# Patient Record
Sex: FEMALE | Race: WHITE | Employment: FULL TIME | URBAN - METROPOLITAN AREA
[De-identification: names, ages, dates, MRNs, and addresses within clinical notes are randomized per-mention and may not be internally consistent; named-entity substitution may affect disease eponyms.]

---

## 2021-02-02 ENCOUNTER — APPOINTMENT (OUTPATIENT)
Dept: GENERAL RADIOLOGY | Age: 36
End: 2021-02-02
Attending: EMERGENCY MEDICINE
Payer: COMMERCIAL

## 2021-02-02 ENCOUNTER — HOSPITAL ENCOUNTER (EMERGENCY)
Age: 36
Discharge: HOME | End: 2021-02-03
Attending: EMERGENCY MEDICINE
Payer: COMMERCIAL

## 2021-02-02 DIAGNOSIS — R07.81 PLEURITIC CHEST PAIN: Primary | ICD-10-CM

## 2021-02-02 DIAGNOSIS — K52.9 GASTROENTERITIS, ACUTE: ICD-10-CM

## 2021-02-02 DIAGNOSIS — Z20.822 SUSPECTED COVID-19 VIRUS INFECTION: ICD-10-CM

## 2021-02-02 LAB
ALBUMIN SERPL-MCNC: 3.3 G/DL (ref 3.5–5)
ALBUMIN/GLOB SERPL: 0.7 {RATIO} (ref 1.2–3.5)
ALP SERPL-CCNC: 105 U/L (ref 50–136)
ALT SERPL-CCNC: 27 U/L (ref 12–65)
ANION GAP SERPL CALC-SCNC: 7 MMOL/L (ref 7–16)
AST SERPL-CCNC: 28 U/L (ref 15–37)
BASOPHILS # BLD: 0 K/UL (ref 0–0.2)
BASOPHILS NFR BLD: 0 % (ref 0–2)
BILIRUB SERPL-MCNC: 0.4 MG/DL (ref 0.2–1.1)
BUN SERPL-MCNC: 13 MG/DL (ref 6–23)
CALCIUM SERPL-MCNC: 9.1 MG/DL (ref 8.3–10.4)
CHLORIDE SERPL-SCNC: 101 MMOL/L (ref 98–107)
CO2 SERPL-SCNC: 28 MMOL/L (ref 21–32)
CREAT SERPL-MCNC: 0.62 MG/DL (ref 0.6–1)
DIFFERENTIAL METHOD BLD: ABNORMAL
EOSINOPHIL # BLD: 0 K/UL (ref 0–0.8)
EOSINOPHIL NFR BLD: 0 % (ref 0.5–7.8)
ERYTHROCYTE [DISTWIDTH] IN BLOOD BY AUTOMATED COUNT: 13.7 % (ref 11.9–14.6)
GLOBULIN SER CALC-MCNC: 4.6 G/DL (ref 2.3–3.5)
GLUCOSE SERPL-MCNC: 187 MG/DL (ref 65–100)
HCG UR QL: NEGATIVE
HCT VFR BLD AUTO: 43.8 % (ref 35.8–46.3)
HGB BLD-MCNC: 14.3 G/DL (ref 11.7–15.4)
IMM GRANULOCYTES # BLD AUTO: 0 K/UL (ref 0–0.5)
IMM GRANULOCYTES NFR BLD AUTO: 0 % (ref 0–5)
LIPASE SERPL-CCNC: 56 U/L (ref 73–393)
LYMPHOCYTES # BLD: 1.2 K/UL (ref 0.5–4.6)
LYMPHOCYTES NFR BLD: 16 % (ref 13–44)
MCH RBC QN AUTO: 27.8 PG (ref 26.1–32.9)
MCHC RBC AUTO-ENTMCNC: 32.6 G/DL (ref 31.4–35)
MCV RBC AUTO: 85.2 FL (ref 79.6–97.8)
MONOCYTES # BLD: 0.7 K/UL (ref 0.1–1.3)
MONOCYTES NFR BLD: 9 % (ref 4–12)
NEUTS SEG # BLD: 5.4 K/UL (ref 1.7–8.2)
NEUTS SEG NFR BLD: 74 % (ref 43–78)
NRBC # BLD: 0 K/UL (ref 0–0.2)
PLATELET # BLD AUTO: 190 K/UL (ref 150–450)
PMV BLD AUTO: 9.3 FL (ref 9.4–12.3)
POTASSIUM SERPL-SCNC: 3.9 MMOL/L (ref 3.5–5.1)
PROT SERPL-MCNC: 7.9 G/DL (ref 6.3–8.2)
RBC # BLD AUTO: 5.14 M/UL (ref 4.05–5.2)
SODIUM SERPL-SCNC: 136 MMOL/L (ref 136–145)
WBC # BLD AUTO: 7.3 K/UL (ref 4.3–11.1)

## 2021-02-02 PROCEDURE — 71046 X-RAY EXAM CHEST 2 VIEWS: CPT

## 2021-02-02 PROCEDURE — 85025 COMPLETE CBC W/AUTO DIFF WBC: CPT

## 2021-02-02 PROCEDURE — 83690 ASSAY OF LIPASE: CPT

## 2021-02-02 PROCEDURE — 74011000250 HC RX REV CODE- 250: Performed by: EMERGENCY MEDICINE

## 2021-02-02 PROCEDURE — 96375 TX/PRO/DX INJ NEW DRUG ADDON: CPT

## 2021-02-02 PROCEDURE — U0002 COVID-19 LAB TEST NON-CDC: HCPCS

## 2021-02-02 PROCEDURE — 80053 COMPREHEN METABOLIC PANEL: CPT

## 2021-02-02 PROCEDURE — 81025 URINE PREGNANCY TEST: CPT

## 2021-02-02 PROCEDURE — U0003 INFECTIOUS AGENT DETECTION BY NUCLEIC ACID (DNA OR RNA); SEVERE ACUTE RESPIRATORY SYNDROME CORONAVIRUS 2 (SARS-COV-2) (CORONAVIRUS DISEASE [COVID-19]), AMPLIFIED PROBE TECHNIQUE, MAKING USE OF HIGH THROUGHPUT TECHNOLOGIES AS DESCRIBED BY CMS-2020-01-R: HCPCS

## 2021-02-02 PROCEDURE — 96374 THER/PROPH/DIAG INJ IV PUSH: CPT

## 2021-02-02 PROCEDURE — 81003 URINALYSIS AUTO W/O SCOPE: CPT

## 2021-02-02 PROCEDURE — 94640 AIRWAY INHALATION TREATMENT: CPT

## 2021-02-02 PROCEDURE — 74011250636 HC RX REV CODE- 250/636: Performed by: EMERGENCY MEDICINE

## 2021-02-02 PROCEDURE — 99283 EMERGENCY DEPT VISIT LOW MDM: CPT

## 2021-02-02 RX ORDER — METHYLPREDNISOLONE 4 MG/1
TABLET ORAL
Qty: 1 DOSE PACK | Refills: 0 | Status: SHIPPED | OUTPATIENT
Start: 2021-02-02 | End: 2021-02-28

## 2021-02-02 RX ORDER — ONDANSETRON 2 MG/ML
4 INJECTION INTRAMUSCULAR; INTRAVENOUS
Status: COMPLETED | OUTPATIENT
Start: 2021-02-02 | End: 2021-02-02

## 2021-02-02 RX ORDER — GLYBURIDE 5 MG/1
10 TABLET ORAL 2 TIMES DAILY WITH MEALS
COMMUNITY

## 2021-02-02 RX ORDER — HYOSCYAMINE SULFATE 0.12 MG/1
0.25 TABLET SUBLINGUAL
Qty: 20 TAB | Refills: 0 | Status: SHIPPED | OUTPATIENT
Start: 2021-02-02 | End: 2021-02-28

## 2021-02-02 RX ORDER — LISINOPRIL AND HYDROCHLOROTHIAZIDE 12.5; 2 MG/1; MG/1
1 TABLET ORAL DAILY
COMMUNITY

## 2021-02-02 RX ORDER — BUSPIRONE HYDROCHLORIDE 10 MG/1
10 TABLET ORAL 3 TIMES DAILY
COMMUNITY

## 2021-02-02 RX ORDER — ALBUTEROL SULFATE 90 UG/1
2 AEROSOL, METERED RESPIRATORY (INHALATION)
Qty: 1 INHALER | Refills: 0 | Status: SHIPPED | OUTPATIENT
Start: 2021-02-02

## 2021-02-02 RX ORDER — IPRATROPIUM BROMIDE AND ALBUTEROL SULFATE 2.5; .5 MG/3ML; MG/3ML
3 SOLUTION RESPIRATORY (INHALATION)
Status: COMPLETED | OUTPATIENT
Start: 2021-02-02 | End: 2021-02-02

## 2021-02-02 RX ORDER — ESCITALOPRAM OXALATE 10 MG/1
10 TABLET ORAL DAILY
COMMUNITY

## 2021-02-02 RX ORDER — NAPROXEN 500 MG/1
500 TABLET ORAL 2 TIMES DAILY WITH MEALS
Qty: 20 TAB | Refills: 0 | Status: SHIPPED | OUTPATIENT
Start: 2021-02-02 | End: 2021-02-28

## 2021-02-02 RX ORDER — ONDANSETRON 4 MG/1
4 TABLET, FILM COATED ORAL
Qty: 15 TAB | Refills: 0 | Status: SHIPPED | OUTPATIENT
Start: 2021-02-02 | End: 2021-02-28

## 2021-02-02 RX ADMIN — IPRATROPIUM BROMIDE AND ALBUTEROL SULFATE 3 ML: .5; 3 SOLUTION RESPIRATORY (INHALATION) at 23:58

## 2021-02-02 RX ADMIN — METHYLPREDNISOLONE SODIUM SUCCINATE 125 MG: 125 INJECTION, POWDER, FOR SOLUTION INTRAMUSCULAR; INTRAVENOUS at 23:52

## 2021-02-02 RX ADMIN — ONDANSETRON 4 MG: 2 INJECTION INTRAMUSCULAR; INTRAVENOUS at 22:23

## 2021-02-02 RX ADMIN — SODIUM CHLORIDE 1000 ML: 900 INJECTION, SOLUTION INTRAVENOUS at 22:19

## 2021-02-02 NOTE — Clinical Note
[address] [address] EMERGENCY DEPT 
[id number] [facility] 
[place] [facility] [place] [id number] [phone] Work/School Note Date: 2/2/2021 To Whom It May concern: 
 
Jillian Bolanos was evaulated by the following provider(s): 
Attending Provider: Diandra Rojas MD.   COVID19 virus is suspected. Per the CDC guidelines we recommend home isolation until the following conditions are all met: 1. At least 10 days have passed since symptoms first appeared and 2. At least 24 hours have passed since last fever without the use of fever-reducing medications and 
3. Symptoms (e.g., cough, shortness of breath) have improved Sincerely, Liz Palm MD

## 2021-02-03 VITALS
RESPIRATION RATE: 18 BRPM | HEART RATE: 111 BPM | SYSTOLIC BLOOD PRESSURE: 118 MMHG | HEIGHT: 68 IN | DIASTOLIC BLOOD PRESSURE: 81 MMHG | WEIGHT: 220 LBS | TEMPERATURE: 99.5 F | OXYGEN SATURATION: 100 % | BODY MASS INDEX: 33.34 KG/M2

## 2021-02-03 LAB — SARS COV-2, XPGCVT: POSITIVE

## 2021-02-03 NOTE — ED NOTES
I have reviewed discharge instructions with the patient. The patient verbalized understanding. Patient left ED via Discharge Method: ambulatory to Home f family, self). Opportunity for questions and clarification provided. Patient given 5 scripts. To continue your aftercare when you leave the hospital, you may receive an automated call from our care team to check in on how you are doing. This is a free service and part of our promise to provide the best care and service to meet your aftercare needs.  If you have questions, or wish to unsubscribe from this service please call [phone]. Thank you for Choosing our [facility] Emergency Department.

## 2021-02-03 NOTE — ED PROVIDER NOTES
The history is provided by the patient. Cough  This is a recurrent problem. The current episode started more than 2 days ago. The problem occurs constantly. The problem has been gradually worsening. The cough is non-productive. Patient reports a subjective fever - was not measured. Associated symptoms include headaches, shortness of breath, nausea and vomiting. Pertinent negatives include no chest pain, no chills, no ear pain, no rhinorrhea and no myalgias. She has tried antibiotics for the symptoms. The treatment provided no relief. Her past medical history is significant for asthma. Diarrhea   Associated symptoms include diarrhea, nausea, vomiting and headaches. Pertinent negatives include no fever, no constipation, no dysuria, no arthralgias, no myalgias and no chest pain. Vomiting   Associated symptoms include diarrhea, headaches, cough and headaches. Pertinent negatives include no chills, no fever, no abdominal pain, no arthralgias and no myalgias. Past Medical History:   Diagnosis Date    Asthma     Diabetes mellitus ([facility] [address]     Hypertension        History reviewed. No pertinent surgical history. No family history on file.     Social History     Socioeconomic History    Marital status: SINGLE     Spouse name: Not on file    Number of children: Not on file    Years of education: Not on file    Highest education level: Not on file   Occupational History    Not on file   Social Needs    Financial resource strain: Not on file    Food insecurity     Worry: Not on file     Inability: Not on file    Transportation needs     Medical: Not on file     Non-medical: Not on file   Tobacco Use    Smoking status: Not on file   Substance and Sexual Activity    Alcohol use: Not on file    Drug use: Not on file    Sexual activity: Not on file   Lifestyle    Physical activity     Days per week: Not on file     Minutes per session: Not on file    Stress: Not on file   Relationships    Social connections     Talks on phone: Not on file     Gets together: Not on file     Attends Anabaptism service: Not on file     Active member of club or organization: Not on file     Attends meetings of clubs or organizations: Not on file     Relationship status: Not on file    Intimate partner violence     Fear of current or ex partner: Not on file     Emotionally abused: Not on file     Physically abused: Not on file     Forced sexual activity: Not on file   Other Topics Concern    Not on file   Social History Narrative    Not on file         ALLERGIES: Amoxicillin, Gabapentin, and Penicillins    Review of Systems   Constitutional: Negative for chills and fever. HENT: Negative for congestion, ear pain and rhinorrhea. Eyes: Negative for photophobia and discharge. Respiratory: Positive for cough and shortness of breath. Cardiovascular: Negative for chest pain and palpitations. Gastrointestinal: Positive for diarrhea, nausea and vomiting. Negative for abdominal pain and constipation. Endocrine: Negative for cold intolerance and heat intolerance. Genitourinary: Negative for dysuria and flank pain. Musculoskeletal: Negative for arthralgias, myalgias and neck pain. Skin: Negative for rash and wound. Allergic/Immunologic: Negative for environmental allergies and food allergies. Neurological: Positive for headaches. Negative for syncope. Hematological: Negative for adenopathy. Does not bruise/bleed easily. Psychiatric/Behavioral: Negative for dysphoric mood. The patient is not nervous/anxious. All other systems reviewed and are negative. Vitals:    02/02/21 1944   BP: 118/81   Pulse: (!) 111   Resp: 18   Temp: 99.5 °F (37.5 °C)   SpO2: 93%   Weight: 99.8 kg (220 lb)   Height: 5' 8\" (1.727 m)            Physical Exam  Vitals signs and nursing note reviewed. Constitutional:       General: She is in acute distress. Appearance: Normal appearance. She is well-developed. She is obese. HENT:      Head: Normocephalic and atraumatic. Right Ear: External ear normal.      Left Ear: External ear normal.      Mouth/Throat:      Mouth: Mucous membranes are moist.      Pharynx: Oropharynx is clear. No oropharyngeal exudate or posterior oropharyngeal erythema. Eyes:      Extraocular Movements: Extraocular movements intact. Conjunctiva/sclera: Conjunctivae normal.      Pupils: Pupils are equal, round, and reactive to light. Neck:      Musculoskeletal: Normal range of motion and neck supple. Vascular: No JVD. Cardiovascular:      Rate and Rhythm: Normal rate and regular rhythm. Pulses: Normal pulses. Heart sounds: Normal heart sounds. No murmur. No friction rub. No gallop. Pulmonary:      Effort: Pulmonary effort is normal.      Breath sounds: Normal breath sounds. Abdominal:      General: Bowel sounds are normal. There is no distension. Palpations: Abdomen is soft. There is no mass. Tenderness: There is no abdominal tenderness. Musculoskeletal: Normal range of motion. General: No deformity. Skin:     General: Skin is warm and dry. Capillary Refill: Capillary refill takes less than 2 seconds. Findings: No rash. Neurological:      General: No focal deficit present. Mental Status: She is alert and oriented to person, place, and time. Cranial Nerves: No cranial nerve deficit. Sensory: No sensory deficit. Gait: Gait normal.   Psychiatric:         Mood and Affect: Mood normal.         Speech: Speech normal.         Behavior: Behavior normal.         Thought Content:  Thought content normal.         Judgment: Judgment normal.          MDM  Number of Diagnoses or Management Options  Gastroenteritis, acute: new and requires workup  Pleuritic chest pain: new and requires workup  Suspected COVID-19 virus infection: new and requires workup  Diagnosis management comments: Urine dip negative  Urine hCG negative    Chest x-ray is essentially negative    Lab work unremarkable    Patient symptoms are consistent with a viral illness. We will start her on some steroids and an inhaler. Levsin Zofran for her GI issues    We will Covid test prior to discharge  Isolation protocol discussed  Work note provided       Amount and/or Complexity of Data Reviewed  Clinical lab tests: ordered and reviewed  Tests in the radiology section of CPT®: ordered and reviewed  Tests in the medicine section of CPT®: reviewed  Decide to obtain previous medical records or to obtain history from someone other than the patient: yes  Review and summarize past medical records: yes  Independent visualization of images, tracings, or specimens: yes    Risk of Complications, Morbidity, and/or Mortality  Presenting problems: moderate  Diagnostic procedures: moderate  Management options: moderate  General comments: Elements of this note have been dictated via voice recognition software. Text and phrases may be limited by the accuracy of the software. The chart has been reviewed, but errors may still be present.       Patient Progress  Patient progress: improved         Procedures

## 2021-02-03 NOTE — ED TRIAGE NOTES
Pt to ED from [facility] for n/v/d and cough. The n/v/d began on Saturday and pt states poor oral intake. Pt states cough with mucus production that began approx ten days ago. Pt was prescribed a z-marta last week and completed the course. Pt states she has a pressure on her chest and tightness and difficulty breathing. Pt states taking OTC dayquil but has not been able to \"keep it down\" Pt denies fevers at home or bodyaches. Pt masked PTA.

## 2021-02-03 NOTE — DISCHARGE INSTRUCTIONS
Take medications as directed  Increase your fluid intake  Canvas diet  Recheck with your doctor or the follow-up doctor  If you develop fever or any progressive shortness of breath,  Return to ER for any worsening symptoms or new problems which may arise

## 2021-02-03 NOTE — ED NOTES
Pt presents to the ED for c/o cough, diarrhea and vomiting. States that she was seen and treated in her PMD's office and given antibiotics and a cough meds.   Has had nausea and vomiting prior to getting her meds

## 2021-02-04 NOTE — PROGRESS NOTES
02/04/21 1st attempt to notify patient of positive Covid 19 result; mailbox full; unable to leave message

## 2021-02-05 NOTE — PROGRESS NOTES
02/05/21 Patient notified of Positive Covid 19 result; home care instructions discussed; questions answered

## 2021-02-28 ENCOUNTER — HOSPITAL ENCOUNTER (EMERGENCY)
Age: 36
Discharge: HOME | End: 2021-02-28
Attending: EMERGENCY MEDICINE
Payer: COMMERCIAL

## 2021-02-28 VITALS
SYSTOLIC BLOOD PRESSURE: 158 MMHG | OXYGEN SATURATION: 99 % | TEMPERATURE: 98.9 F | HEART RATE: 90 BPM | DIASTOLIC BLOOD PRESSURE: 84 MMHG | RESPIRATION RATE: 16 BRPM

## 2021-02-28 DIAGNOSIS — K04.7 DENTAL INFECTION: Primary | ICD-10-CM

## 2021-02-28 DIAGNOSIS — K08.409 HISTORY OF TOOTH EXTRACTION, UNSPECIFIED EDENTULISM CLASS: ICD-10-CM

## 2021-02-28 PROCEDURE — 99283 EMERGENCY DEPT VISIT LOW MDM: CPT

## 2021-02-28 RX ORDER — CLINDAMYCIN HYDROCHLORIDE 300 MG/1
300 CAPSULE ORAL 3 TIMES DAILY
Qty: 30 CAP | Refills: 0 | Status: SHIPPED | OUTPATIENT
Start: 2021-02-28 | End: 2021-03-10

## 2021-02-28 RX ORDER — HYDROCODONE BITARTRATE AND ACETAMINOPHEN 5; 325 MG/1; MG/1
1 TABLET ORAL
Qty: 10 TAB | Refills: 0 | Status: SHIPPED | OUTPATIENT
Start: 2021-02-28 | End: 2021-03-03

## 2021-02-28 NOTE — ED PROVIDER NOTES
Emergency department with a dental pain after a tooth extraction couple days ago. Now she is complaining of additional pain she states they did not put her on antibiotics. She has pain mostly located over her left upper jaw. The history is provided by the patient. Dental Pain   This is a new problem. The problem occurs constantly. The problem has not changed since onset. The pain is located in the generalized mouth. The pain is at a severity of 8/10. The pain is moderate. There was no vomiting, no nausea, no fever, no swelling, no chest pain, no shortness of breath, no headaches, no gum redness and no drainage. She has tried nothing for the symptoms. The treatment provided no relief. Past Medical History:   Diagnosis Date    Asthma     Diabetes mellitus ([facility] [address]     Hypertension        No past surgical history on file. No family history on file.     Social History     Socioeconomic History    Marital status: SINGLE     Spouse name: Not on file    Number of children: Not on file    Years of education: Not on file    Highest education level: Not on file   Occupational History    Not on file   Social Needs    Financial resource strain: Not on file    Food insecurity     Worry: Not on file     Inability: Not on file    Transportation needs     Medical: Not on file     Non-medical: Not on file   Tobacco Use    Smoking status: Not on file   Substance and Sexual Activity    Alcohol use: Not on file    Drug use: Not on file    Sexual activity: Not on file   Lifestyle    Physical activity     Days per week: Not on file     Minutes per session: Not on file    Stress: Not on file   Relationships    Social connections     Talks on phone: Not on file     Gets together: Not on file     Attends Buddhist service: Not on file     Active member of club or organization: Not on file     Attends meetings of clubs or organizations: Not on file     Relationship status: Not on file    Intimate partner violence     Fear of current or ex partner: Not on file     Emotionally abused: Not on file     Physically abused: Not on file     Forced sexual activity: Not on file   Other Topics Concern   • Not on file   Social History Narrative   • Not on file         ALLERGIES: Amoxicillin, Gabapentin, and Penicillins    Review of Systems   Constitutional: Negative.  Negative for activity change, appetite change, chills and fever.   HENT: Positive for dental problem.    Respiratory: Negative.  Negative for cough and shortness of breath.    Cardiovascular: Negative.    Gastrointestinal: Negative.    Genitourinary: Negative.    Musculoskeletal: Negative.  Negative for gait problem.   Neurological: Negative.    All other systems reviewed and are negative.      Vitals:    02/28/21 1418   BP: (!) 160/92   Pulse: 98   Resp: 18   Temp: 98.9 °F (37.2 °C)   SpO2: 98%            Physical Exam  Vitals signs and nursing note reviewed.   Constitutional:       General: She is not in acute distress.     Appearance: Normal appearance. She is not ill-appearing, toxic-appearing or diaphoretic.   HENT:      Head: Normocephalic and atraumatic.      Mouth/Throat:      Dentition: Abnormal dentition. Gingival swelling present. No dental tenderness, dental caries or dental abscesses.      Tongue: No lesions. Tongue does not deviate from midline.        Comments: No infection noted all upper teeth were extracted.  No signs of abscess.  There is some gingival swelling.  Eyes:      Conjunctiva/sclera: Conjunctivae normal.   Cardiovascular:      Rate and Rhythm: Normal rate and regular rhythm.      Pulses: Normal pulses.      Heart sounds: Normal heart sounds.   Pulmonary:      Effort: Pulmonary effort is normal. No respiratory distress.      Breath sounds: Normal breath sounds and air entry. No stridor, decreased air movement or transmitted upper airway sounds. No decreased breath sounds, wheezing, rhonchi or rales.   Chest:      Chest wall: No  tenderness. Skin:     General: Skin is warm and dry. Neurological:      General: No focal deficit present. Mental Status: She is alert and oriented to person, place, and time. Mental status is at baseline. MDM  Number of Diagnoses or Management Options  Dental infection: new and requires workup  History of tooth extraction, unspecified edentulism class: new and requires workup  Diagnosis management comments: We will treat with antibiotics and discharge her home. She is stable for discharge home.        Amount and/or Complexity of Data Reviewed  Discuss the patient with other providers: yes    Risk of Complications, Morbidity, and/or Mortality  Presenting problems: moderate  Diagnostic procedures: low  Management options: low    Patient Progress  Patient progress: stable         Procedures

## 2021-02-28 NOTE — ED TRIAGE NOTES
Pt states she had 8 teeth pulled 5 days ago. Has run out of her [place] tabs and the dentist has been closed all weekend. C/o continued L upper dental pain and states it feels swollen and possibly infected.

## 2021-02-28 NOTE — LETTER
Yulia Montgomery was seen and treated in our emergency department on 2/28/2021. She may return to work on 3/4/2021  
if you have any questions or concerns, please don't hesitate to call.

## 2021-02-28 NOTE — ED NOTES
I have reviewed discharge instructions with the patient. The patient verbalized understanding. Patient left ED via Discharge Method: ambulatory to Home with self. Opportunity for questions and clarification provided. Patient given 0 scripts. 2 E-scripts sent to pharmacy        To continue your aftercare when you leave the hospital, you may receive an automated call from our care team to check in on how you are doing. This is a free service and part of our promise to provide the best care and service to meet your aftercare needs.  If you have questions, or wish to unsubscribe from this service please call [phone]. Thank you for Choosing our [facility] Emergency Department.

## 2021-06-04 ENCOUNTER — HOSPITAL ENCOUNTER (EMERGENCY)
Age: 36
Discharge: HOME | End: 2021-06-04
Attending: EMERGENCY MEDICINE

## 2021-06-04 VITALS
BODY MASS INDEX: 33.35 KG/M2 | WEIGHT: 220.02 LBS | DIASTOLIC BLOOD PRESSURE: 85 MMHG | OXYGEN SATURATION: 98 % | RESPIRATION RATE: 16 BRPM | HEART RATE: 95 BPM | SYSTOLIC BLOOD PRESSURE: 133 MMHG | TEMPERATURE: 98.7 F | HEIGHT: 68 IN

## 2021-06-04 DIAGNOSIS — S39.012A BACK STRAIN, INITIAL ENCOUNTER: Primary | ICD-10-CM

## 2021-06-04 DIAGNOSIS — M62.838 MUSCLE SPASM: ICD-10-CM

## 2021-06-04 PROCEDURE — 99282 EMERGENCY DEPT VISIT SF MDM: CPT

## 2021-06-04 RX ORDER — METHOCARBAMOL 750 MG/1
1500 TABLET, FILM COATED ORAL 4 TIMES DAILY
Qty: 40 TABLET | Refills: 0 | Status: SHIPPED | OUTPATIENT
Start: 2021-06-04

## 2021-06-04 RX ORDER — DIFLUNISAL 500 MG/1
500 TABLET, FILM COATED ORAL 2 TIMES DAILY
Qty: 20 TABLET | Refills: 0 | Status: SHIPPED | OUTPATIENT
Start: 2021-06-04 | End: 2021-06-14

## 2021-06-05 NOTE — ED TRIAGE NOTES
Pt states that she is having neck and shoulder pain that radiates down into her back.   Masked on arrival

## 2021-06-05 NOTE — ED NOTES
I have reviewed discharge instructions with the patient. The patient verbalized understanding. Patient left ED via Discharge Method: ambulatory to Home with family). Opportunity for questions and clarification provided. Patient given 2 scripts. To continue your aftercare when you leave the hospital, you may receive an automated call from our care team to check in on how you are doing. This is a free service and part of our promise to provide the best care and service to meet your aftercare needs.  If you have questions, or wish to unsubscribe from this service please call [phone]. Thank you for Choosing our [facility] Emergency Department.

## 2021-06-05 NOTE — ED PROVIDER NOTES
The history is provided by the patient. Neck Pain   This is a new problem. The current episode started more than 2 days ago. The problem occurs constantly. The problem has been gradually worsening. The pain is associated with lifting a heavy object and twisting. There has been no fever. The pain is present in the generalized neck. The quality of the pain is described as aching and cramping. The pain is moderate. The symptoms are aggravated by bending and twisting. Pertinent negatives include no photophobia, no chest pain, no headaches, no bowel incontinence and no bladder incontinence. She has tried NSAIDs for the symptoms. The treatment provided mild relief. Back Pain   This is a new problem. The current episode started more than 2 days ago. The problem has been gradually worsening. The problem occurs constantly. The pain is associated with lifting and twisting. The pain is present in the lower back. The quality of the pain is described as aching and cramping. The pain does not radiate. The pain is moderate. The symptoms are aggravated by bending, twisting and certain positions. Pertinent negatives include no chest pain, no fever, no headaches, no abdominal pain, no bowel incontinence, no bladder incontinence and no dysuria. She has tried NSAIDs for the symptoms. The treatment provided mild relief. Risk factors include obesity. The patient's surgical history non-contributory        Past Medical History:   Diagnosis Date    Asthma     Diabetes mellitus ([facility] [address]     Hypertension        History reviewed. No pertinent surgical history. History reviewed. No pertinent family history.     Social History     Socioeconomic History    Marital status: SINGLE     Spouse name: Not on file    Number of children: Not on file    Years of education: Not on file    Highest education level: Not on file   Occupational History    Not on file   Tobacco Use    Smoking status: Never Smoker    Smokeless tobacco: Never Used Substance and Sexual Activity    Alcohol use: Not Currently    Drug use: Not Currently    Sexual activity: Yes     Partners: Male   Other Topics Concern    Not on file   Social History Narrative    Not on file     Social Determinants of Health     Financial Resource Strain:     Difficulty of Paying Living Expenses:    Food Insecurity:     Worried About Running Out of Food in the Last Year:     [address] in the Last Year:    Transportation Needs:     Lack of Transportation (Medical):  Lack of Transportation (Non-Medical):    Physical Activity:     Days of Exercise per Week:     Minutes of Exercise per Session:    Stress:     Feeling of Stress :    Social Connections:     Frequency of Communication with Friends and Family:     Frequency of Social Gatherings with Friends and Family:     Attends Worship Services:     Active Member of Clubs or Organizations:     Attends Club or Organization Meetings:     Marital Status:    Intimate Partner Violence:     Fear of Current or Ex-Partner:     Emotionally Abused:     Physically Abused:     Sexually Abused: ALLERGIES: Amoxicillin, Gabapentin, and Penicillins    Review of Systems   Constitutional: Negative for chills and fever. HENT: Negative for congestion, ear pain and rhinorrhea. Eyes: Negative for photophobia and discharge. Respiratory: Negative for cough and shortness of breath. Cardiovascular: Negative for chest pain and palpitations. Gastrointestinal: Negative for abdominal pain, bowel incontinence, constipation, diarrhea and vomiting. Endocrine: Negative for cold intolerance and heat intolerance. Genitourinary: Negative for bladder incontinence, dysuria and flank pain. Musculoskeletal: Positive for back pain and neck pain. Negative for arthralgias and myalgias. Skin: Negative for rash and wound. Allergic/Immunologic: Negative for environmental allergies and food allergies.    Neurological: Negative for syncope and headaches. Hematological: Negative for adenopathy. Does not bruise/bleed easily. Psychiatric/Behavioral: Negative for dysphoric mood. The patient is not nervous/anxious. All other systems reviewed and are negative. Vitals:    06/04/21 2051   BP: 133/85   Pulse: 95   Resp: 16   Temp: 98.7 °F (37.1 °C)   SpO2: 98%   Weight: 99.8 kg (220 lb 0.3 oz)   Height: 5' 8\" (1.727 m)            Physical Exam  Vitals and nursing note reviewed. Constitutional:       General: She is in acute distress. Appearance: Normal appearance. She is well-developed. She is obese. HENT:      Head: Normocephalic and atraumatic. Right Ear: External ear normal.      Left Ear: External ear normal.      Mouth/Throat:      Pharynx: No oropharyngeal exudate. Eyes:      Conjunctiva/sclera: Conjunctivae normal.      Pupils: Pupils are equal, round, and reactive to light. Neck:      Vascular: No JVD. Cardiovascular:      Rate and Rhythm: Normal rate and regular rhythm. Pulses: Normal pulses. Heart sounds: Normal heart sounds. No murmur heard. No friction rub. No gallop. Pulmonary:      Effort: Pulmonary effort is normal.      Breath sounds: Normal breath sounds. Abdominal:      General: Bowel sounds are normal. There is no distension. Palpations: Abdomen is soft. There is no mass. Tenderness: There is no abdominal tenderness. Musculoskeletal:         General: No deformity. Normal range of motion. Cervical back: Normal range of motion and neck supple. Tenderness present. No rigidity, torticollis or bony tenderness. Pain with movement present. Thoracic back: Normal.      Lumbar back: Spasms and tenderness present. No edema or bony tenderness. Back:    Skin:     General: Skin is warm and dry. Capillary Refill: Capillary refill takes less than 2 seconds. Findings: No rash. Neurological:      General: No focal deficit present.       Mental Status: She is alert and oriented to person, place, and time. Mental status is at baseline. Cranial Nerves: No cranial nerve deficit. Sensory: No sensory deficit. Gait: Gait normal.   Psychiatric:         Mood and Affect: Mood normal.         Speech: Speech normal.         Behavior: Behavior normal.         Thought Content: Thought content normal.         Judgment: Judgment normal.          MDM  Number of Diagnoses or Management Options  Back strain, initial encounter: new and does not require workup  Muscle spasm: new and does not require workup  Diagnosis management comments: Patient here with 4 to 5 days of paraspinal cervical and lumbar discomfort. 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 She has had no acute trauma such as a fall motor vehicle accident or direct blows    Patient will be treated symptomatically with Dolobid and Robaxin  Given the lack of a traumatic mechanism there is no indication for imaging       Amount and/or Complexity of Data Reviewed  Tests in the medicine section of CPT®: reviewed    Risk of Complications, Morbidity, and/or Mortality  Presenting problems: moderate  Diagnostic procedures: minimal  Management options: low  General comments: Elements of this note have been dictated via voice recognition software. Text and phrases may be limited by the accuracy of the software. The chart has been reviewed, but errors may still be present.       Patient Progress  Patient progress: stable         Procedures

## 2021-06-05 NOTE — DISCHARGE INSTRUCTIONS
Take medications as prescribed  No lifting, bending or other strenous activities  Call your doctor/follow up doctor to set up appointment for recheck visit    Do not drink alcohol or drive while taking the prescription pain medications    Return to ER for any worsening symptoms or new problems which may arise

## 2021-06-14 ENCOUNTER — APPOINTMENT (OUTPATIENT)
Dept: GENERAL RADIOLOGY | Age: 36
End: 2021-06-14
Attending: EMERGENCY MEDICINE
Payer: COMMERCIAL

## 2021-06-14 ENCOUNTER — HOSPITAL ENCOUNTER (EMERGENCY)
Age: 36
Discharge: HOME | End: 2021-06-14
Attending: EMERGENCY MEDICINE
Payer: COMMERCIAL

## 2021-06-14 VITALS
HEIGHT: 68 IN | SYSTOLIC BLOOD PRESSURE: 133 MMHG | DIASTOLIC BLOOD PRESSURE: 84 MMHG | RESPIRATION RATE: 18 BRPM | HEART RATE: 96 BPM | OXYGEN SATURATION: 97 % | BODY MASS INDEX: 33.34 KG/M2 | TEMPERATURE: 99.1 F | WEIGHT: 220 LBS

## 2021-06-14 DIAGNOSIS — M25.551 RIGHT HIP PAIN: Primary | ICD-10-CM

## 2021-06-14 PROCEDURE — 99283 EMERGENCY DEPT VISIT LOW MDM: CPT

## 2021-06-14 PROCEDURE — 74011250636 HC RX REV CODE- 250/636: Performed by: PHYSICIAN ASSISTANT

## 2021-06-14 PROCEDURE — 73502 X-RAY EXAM HIP UNI 2-3 VIEWS: CPT

## 2021-06-14 PROCEDURE — 96372 THER/PROPH/DIAG INJ SC/IM: CPT

## 2021-06-14 RX ORDER — METHYL SALICYLATE/MENTH/CAMPH 15%-5%-3%
CREAM (GRAM) TOPICAL
Qty: 60 G | Refills: 0 | Status: SHIPPED | OUTPATIENT
Start: 2021-06-14

## 2021-06-14 RX ORDER — CYCLOBENZAPRINE HCL 10 MG
10 TABLET ORAL
Qty: 21 TABLET | Refills: 0 | Status: SHIPPED | OUTPATIENT
Start: 2021-06-14 | End: 2021-06-21

## 2021-06-14 RX ORDER — NAPROXEN SODIUM 550 MG/1
550 TABLET ORAL 2 TIMES DAILY WITH MEALS
Qty: 20 TABLET | Refills: 0 | Status: SHIPPED | OUTPATIENT
Start: 2021-06-14 | End: 2021-06-24

## 2021-06-14 RX ORDER — KETOROLAC TROMETHAMINE 30 MG/ML
30 INJECTION, SOLUTION INTRAMUSCULAR; INTRAVENOUS
Status: COMPLETED | OUTPATIENT
Start: 2021-06-14 | End: 2021-06-14

## 2021-06-14 RX ADMIN — KETOROLAC TROMETHAMINE 30 MG: 30 INJECTION, SOLUTION INTRAMUSCULAR at 16:01

## 2021-06-14 NOTE — ED NOTES
I have reviewed discharge instructions with the patient. The patient verbalized understanding. Patient left ED via Discharge Method: ambulatory to Home with self. Opportunity for questions and clarification provided. Patient given 3 scripts. To continue your aftercare when you leave the hospital, you may receive an automated call from our care team to check in on how you are doing. This is a free service and part of our promise to provide the best care and service to meet your aftercare needs.  If you have questions, or wish to unsubscribe from this service please call [phone]. Thank you for Choosing our [facility] Emergency Department.

## 2021-06-14 NOTE — ED TRIAGE NOTES
Pt states she fell at work about one week ago and was having pain in right knee. States she is now having pain in right thigh and hip since yesterday with no new injury. Pt has been ambulatory. Masked.

## 2021-06-14 NOTE — Clinical Note
[address] [address] EMERGENCY DEPT 
[address] 
[facility] [address] [phone] Work/School Note Date: 6/14/2021 To Whom It May concern: 
 
Keren De Oliveira was seen and treated today in the emergency room by the following provider(s): 
Attending Provider: Maria Victoria Yousif DO Physician Assistant: Mainor Blake, [address]. Keren De Oliveira is excused from work/school on 06/14/21 and 06/15/21. She is medically clear to return to work/school on 6/16/2021. Sincerely, Annabella Joshi, [address]

## 2021-06-14 NOTE — ED PROVIDER NOTES
Patient is a 69-year-old female coming in for evaluation of right hip pain. She reports she had a fall about a week ago and was having lower leg pain and right medial thigh pain at that time. She was prescribed a muscle relaxant and oxycodone and her symptoms are improving. She ran out of her muscle relaxant yesterday and she says that she started having pain in the right hip yesterday           Past Medical History:   Diagnosis Date    Asthma     Diabetes mellitus ([address]     Hypertension        History reviewed. No pertinent surgical history. History reviewed. No pertinent family history. Social History     Socioeconomic History    Marital status: SINGLE     Spouse name: Not on file    Number of children: Not on file    Years of education: Not on file    Highest education level: Not on file   Occupational History    Not on file   Tobacco Use    Smoking status: Never Smoker    Smokeless tobacco: Never Used   Substance and Sexual Activity    Alcohol use: Not Currently    Drug use: Not Currently    Sexual activity: Yes     Partners: Male   Other Topics Concern    Not on file   Social History Narrative    Not on file     Social Determinants of Health     Financial Resource Strain:     Difficulty of Paying Living Expenses:    Food Insecurity:     Worried About Running Out of Food in the Last Year:     [address] in the Last Year:    Transportation Needs:     Lack of Transportation (Medical):      Lack of Transportation (Non-Medical):    Physical Activity:     Days of Exercise per Week:     Minutes of Exercise per Session:    Stress:     Feeling of Stress :    Social Connections:     Frequency of Communication with Friends and Family:     Frequency of Social Gatherings with Friends and Family:     Attends [employer] Services:     Active Member of Clubs or Organizations:     Attends Club or Organization Meetings:     Marital Status:    Intimate Partner Violence:     Fear of Current or Ex-Partner:     Emotionally Abused:     Physically Abused:     Sexually Abused: ALLERGIES: Amoxicillin, Gabapentin, and Penicillins    Review of Systems   Constitutional: Negative for chills and fever. Respiratory: Negative for cough and shortness of breath. Cardiovascular: Negative for chest pain. Musculoskeletal: Positive for arthralgias. Negative for back pain and neck pain. Skin: Negative for color change. All other systems reviewed and are negative. Vitals:    06/14/21 1425   BP: 130/86   Pulse: 100   Resp: 16   Temp: 99.1 °F (37.3 °C)   SpO2: 97%   Weight: 99.8 kg (220 lb)   Height: 5' 8\" (1.727 m)            Physical Exam  Vitals and nursing note reviewed. Constitutional:       General: She is not in acute distress. Appearance: Normal appearance. She is not ill-appearing, toxic-appearing or diaphoretic. HENT:      Head: Normocephalic and atraumatic. Eyes:      Conjunctiva/sclera: Conjunctivae normal.   Cardiovascular:      Rate and Rhythm: Normal rate and regular rhythm. Pulses: Normal pulses. Pulmonary:      Effort: Pulmonary effort is normal. No respiratory distress. Breath sounds: Normal air entry. No stridor, decreased air movement or transmitted upper airway sounds. No decreased breath sounds. Abdominal:      General: Abdomen is flat. Palpations: Abdomen is soft. Musculoskeletal:      Cervical back: Normal.      Thoracic back: Normal.      Lumbar back: Normal.      Right hip: Tenderness present. No deformity, lacerations, bony tenderness or crepitus. Decreased range of motion. Normal strength. Left hip: Normal.      Right upper leg: Tenderness present. No swelling, edema, deformity, lacerations or bony tenderness. Left upper leg: Normal.        Legs:       Comments: There is tenderness to palpation of the right lateral hip and the medial aspect of the right thigh.   There is minimally diminished range of motion of the right hip, no leg length discrepancy or external rotation noted. Patient is ambulating without a limp. She is neurovascularly intact. Skin:     General: Skin is warm and dry. Neurological:      General: No focal deficit present. Mental Status: She is alert and oriented to person, place, and time. Mental status is at baseline. MDM  Number of Diagnoses or Management Options  Right hip pain: new and requires workup  Diagnosis management comments: Patient comes in with concern for right hip pain. She injured her lower leg and thigh about a week ago and now is complaining of right hip pain to the lateral aspect. We will get an x-ray and treat with a Toradol injection. We will also refill her prescription for muscle relaxants as this has been helping her pain. Hip x-ray negative. Will treat conservatively and have patient follow-up with PCP.        Amount and/or Complexity of Data Reviewed  Tests in the radiology section of CPT®: reviewed and ordered  Tests in the medicine section of CPT®: reviewed and ordered    Risk of Complications, Morbidity, and/or Mortality  Presenting problems: moderate  Diagnostic procedures: low  Management options: low    Patient Progress  Patient progress: stable         Procedures

## 2021-06-14 NOTE — DISCHARGE INSTRUCTIONS
Take naproxen and Flexeril for pain. Use Tiger balm as needed. Rest and apply ice to the area and return for emergent symptoms. Please follow-up with the primary doctor as scheduled on Thursday.

## 2022-03-18 PROBLEM — M25.551 RIGHT HIP PAIN: Status: ACTIVE | Noted: 2021-06-14

## 2022-09-26 PROCEDURE — 96365 THER/PROPH/DIAG IV INF INIT: CPT

## 2022-09-26 PROCEDURE — 99284 EMERGENCY DEPT VISIT MOD MDM: CPT

## 2022-09-26 PROCEDURE — 96361 HYDRATE IV INFUSION ADD-ON: CPT

## 2022-09-26 RX ORDER — SODIUM CHLORIDE, SODIUM LACTATE, POTASSIUM CHLORIDE, AND CALCIUM CHLORIDE .6; .31; .03; .02 G/100ML; G/100ML; G/100ML; G/100ML
30 INJECTION, SOLUTION INTRAVENOUS ONCE
Status: COMPLETED | OUTPATIENT
Start: 2022-09-27 | End: 2022-09-27

## 2022-09-26 ASSESSMENT — PAIN - FUNCTIONAL ASSESSMENT: PAIN_FUNCTIONAL_ASSESSMENT: 0-10

## 2022-09-26 ASSESSMENT — PAIN SCALES - GENERAL: PAINLEVEL_OUTOF10: 3

## 2022-09-27 ENCOUNTER — HOSPITAL ENCOUNTER (EMERGENCY)
Dept: GENERAL RADIOLOGY | Age: 37
Discharge: HOME | End: 2022-09-30

## 2022-09-27 ENCOUNTER — HOSPITAL ENCOUNTER (EMERGENCY)
Age: 37
Discharge: HOME | End: 2022-09-27
Attending: GENERAL PRACTICE

## 2022-09-27 VITALS
BODY MASS INDEX: 33.34 KG/M2 | WEIGHT: 220 LBS | OXYGEN SATURATION: 98 % | HEIGHT: 68 IN | SYSTOLIC BLOOD PRESSURE: 144 MMHG | TEMPERATURE: 98 F | DIASTOLIC BLOOD PRESSURE: 88 MMHG | RESPIRATION RATE: 20 BRPM | HEART RATE: 99 BPM

## 2022-09-27 DIAGNOSIS — L08.9 DIABETIC FOOT INFECTION (HCC): ICD-10-CM

## 2022-09-27 DIAGNOSIS — E11.628 DIABETIC FOOT INFECTION (HCC): ICD-10-CM

## 2022-09-27 DIAGNOSIS — L03.115 CELLULITIS OF RIGHT LOWER EXTREMITY: Primary | ICD-10-CM

## 2022-09-27 LAB
ALBUMIN SERPL-MCNC: 3.6 G/DL (ref 3.5–5)
ALBUMIN/GLOB SERPL: 0.9 {RATIO} (ref 1.2–3.5)
ALP SERPL-CCNC: 115 U/L (ref 50–136)
ALT SERPL-CCNC: 21 U/L (ref 12–65)
ANION GAP SERPL CALC-SCNC: 6 MMOL/L (ref 4–13)
AST SERPL-CCNC: 10 U/L (ref 15–37)
BASOPHILS # BLD: 0.1 K/UL (ref 0–0.2)
BASOPHILS NFR BLD: 1 % (ref 0–2)
BILIRUB SERPL-MCNC: 0.2 MG/DL (ref 0.2–1.1)
BUN SERPL-MCNC: 18 MG/DL (ref 6–23)
CALCIUM SERPL-MCNC: 9.5 MG/DL (ref 8.3–10.4)
CHLORIDE SERPL-SCNC: 100 MMOL/L (ref 101–110)
CO2 SERPL-SCNC: 27 MMOL/L (ref 21–32)
CREAT SERPL-MCNC: 0.66 MG/DL (ref 0.6–1)
DIFFERENTIAL METHOD BLD: ABNORMAL
EOSINOPHIL # BLD: 0.2 K/UL (ref 0–0.8)
EOSINOPHIL NFR BLD: 2 % (ref 0.5–7.8)
ERYTHROCYTE [DISTWIDTH] IN BLOOD BY AUTOMATED COUNT: 12.9 % (ref 11.9–14.6)
GLOBULIN SER CALC-MCNC: 4.1 G/DL (ref 2.3–3.5)
GLUCOSE SERPL-MCNC: 166 MG/DL (ref 65–100)
HCT VFR BLD AUTO: 45 % (ref 35.8–46.3)
HGB BLD-MCNC: 14.9 G/DL (ref 11.7–15.4)
IMM GRANULOCYTES # BLD AUTO: 0 K/UL (ref 0–0.5)
IMM GRANULOCYTES NFR BLD AUTO: 0 % (ref 0–5)
LACTATE SERPL-SCNC: 1.4 MMOL/L (ref 0.4–2)
LYMPHOCYTES # BLD: 3.4 K/UL (ref 0.5–4.6)
LYMPHOCYTES NFR BLD: 31 % (ref 13–44)
MCH RBC QN AUTO: 26.7 PG (ref 26.1–32.9)
MCHC RBC AUTO-ENTMCNC: 33.1 G/DL (ref 31.4–35)
MCV RBC AUTO: 80.5 FL (ref 79.6–97.8)
MONOCYTES # BLD: 0.8 K/UL (ref 0.1–1.3)
MONOCYTES NFR BLD: 8 % (ref 4–12)
NEUTS SEG # BLD: 6.7 K/UL (ref 1.7–8.2)
NEUTS SEG NFR BLD: 60 % (ref 43–78)
NRBC # BLD: 0 K/UL (ref 0–0.2)
PLATELET # BLD AUTO: 336 K/UL (ref 150–450)
PMV BLD AUTO: 8.9 FL (ref 9.4–12.3)
POTASSIUM SERPL-SCNC: 3.7 MMOL/L (ref 3.5–5.1)
PROCALCITONIN SERPL-MCNC: 0.05 NG/ML (ref 0–0.49)
PROT SERPL-MCNC: 7.7 G/DL (ref 6.3–8.2)
RBC # BLD AUTO: 5.59 M/UL (ref 4.05–5.2)
SODIUM SERPL-SCNC: 133 MMOL/L (ref 136–145)
WBC # BLD AUTO: 11.2 K/UL (ref 4.3–11.1)

## 2022-09-27 PROCEDURE — 84145 PROCALCITONIN (PCT): CPT

## 2022-09-27 PROCEDURE — 83605 ASSAY OF LACTIC ACID: CPT

## 2022-09-27 PROCEDURE — 96361 HYDRATE IV INFUSION ADD-ON: CPT

## 2022-09-27 PROCEDURE — 87040 BLOOD CULTURE FOR BACTERIA: CPT

## 2022-09-27 PROCEDURE — 80053 COMPREHEN METABOLIC PANEL: CPT

## 2022-09-27 PROCEDURE — 2580000003 HC RX 258: Performed by: GENERAL PRACTICE

## 2022-09-27 PROCEDURE — 6360000002 HC RX W HCPCS: Performed by: GENERAL PRACTICE

## 2022-09-27 PROCEDURE — 96365 THER/PROPH/DIAG IV INF INIT: CPT

## 2022-09-27 PROCEDURE — 85025 COMPLETE CBC W/AUTO DIFF WBC: CPT

## 2022-09-27 PROCEDURE — 71045 X-RAY EXAM CHEST 1 VIEW: CPT

## 2022-09-27 RX ADMIN — CEFTRIAXONE 1000 MG: 1 INJECTION, POWDER, FOR SOLUTION INTRAMUSCULAR; INTRAVENOUS at 02:13

## 2022-09-27 RX ADMIN — SODIUM CHLORIDE, SODIUM LACTATE, POTASSIUM CHLORIDE, AND CALCIUM CHLORIDE 1917 ML: 600; 310; 30; 20 INJECTION, SOLUTION INTRAVENOUS at 01:00

## 2022-09-27 NOTE — ED PROVIDER NOTES
Emergency Department Provider Note                   PCP:                None Provider               Age: 43 y.o. Sex: female       ICD-10-CM    1. Cellulitis of right lower extremity  L03.115       2. Diabetic foot infection ([facility]  E11.628     L08.9           DISPOSITION [place] 09/27/2022 02:03:21 AM        MDM  Number of Diagnoses or Management Options  Cellulitis of right lower extremity: new, needed workup  Diabetic foot infection [facility]): new, needed workup  Diagnosis management comments: Patient presented with diabetic foot infection that is worsening despite outpatient treatment. Infection is also severe enough that I told the patient she needed to be admitted to the hospital.  Patient does not appear to be septic as her vitals are mostly normal.  However, with this type of infection and with her failing outpatient treatment I strongly recommended the patient be admitted to the hospital for IV antibiotics. Patient did not want to stay. She did not want the inconvenience of staying in the hospital.  I reiterated that she is already failing outpatient treatment and with her diabetes that this could be foot and/or toe threatening or even could turn into sepsis. Patient still refused to be admitted. She was of sound mind and able to make her own decisions and vitals were normal at this time. I did at least convince her to receive 1 dose of IV antibiotics. Patient has allergies to penicillins so I could not give Zosyn. Vancomycin would not be beneficial with 1 IV dose. Therefore I gave a dose of Rocephin. Patient is already taking clindamycin.   Patient will be signing out [address]       Amount and/or Complexity of Data Reviewed  Clinical lab tests: ordered and reviewed  Tests in the radiology section of CPT®: ordered and reviewed  Tests in the medicine section of CPT®: ordered and reviewed  Discussion of test results with the performing providers: no  Decide to obtain previous medical records or to obtain history from someone other than the patient: no  Obtain history from someone other than the patient: no  Review and summarize past medical records: no  Discuss the patient with other providers: no  Independent visualization of images, tracings, or specimens: no    Risk of Complications, Morbidity, and/or Mortality  Presenting problems: moderate  Diagnostic procedures: low  Management options: moderate    Patient Progress  Patient progress: stable             Orders Placed This Encounter   Procedures    Culture, Blood 1    Culture, Blood 1    XR CHEST PORTABLE    Lactic Acid    Lactic Acid    CBC with Auto Differential    CMP    Procalcitonin    Cardiac Monitor - ED Only    Straight Cath (Select if patient is unable to provide a sample)    EKG 12 Lead    Saline lock IV        Medications   lactated ringers bolus (has no administration in time range)   cefTRIAXone (ROCEPHIN) 1,000 mg in sodium chloride 0.9 % 50 mL IVPB mini-bag (has no administration in time range)       New Prescriptions    No medications on file        Hermelinda Rdz is a 43 y.o. female who presents to the Emergency Department with chief complaint of    Chief Complaint   Patient presents with    Wound Infection     Patient presents complaining of diabetic ulcer to right foot. Reports recent surgery for osteomyelitis. States wound was healing appropriately until this week. PCP placed her on clindamycin. Presents to ED for increased drainage and increased BGL. Patient presents with diabetic foot infection. Patient has noted swelling to her right foot since Thursday. It has progressed over the weekend to increasing redness and swelling over the majority of her toes and plantar and dorsal aspect of her right foot. Patient had a recent osteomyelitis infection a few months ago and Priscila. Patient states she called her primary who called in antibiotics for her and is arranging for wound clinic with Janki.   Patient started clindamycin on Saturday. She has taken it every day since and still has worsening. Review of Systems   All other systems reviewed and are negative. Past Medical History:   Diagnosis Date    Asthma     Diabetes mellitus ([address]     Hypertension         No past surgical history on file. No family history on file. Social History     Socioeconomic History    Marital status: Single   Tobacco Use    Smoking status: Never    Smokeless tobacco: Never   Substance and Sexual Activity    Alcohol use: Not Currently    Drug use: Not Currently         Gabapentin, Metformin and related, and Penicillins     Previous Medications    ALBUTEROL SULFATE  (90 BASE) MCG/ACT INHALER    Inhale 2 puffs into the lungs every 4 hours as needed    BUSPIRONE (BUSPAR) 10 MG TABLET    Take 10 mg by mouth 3 times daily    CAMPHOR-MENTHOL-METHYL SAL 3-5-15 % CREA    Apply to the affected area every 6-8 hours as needed for pain. ESCITALOPRAM (LEXAPRO) 10 MG TABLET    Take 10 mg by mouth daily    GLYBURIDE (DIABETA) 5 MG TABLET    Take 10 mg by mouth 2 times daily (with meals)    LISINOPRIL-HYDROCHLOROTHIAZIDE (PRINZIDE;ZESTORETIC) 20-12.5 MG PER TABLET    Take 1 tablet by mouth daily    METHOCARBAMOL (ROBAXIN) 750 MG TABLET    Take 1,500 mg by mouth 4 times daily        Vitals signs and nursing note reviewed. Patient Vitals for the past 4 hrs:   Temp Pulse Resp BP SpO2   09/26/22 2345 98 °F (36.7 °C) (!) 102 20 (!) 148/92 99 %          Physical Exam  Constitutional:       General: She is not in acute distress. HENT:      Head: Normocephalic and atraumatic. Nose: Nose normal.      Mouth/Throat:      Mouth: Mucous membranes are moist.   Eyes:      Extraocular Movements: Extraocular movements intact. Pupils: Pupils are equal, round, and reactive to light. Cardiovascular:      Rate and Rhythm: Normal rate and regular rhythm.    Pulmonary:      Effort: Pulmonary effort is normal.   Abdominal:      General: Abdomen is flat. Palpations: Abdomen is soft. Tenderness: There is no abdominal tenderness. Musculoskeletal:         General: No swelling or tenderness. Cervical back: Normal range of motion. Comments: Patient has significant swelling surrounding a plantar ulcer that is located at the plantar aspect of the third MTP. She has diffuse swelling on the plantar aspect throughout the whole foot and then radiating into the dorsal aspect and some swelling going up to the ankle. Erythema is mostly around the plantar and dorsal aspects at the MTPs. Patient does have swelling of the second and third toe diffusely with erythema. No palpable fluid collection   Skin:     Findings: No erythema or rash. Neurological:      General: No focal deficit present. Mental Status: She is oriented to person, place, and time. Psychiatric:         Mood and Affect: Mood normal.         Behavior: Behavior normal.        Procedures    Results for orders placed or performed during the hospital encounter of 09/27/22   XR CHEST PORTABLE    Narrative    Portable chest xray      INDICATION: Sepsis    FINDINGS:     There is no focal pulmonary consolidation, pleural effusion or pneumothorax. No  pulmonary edema. Cardiac mediastinal silhouette is within normal limits. Surrounding bones are intact. Impression    1. Negative chest x-ray.    Lactic Acid   Result Value Ref Range    Lactic Acid, Plasma 1.4 0.4 - 2.0 MMOL/L   CBC with Auto Differential   Result Value Ref Range    WBC 11.2 (H) 4.3 - 11.1 K/uL    RBC 5.59 (H) 4.05 - 5.2 M/uL    Hemoglobin 14.9 11.7 - 15.4 g/dL    Hematocrit 45.0 35.8 - 46.3 %    MCV 80.5 79.6 - 97.8 FL    MCH 26.7 26.1 - 32.9 PG    MCHC 33.1 31.4 - 35.0 g/dL    RDW 12.9 11.9 - 14.6 %    Platelets 860 831 - 239 K/uL    MPV 8.9 (L) 9.4 - 12.3 FL    nRBC 0.00 0.0 - 0.2 K/uL    Differential Type AUTOMATED      Seg Neutrophils 60 43 - 78 %    Lymphocytes 31 13 - 44 %    Monocytes 8 4.0 - 12.0 % Eosinophils % 2 0.5 - 7.8 %    Basophils 1 0.0 - 2.0 %    Immature Granulocytes 0 0.0 - 5.0 %    Segs Absolute 6.7 1.7 - 8.2 K/UL    Absolute Lymph # 3.4 0.5 - 4.6 K/UL    Absolute Mono # 0.8 0.1 - 1.3 K/UL    Absolute Eos # 0.2 0.0 - 0.8 K/UL    Basophils Absolute 0.1 0.0 - 0.2 K/UL    Absolute Immature Granulocyte 0.0 0.0 - 0.5 K/UL   CMP   Result Value Ref Range    Sodium 133 (L) 136 - 145 mmol/L    Potassium 3.7 3.5 - 5.1 mmol/L    Chloride 100 (L) 101 - 110 mmol/L    CO2 27 21 - 32 mmol/L    Anion Gap 6 4 - 13 mmol/L    Glucose 166 (H) 65 - 100 mg/dL    BUN 18 6 - 23 MG/DL    Creatinine 0.66 0.6 - 1.0 MG/DL    GFR African American >60 >60 ml/min/1.73m2    GFR Non- >60 >60 ml/min/1.73m2    Calcium 9.5 8.3 - 10.4 MG/DL    Total Bilirubin 0.2 0.2 - 1.1 MG/DL    ALT 21 12 - 65 U/L    AST 10 (L) 15 - 37 U/L    Alk Phosphatase 115 50 - 136 U/L    Total Protein 7.7 6.3 - 8.2 g/dL    Albumin 3.6 3.5 - 5.0 g/dL    Globulin 4.1 (H) 2.3 - 3.5 g/dL    Albumin/Globulin Ratio 0.9 (L) 1.2 - 3.5     Procalcitonin   Result Value Ref Range    Procalcitonin 0.05 0.00 - 0.49 ng/mL        XR CHEST PORTABLE   Final Result      1. Negative chest x-ray. Voice dictation software was used during the making of this note. This software is not perfect and grammatical and other typographical errors may be present. This note has not been completely proofread for errors.      Kailyn Villa DO  09/27/22 [id number]

## 2022-09-27 NOTE — ED TRIAGE NOTES
Patient presents complaining of diabetic ulcer to right foot. Reports recent surgery for osteomyelitis. States wound was healing appropriately until this week. PCP placed her on clindamycin. Presents to ED for increased drainage and increased BGL.

## 2022-09-27 NOTE — ED NOTES
I have reviewed discharge instructions with the patient. The patient verbalized understanding. Patient left ED via Discharge Method: ambulatory to Home with family. Opportunity for questions and clarification provided. Patient given 0 scripts. To continue your aftercare when you leave the hospital, you may receive an automated call from our care team to check in on how you are doing. This is a free service and part of our promise to provide the best care and service to meet your aftercare needs.  If you have questions, or wish to unsubscribe from this service please call [phone]. Thank you for Choosing our [facility] Emergency Department.         Shirley Muller RN  09/27/22 2128

## 2022-10-02 LAB
BACTERIA SPEC CULT: NORMAL
BACTERIA SPEC CULT: NORMAL
SERVICE CMNT-IMP: NORMAL
SERVICE CMNT-IMP: NORMAL

## 2023-01-05 ENCOUNTER — HOSPITAL ENCOUNTER (EMERGENCY)
Dept: GENERAL RADIOLOGY | Age: 38
End: 2023-01-05

## 2023-01-05 ENCOUNTER — HOSPITAL ENCOUNTER (EMERGENCY)
Age: 38
Discharge: HOME | End: 2023-01-05
Attending: EMERGENCY MEDICINE

## 2023-01-05 VITALS
TEMPERATURE: 98.2 F | BODY MASS INDEX: 32.58 KG/M2 | OXYGEN SATURATION: 100 % | HEIGHT: 68 IN | HEART RATE: 98 BPM | SYSTOLIC BLOOD PRESSURE: 112 MMHG | DIASTOLIC BLOOD PRESSURE: 85 MMHG | RESPIRATION RATE: 12 BRPM | WEIGHT: 215 LBS

## 2023-01-05 DIAGNOSIS — U07.1 COVID-19: Primary | ICD-10-CM

## 2023-01-05 LAB
FLUAV AG NPH QL IA: NEGATIVE
FLUBV AG NPH QL IA: NEGATIVE
SARS-COV-2 RDRP RESP QL NAA+PROBE: DETECTED
SOURCE: ABNORMAL
SPECIMEN SOURCE: NORMAL

## 2023-01-05 PROCEDURE — 87635 SARS-COV-2 COVID-19 AMP PRB: CPT

## 2023-01-05 PROCEDURE — 87804 INFLUENZA ASSAY W/OPTIC: CPT

## 2023-01-05 PROCEDURE — 6370000000 HC RX 637 (ALT 250 FOR IP): Performed by: PHYSICIAN ASSISTANT

## 2023-01-05 PROCEDURE — 71046 X-RAY EXAM CHEST 2 VIEWS: CPT

## 2023-01-05 PROCEDURE — 99284 EMERGENCY DEPT VISIT MOD MDM: CPT

## 2023-01-05 RX ORDER — ONDANSETRON 4 MG/1
4 TABLET, ORALLY DISINTEGRATING ORAL
Status: COMPLETED | OUTPATIENT
Start: 2023-01-05 | End: 2023-01-05

## 2023-01-05 RX ORDER — ONDANSETRON 4 MG/1
4 TABLET, FILM COATED ORAL 3 TIMES DAILY PRN
Qty: 15 TABLET | Refills: 2 | Status: SHIPPED | OUTPATIENT
Start: 2023-01-05

## 2023-01-05 RX ADMIN — ONDANSETRON 4 MG: 4 TABLET, ORALLY DISINTEGRATING ORAL at 15:12

## 2023-01-05 ASSESSMENT — ENCOUNTER SYMPTOMS
COUGH: 1
SHORTNESS OF BREATH: 1
ABDOMINAL PAIN: 0
SORE THROAT: 1

## 2023-01-05 ASSESSMENT — PAIN SCALES - GENERAL: PAINLEVEL_OUTOF10: 9

## 2023-01-05 ASSESSMENT — PAIN DESCRIPTION - LOCATION: LOCATION: EAR;THROAT

## 2023-01-05 ASSESSMENT — PAIN - FUNCTIONAL ASSESSMENT: PAIN_FUNCTIONAL_ASSESSMENT: 0-10

## 2023-01-05 NOTE — ED TRIAGE NOTES
68-year-old female presenting to the emergency department chief complaint of 3 days history of sore throat, cough, and congestion. She denies associated fevers and chills. Physical examination reveals chest clear to auscultation bilaterally. Oxygen 100% on room air. Blood pressure 108/62. Patient evaluated initially in triage. Rapid Medical Evaluation was conducted and necessary orders have been placed. I have performed a medical screening exam.  Care will now be transferred to the provider in the back of the emergency department.   TAMARA Crane 11:28 AM

## 2023-01-05 NOTE — ED NOTES
I have reviewed discharge instructions with the patient. The patient verbalized understanding. Patient left ED via Discharge Method: ambulatory to Home with self. Opportunity for questions and clarification provided. Patient given 1 scripts. To continue your aftercare when you leave the hospital, you may receive an automated call from our care team to check in on how you are doing. This is a free service and part of our promise to provide the best care and service to meet your aftercare needs.  If you have questions, or wish to unsubscribe from this service please call [phone]. Thank you for Choosing our [facility] Emergency Department.       Nicholas Mix RN  01/05/23 6962

## 2023-01-05 NOTE — Clinical Note
Emmanuelle Hernández was seen and treated in our emergency department on 1/5/2023. She may return to work on 01/07/2023. If you have any questions or concerns, please don't hesitate to call.       TAMARA Samuels

## 2023-01-05 NOTE — DISCHARGE INSTRUCTIONS
Continue your regularly scheduled medications. Return here with worsening worrisome symptoms, otherwise follow-up with your primary care physician.

## 2023-01-05 NOTE — Clinical Note
Suly Pendleton was seen and treated in our emergency department on 1/5/2023. She may return to work on . If you have any questions or concerns, please don't hesitate to call.       TAMARA Medina

## 2023-01-05 NOTE — Clinical Note
Odessa Bunn was seen and treated in our emergency department on 1/5/2023. She may return to work on . If you have any questions or concerns, please don't hesitate to call.       TAMARA Casillas

## 2023-01-05 NOTE — ED PROVIDER NOTES
Emergency Department Provider Note                   PCP:                On File Not (Inactive)               Age: 42 y.o. Sex: female       ICD-10-CM    1. COVID-19  U5.3           DISPOSITION Decision To Discharge 01/05/2023 01:11:55 PM        Odessa Bunn is a 42 y.o. female who presents to the Emergency Department with chief complaint of    Chief Complaint   Patient presents with    Cough    Pharyngitis      Presents with 1 week history of COVID-like illness. Ports getting COVID shot and boosters. Starts this she had headache, progressed to coughing, generalized body aches and unwell feeling. Works in healthcare. Reports history of hypertension and diabetes for which she is on medications for both. The history is provided by the patient. No  was used. Review of Systems   Constitutional:  Positive for chills and fever. HENT:  Positive for congestion and sore throat. Respiratory:  Positive for cough and shortness of breath. Gastrointestinal:  Negative for abdominal pain. Genitourinary:  Negative for dysuria. Musculoskeletal:  Positive for myalgias. Neurological:  Positive for headaches. Psychiatric/Behavioral:  The patient is not nervous/anxious. Past Medical History:   Diagnosis Date    Asthma     Diabetes mellitus ([facility] [address]     Hypertension         No past surgical history on file. No family history on file.      Social History     Socioeconomic History    Marital status: Single   Tobacco Use    Smoking status: Never    Smokeless tobacco: Never   Substance and Sexual Activity    Alcohol use: Not Currently    Drug use: Not Currently         Gabapentin, Metformin and related, and Penicillins     Previous Medications    ALBUTEROL SULFATE  (90 BASE) MCG/ACT INHALER    Inhale 2 puffs into the lungs every 4 hours as needed    BUSPIRONE (BUSPAR) 10 MG TABLET    Take 10 mg by mouth 3 times daily    CAMPHOR-MENTHOL-METHYL SAL 3-5-15 % CREA    Apply to the affected area every 6-8 hours as needed for pain. ESCITALOPRAM (LEXAPRO) 10 MG TABLET    Take 10 mg by mouth daily    GLYBURIDE (DIABETA) 5 MG TABLET    Take 10 mg by mouth 2 times daily (with meals)    LISINOPRIL-HYDROCHLOROTHIAZIDE (PRINZIDE;ZESTORETIC) 20-12.5 MG PER TABLET    Take 1 tablet by mouth daily    METHOCARBAMOL (ROBAXIN) 750 MG TABLET    Take 1,500 mg by mouth 4 times daily        Vitals signs and nursing note reviewed. Patient Vitals for the past 4 hrs:   Temp Pulse Resp BP SpO2   01/05/23 1123 98.2 °F (36.8 °C) (!) 111 18 108/62 95 %          Physical Exam  Vitals and nursing note reviewed. Constitutional:       General: She is not in acute distress. Appearance: Normal appearance. She is not ill-appearing, toxic-appearing or diaphoretic. HENT:      Head: Normocephalic and atraumatic. Nose: Nose normal.      Mouth/Throat:      Mouth: Mucous membranes are moist.   Eyes:      Pupils: Pupils are equal, round, and reactive to light. Cardiovascular:      Rate and Rhythm: Normal rate. Pulmonary:      Effort: Pulmonary effort is normal. No respiratory distress. Abdominal:      General: Abdomen is flat. Palpations: Abdomen is soft. Tenderness: There is no abdominal tenderness. Musculoskeletal:         General: Normal range of motion. Cervical back: Normal range of motion. No rigidity. Skin:     General: Skin is warm. Neurological:      General: No focal deficit present. Mental Status: She is alert. Psychiatric:         Mood and Affect: Mood normal.        Procedures    Medical Decision Making  Presents with COVID-like illness. COVID test was ordered here in the department and resulted positive. Discussed treatment with patient, unfortunate she is on diabetic meds as well as hypertensive medications that would interact with the Paxlovid. We will elected not to prescribe Paxlovid. This was discussed with patient.   Given that she has had COVID shots and booster she should do just fine. She will follow-up with primary care and return here with worsening or worrisome symptoms. Amount and/or Complexity of Data Reviewed  External Data Reviewed: labs. Labs: ordered. Decision-making details documented in ED Course. Radiology:      Details: Considered but did not order       Complexity of Problem: 1 acute, uncomplicated illness or injury. (3)    I have conducted an independent ordering and review of Labs. I have reviewed records from an external source: ED records from outside this hospital.  I have reviewed records from an external source: provider visit notes from PCP. Considerations: Shared decision making was utilized in the care of this patient. Considerations: The following labs and/or imaging studies were considered but not ordered: X-ray of chest, CT of chest, CBC and chemistry  Considerations: The following treatments were considered but not given:   Paxlovid  We discussed care recommended by provider that patient declined (tests, disposition, etc). Patient was discharged risks and benefits of hospitalization were discussed. Orders Placed This Encounter   Procedures    COVID-19, Rapid    Rapid influenza A/B antigens    XR CHEST (2 VW)        Medications - No data to display    New Prescriptions    No medications on file        Results for orders placed or performed during the hospital encounter of 01/05/23   COVID-19, Rapid    Specimen: Nasopharyngeal   Result Value Ref Range    Source Nasopharyngeal      SARS-CoV-2, Rapid Detected (AA) NOTD     Rapid influenza A/B antigens    Specimen: Nasal Washing   Result Value Ref Range    Influenza A Ag Negative NEG      Influenza B Ag Negative NEG      Source Nasopharyngeal     XR CHEST (2 VW)    Narrative    Exam: XR CHEST (2 VW) on 1/5/2023 11:58 AM    Clinical History: The Female patient is 42years old  presenting for prod cough.     Comparison:  Chest x-ray 9/27/2022    Findings:  Frontal and lateral views of the chest were obtained. Lungs are clear without focal infiltrate or consolidation. No pleural effusions  are seen. The cardiomediastinal silhouette is within normal limits. There are  no acute osseous abnormalities. A right brachial PICC line has been placed with  tip at the right atrial/vena cava junction. Impression    1. No acute cardiopulmonary process. CPT code(s) 85646                XR CHEST (2 VW)   Final Result      1. No acute cardiopulmonary process. CPT code(s) 08857                                      Voice dictation software was used during the making of this note. This software is not perfect and grammatical and other typographical errors may be present. This note has not been completely proofread for errors.      Desirae Cuevasma  01/05/23 1312

## 2023-01-05 NOTE — ED TRIAGE NOTES
Patient complaining of headache started one week ago, started with cough, congestion and sore throat 2 days ago. Patient with PICC line to right upper arm after dealing with recent osteomyelitis to right foot.

## 2023-02-24 ENCOUNTER — HOSPITAL ENCOUNTER (OUTPATIENT)
Dept: WOUND CARE | Age: 38
Discharge: HOME | End: 2023-02-24
Payer: COMMERCIAL

## 2023-02-24 VITALS
HEIGHT: 68 IN | TEMPERATURE: 98.5 F | RESPIRATION RATE: 18 BRPM | BODY MASS INDEX: 33.04 KG/M2 | WEIGHT: 218 LBS | HEART RATE: 83 BPM | DIASTOLIC BLOOD PRESSURE: 68 MMHG | SYSTOLIC BLOOD PRESSURE: 112 MMHG

## 2023-02-24 DIAGNOSIS — E11.621 DIABETIC ULCER OF RIGHT MIDFOOT ASSOCIATED WITH TYPE 2 DIABETES MELLITUS, WITH NECROSIS OF MUSCLE (HCC): ICD-10-CM

## 2023-02-24 DIAGNOSIS — M86.671 CHRONIC REFRACTORY OSTEOMYELITIS OF FOOT, RIGHT (HCC): ICD-10-CM

## 2023-02-24 DIAGNOSIS — L97.413 DIABETIC ULCER OF RIGHT MIDFOOT ASSOCIATED WITH TYPE 2 DIABETES MELLITUS, WITH NECROSIS OF MUSCLE (HCC): ICD-10-CM

## 2023-02-24 PROCEDURE — 11042 DBRDMT SUBQ TIS 1ST 20SQCM/<: CPT

## 2023-02-24 RX ORDER — DOXYCYCLINE HYCLATE 100 MG/1
100 CAPSULE ORAL DAILY
COMMUNITY
Start: 2023-02-19

## 2023-02-24 RX ORDER — NALOXONE HYDROCHLORIDE 4 MG/.1ML
4 SPRAY NASAL
COMMUNITY
Start: 2022-03-16

## 2023-02-24 RX ORDER — FLUOXETINE HYDROCHLORIDE 40 MG/1
40 CAPSULE ORAL DAILY
COMMUNITY
Start: 2023-02-01

## 2023-02-24 RX ORDER — INSULIN DEGLUDEC INJECTION 100 U/ML
50 INJECTION, SOLUTION SUBCUTANEOUS NIGHTLY
COMMUNITY
Start: 2023-02-18

## 2023-02-24 RX ORDER — HYDROXYZINE HYDROCHLORIDE 25 MG/1
75 TABLET, FILM COATED ORAL NIGHTLY
COMMUNITY
Start: 2023-02-02

## 2023-02-24 RX ORDER — CETIRIZINE HYDROCHLORIDE 10 MG/1
10 TABLET ORAL DAILY
COMMUNITY
Start: 2023-02-18

## 2023-02-24 RX ORDER — INSULIN LISPRO 200 [IU]/ML
10 INJECTION, SOLUTION SUBCUTANEOUS
COMMUNITY
Start: 2023-02-01

## 2023-02-24 RX ORDER — IBUPROFEN 800 MG/1
TABLET ORAL
COMMUNITY
Start: 2023-02-18

## 2023-02-24 RX ORDER — BLOOD-GLUCOSE TRANSMITTER
EACH MISCELLANEOUS
COMMUNITY
Start: 2022-12-30

## 2023-02-24 RX ORDER — TRAZODONE HYDROCHLORIDE 100 MG/1
100 TABLET ORAL NIGHTLY
COMMUNITY
Start: 2023-02-01

## 2023-02-24 RX ORDER — OXYCODONE AND ACETAMINOPHEN 10; 325 MG/1; MG/1
1 TABLET ORAL EVERY 8 HOURS PRN
COMMUNITY
Start: 2023-02-01

## 2023-02-24 ASSESSMENT — PAIN DESCRIPTION - ORIENTATION: ORIENTATION: RIGHT

## 2023-02-24 ASSESSMENT — PAIN SCALES - GENERAL: PAINLEVEL_OUTOF10: 5

## 2023-02-24 ASSESSMENT — PAIN DESCRIPTION - LOCATION: LOCATION: FOOT

## 2023-02-24 NOTE — FLOWSHEET NOTE
02/24/23 1300   Wound 02/24/23 Right;Plantar #1   Date First Assessed/Time First Assessed: 02/24/23 1314   Present on Hospital Admission: Yes  Primary Wound Type: Diabetic Ulcer  Wound Location Orientation: Right;Plantar  Wound Description (Comments): #1   Wound Image    Wound Etiology Diabetic Elizabeth 3   Wound Cleansed Cleansed with saline   Dressing/Treatment Hydrofera blue   Offloading for Diabetic Foot Ulcers Orthowedge/inserts   Wound Length (cm) 2.4 cm   Wound Width (cm) 3 cm   Wound Depth (cm) 0.2 cm   Wound Surface Area (cm^2) 7.2 cm^2   Wound Volume (cm^3) 1.44 cm^3   Post-Procedure Length (cm) 2.4 cm   Post-Procedure Width (cm) 3 cm   Post-Procedure Depth (cm) 0.2 cm   Post-Procedure Surface Area (cm^2) 7.2 cm^2   Post-Procedure Volume (cm^3) 1.44 cm^3   Wound Assessment Granulation tissue   Drainage Amount Moderate   Drainage Description Serosanguinous   Odor None   Emily-wound Assessment Edematous   Wound Thickness Description not for Pressure Injury Full thickness   Wound 02/24/23 Foot Right;Medial #2   Date First Assessed/Time First Assessed: 02/24/23 1314   Present on Hospital Admission: Yes  Primary Wound Type: Diabetic Ulcer  Location: Foot  Wound Location Orientation: Right;Medial  Wound Description (Comments): #2   Wound Image    Wound Etiology Diabetic Elizabeth 2   Wound Cleansed Cleansed with saline   Dressing/Treatment Hydrofera blue   Offloading for Diabetic Foot Ulcers Orthowedge/inserts   Wound Length (cm) 0.5 cm   Wound Width (cm) 1.1 cm   Wound Depth (cm) 0.1 cm   Wound Surface Area (cm^2) 0.55 cm^2   Wound Volume (cm^3) 0.055 cm^3   Post-Procedure Length (cm) 0.5 cm   Post-Procedure Width (cm) 1.1 cm   Post-Procedure Depth (cm) 0.1 cm   Post-Procedure Surface Area (cm^2) 0.55 cm^2   Post-Procedure Volume (cm^3) 0.055 cm^3   Wound Assessment Pink/red   Drainage Amount Moderate   Drainage Description Serosanguinous   Odor None   Emily-wound Assessment Edematous   Wound Thickness Description not for Pressure Injury Full thickness   Pain Assessment   Pain Assessment 0-10   Pain Level 5   Pain Location Foot   Pain Orientation Right

## 2023-02-24 NOTE — WOUND CARE
Discharge Instructions for  Asad Pillai  [address] [place] [address]  [address], [address]  Phone [phone]   Fax [phone]      NAME:  Pilar Robles OF BIRTH:  1985  MEDICAL RECORD NUMBER:  [id number]  DATE:  2/24/2023    Return Appointment:   1 week with Loida Kelsey DO      Instructions: Right foot wounds:  Clean wounds with Vashe. Hydrofera Blue: Cut to wound size, moisten with saline, and apply to wound bed. Cover with ABD pad and wrap with gauze. Change 3 times weekly. Patient to offload wound with DARCO SHOE WITH PEG ASSIST INSERT while standing or weight bearing. Do not get wound wet. May purchase cast cover at local pharmacy to keep dry in shower. Wound healing is greatly slowed when blood glucose levels are greater than 200. Monitor glucose levels daily to ensure tight glucose control. Follow up with PCP if your glucose levels are frequently greater than 200. Recommend weight loss to promote overall health and wound healing. Increase protein intake to promote wound healing. Protein supplements such as Austin and Ensure are great options. MRI ordered today. Expect a call to schedule test. If you do not receive a call in 2 business days, please call [phone] to schedule. Continue antibiotics as directed by Infectious Disease. Should you experience increased redness, swelling, pain, foul odor, size of wound(s), or have a temperature over 101 degrees please contact the [address] [place] [address] Road at [phone] or if after hours contact your primary care physician or go to the hospital emergency department. PLEASE NOTE: IF YOU ARE UNABLE TO OBTAIN WOUND SUPPLIES, CONTINUE TO USE THE SUPPLIES YOU HAVE AVAILABLE UNTIL YOU ARE ABLE TO REACH US. IT IS MOST IMPORTANT TO KEEP THE WOUND COVERED AT ALL TIMES.     Electronically signed Andrea Frazier RN on 2/24/2023 at 2:08 PM

## 2023-03-01 PROBLEM — M86.671 CHRONIC REFRACTORY OSTEOMYELITIS OF FOOT, RIGHT (HCC): Chronic | Status: ACTIVE | Noted: 2023-02-24

## 2023-03-01 PROBLEM — E11.621 DIABETIC ULCER OF RIGHT MIDFOOT ASSOCIATED WITH TYPE 2 DIABETES MELLITUS, WITH NECROSIS OF MUSCLE (HCC): Chronic | Status: ACTIVE | Noted: 2023-02-24

## 2023-03-01 PROBLEM — L97.413 DIABETIC ULCER OF RIGHT MIDFOOT ASSOCIATED WITH TYPE 2 DIABETES MELLITUS, WITH NECROSIS OF MUSCLE (HCC): Chronic | Status: ACTIVE | Noted: 2023-02-24

## 2023-03-03 ENCOUNTER — HOSPITAL ENCOUNTER (OUTPATIENT)
Dept: WOUND CARE | Age: 38
Discharge: HOME | End: 2023-03-03
Payer: COMMERCIAL

## 2023-03-03 VITALS
RESPIRATION RATE: 18 BRPM | DIASTOLIC BLOOD PRESSURE: 51 MMHG | HEIGHT: 68 IN | HEART RATE: 89 BPM | WEIGHT: 218 LBS | BODY MASS INDEX: 33.04 KG/M2 | SYSTOLIC BLOOD PRESSURE: 91 MMHG | TEMPERATURE: 97.9 F

## 2023-03-03 DIAGNOSIS — M86.671 CHRONIC REFRACTORY OSTEOMYELITIS OF FOOT, RIGHT (HCC): ICD-10-CM

## 2023-03-03 DIAGNOSIS — E11.621 DIABETIC ULCER OF RIGHT MIDFOOT ASSOCIATED WITH TYPE 2 DIABETES MELLITUS, WITH NECROSIS OF MUSCLE (HCC): Primary | ICD-10-CM

## 2023-03-03 DIAGNOSIS — L97.413 DIABETIC ULCER OF RIGHT MIDFOOT ASSOCIATED WITH TYPE 2 DIABETES MELLITUS, WITH NECROSIS OF MUSCLE (HCC): Primary | ICD-10-CM

## 2023-03-03 PROCEDURE — 99213 OFFICE O/P EST LOW 20 MIN: CPT

## 2023-03-03 RX ORDER — LIDOCAINE 40 MG/G
CREAM TOPICAL ONCE
OUTPATIENT
Start: 2023-03-03 | End: 2023-03-03

## 2023-03-03 RX ORDER — GINSENG 100 MG
CAPSULE ORAL ONCE
OUTPATIENT
Start: 2023-03-03 | End: 2023-03-03

## 2023-03-03 RX ORDER — BACITRACIN, NEOMYCIN, POLYMYXIN B 400; 3.5; 5 [USP'U]/G; MG/G; [USP'U]/G
OINTMENT TOPICAL ONCE
OUTPATIENT
Start: 2023-03-03 | End: 2023-03-03

## 2023-03-03 RX ORDER — LIDOCAINE HYDROCHLORIDE 20 MG/ML
JELLY TOPICAL ONCE
OUTPATIENT
Start: 2023-03-03 | End: 2023-03-03

## 2023-03-03 RX ORDER — BETAMETHASONE DIPROPIONATE 0.05 %
OINTMENT (GRAM) TOPICAL ONCE
OUTPATIENT
Start: 2023-03-03 | End: 2023-03-03

## 2023-03-03 RX ORDER — LIDOCAINE 50 MG/G
OINTMENT TOPICAL ONCE
OUTPATIENT
Start: 2023-03-03 | End: 2023-03-03

## 2023-03-03 RX ORDER — GENTAMICIN SULFATE 1 MG/G
OINTMENT TOPICAL ONCE
OUTPATIENT
Start: 2023-03-03 | End: 2023-03-03

## 2023-03-03 RX ORDER — BACITRACIN ZINC AND POLYMYXIN B SULFATE 500; 1000 [USP'U]/G; [USP'U]/G
OINTMENT TOPICAL ONCE
OUTPATIENT
Start: 2023-03-03 | End: 2023-03-03

## 2023-03-03 RX ORDER — LIDOCAINE HYDROCHLORIDE 40 MG/ML
SOLUTION TOPICAL ONCE
OUTPATIENT
Start: 2023-03-03 | End: 2023-03-03

## 2023-03-03 RX ORDER — CLOBETASOL PROPIONATE 0.5 MG/G
OINTMENT TOPICAL ONCE
OUTPATIENT
Start: 2023-03-03 | End: 2023-03-03

## 2023-03-03 NOTE — FLOWSHEET NOTE
03/03/23 0848   Wound 03/03/23 Toe (Comment  which one) Right;Medial #3 Right 4th Medial Toe   Date First Assessed/Time First Assessed: 03/03/23 0850   Present on Hospital Admission: Yes  Wound Approximate Age at First Assessment (Weeks): 2 weeks  Primary Wound Type: Diabetic Ulcer  Location: (c) Toe (Comment  which one)  Wound Location Orienta. .. Wound Image    Wound Etiology Diabetic Elizabeth 2   Dressing Status Old drainage noted   Wound Cleansed Vashe;Cleansed with saline   Dressing/Treatment Betadine swabs/povidone iodine   Offloading for Diabetic Foot Ulcers Post op shoe  (Peg Assist ordered)   Wound Length (cm) 1.4 cm   Wound Width (cm) 0.6 cm   Wound Depth (cm) 0.1 cm   Wound Surface Area (cm^2) 0.84 cm^2   Wound Volume (cm^3) 0.084 cm^3   Wound Assessment Slough;Pink/red   Drainage Amount Small   Drainage Description Serosanguinous   Odor None   Emily-wound Assessment Maceration   Margins Attached edges   Wound Thickness Description not for Pressure Injury Full thickness   Wound 02/24/23 Right;Plantar #1   Date First Assessed/Time First Assessed: 02/24/23 1314   Present on Hospital Admission: Yes  Primary Wound Type: Diabetic Ulcer  Wound Location Orientation: Right;Plantar  Wound Description (Comments): #1   Wound Image    Wound Etiology Diabetic Elizabeth 3   Dressing Status Old drainage noted   Wound Cleansed Cleansed with saline; Vashe   Dressing/Treatment Hydrofera blue   Offloading for Diabetic Foot Ulcers Post op shoe  (Peg Assist ordered)   Wound Length (cm) 2.7 cm   Wound Width (cm) 2.7 cm   Wound Depth (cm) 0.2 cm   Wound Surface Area (cm^2) 7.29 cm^2   Change in Wound Size % (l*w) -1.25   Wound Volume (cm^3) 1. 458 cm^3   Wound Healing % -1   Wound Assessment Granulation tissue; Fluid filled blister   Drainage Amount Moderate   Drainage Description Serosanguinous   Odor None   Emily-wound Assessment Hyperkeratosis (callous); Edematous; Blisters   Margins Attached edges   Wound Thickness Description not for Pressure Injury Full thickness   Wound 02/24/23 Foot Right;Medial #2   Date First Assessed/Time First Assessed: 02/24/23 1314   Present on Hospital Admission: Yes  Primary Wound Type: Diabetic Ulcer  Location: Foot  Wound Location Orientation: Right;Medial  Wound Description (Comments): #2   Wound Image    Wound Etiology Diabetic Elizabeth 2   Dressing Status Old drainage noted   Wound Cleansed Cleansed with saline; Vashe   Dressing/Treatment Hydrofera blue   Offloading for Diabetic Foot Ulcers Post op shoe  (Peg Assist ordered)   Wound Length (cm) 0.5 cm   Wound Width (cm) 1.1 cm   Wound Depth (cm) 0.1 cm   Wound Surface Area (cm^2) 0.55 cm^2   Change in Wound Size % (l*w) 0   Wound Volume (cm^3) 0.055 cm^3   Wound Healing % 0   Wound Assessment Granulation tissue   Drainage Amount Moderate   Drainage Description Serosanguinous   Odor None   Emily-wound Assessment Edematous   Margins Attached edges   Wound Thickness Description not for Pressure Injury Full thickness   Pain Assessment   Pain Assessment 0-10   Pain Level 5   Patient's Stated Pain Goal 0 - No pain   Pain Location Foot   Pain Orientation Right;Other (Comment)  (plantar)   Wounds mechanically debrided with gauze and normal saline. Clinician visit only.

## 2023-03-06 ENCOUNTER — HOSPITAL ENCOUNTER (OUTPATIENT)
Dept: WOUND CARE | Age: 38
Discharge: HOME | End: 2023-03-06
Payer: COMMERCIAL

## 2023-03-06 VITALS
HEART RATE: 85 BPM | RESPIRATION RATE: 18 BRPM | HEIGHT: 68 IN | WEIGHT: 222 LBS | DIASTOLIC BLOOD PRESSURE: 72 MMHG | BODY MASS INDEX: 33.65 KG/M2 | SYSTOLIC BLOOD PRESSURE: 109 MMHG | TEMPERATURE: 97.8 F

## 2023-03-06 DIAGNOSIS — E11.621 DIABETIC ULCER OF RIGHT MIDFOOT ASSOCIATED WITH TYPE 2 DIABETES MELLITUS, WITH NECROSIS OF MUSCLE (HCC): Primary | ICD-10-CM

## 2023-03-06 DIAGNOSIS — M86.671 CHRONIC REFRACTORY OSTEOMYELITIS OF FOOT, RIGHT (HCC): ICD-10-CM

## 2023-03-06 DIAGNOSIS — L97.413 DIABETIC ULCER OF RIGHT MIDFOOT ASSOCIATED WITH TYPE 2 DIABETES MELLITUS, WITH NECROSIS OF MUSCLE (HCC): Primary | ICD-10-CM

## 2023-03-06 PROBLEM — M25.551 RIGHT HIP PAIN: Chronic | Status: ACTIVE | Noted: 2021-06-14

## 2023-03-06 PROBLEM — M25.551 RIGHT HIP PAIN: Status: ACTIVE | Noted: 2021-06-14

## 2023-03-06 PROCEDURE — 11042 DBRDMT SUBQ TIS 1ST 20SQCM/<: CPT

## 2023-03-06 PROCEDURE — 11042 DBRDMT SUBQ TIS 1ST 20SQCM/<: CPT | Performed by: FAMILY MEDICINE

## 2023-03-06 RX ORDER — LIDOCAINE HYDROCHLORIDE 20 MG/ML
JELLY TOPICAL ONCE
Status: COMPLETED | OUTPATIENT
Start: 2023-03-06 | End: 2023-03-06

## 2023-03-06 RX ORDER — BETAMETHASONE DIPROPIONATE 0.05 %
OINTMENT (GRAM) TOPICAL ONCE
OUTPATIENT
Start: 2023-03-06 | End: 2023-03-06

## 2023-03-06 RX ORDER — LIDOCAINE 40 MG/G
CREAM TOPICAL ONCE
OUTPATIENT
Start: 2023-03-06 | End: 2023-03-06

## 2023-03-06 RX ORDER — LIDOCAINE HYDROCHLORIDE 20 MG/ML
JELLY TOPICAL ONCE
OUTPATIENT
Start: 2023-03-06 | End: 2023-03-06

## 2023-03-06 RX ORDER — BACITRACIN ZINC AND POLYMYXIN B SULFATE 500; 1000 [USP'U]/G; [USP'U]/G
OINTMENT TOPICAL ONCE
OUTPATIENT
Start: 2023-03-06 | End: 2023-03-06

## 2023-03-06 RX ORDER — BACITRACIN, NEOMYCIN, POLYMYXIN B 400; 3.5; 5 [USP'U]/G; MG/G; [USP'U]/G
OINTMENT TOPICAL ONCE
OUTPATIENT
Start: 2023-03-06 | End: 2023-03-06

## 2023-03-06 RX ORDER — LIDOCAINE HYDROCHLORIDE 40 MG/ML
SOLUTION TOPICAL ONCE
OUTPATIENT
Start: 2023-03-06 | End: 2023-03-06

## 2023-03-06 RX ORDER — GENTAMICIN SULFATE 1 MG/G
OINTMENT TOPICAL ONCE
OUTPATIENT
Start: 2023-03-06 | End: 2023-03-06

## 2023-03-06 RX ORDER — LIDOCAINE 50 MG/G
OINTMENT TOPICAL ONCE
OUTPATIENT
Start: 2023-03-06 | End: 2023-03-06

## 2023-03-06 RX ORDER — GINSENG 100 MG
CAPSULE ORAL ONCE
OUTPATIENT
Start: 2023-03-06 | End: 2023-03-06

## 2023-03-06 RX ORDER — CLOBETASOL PROPIONATE 0.5 MG/G
OINTMENT TOPICAL ONCE
OUTPATIENT
Start: 2023-03-06 | End: 2023-03-06

## 2023-03-06 RX ADMIN — LIDOCAINE HYDROCHLORIDE: 20 JELLY TOPICAL at 08:43

## 2023-03-06 ASSESSMENT — PAIN SCALES - GENERAL: PAINLEVEL_OUTOF10: 6

## 2023-03-06 ASSESSMENT — PAIN DESCRIPTION - LOCATION: LOCATION: FOOT

## 2023-03-06 ASSESSMENT — PAIN DESCRIPTION - DESCRIPTORS: DESCRIPTORS: ACHING

## 2023-03-06 ASSESSMENT — PAIN DESCRIPTION - ORIENTATION: ORIENTATION: RIGHT

## 2023-03-06 NOTE — WOUND CARE
Discharge Instructions for  Asad Pillai  [address] [facility] [address]  [address], [address]  Phone [phone]   Fax [phone]      NAME:  Sudhakar Paulino OF BIRTH:  1985  MEDICAL RECORD NUMBER:  [id number]  DATE:  3/6/2023    Return Appointment:   1 week with Maria Munson DO      Instructions: Right foot wounds:  Clean wounds with Vashe. Hydrofera Blue: Cut to wound size, moisten with saline, and apply to wound bed. Cover with ABD pad and wrap with gauze. Change 3 times weekly. Patient to offload wound with DARCO SHOE WITH PEG ASSIST INSERT while standing or weight bearing. Do not get wound wet. May purchase cast cover at local pharmacy to keep dry in shower. Wound healing is greatly slowed when blood glucose levels are greater than 200. Monitor glucose levels daily to ensure tight glucose control. Follow up with PCP if your glucose levels are frequently greater than 200. Recommend weight loss to promote overall health and wound healing. Increase protein intake to promote wound healing. Protein supplements such as Austin and Ensure are great options. MRI ordered today. Expect a call to schedule test. If you do not receive a call in 2 business days, please call [phone] to schedule. Continue antibiotics as directed by Infectious Disease. Should you experience increased redness, swelling, pain, foul odor, size of wound(s), or have a temperature over 101 degrees please contact the [address] [place] [address] Road at [phone] or if after hours contact your primary care physician or go to the hospital emergency department. PLEASE NOTE: IF YOU ARE UNABLE TO OBTAIN WOUND SUPPLIES, CONTINUE TO USE THE SUPPLIES YOU HAVE AVAILABLE UNTIL YOU ARE ABLE TO REACH US. IT IS MOST IMPORTANT TO KEEP THE WOUND COVERED AT ALL TIMES.     Electronically signed Natalie Andrew RN on 3/6/2023 at 8:44 AM

## 2023-03-06 NOTE — FLOWSHEET NOTE
03/06/23 0820   Wound 03/03/23 Toe (Comment  which one) Right;Medial #3 Right 4th Medial Toe   Date First Assessed/Time First Assessed: 03/03/23 0850   Present on Hospital Admission: Yes  Wound Approximate Age at First Assessment (Weeks): 2 weeks  Primary Wound Type: Diabetic Ulcer  Location: (c) Toe (Comment  which one)  Wound Location Orienta. ..    Wound Image    Wound Etiology Diabetic Elizabeth 2   Dressing Status Old drainage noted   Wound Cleansed Vashe   Dressing/Treatment Betadine swabs/povidone iodine   Offloading for Diabetic Foot Ulcers Post op shoe   Wound Length (cm) 0.5 cm   Wound Width (cm) 0.5 cm   Wound Depth (cm) 0.1 cm   Wound Surface Area (cm^2) 0.25 cm^2   Change in Wound Size % (l*w) 70.24   Wound Volume (cm^3) 0.025 cm^3   Wound Healing % 70   Wound Assessment Pink/red   Drainage Amount Moderate   Drainage Description Serous   Odor Mild   Emily-wound Assessment Maceration   Wound Thickness Description not for Pressure Injury Full thickness   Wound 02/24/23 Right;Plantar #1   Date First Assessed/Time First Assessed: 02/24/23 1314   Present on Hospital Admission: Yes  Primary Wound Type: Diabetic Ulcer  Wound Location Orientation: Right;Plantar  Wound Description (Comments): #1   Wound Image    Wound Etiology Diabetic Elizabeth 3   Dressing Status Old drainage noted   Wound Cleansed Cleansed with saline   Dressing/Treatment Hydrofera blue   Offloading for Diabetic Foot Ulcers Post op shoe   Wound Length (cm) 2.5 cm   Wound Width (cm) 2.7 cm   Wound Depth (cm) 0.2 cm   Wound Surface Area (cm^2) 6.75 cm^2   Change in Wound Size % (l*w) 6.25   Wound Volume (cm^3) 1.35 cm^3   Wound Healing % 6   Wound Assessment Granulation tissue   Drainage Amount Large   Drainage Description Serosanguinous   Odor Mild   Emily-wound Assessment Intact   Wound Thickness Description not for Pressure Injury Full thickness   Wound 02/24/23 Foot Right;Medial #2   Date First Assessed/Time First Assessed: 02/24/23 1314   Present on Hospital Admission: Yes  Primary Wound Type: Diabetic Ulcer  Location: Foot  Wound Location Orientation: Right;Medial  Wound Description (Comments): #2   Wound Image    Wound Etiology Diabetic Elizabeth 2   Dressing Status Old drainage noted   Wound Cleansed Cleansed with saline   Dressing/Treatment Hydrofera blue   Offloading for Diabetic Foot Ulcers Post op shoe   Wound Length (cm) 0.3 cm   Wound Width (cm) 0.4 cm   Wound Depth (cm) 0.1 cm   Wound Surface Area (cm^2) 0.12 cm^2   Change in Wound Size % (l*w) 78.18   Wound Volume (cm^3) 0.012 cm^3   Wound Healing % 78   Wound Assessment Pink/red   Drainage Amount Small   Drainage Description Serosanguinous   Odor None   Emily-wound Assessment Intact   Wound Thickness Description not for Pressure Injury Full thickness   Pain Assessment   Pain Assessment 0-10   Pain Level 6   Pain Location Foot   Pain Orientation Right   Pain Descriptors Aching

## 2023-03-06 NOTE — PROGRESS NOTES
Procedure Note  Indications: Based on my examination of this patient's wound(s)/ulcer(s) today, debridement is required to promote healing and evaluate the wound base. Return Appointment:   1 week with Jensen Holman DO        Instructions: Right foot wounds:  Clean wounds with Vashe. Hydrofera Blue: Cut to wound size, moisten with saline, and apply to wound bed. Cover with ABD pad and wrap with gauze. Change 3 times weekly. Patient to offload wound with DARCO SHOE WITH PEG ASSIST INSERT while standing or weight bearing. Do not get wound wet. May purchase cast cover at local pharmacy to keep dry in shower. Wound healing is greatly slowed when blood glucose levels are greater than 200. Monitor glucose levels daily to ensure tight glucose control. Follow up with PCP if your glucose levels are frequently greater than 200. Recommend weight loss to promote overall health and wound healing. Increase protein intake to promote wound healing. Protein supplements such as Austin and Ensure are great options. MRI ordered today. Expect a call to schedule test. If you do not receive a call in 2 business days, please call [phone] to schedule. Continue antibiotics as directed by Infectious Disease. Debridement: Excisional Debridement    Using: curette the wound(s)/ulcer(s) was/were debrided down through and including the removal of epidermis, dermis, and subcutaneous tissue. Performed by:  Lia Alonso DO  Consent obtained: Yes  Time out taken: Yes  Pain Control:         Devitalized Tissue Debrided: fibrin, biofilm, slough, and callus    Pre Debridement Measurements:  Are located in the [place]  Documentation Flow Sheet    Diabetic/Pressure/Non Pressure Ulcers only:  Ulcer: Diabetic ulcer, fat layer exposed     Wound/Ulcer #: 1 and 2  Post Debridement Measurements:  Wound/Ulcer Descriptions are Pre Debridement except measurements:  Wound 02/24/23 Right;Plantar #1 (Active)   Wound Image 03/06/23 0820   Wound Etiology Diabetic Elizabeth 3 03/06/23 0820   Dressing Status Old drainage noted 03/06/23 0820   Wound Cleansed Cleansed with saline 03/06/23 0820   Dressing/Treatment Hydrofera blue 03/06/23 0820   Offloading for Diabetic Foot Ulcers Post op shoe 03/06/23 0820   Wound Length (cm) 2.5 cm 03/06/23 0820   Wound Width (cm) 2.7 cm 03/06/23 0820   Wound Depth (cm) 0.2 cm 03/06/23 0820   Wound Surface Area (cm^2) 6.75 cm^2 03/06/23 0820   Change in Wound Size % (l*w) 6.25 03/06/23 0820   Wound Volume (cm^3) 1.35 cm^3 03/06/23 0820   Wound Healing % 6 03/06/23 0820   Post-Procedure Length (cm) 2.4 cm 02/24/23 1300   Post-Procedure Width (cm) 3 cm 02/24/23 1300   Post-Procedure Depth (cm) 0.2 cm 02/24/23 1300   Post-Procedure Surface Area (cm^2) 7.2 cm^2 02/24/23 1300   Post-Procedure Volume (cm^3) 1.44 cm^3 02/24/23 1300   Wound Assessment Granulation tissue 03/06/23 0820   Drainage Amount Large 03/06/23 0820   Drainage Description Serosanguinous 03/06/23 0820   Odor Mild 03/06/23 0820   Emily-wound Assessment Intact 03/06/23 0820   Margins Attached edges 03/03/23 0848   Wound Thickness Description not for Pressure Injury Full thickness 03/06/23 0820   Number of days: 9       Wound 02/24/23 Foot Right;Medial #2 (Active)   Wound Image   03/06/23 0820   Wound Etiology Diabetic Elizabeth 2 03/06/23 0820   Dressing Status Old drainage noted 03/06/23 0820   Wound Cleansed Cleansed with saline 03/06/23 0820   Dressing/Treatment Hydrofera blue 03/06/23 0820   Offloading for Diabetic Foot Ulcers Post op shoe 03/06/23 0820   Wound Length (cm) 0.3 cm 03/06/23 0820   Wound Width (cm) 0.4 cm 03/06/23 0820   Wound Depth (cm) 0.1 cm 03/06/23 0820   Wound Surface Area (cm^2) 0.12 cm^2 03/06/23 0820   Change in Wound Size % (l*w) 78.18 03/06/23 0820   Wound Volume (cm^3) 0.012 cm^3 03/06/23 0820   Wound Healing % 78 03/06/23 0820   Post-Procedure Length (cm) 0.5 cm 02/24/23 1300   Post-Procedure Width (cm) 1.1 cm 02/24/23 1300   Post-Procedure Depth (cm) 0.1 cm 02/24/23 1300   Post-Procedure Surface Area (cm^2) 0.55 cm^2 02/24/23 1300   Post-Procedure Volume (cm^3) 0.055 cm^3 02/24/23 1300   Wound Assessment Pink/red 03/06/23 0820   Drainage Amount Small 03/06/23 0820   Drainage Description Serosanguinous 03/06/23 0820   Odor None 03/06/23 0820   Emily-wound Assessment Intact 03/06/23 0820   Margins Attached edges 03/03/23 0848   Wound Thickness Description not for Pressure Injury Full thickness 03/06/23 0820   Number of days: 9       Wound 03/03/23 Toe (Comment  which one) Right;Medial #3 Right 4th Medial Toe (Active)   Wound Image   03/06/23 0820   Wound Etiology Diabetic Elizabeth 2 03/06/23 0820   Dressing Status Old drainage noted 03/06/23 0820   Wound Cleansed Vashe 03/06/23 0820   Dressing/Treatment Betadine swabs/povidone iodine 03/06/23 0820   Offloading for Diabetic Foot Ulcers Post op shoe 03/06/23 0820   Wound Length (cm) 0.5 cm 03/06/23 0820   Wound Width (cm) 0.5 cm 03/06/23 0820   Wound Depth (cm) 0.1 cm 03/06/23 0820   Wound Surface Area (cm^2) 0.25 cm^2 03/06/23 0820   Change in Wound Size % (l*w) 70.24 03/06/23 0820   Wound Volume (cm^3) 0.025 cm^3 03/06/23 0820   Wound Healing % 70 03/06/23 0820   Wound Assessment Pink/red 03/06/23 0820   Drainage Amount Moderate 03/06/23 0820   Drainage Description Serous 03/06/23 0820   Odor Mild 03/06/23 0820   Emily-wound Assessment Maceration 03/06/23 0820   Margins Attached edges 03/03/23 0848   Wound Thickness Description not for Pressure Injury Full thickness 03/06/23 0820   Number of days: 3        Total Surface Area Debrided:  6.87 sq cm   Estimated Blood Loss: Minimal amount blood loss . Hemostasis Achieved:  by pressure and by silver nitrate stick  Procedural Pain: 0  / 10   Post Procedural Pain: 0 / 10   Response to treatment: Patient tolerated procedure well with no complaints of pain.

## 2023-03-15 ENCOUNTER — HOSPITAL ENCOUNTER (OUTPATIENT)
Dept: WOUND CARE | Age: 38
Discharge: HOME | End: 2023-03-15
Payer: COMMERCIAL

## 2023-03-15 VITALS
TEMPERATURE: 98.5 F | HEIGHT: 68 IN | WEIGHT: 220 LBS | SYSTOLIC BLOOD PRESSURE: 137 MMHG | BODY MASS INDEX: 33.34 KG/M2 | HEART RATE: 82 BPM | DIASTOLIC BLOOD PRESSURE: 77 MMHG

## 2023-03-15 PROCEDURE — 11042 DBRDMT SUBQ TIS 1ST 20SQCM/<: CPT

## 2023-03-15 NOTE — FLOWSHEET NOTE
03/15/23 1328   Wound 02/24/23 Right;Plantar #1   Date First Assessed/Time First Assessed: 02/24/23 1314   Present on Hospital Admission: Yes  Primary Wound Type: Diabetic Ulcer  Wound Location Orientation: Right;Plantar  Wound Description (Comments): #1   Wound Image    Wound Etiology Diabetic Elizabeth 3   Dressing Status Intact   Wound Cleansed Cleansed with saline   Dressing/Treatment Hydrofera blue   Offloading for Diabetic Foot Ulcers Post op shoe   Wound Length (cm) 2.2 cm   Wound Width (cm) 2.7 cm   Wound Depth (cm) 0.1 cm   Wound Surface Area (cm^2) 5.94 cm^2   Change in Wound Size % (l*w) 17.5   Wound Volume (cm^3) 0.594 cm^3   Wound Healing % 59   Wound Assessment Granulation tissue   Drainage Amount Moderate   Drainage Description Serosanguinous   Odor None   Emily-wound Assessment Intact   Wound Thickness Description not for Pressure Injury Full thickness   Wound 03/03/23 Toe (Comment  which one) Right;Medial #3 Right 4th Medial Toe   Date First Assessed/Time First Assessed: 03/03/23 0850   Present on Hospital Admission: Yes  Wound Approximate Age at First Assessment (Weeks): 2 weeks  Primary Wound Type: Diabetic Ulcer  Location: (c) Toe (Comment  which one)  Wound Location Orienta. ..    Wound Image    Wound Etiology Diabetic Elizabeth 2   Dressing Status Intact   Wound Cleansed Vashe   Dressing/Treatment Hydrofera blue   Offloading for Diabetic Foot Ulcers Post op shoe   Wound Length (cm) 0.1 cm   Wound Width (cm) 0.1 cm   Wound Depth (cm) 0.1 cm   Wound Surface Area (cm^2) 0.01 cm^2   Change in Wound Size % (l*w) 98.81   Wound Volume (cm^3) 0.001 cm^3   Wound Healing % 99   Wound Assessment Pink/red   Drainage Amount Small   Drainage Description Serous   Odor None   Emily-wound Assessment Intact   Wound Thickness Description not for Pressure Injury Full thickness   Wound 02/24/23 Foot Right;Medial #2   Date First Assessed/Time First Assessed: 02/24/23 1314   Present on Hospital Admission: Yes  Primary Wound Type: Diabetic Ulcer  Location: Foot  Wound Location Orientation: Right;Medial  Wound Description (Comments): #2   Wound Image    Wound Etiology Diabetic Elizabeth 2   Dressing Status Intact   Wound Cleansed Cleansed with saline   Dressing/Treatment Hydrofera blue   Offloading for Diabetic Foot Ulcers Post op shoe   Wound Length (cm) 0 cm   Wound Width (cm) 0 cm   Wound Depth (cm) 0 cm   Wound Surface Area (cm^2) 0 cm^2   Change in Wound Size % (l*w) 100   Wound Volume (cm^3) 0 cm^3   Wound Healing % 100   Wound Assessment Epithelialization   Drainage Amount None   Odor None   Emily-wound Assessment Intact   Wound Thickness Description not for Pressure Injury Full thickness

## 2023-03-15 NOTE — WOUND CARE
Discharge Instructions for  Asad Pillai  [address] [place] [address]  [address], [address] [facility]  Phone [phone]   Fax [phone]      NAME:  Emily Hitchcock OF BIRTH:  1985  MEDICAL RECORD NUMBER:  [id number]  DATE:  3/15/2023    Return Appointment:   3 weeks with Isauro Estrada, DO      Instructions: Right foot wounds:  Clean wounds with Vashe. Apply alginate with silver. Cut product to wound size and apply to wound bed. Cover with ABD pad and wrap with gauze. Change 3 times weekly. Patient to offload wound with DARCO SHOE WITH PEG ASSIST INSERT while standing or weight bearing. Do not get wound wet. May purchase cast cover at local pharmacy to keep dry in shower. Wound healing is greatly slowed when blood glucose levels are greater than 200. Monitor glucose levels daily to ensure tight glucose control. Follow up with PCP if your glucose levels are frequently greater than 200. Recommend weight loss to promote overall health and wound healing. Increase protein intake to promote wound healing. Protein supplements such as Austin and Ensure are great options. MRI ordered today. Expect a call to schedule test. If you do not receive a call in 2 business days, please call [phone] to schedule. Continue antibiotics as directed by Infectious Disease. Should you experience increased redness, swelling, pain, foul odor, size of wound(s), or have a temperature over 101 degrees please contact the [address] [place] [address] Road at [phone] or if after hours contact your primary care physician or go to the hospital emergency department. PLEASE NOTE: IF YOU ARE UNABLE TO OBTAIN WOUND SUPPLIES, CONTINUE TO USE THE SUPPLIES YOU HAVE AVAILABLE UNTIL YOU ARE ABLE TO REACH US. IT IS MOST IMPORTANT TO KEEP THE WOUND COVERED AT ALL TIMES.     Electronically signed Wendi Parr RN on 3/15/2023 at 1:48 PM

## 2023-03-29 ENCOUNTER — HOSPITAL ENCOUNTER (OUTPATIENT)
Dept: MRI IMAGING | Age: 38
Discharge: HOME | End: 2023-04-01
Payer: COMMERCIAL

## 2023-03-29 DIAGNOSIS — L97.413 DIABETIC ULCER OF RIGHT MIDFOOT ASSOCIATED WITH TYPE 2 DIABETES MELLITUS, WITH NECROSIS OF MUSCLE (HCC): ICD-10-CM

## 2023-03-29 DIAGNOSIS — M86.671 CHRONIC REFRACTORY OSTEOMYELITIS OF FOOT, RIGHT (HCC): ICD-10-CM

## 2023-03-29 DIAGNOSIS — E11.621 DIABETIC ULCER OF RIGHT MIDFOOT ASSOCIATED WITH TYPE 2 DIABETES MELLITUS, WITH NECROSIS OF MUSCLE (HCC): ICD-10-CM

## 2023-03-29 PROCEDURE — 6360000004 HC RX CONTRAST MEDICATION: Performed by: FAMILY MEDICINE

## 2023-03-29 PROCEDURE — 2580000003 HC RX 258: Performed by: FAMILY MEDICINE

## 2023-03-29 PROCEDURE — 73720 MRI LWR EXTREMITY W/O&W/DYE: CPT

## 2023-03-29 PROCEDURE — A9579 GAD-BASE MR CONTRAST NOS,1ML: HCPCS | Performed by: FAMILY MEDICINE

## 2023-03-29 RX ORDER — SODIUM CHLORIDE 0.9 % (FLUSH) 0.9 %
10 SYRINGE (ML) INJECTION AS NEEDED
Status: DISCONTINUED | OUTPATIENT
Start: 2023-03-29 | End: 2023-04-02 | Stop reason: HOSPADM

## 2023-03-29 RX ADMIN — GADOTERIDOL 20 ML: 279.3 INJECTION, SOLUTION INTRAVENOUS at 14:54

## 2023-03-29 RX ADMIN — SODIUM CHLORIDE, PRESERVATIVE FREE 10 ML: 5 INJECTION INTRAVENOUS at 14:54

## 2023-03-31 ENCOUNTER — TELEPHONE (OUTPATIENT)
Dept: WOUND CARE | Age: 38
End: 2023-03-31

## 2023-03-31 NOTE — TELEPHONE ENCOUNTER
Estevan Sadler, representative with [facility] outpatient pre-authorization, called to notify that the patient's insurance is requesting a [id number] review for order for MRI of right foot. Wondering why x-ray was not ordered first.  Estevan Sadler stated that they went ahead and completed the test based on severity of her foot but insurance has made this request. Phone number to call is [phone] and reference case # [id number].

## 2023-04-07 ENCOUNTER — HOSPITAL ENCOUNTER (OUTPATIENT)
Dept: WOUND CARE | Age: 38
Discharge: HOME | End: 2023-04-07

## 2023-04-07 VITALS
HEIGHT: 68 IN | HEART RATE: 97 BPM | WEIGHT: 219 LBS | BODY MASS INDEX: 33.19 KG/M2 | SYSTOLIC BLOOD PRESSURE: 118 MMHG | DIASTOLIC BLOOD PRESSURE: 63 MMHG

## 2023-04-07 DIAGNOSIS — E11.621 DIABETIC ULCER OF RIGHT MIDFOOT ASSOCIATED WITH TYPE 2 DIABETES MELLITUS, WITH NECROSIS OF MUSCLE (HCC): Primary | ICD-10-CM

## 2023-04-07 DIAGNOSIS — L97.413 DIABETIC ULCER OF RIGHT MIDFOOT ASSOCIATED WITH TYPE 2 DIABETES MELLITUS, WITH NECROSIS OF MUSCLE (HCC): Primary | ICD-10-CM

## 2023-04-07 DIAGNOSIS — M86.671 CHRONIC REFRACTORY OSTEOMYELITIS OF FOOT, RIGHT (HCC): ICD-10-CM

## 2023-04-07 RX ORDER — BACITRACIN, NEOMYCIN, POLYMYXIN B 400; 3.5; 5 [USP'U]/G; MG/G; [USP'U]/G
OINTMENT TOPICAL ONCE
OUTPATIENT
Start: 2023-04-07 | End: 2023-04-07

## 2023-04-07 RX ORDER — LIDOCAINE HYDROCHLORIDE 20 MG/ML
JELLY TOPICAL ONCE
OUTPATIENT
Start: 2023-04-07 | End: 2023-04-07

## 2023-04-07 RX ORDER — LIDOCAINE 40 MG/G
CREAM TOPICAL ONCE
OUTPATIENT
Start: 2023-04-07 | End: 2023-04-07

## 2023-04-07 RX ORDER — GINSENG 100 MG
CAPSULE ORAL ONCE
OUTPATIENT
Start: 2023-04-07 | End: 2023-04-07

## 2023-04-07 RX ORDER — LIDOCAINE 50 MG/G
OINTMENT TOPICAL ONCE
OUTPATIENT
Start: 2023-04-07 | End: 2023-04-07

## 2023-04-07 RX ORDER — GENTAMICIN SULFATE 1 MG/G
OINTMENT TOPICAL ONCE
OUTPATIENT
Start: 2023-04-07 | End: 2023-04-07

## 2023-04-07 RX ORDER — LIDOCAINE HYDROCHLORIDE 40 MG/ML
SOLUTION TOPICAL ONCE
OUTPATIENT
Start: 2023-04-07 | End: 2023-04-07

## 2023-04-07 RX ORDER — BETAMETHASONE DIPROPIONATE 0.05 %
OINTMENT (GRAM) TOPICAL ONCE
OUTPATIENT
Start: 2023-04-07 | End: 2023-04-07

## 2023-04-07 RX ORDER — BACITRACIN ZINC AND POLYMYXIN B SULFATE 500; 1000 [USP'U]/G; [USP'U]/G
OINTMENT TOPICAL ONCE
OUTPATIENT
Start: 2023-04-07 | End: 2023-04-07

## 2023-04-07 RX ORDER — LIDOCAINE HYDROCHLORIDE 20 MG/ML
JELLY TOPICAL ONCE
Status: COMPLETED | OUTPATIENT
Start: 2023-04-07 | End: 2023-04-07

## 2023-04-07 RX ORDER — CLOBETASOL PROPIONATE 0.5 MG/G
OINTMENT TOPICAL ONCE
OUTPATIENT
Start: 2023-04-07 | End: 2023-04-07

## 2023-04-07 RX ADMIN — LIDOCAINE HYDROCHLORIDE: 20 JELLY TOPICAL at 13:29

## 2023-04-07 ASSESSMENT — PAIN SCALES - GENERAL: PAINLEVEL_OUTOF10: 4

## 2023-04-07 ASSESSMENT — PAIN DESCRIPTION - LOCATION: LOCATION: FOOT

## 2023-04-07 ASSESSMENT — PAIN DESCRIPTION - ORIENTATION: ORIENTATION: RIGHT

## 2023-04-07 ASSESSMENT — PAIN DESCRIPTION - DESCRIPTORS: DESCRIPTORS: BURNING

## 2023-04-07 NOTE — DISCHARGE INSTRUCTIONS
treatment discussed. Expect a call from Ashley Chu, [facility]. Continue antibiotics as directed by Infectious Disease. Should you experience increased redness, swelling, pain, foul odor, size of wound(s), or have a temperature over 101 degrees please contact the [address] [place] [address] Road at [phone] or if after hours contact your primary care physician or go to the hospital emergency department. PLEASE NOTE: IF YOU ARE UNABLE TO OBTAIN WOUND SUPPLIES, CONTINUE TO USE THE SUPPLIES YOU HAVE AVAILABLE UNTIL YOU ARE ABLE TO REACH US. IT IS MOST IMPORTANT TO KEEP THE WOUND COVERED AT ALL TIMES.      Electronically signed Alva Bo RN on 4/7/2023 at 1:58 PM

## 2023-04-07 NOTE — FLOWSHEET NOTE
(cm) 2.5 cm   Wound Depth (cm) 0.1 cm   Wound Surface Area (cm^2) 5 cm^2   Change in Wound Size % (l*w) 30.56   Wound Volume (cm^3) 0.5 cm^3   Wound Healing % 65   Post-Procedure Length (cm) 2 cm   Post-Procedure Width (cm) 2.5 cm   Post-Procedure Depth (cm) 0.1 cm   Post-Procedure Surface Area (cm^2) 5 cm^2   Post-Procedure Volume (cm^3) 0.5 cm^3   Wound Assessment Granulation tissue   Drainage Amount Moderate   Drainage Description Serosanguinous   Odor None   Emily-wound Assessment Edematous; Intact; Hyperkeratosis (callous)   Margins Unattached edges   Wound Thickness Description not for Pressure Injury Full thickness   Wound 02/24/23 Foot Right;Medial #2   Date First Assessed/Time First Assessed: 02/24/23 1314   Present on Hospital Admission: Yes  Primary Wound Type: Diabetic Ulcer  Location: Foot  Wound Location Orientation: Right;Medial  Wound Description (Comments): #2   Wound Image    Wound Etiology Diabetic Elizabeth 2   Dressing Status Intact   Wound Cleansed Cleansed with saline   Dressing/Treatment Alginate with Ag   Offloading for Diabetic Foot Ulcers Offloading ordered   Wound Length (cm) 0 cm   Wound Width (cm) 0 cm   Wound Depth (cm) 0 cm   Wound Surface Area (cm^2) 0 cm^2   Change in Wound Size % (l*w) 100   Wound Volume (cm^3) 0 cm^3   Wound Healing % 100   Wound Assessment Epithelialization   Drainage Amount None   Odor None   Emily-wound Assessment Intact   Pain Assessment   Pain Assessment 0-10   Pain Level 4   Pain Location Foot   Pain Orientation Right   Pain Descriptors Burning

## 2023-04-07 NOTE — WOUND CARE
the [facility] at [phone] or if after hours contact your primary care physician or go to the hospital emergency department. PLEASE NOTE: IF YOU ARE UNABLE TO OBTAIN WOUND SUPPLIES, CONTINUE TO USE THE SUPPLIES YOU HAVE AVAILABLE UNTIL YOU ARE ABLE TO REACH US. IT IS MOST IMPORTANT TO KEEP THE WOUND COVERED AT ALL TIMES.     Electronically signed Tyshawn Hager RN on 4/7/2023 at 1:58 PM

## 2023-05-01 ENCOUNTER — HOSPITAL ENCOUNTER (OUTPATIENT)
Dept: WOUND CARE | Age: 38
Discharge: HOME | End: 2023-05-01
Payer: COMMERCIAL

## 2023-05-01 VITALS
TEMPERATURE: 98.5 F | BODY MASS INDEX: 35.77 KG/M2 | WEIGHT: 236 LBS | SYSTOLIC BLOOD PRESSURE: 137 MMHG | HEART RATE: 94 BPM | RESPIRATION RATE: 18 BRPM | DIASTOLIC BLOOD PRESSURE: 85 MMHG | HEIGHT: 68 IN

## 2023-05-01 DIAGNOSIS — E11.621 DIABETIC ULCER OF RIGHT MIDFOOT ASSOCIATED WITH TYPE 2 DIABETES MELLITUS, WITH NECROSIS OF MUSCLE (HCC): Primary | ICD-10-CM

## 2023-05-01 DIAGNOSIS — L97.413 DIABETIC ULCER OF RIGHT MIDFOOT ASSOCIATED WITH TYPE 2 DIABETES MELLITUS, WITH NECROSIS OF MUSCLE (HCC): Primary | ICD-10-CM

## 2023-05-01 DIAGNOSIS — M86.671 CHRONIC REFRACTORY OSTEOMYELITIS OF FOOT, RIGHT (HCC): ICD-10-CM

## 2023-05-01 PROCEDURE — 11042 DBRDMT SUBQ TIS 1ST 20SQCM/<: CPT

## 2023-05-01 PROCEDURE — 11042 DBRDMT SUBQ TIS 1ST 20SQCM/<: CPT | Performed by: FAMILY MEDICINE

## 2023-05-01 RX ORDER — LIDOCAINE HYDROCHLORIDE 20 MG/ML
JELLY TOPICAL ONCE
OUTPATIENT
Start: 2023-05-01 | End: 2023-05-01

## 2023-05-01 RX ORDER — LIDOCAINE 50 MG/G
OINTMENT TOPICAL ONCE
OUTPATIENT
Start: 2023-05-01 | End: 2023-05-01

## 2023-05-01 RX ORDER — BETAMETHASONE DIPROPIONATE 0.05 %
OINTMENT (GRAM) TOPICAL ONCE
OUTPATIENT
Start: 2023-05-01 | End: 2023-05-01

## 2023-05-01 RX ORDER — LIDOCAINE 40 MG/G
CREAM TOPICAL ONCE
OUTPATIENT
Start: 2023-05-01 | End: 2023-05-01

## 2023-05-01 RX ORDER — CLOBETASOL PROPIONATE 0.5 MG/G
OINTMENT TOPICAL ONCE
OUTPATIENT
Start: 2023-05-01 | End: 2023-05-01

## 2023-05-01 RX ORDER — LIDOCAINE HYDROCHLORIDE 40 MG/ML
SOLUTION TOPICAL ONCE
OUTPATIENT
Start: 2023-05-01 | End: 2023-05-01

## 2023-05-01 RX ORDER — BACITRACIN, NEOMYCIN, POLYMYXIN B 400; 3.5; 5 [USP'U]/G; MG/G; [USP'U]/G
OINTMENT TOPICAL ONCE
OUTPATIENT
Start: 2023-05-01 | End: 2023-05-01

## 2023-05-01 RX ORDER — BACITRACIN ZINC AND POLYMYXIN B SULFATE 500; 1000 [USP'U]/G; [USP'U]/G
OINTMENT TOPICAL ONCE
OUTPATIENT
Start: 2023-05-01 | End: 2023-05-01

## 2023-05-01 RX ORDER — LIDOCAINE HYDROCHLORIDE 20 MG/ML
JELLY TOPICAL ONCE
Status: COMPLETED | OUTPATIENT
Start: 2023-05-01 | End: 2023-05-01

## 2023-05-01 RX ORDER — GENTAMICIN SULFATE 1 MG/G
OINTMENT TOPICAL ONCE
OUTPATIENT
Start: 2023-05-01 | End: 2023-05-01

## 2023-05-01 RX ORDER — GINSENG 100 MG
CAPSULE ORAL ONCE
OUTPATIENT
Start: 2023-05-01 | End: 2023-05-01

## 2023-05-01 RX ADMIN — LIDOCAINE HYDROCHLORIDE: 20 JELLY TOPICAL at 09:25

## 2023-05-01 NOTE — WOUND CARE
Discharge Instructions for  Asad Pillai  [facility]  [address], [address]  Phone [phone]   Fax [phone]      NAME:  Merline Abraham OF BIRTH:  1985  MEDICAL RECORD NUMBER:  [id number]  DATE:  5/1/2023    Return Appointment:   1 week with Sal Simms DO    Instructions: Right foot wounds:  Cleanse intact skin with soap & water, wound with normal saline or wound cleanser. Vashe Wound Solution (hypochlorous acid) soak placed on wound bed for a minimum of 1 minute prior to dressing application. This solution removes pathogens, bioburden, and biofilms from wounds, but is not cytotoxic. Apply alginate with silver. Cut product to wound size and apply to wound bed. Cover with ABD pad and wrap with gauze. Change 3 times weekly. Patient to offload wound with DARCO SHOE WITH PEG ASSIST INSERT while standing or weight bearing. Place rolled foam alongside foot to keep depression on PEG insert aligned with wound bed. Diabetic shoes with inserts the long-range plan after wound is healed. Potential Total Contact Cast application if offloading PEG with Darco not effective. Do not get wound wet. May purchase cast cover at local pharmacy to keep dry in shower. Wound healing is greatly slowed when blood glucose levels are greater than 200. Monitor glucose levels daily to ensure tight glucose control. Follow up with PCP if your glucose levels are frequently greater than 200. Recommend weight loss to promote overall health and wound healing. Increase protein intake to promote wound healing. Protein supplements such as Austin and Ensure are great options. MRI results reviewed. HBO evaluation & treatment discussed. Expect a call from Ant Ta, [facility]. Continue antibiotics as directed by Infectious Disease.      Should you experience increased redness, swelling, pain, foul odor, size of wound(s), or have a temperature over 101 degrees please contact

## 2023-05-01 NOTE — FLOWSHEET NOTE
05/01/23 0903   Wound 03/03/23 Toe (Comment  which one) Right;Medial #3 Right 4th Medial Toe   Date First Assessed/Time First Assessed: 03/03/23 0850   Present on Hospital Admission: Yes  Wound Approximate Age at First Assessment (Weeks): 2 weeks  Primary Wound Type: Diabetic Ulcer  Location: (c) Toe (Comment  which one)  Wound Location [place]. ..    Wound Image    Wound Etiology Diabetic Elizabeth 2   Dressing Status Intact   Wound Cleansed Cleansed with saline   Dressing/Treatment Alginate with Ag   Offloading for Diabetic Foot Ulcers Offloading ordered   Wound Length (cm) 0 cm   Wound Width (cm) 0 cm   Wound Depth (cm) 0 cm   Wound Surface Area (cm^2) 0 cm^2   Change in Wound Size % (l*w) 100   Wound Volume (cm^3) 0 cm^3   Wound Healing % 100   Wound Assessment Epithelialization   Drainage Amount None   Odor None   Emily-wound Assessment Intact   Wound Thickness Description not for Pressure Injury Full thickness   Wound 02/24/23 Right;Plantar #1   Date First Assessed/Time First Assessed: 02/24/23 1314   Present on Hospital Admission: Yes  Primary Wound Type: Diabetic Ulcer  Wound Location Orientation: Right;Plantar  Wound Description (Comments): #1   Wound Image    Wound Etiology Diabetic Elizabeth 3   Dressing Status Intact   Wound Cleansed Cleansed with saline   Dressing/Treatment Alginate with Ag   Offloading for Diabetic Foot Ulcers Offloading ordered   Wound Length (cm) 1.8 cm   Wound Width (cm) 2.5 cm   Wound Depth (cm) 0.1 cm   Wound Surface Area (cm^2) 4.5 cm^2   Change in Wound Size % (l*w) 37.5   Wound Volume (cm^3) 0.45 cm^3   Wound Healing % 69   Wound Assessment Granulation tissue   Drainage Amount Moderate   Drainage Description Serosanguinous   Odor None   Emily-wound Assessment Intact   Wound Thickness Description not for Pressure Injury Full thickness   Pain Assessment   Pain Assessment None - Denies Pain

## 2023-05-01 NOTE — PROGRESS NOTES
Procedure Note  Indications: Based on my examination of this patient's wound(s)/ulcer(s) today, debridement is required to promote healing and evaluate the wound base. Return Appointment:   1 week with Juan David Smith DO     Instructions: Right foot wounds:  Cleanse intact skin with soap & water, wound with normal saline or wound cleanser. Vashe Wound Solution (hypochlorous acid) soak placed on wound bed for a minimum of 1 minute prior to dressing application. This solution removes pathogens, bioburden, and biofilms from wounds, but is not cytotoxic. Apply alginate with silver. Cut product to wound size and apply to wound bed. Cover with ABD pad and wrap with gauze. Change 3 times weekly. Patient to offload wound with DARCO SHOE WITH PEG ASSIST INSERT while standing or weight bearing. Place rolled foam alongside foot to keep depression on PEG insert aligned with wound bed. Diabetic shoes with inserts the long-range plan after wound is healed. Potential Total Contact Cast application if offloading PEG with Darco not effective. Do not get wound wet. May purchase cast cover at local pharmacy to keep dry in shower. Wound healing is greatly slowed when blood glucose levels are greater than 200. Monitor glucose levels daily to ensure tight glucose control. Follow up with PCP if your glucose levels are frequently greater than 200. Recommend weight loss to promote overall health and wound healing. Increase protein intake to promote wound healing. Protein supplements such as Austin and Ensure are great options. MRI results reviewed. HBO evaluation & treatment discussed. Expect a call from Diego Santiago, [facility]. Continue antibiotics as directed by Infectious Disease. Debridement: Excisional Debridement    Using: curette the wound(s)/ulcer(s) was/were debrided down through and including the removal of epidermis, dermis, and subcutaneous tissue. Performed by:  Lisette Doan, DO  Consent

## 2023-05-10 ENCOUNTER — HOSPITAL ENCOUNTER (OUTPATIENT)
Dept: WOUND CARE | Age: 38
Discharge: HOME | End: 2023-05-10
Payer: COMMERCIAL

## 2023-05-10 VITALS
SYSTOLIC BLOOD PRESSURE: 121 MMHG | BODY MASS INDEX: 36.07 KG/M2 | OXYGEN SATURATION: 98 % | DIASTOLIC BLOOD PRESSURE: 70 MMHG | RESPIRATION RATE: 18 BRPM | TEMPERATURE: 98.6 F | HEART RATE: 104 BPM | HEIGHT: 68 IN | WEIGHT: 238 LBS

## 2023-05-10 DIAGNOSIS — L97.413 DIABETIC ULCER OF RIGHT MIDFOOT ASSOCIATED WITH TYPE 2 DIABETES MELLITUS, WITH NECROSIS OF MUSCLE (HCC): Primary | ICD-10-CM

## 2023-05-10 DIAGNOSIS — E11.621 DIABETIC ULCER OF RIGHT MIDFOOT ASSOCIATED WITH TYPE 2 DIABETES MELLITUS, WITH NECROSIS OF MUSCLE (HCC): Primary | ICD-10-CM

## 2023-05-10 DIAGNOSIS — M86.671 CHRONIC REFRACTORY OSTEOMYELITIS OF FOOT, RIGHT (HCC): ICD-10-CM

## 2023-05-10 PROCEDURE — 11042 DBRDMT SUBQ TIS 1ST 20SQCM/<: CPT

## 2023-05-10 RX ORDER — GINSENG 100 MG
CAPSULE ORAL ONCE
OUTPATIENT
Start: 2023-05-10 | End: 2023-05-10

## 2023-05-10 RX ORDER — LIDOCAINE 40 MG/G
CREAM TOPICAL ONCE
OUTPATIENT
Start: 2023-05-10 | End: 2023-05-10

## 2023-05-10 RX ORDER — GENTAMICIN SULFATE 1 MG/G
OINTMENT TOPICAL ONCE
OUTPATIENT
Start: 2023-05-10 | End: 2023-05-10

## 2023-05-10 RX ORDER — LIDOCAINE HYDROCHLORIDE 40 MG/ML
SOLUTION TOPICAL ONCE
OUTPATIENT
Start: 2023-05-10 | End: 2023-05-10

## 2023-05-10 RX ORDER — LIDOCAINE 50 MG/G
OINTMENT TOPICAL ONCE
OUTPATIENT
Start: 2023-05-10 | End: 2023-05-10

## 2023-05-10 RX ORDER — CLOBETASOL PROPIONATE 0.5 MG/G
OINTMENT TOPICAL ONCE
OUTPATIENT
Start: 2023-05-10 | End: 2023-05-10

## 2023-05-10 RX ORDER — BACITRACIN ZINC AND POLYMYXIN B SULFATE 500; 1000 [USP'U]/G; [USP'U]/G
OINTMENT TOPICAL ONCE
OUTPATIENT
Start: 2023-05-10 | End: 2023-05-10

## 2023-05-10 RX ORDER — LIDOCAINE HYDROCHLORIDE 20 MG/ML
JELLY TOPICAL ONCE
OUTPATIENT
Start: 2023-05-10 | End: 2023-05-10

## 2023-05-10 RX ORDER — BACITRACIN, NEOMYCIN, POLYMYXIN B 400; 3.5; 5 [USP'U]/G; MG/G; [USP'U]/G
OINTMENT TOPICAL ONCE
OUTPATIENT
Start: 2023-05-10 | End: 2023-05-10

## 2023-05-10 RX ORDER — BETAMETHASONE DIPROPIONATE 0.05 %
OINTMENT (GRAM) TOPICAL ONCE
OUTPATIENT
Start: 2023-05-10 | End: 2023-05-10

## 2023-05-10 ASSESSMENT — PAIN DESCRIPTION - DESCRIPTORS: DESCRIPTORS: THROBBING

## 2023-05-10 ASSESSMENT — PAIN DESCRIPTION - LOCATION: LOCATION: FOOT

## 2023-05-10 ASSESSMENT — PAIN SCALES - GENERAL: PAINLEVEL_OUTOF10: 6

## 2023-05-10 ASSESSMENT — PAIN DESCRIPTION - ORIENTATION: ORIENTATION: RIGHT

## 2023-05-10 NOTE — WOUND CARE
Discharge Instructions for  Asad Pillai  [address] [place] [address]  [address], [address] [address] [facility]  Phone [phone]   Fax [phone]      NAME:  Juanito Farnsworth OF BIRTH:  1985  MEDICAL RECORD NUMBER:  [id number]  DATE:  5/10/2023    Return Appointment:   1 week with Edna Fenton DO      Instructions:    Right foot wounds:  Cleanse intact skin with soap & water, wound with normal saline or wound cleanser. Do not get wound wet. May purchase cast cover at local pharmacy to keep dry in shower. Vashe Wound Solution (hypochlorous acid) soak placed on wound bed for a minimum of 1 minute prior to dressing application. This solution removes pathogens, bioburden, and biofilms from wounds, but is not cytotoxic. Apply alginate with silver. Cut product to wound size and apply to wound bed. Cover with ABD pad and wrap with gauze. Change 3 times weekly. Patient to offload wound with DARCO SHOE WITH PEG ASSIST INSERT while standing or weight bearing. Place rolled foam alongside foot to keep depression on PEG insert aligned with wound bed. Diabetic shoes with inserts the long-range plan after wound is healed. Potential Total Contact Cast application if offloading PEG with Darco not effective. Wound healing is greatly slowed when blood glucose levels are greater than 200. Monitor glucose levels daily to ensure tight glucose control. Follow up with PCP if your glucose levels are frequently greater than 200. Recommend weight loss to promote overall health and wound healing. Increase protein intake to promote wound healing. Protein supplements such as Austin and Ensure are great options. -HBO treatment discussed to start on 5/15/23.   -Continue antibiotics as directed by Infectious Disease.       Should you experience increased redness, swelling, pain, foul odor, size of wound(s), or have a temperature over 101 degrees please contact the [address] at
RN HYPERBARIC OXYGEN THERAPY RISK ASSESSMENT TOOL   [facility]     Brenda Montesinos  MEDICAL RECORD NUMBER:  [id number]  AGE: 42 y.o. GENDER: female  : 1985  EPISODE DATE:  5/10/2023       PAST MEDICAL HISTORY      Diagnosis Date    Asthma     Diabetes mellitus ([address]     Hypertension        PAST SURGICAL HISTORY  History reviewed. No pertinent surgical history. FAMILY HISTORY  History reviewed. No pertinent family history. SOCIAL HISTORY  Social History     Tobacco Use    Smoking status: Never    Smokeless tobacco: Never   Substance Use Topics    Alcohol use: Not Currently    Drug use: Not Currently       ALLERGIES  Allergies   Allergen Reactions    Morphine Anaphylaxis    Metformin Nausea And Vomiting and Nausea Only    Gabapentin Diarrhea    Mirtazapine Other (See Comments) and Seizure     seizure  seizure  seizure  Other reaction(s): Other (See Comments)  seizure  seizure  seizure  seizure  seizure      Sulfamethoxazole-Trimethoprim Diarrhea and Other (See Comments)    Insulin Glargine-Lixisenatide Nausea And Vomiting    Metformin And Related Rash    Penicillins Rash, Hives and Other (See Comments)     Other reaction(s): Rash-Allergy         MEDICATIONS  Current Outpatient Medications on File Prior to Encounter   Medication Sig Dispense Refill    traZODone (DESYREL) 100 MG tablet Take 100 mg by mouth at bedtime      oxyCODONE-acetaminophen (PERCOCET)  MG per tablet Take 1 tablet by mouth every 8 hours as needed.       naloxone 4 MG/0.1ML LIQD nasal spray 4 mg once as needed      insulin lispro (HUMALOG KWIKPEN) 200 UNIT/ML SOPN pen Inject 10 Units into the skin 3 times daily (with meals)      TRESIBA FLEXTOUCH 100 UNIT/ML SOPN Inject 50 Units into the skin at bedtime      ibuprofen (ADVIL;MOTRIN) 800 MG tablet TAKE 1 TABLET BY MOUTH EVERY 8 HOURS AS NEEDED FOR MODERATE PAIN      hydrOXYzine HCl (ATARAX) 25 MG tablet Take 75 mg by mouth at bedtime      FLUoxetine
Smoking has a negative effect on treatment and may diminish the benefits you may receive. Smoking within 2 hours prior to your treatment poses additional risk and should be avoided completely. Drinking alcohol or using illicit drugs may also have a negative effect on your treatment and should be avoided. Drinking carbonated beverages such as soda pop within 1 hour of your treatment could cause pains in the stomach during the treatment. Caffeinated beverages or foods containing caffeine should be avoided before your treatment. Please let us know if we can assist you with seeking help to stop smoking, drinking or using recreational drugs. PROHIBITED ITEMS INFORMATION REVIEWED: Yes   No wigs, hair spray, hair oils, hair ornaments, creams, lotions, make-up, after shave, perfumes, colognes, chap stick, lip balms, mustache waxes, petroleum products (e.g. Vaseline), baby oil, deodorant or ointments  No nail polish    No synthetic clothing  No nylon hose, underwear, panty hose or bra  No food, gum or candy  No jewelry  No smoking materials such as lighter, cigarettes or matches  No reading material  No hearing aids, non-fixed dentures  No Hard (non-gas permeable) contact lenses  Most dressings are approved to wear into the hyperbaric chamber. Please advise the hyperbaric staff of any new dressings applied to your wound to ensure the new dressings are compatible with hyperbaric oxygen treatments. No alcohol preps  No heat packs  No street clothes or shoes  No cell phones or other electronics  If it was not a part of you when you were born into this world, if it was not provided by the chamber , then it cannot go into the chamber with you.         Electronically signed by Brigida Yates RN on 5/10/2023 at 3:42 PM

## 2023-05-10 NOTE — DISCHARGE INSTRUCTIONS
Instructions:    Right foot wounds:  Cleanse intact skin with soap & water, wound with normal saline or wound cleanser. Do not get wound wet. May purchase cast cover at local pharmacy to keep dry in shower. Vashe Wound Solution (hypochlorous acid) soak placed on wound bed for a minimum of 1 minute prior to dressing application. This solution removes pathogens, bioburden, and biofilms from wounds, but is not cytotoxic. Apply alginate with silver. Cut product to wound size and apply to wound bed. Cover with ABD pad and wrap with gauze. Change 3 times weekly. Patient to offload wound with DARCO SHOE WITH PEG ASSIST INSERT while standing or weight bearing. Place rolled foam alongside foot to keep depression on PEG insert aligned with wound bed. Diabetic shoes with inserts the long-range plan after wound is healed. Potential Total Contact Cast application if offloading PEG with Darco not effective. Wound healing is greatly slowed when blood glucose levels are greater than 200. Monitor glucose levels daily to ensure tight glucose control. Follow up with PCP if your glucose levels are frequently greater than 200. Recommend weight loss to promote overall health and wound healing. Increase protein intake to promote wound healing. Protein supplements such as Austin and Ensure are great options. -HBO treatment discussed to start on 5/15/23.   -Continue antibiotics as directed by Infectious Disease.

## 2023-05-10 NOTE — FLOWSHEET NOTE
05/10/23 1535   Wound 02/24/23 Right;Plantar #1   Date First Assessed/Time First Assessed: 02/24/23 1314   Present on Hospital Admission: Yes  Primary Wound Type: Diabetic Ulcer  Wound Location Orientation: Right;Plantar  Wound Description (Comments): #1   Wound Image    Wound Etiology Diabetic Elizabeth 3   Dressing Status Intact   Wound Cleansed Cleansed with saline   Dressing/Treatment Alginate with Ag   Offloading for Diabetic Foot Ulcers Offloading ordered   Wound Length (cm) 1.7 cm   Wound Width (cm) 2.5 cm   Wound Depth (cm) 0.2 cm   Wound Surface Area (cm^2) 4.25 cm^2   Change in Wound Size % (l*w) 40.97   Wound Volume (cm^3) 0.85 cm^3   Wound Healing % 41   Wound Assessment Granulation tissue   Drainage Amount Moderate   Drainage Description Serosanguinous   Odor None   Emily-wound Assessment Maceration   Wound Thickness Description not for Pressure Injury Full thickness

## 2023-05-15 ENCOUNTER — HOSPITAL ENCOUNTER (OUTPATIENT)
Dept: WOUND CARE | Age: 38
Discharge: HOME | End: 2023-05-15
Payer: COMMERCIAL

## 2023-05-15 VITALS
HEART RATE: 88 BPM | TEMPERATURE: 97.8 F | SYSTOLIC BLOOD PRESSURE: 129 MMHG | DIASTOLIC BLOOD PRESSURE: 87 MMHG | OXYGEN SATURATION: 97 % | RESPIRATION RATE: 18 BRPM

## 2023-05-15 DIAGNOSIS — E11.621 DIABETIC ULCER OF RIGHT MIDFOOT ASSOCIATED WITH TYPE 2 DIABETES MELLITUS, WITH NECROSIS OF MUSCLE (HCC): Primary | ICD-10-CM

## 2023-05-15 DIAGNOSIS — M86.671 CHRONIC REFRACTORY OSTEOMYELITIS OF FOOT, RIGHT (HCC): ICD-10-CM

## 2023-05-15 DIAGNOSIS — L97.413 DIABETIC ULCER OF RIGHT MIDFOOT ASSOCIATED WITH TYPE 2 DIABETES MELLITUS, WITH NECROSIS OF MUSCLE (HCC): Primary | ICD-10-CM

## 2023-05-15 LAB
GLUCOSE BLD STRIP.AUTO-MCNC: 125 MG/DL (ref 65–100)
GLUCOSE BLD STRIP.AUTO-MCNC: 175 MG/DL (ref 65–100)
SERVICE CMNT-IMP: ABNORMAL
SERVICE CMNT-IMP: ABNORMAL

## 2023-05-15 PROCEDURE — G0277 HBOT, FULL BODY CHAMBER, 30M: HCPCS

## 2023-05-15 PROCEDURE — 82962 GLUCOSE BLOOD TEST: CPT

## 2023-05-16 ENCOUNTER — HOSPITAL ENCOUNTER (OUTPATIENT)
Dept: WOUND CARE | Age: 38
Discharge: HOME | End: 2023-05-16
Payer: COMMERCIAL

## 2023-05-16 VITALS
SYSTOLIC BLOOD PRESSURE: 110 MMHG | TEMPERATURE: 97.6 F | DIASTOLIC BLOOD PRESSURE: 78 MMHG | OXYGEN SATURATION: 100 % | HEART RATE: 82 BPM | RESPIRATION RATE: 18 BRPM

## 2023-05-16 DIAGNOSIS — E11.621 DIABETIC ULCER OF RIGHT MIDFOOT ASSOCIATED WITH TYPE 2 DIABETES MELLITUS, WITH NECROSIS OF MUSCLE (HCC): Primary | ICD-10-CM

## 2023-05-16 DIAGNOSIS — L97.413 DIABETIC ULCER OF RIGHT MIDFOOT ASSOCIATED WITH TYPE 2 DIABETES MELLITUS, WITH NECROSIS OF MUSCLE (HCC): Primary | ICD-10-CM

## 2023-05-16 DIAGNOSIS — M86.671 CHRONIC REFRACTORY OSTEOMYELITIS OF FOOT, RIGHT (HCC): ICD-10-CM

## 2023-05-16 LAB
GLUCOSE BLD STRIP.AUTO-MCNC: 196 MG/DL (ref 65–100)
GLUCOSE BLD STRIP.AUTO-MCNC: 244 MG/DL (ref 65–100)
SERVICE CMNT-IMP: ABNORMAL
SERVICE CMNT-IMP: ABNORMAL

## 2023-05-16 PROCEDURE — 99183 HYPERBARIC OXYGEN THERAPY: CPT | Performed by: FAMILY MEDICINE

## 2023-05-16 PROCEDURE — 82962 GLUCOSE BLOOD TEST: CPT

## 2023-05-16 PROCEDURE — G0277 HBOT, FULL BODY CHAMBER, 30M: HCPCS

## 2023-05-16 NOTE — PROGRESS NOTES
HBO PROGRESS NOTE      NAME: [address] RECORD NUMBER:  [id number]  AGE: 42 y.o. GENDER: female  : 1985  EPISODE DATE:  2023     Subjective     HBO Treatment Number: 2 out of Total Treatments: 40    HBO Diagnosis:             Indications: Chronic Refractory Osteomyelitis to ___ (site) (right foot)    Safety checks performed prior to treatment. See doc flowsheets for documentation.     Objective        Recent Labs     23  0702 23  0858   POCGLU 244* 196*     Pre treatment Vital Signs       Temp: 97.6 °F (36.4 °C)     Pulse: 92     Respirations: 18     BP: 106/76       Post treatment Vital Signs  Temp: 97.6 °F (36.4 °C)  Pulse: 82  Respirations: 18  BP: 110/78    Assessment        HBO Diagnosis:   Problem List Items Addressed This Visit          Endocrine    Diabetic ulcer of right midfoot associated with type 2 diabetes mellitus, with necrosis of muscle (HCC) - Primary (Chronic)    Relevant Orders    Hypoglycemial protocol    POCT glucose    Care order/instruction    Care order/instruction    Care order/instruction    Care order/instruction    Care order/instruction    Care order/instruction    Care order/instruction    Care order/instruction       Other    Chronic refractory osteomyelitis of foot, right (HCC) (Chronic)    Relevant Orders    Hypoglycemial protocol    POCT glucose    Care order/instruction    Care order/instruction    Care order/instruction    Care order/instruction    Care order/instruction    Care order/instruction    Care order/instruction    Care order/instruction    Notify physician (specify)    Hyperbaric Oxygen Therapy       Physical Exam        General Appearance:  alert and oriented to person, place and time, well-developed and well-nourished, in no acute distress    Pre Tympanic Membrane Assessment:  Right: Normal  Left: Normal    Post Tympanic Membrane Assessment:  Left: Normal  Right: Normal    Pulmonary/Chest:  clear to auscultation bilaterally- no

## 2023-05-17 ENCOUNTER — HOSPITAL ENCOUNTER (OUTPATIENT)
Dept: WOUND CARE | Age: 38
Discharge: HOME | End: 2023-05-17
Payer: COMMERCIAL

## 2023-05-17 VITALS
RESPIRATION RATE: 18 BRPM | TEMPERATURE: 97.5 F | DIASTOLIC BLOOD PRESSURE: 81 MMHG | SYSTOLIC BLOOD PRESSURE: 116 MMHG | HEART RATE: 98 BPM | OXYGEN SATURATION: 98 %

## 2023-05-17 DIAGNOSIS — E11.621 DIABETIC ULCER OF RIGHT MIDFOOT ASSOCIATED WITH TYPE 2 DIABETES MELLITUS, WITH NECROSIS OF MUSCLE (HCC): Primary | ICD-10-CM

## 2023-05-17 DIAGNOSIS — L97.413 DIABETIC ULCER OF RIGHT MIDFOOT ASSOCIATED WITH TYPE 2 DIABETES MELLITUS, WITH NECROSIS OF MUSCLE (HCC): Primary | ICD-10-CM

## 2023-05-17 DIAGNOSIS — M86.671 CHRONIC REFRACTORY OSTEOMYELITIS OF FOOT, RIGHT (HCC): ICD-10-CM

## 2023-05-17 LAB
GLUCOSE BLD STRIP.AUTO-MCNC: 153 MG/DL (ref 65–100)
GLUCOSE BLD STRIP.AUTO-MCNC: 158 MG/DL (ref 65–100)
SERVICE CMNT-IMP: ABNORMAL
SERVICE CMNT-IMP: ABNORMAL

## 2023-05-17 PROCEDURE — G0277 HBOT, FULL BODY CHAMBER, 30M: HCPCS

## 2023-05-17 PROCEDURE — 82962 GLUCOSE BLOOD TEST: CPT

## 2023-05-17 PROCEDURE — 11042 DBRDMT SUBQ TIS 1ST 20SQCM/<: CPT

## 2023-05-17 RX ORDER — LIDOCAINE HYDROCHLORIDE 40 MG/ML
SOLUTION TOPICAL ONCE
OUTPATIENT
Start: 2023-05-17 | End: 2023-05-17

## 2023-05-17 RX ORDER — BACITRACIN ZINC AND POLYMYXIN B SULFATE 500; 1000 [USP'U]/G; [USP'U]/G
OINTMENT TOPICAL ONCE
OUTPATIENT
Start: 2023-05-17 | End: 2023-05-17

## 2023-05-17 RX ORDER — BETAMETHASONE DIPROPIONATE 0.05 %
OINTMENT (GRAM) TOPICAL ONCE
OUTPATIENT
Start: 2023-05-17 | End: 2023-05-17

## 2023-05-17 RX ORDER — LIDOCAINE HYDROCHLORIDE 20 MG/ML
JELLY TOPICAL ONCE
OUTPATIENT
Start: 2023-05-17 | End: 2023-05-17

## 2023-05-17 RX ORDER — LIDOCAINE 40 MG/G
CREAM TOPICAL ONCE
OUTPATIENT
Start: 2023-05-17 | End: 2023-05-17

## 2023-05-17 RX ORDER — CLOBETASOL PROPIONATE 0.5 MG/G
OINTMENT TOPICAL ONCE
OUTPATIENT
Start: 2023-05-17 | End: 2023-05-17

## 2023-05-17 RX ORDER — LIDOCAINE HYDROCHLORIDE 20 MG/ML
JELLY TOPICAL ONCE
Status: COMPLETED | OUTPATIENT
Start: 2023-05-17 | End: 2023-05-17

## 2023-05-17 RX ORDER — BACITRACIN, NEOMYCIN, POLYMYXIN B 400; 3.5; 5 [USP'U]/G; MG/G; [USP'U]/G
OINTMENT TOPICAL ONCE
OUTPATIENT
Start: 2023-05-17 | End: 2023-05-17

## 2023-05-17 RX ORDER — GINSENG 100 MG
CAPSULE ORAL ONCE
OUTPATIENT
Start: 2023-05-17 | End: 2023-05-17

## 2023-05-17 RX ORDER — GENTAMICIN SULFATE 1 MG/G
OINTMENT TOPICAL ONCE
OUTPATIENT
Start: 2023-05-17 | End: 2023-05-17

## 2023-05-17 RX ORDER — LIDOCAINE 50 MG/G
OINTMENT TOPICAL ONCE
OUTPATIENT
Start: 2023-05-17 | End: 2023-05-17

## 2023-05-17 RX ADMIN — LIDOCAINE HYDROCHLORIDE: 20 JELLY TOPICAL at 09:50

## 2023-05-17 NOTE — DISCHARGE INSTRUCTIONS
Discharge Instructions for  Asad Pillai  [address] [place] [address] [address], [address]  Phone [phone]   Fax [phone]      NAME:  Debbie Haynes OF BIRTH:  1985  MEDICAL RECORD NUMBER:  [id number]  DATE:  @ED@    Return Appointment:   1 week with Wendy Arciniega DO      Instructions: Right foot wounds:  Cleanse intact skin with soap & water, wound with normal saline or wound cleanser. Do not get wound wet. May purchase cast cover at local pharmacy to keep dry in shower. Vashe Wound Solution (hypochlorous acid) soak placed on wound bed for a minimum of 1 minute prior to dressing application. This solution removes pathogens, bioburden, and biofilms from wounds, but is not cytotoxic. Apply alginate with silver. Cut product to wound size and apply to wound bed. Cover with ABD pad and wrap with gauze. Change 3 times weekly. Plan for TCC next week        Wound healing is greatly slowed when blood glucose levels are greater than 200. Monitor glucose levels daily to ensure tight glucose control. Follow up with PCP if your glucose levels are frequently greater than 200. Recommend weight loss to promote overall health and wound healing. Increase protein intake to promote wound healing. Protein supplements such as Austin and Ensure are great options. -HBO treatment discussed to start on 5/15/23.   -Continue antibiotics as directed by Infectious Disease. Should you experience increased redness, swelling, pain, foul odor, size of wound(s), or have a temperature over 101 degrees please contact the [address] at [phone] or if after hours contact your primary care physician or go to the hospital emergency department. PLEASE NOTE: IF YOU ARE UNABLE TO OBTAIN WOUND SUPPLIES, CONTINUE TO USE THE SUPPLIES YOU HAVE AVAILABLE UNTIL YOU ARE ABLE TO REACH US. IT IS MOST IMPORTANT TO KEEP THE WOUND COVERED AT ALL TIMES.     Electronically

## 2023-05-17 NOTE — WOUND CARE
Discharge Instructions for  Asad Pillai  [address] [place] [address] [address], [address] [facility] [address]  Phone [phone]   Fax [phone]      NAME:  Dmitriy Troncoso OF BIRTH:  1985  MEDICAL RECORD NUMBER:  [id number]  DATE:  5/17/2023    Return Appointment:   1 week with Alden Will DO      Instructions:    Right foot wounds:  Cleanse intact skin with soap & water, wound with normal saline or wound cleanser. Do not get wound wet. May purchase cast cover at local pharmacy to keep dry in shower. Vashe Wound Solution (hypochlorous acid) soak placed on wound bed for a minimum of 1 minute prior to dressing application. This solution removes pathogens, bioburden, and biofilms from wounds, but is not cytotoxic. Apply alginate with silver. Cut product to wound size and apply to wound bed. Cover with ABD pad and wrap with gauze. Change 3 times weekly. Plan for TCC next week       Wound healing is greatly slowed when blood glucose levels are greater than 200. Monitor glucose levels daily to ensure tight glucose control. Follow up with PCP if your glucose levels are frequently greater than 200. Recommend weight loss to promote overall health and wound healing. Increase protein intake to promote wound healing. Protein supplements such as Austni and Ensure are great options. -HBO treatment discussed to start on 5/15/23.   -Continue antibiotics as directed by Infectious Disease. Should you experience increased redness, swelling, pain, foul odor, size of wound(s), or have a temperature over 101 degrees please contact the [address] [place] [address] Road at [phone] or if after hours contact your primary care physician or go to the hospital emergency department. PLEASE NOTE: IF YOU ARE UNABLE TO OBTAIN WOUND SUPPLIES, CONTINUE TO USE THE SUPPLIES YOU HAVE AVAILABLE UNTIL YOU ARE ABLE TO REACH US. IT IS MOST IMPORTANT TO KEEP THE WOUND COVERED AT ALL TIMES.            Should you

## 2023-05-17 NOTE — FLOWSHEET NOTE
05/17/23 0925   Wound 02/24/23 Right;Plantar #1   Date First Assessed/Time First Assessed: 02/24/23 1314   Present on Hospital Admission: Yes  Primary Wound Type: Diabetic Ulcer  Wound Location Orientation: Right;Plantar  Wound Description (Comments): #1   Wound Image    Wound Etiology Diabetic Elizabeth 3   Dressing Status Intact   Wound Cleansed Cleansed with saline   Dressing/Treatment Alginate with Ag   Offloading for Diabetic Foot Ulcers Offloading ordered   Wound Length (cm) 1.8 cm   Wound Width (cm) 2.9 cm   Wound Depth (cm) 0.1 cm   Wound Surface Area (cm^2) 5.22 cm^2   Change in Wound Size % (l*w) 27.5   Wound Volume (cm^3) 0.522 cm^3   Wound Healing % 64   Wound Assessment Granulation tissue   Drainage Amount Moderate   Drainage Description Serosanguinous   Odor None   Emily-wound Assessment Edematous   Wound Thickness Description not for Pressure Injury Full thickness

## 2023-05-17 NOTE — FLOWSHEET NOTE
05/17/23 0925   Wound 02/24/23 Right;Plantar #1   Date First Assessed/Time First Assessed: 02/24/23 1314   Present on Hospital Admission: Yes  Primary Wound Type: Diabetic Ulcer  Wound Location Orientation: Right;Plantar  Wound Description (Comments): #1   Wound Image     Wound Etiology Diabetic Elizabeth 3   Dressing Status Intact   Wound Cleansed Cleansed with saline   Dressing/Treatment Alginate with Ag   Offloading for Diabetic Foot Ulcers Offloading ordered   Wound Length (cm) 1.8 cm   Wound Width (cm) 2.9 cm   Wound Depth (cm) 0.1 cm   Wound Surface Area (cm^2) 5.22 cm^2   Change in Wound Size % (l*w) 27.5   Wound Volume (cm^3) 0.522 cm^3   Wound Healing % 64   Post-Procedure Length (cm) 1.8 cm   Post-Procedure Width (cm) 2.9 cm   Post-Procedure Depth (cm) 0.1 cm   Post-Procedure Surface Area (cm^2) 5.22 cm^2   Post-Procedure Volume (cm^3) 0.522 cm^3   Wound Assessment Granulation tissue   Drainage Amount Moderate   Drainage Description Serosanguinous   Odor None   Emily-wound Assessment Edematous   Wound Thickness Description not for Pressure Injury Full thickness     Post debridement

## 2023-05-18 ENCOUNTER — HOSPITAL ENCOUNTER (OUTPATIENT)
Dept: WOUND CARE | Age: 38
Discharge: HOME | End: 2023-05-18
Payer: COMMERCIAL

## 2023-05-18 VITALS
SYSTOLIC BLOOD PRESSURE: 116 MMHG | DIASTOLIC BLOOD PRESSURE: 83 MMHG | RESPIRATION RATE: 18 BRPM | HEART RATE: 82 BPM | TEMPERATURE: 97.5 F | OXYGEN SATURATION: 99 %

## 2023-05-18 DIAGNOSIS — L97.413 DIABETIC ULCER OF RIGHT MIDFOOT ASSOCIATED WITH TYPE 2 DIABETES MELLITUS, WITH NECROSIS OF MUSCLE (HCC): Primary | ICD-10-CM

## 2023-05-18 DIAGNOSIS — M86.671 CHRONIC REFRACTORY OSTEOMYELITIS OF FOOT, RIGHT (HCC): ICD-10-CM

## 2023-05-18 DIAGNOSIS — E11.621 DIABETIC ULCER OF RIGHT MIDFOOT ASSOCIATED WITH TYPE 2 DIABETES MELLITUS, WITH NECROSIS OF MUSCLE (HCC): Primary | ICD-10-CM

## 2023-05-18 LAB
GLUCOSE BLD STRIP.AUTO-MCNC: 115 MG/DL (ref 65–100)
GLUCOSE BLD STRIP.AUTO-MCNC: 140 MG/DL (ref 65–100)
SERVICE CMNT-IMP: ABNORMAL
SERVICE CMNT-IMP: ABNORMAL

## 2023-05-18 PROCEDURE — 82962 GLUCOSE BLOOD TEST: CPT

## 2023-05-18 PROCEDURE — G0277 HBOT, FULL BODY CHAMBER, 30M: HCPCS

## 2023-05-18 PROCEDURE — 99183 HYPERBARIC OXYGEN THERAPY: CPT | Performed by: SURGERY

## 2023-05-18 NOTE — PROGRESS NOTES
HBO PROGRESS NOTE  NAME: Ming Gutierrez RECORD NUMBER:  [id number]  AGE: 42 y.o. GENDER: female  : 1985  EPISODE DATE:  2023     Subjective   HBO Treatment Number: 4 out of Total Treatments: 40    HBO Diagnosis:           Indications: Chronic Refractory Osteomyelitis to ___ (site) (Right Foot)    Safety checks performed prior to treatment. See doc flowsheets for documentation.       Objective      No results found for: [address] [facility]     23  0703 23  0859   POCGLU 140* 115*     Pre treatment Vital Signs       Temp: 97.5 °F (36.4 °C)     Pulse: 97     Respirations: 18     BP: 108/78       Post treatment Vital Signs  Temp: 97.5 °F (36.4 °C)  Pulse: 82  Respirations: 18  BP: 116/83    Assessment      HBO Diagnosis:   Problem List Items Addressed This Visit          Endocrine    Diabetic ulcer of right midfoot associated with type 2 diabetes mellitus, with necrosis of muscle (HCC) - Primary (Chronic)    Relevant Orders    Hypoglycemial protocol    POCT glucose    Care order/instruction    Care order/instruction    Care order/instruction    Care order/instruction    Care order/instruction    Care order/instruction    Care order/instruction    Care order/instruction       Other    Chronic refractory osteomyelitis of foot, right (HCC) (Chronic)    Relevant Orders    Hypoglycemial protocol    POCT glucose    Care order/instruction    Care order/instruction    Care order/instruction    Care order/instruction    Care order/instruction    Care order/instruction    Care order/instruction    Care order/instruction    Notify physician (specify)    Hyperbaric Oxygen Therapy         Physical Exam:  General Appearance: alert, interacfive    Pre Tympanic Membrane Assessment:  Right: Normal  Left: Normal    Post Tympanic Membrane Assessment:  Left: Normal  Right: Normal    Pulmonary/Chest: No audible wheezing      Patient place in a fully body Monoplace serial number         Treatment Start

## 2023-05-22 ENCOUNTER — HOSPITAL ENCOUNTER (OUTPATIENT)
Dept: WOUND CARE | Age: 38
Discharge: HOME | End: 2023-05-22
Payer: COMMERCIAL

## 2023-05-22 VITALS
OXYGEN SATURATION: 99 % | HEART RATE: 82 BPM | DIASTOLIC BLOOD PRESSURE: 90 MMHG | SYSTOLIC BLOOD PRESSURE: 134 MMHG | TEMPERATURE: 97.4 F | RESPIRATION RATE: 18 BRPM

## 2023-05-22 DIAGNOSIS — M86.671 CHRONIC REFRACTORY OSTEOMYELITIS OF FOOT, RIGHT (HCC): ICD-10-CM

## 2023-05-22 DIAGNOSIS — E11.621 DIABETIC ULCER OF RIGHT MIDFOOT ASSOCIATED WITH TYPE 2 DIABETES MELLITUS, WITH NECROSIS OF MUSCLE (HCC): Primary | ICD-10-CM

## 2023-05-22 DIAGNOSIS — L97.413 DIABETIC ULCER OF RIGHT MIDFOOT ASSOCIATED WITH TYPE 2 DIABETES MELLITUS, WITH NECROSIS OF MUSCLE (HCC): Primary | ICD-10-CM

## 2023-05-22 LAB
GLUCOSE BLD STRIP.AUTO-MCNC: 100 MG/DL (ref 65–100)
GLUCOSE BLD STRIP.AUTO-MCNC: 123 MG/DL (ref 65–100)
GLUCOSE BLD STRIP.AUTO-MCNC: 124 MG/DL (ref 65–100)
SERVICE CMNT-IMP: ABNORMAL
SERVICE CMNT-IMP: ABNORMAL
SERVICE CMNT-IMP: NORMAL

## 2023-05-22 PROCEDURE — 82962 GLUCOSE BLOOD TEST: CPT

## 2023-05-22 PROCEDURE — 99183 HYPERBARIC OXYGEN THERAPY: CPT | Performed by: SURGERY

## 2023-05-22 PROCEDURE — G0277 HBOT, FULL BODY CHAMBER, 30M: HCPCS

## 2023-05-22 NOTE — PROGRESS NOTES
HBO PROGRESS NOTE  NAME: Ming Gutierrez RECORD NUMBER:  [id number]  AGE: 42 y.o. GENDER: female  : 1985  EPISODE DATE:  2023     Subjective   HBO Treatment Number: 5 out of Total Treatments: 40    HBO Diagnosis:           Indications: Chronic Refractory Osteomyelitis to ___ (site) (Right Foot)    Safety checks performed prior to treatment. See doc flowsheets for documentation.       Objective      No results found for: [address] [facility]     23  0708 23  0911   POCGLU 123* 100     Pre treatment Vital Signs       Temp: 97.4 °F (36.3 °C)     Pulse: 91     Respirations: 18     BP: 125/86       Post treatment Vital Signs  Temp: 97.4 °F (36.3 °C)  Pulse: 82  Respirations: 18  BP: (!) 134/90    Assessment      HBO Diagnosis:   Problem List Items Addressed This Visit          Endocrine    Diabetic ulcer of right midfoot associated with type 2 diabetes mellitus, with necrosis of muscle (HCC) - Primary (Chronic)    Relevant Orders    Hypoglycemial protocol    POCT glucose    Care order/instruction    Care order/instruction    Care order/instruction    Care order/instruction    Care order/instruction    Care order/instruction    Care order/instruction    Care order/instruction       Other    Chronic refractory osteomyelitis of foot, right (HCC) (Chronic)    Relevant Orders    Hypoglycemial protocol    POCT glucose    Care order/instruction    Care order/instruction    Care order/instruction    Care order/instruction    Care order/instruction    Care order/instruction    Care order/instruction    Care order/instruction    Notify physician (specify)    Hyperbaric Oxygen Therapy         Physical Exam:  General Appearance: alert, interacfive    Pre Tympanic Membrane Assessment:  Right: Normal  Left: Normal    Post Tympanic Membrane Assessment:  Left: Normal  Right: Normal    Pulmonary/Chest: No audible wheezing      Patient place in a fully body Monoplace serial number         Treatment Start

## 2023-05-23 ENCOUNTER — HOSPITAL ENCOUNTER (OUTPATIENT)
Dept: WOUND CARE | Age: 38
Discharge: HOME | End: 2023-05-23
Payer: COMMERCIAL

## 2023-05-23 VITALS
TEMPERATURE: 97.5 F | SYSTOLIC BLOOD PRESSURE: 147 MMHG | RESPIRATION RATE: 18 BRPM | OXYGEN SATURATION: 99 % | HEART RATE: 90 BPM | DIASTOLIC BLOOD PRESSURE: 88 MMHG

## 2023-05-23 DIAGNOSIS — E11.621 DIABETIC ULCER OF RIGHT MIDFOOT ASSOCIATED WITH TYPE 2 DIABETES MELLITUS, WITH NECROSIS OF MUSCLE (HCC): Primary | ICD-10-CM

## 2023-05-23 DIAGNOSIS — M86.671 CHRONIC REFRACTORY OSTEOMYELITIS OF FOOT, RIGHT (HCC): ICD-10-CM

## 2023-05-23 DIAGNOSIS — L97.413 DIABETIC ULCER OF RIGHT MIDFOOT ASSOCIATED WITH TYPE 2 DIABETES MELLITUS, WITH NECROSIS OF MUSCLE (HCC): Primary | ICD-10-CM

## 2023-05-23 LAB
GLUCOSE BLD STRIP.AUTO-MCNC: 138 MG/DL (ref 65–100)
GLUCOSE BLD STRIP.AUTO-MCNC: 224 MG/DL (ref 65–100)
SERVICE CMNT-IMP: ABNORMAL
SERVICE CMNT-IMP: ABNORMAL

## 2023-05-23 PROCEDURE — 82962 GLUCOSE BLOOD TEST: CPT

## 2023-05-23 PROCEDURE — G0277 HBOT, FULL BODY CHAMBER, 30M: HCPCS

## 2023-05-24 ENCOUNTER — HOSPITAL ENCOUNTER (OUTPATIENT)
Dept: WOUND CARE | Age: 38
Discharge: HOME | End: 2023-05-24
Payer: COMMERCIAL

## 2023-05-24 VITALS
RESPIRATION RATE: 18 BRPM | DIASTOLIC BLOOD PRESSURE: 87 MMHG | OXYGEN SATURATION: 100 % | HEART RATE: 88 BPM | SYSTOLIC BLOOD PRESSURE: 130 MMHG | TEMPERATURE: 97.4 F

## 2023-05-24 VITALS
SYSTOLIC BLOOD PRESSURE: 130 MMHG | DIASTOLIC BLOOD PRESSURE: 87 MMHG | TEMPERATURE: 97.4 F | HEART RATE: 88 BPM | RESPIRATION RATE: 18 BRPM | OXYGEN SATURATION: 100 %

## 2023-05-24 DIAGNOSIS — M86.671 CHRONIC REFRACTORY OSTEOMYELITIS OF FOOT, RIGHT (HCC): ICD-10-CM

## 2023-05-24 DIAGNOSIS — E11.621 DIABETIC ULCER OF RIGHT MIDFOOT ASSOCIATED WITH TYPE 2 DIABETES MELLITUS, WITH NECROSIS OF MUSCLE (HCC): Primary | ICD-10-CM

## 2023-05-24 DIAGNOSIS — L97.413 DIABETIC ULCER OF RIGHT MIDFOOT ASSOCIATED WITH TYPE 2 DIABETES MELLITUS, WITH NECROSIS OF MUSCLE (HCC): Primary | ICD-10-CM

## 2023-05-24 LAB
GLUCOSE BLD STRIP.AUTO-MCNC: 129 MG/DL (ref 65–100)
GLUCOSE BLD STRIP.AUTO-MCNC: 141 MG/DL (ref 65–100)
GLUCOSE BLD STRIP.AUTO-MCNC: 149 MG/DL (ref 65–100)
SERVICE CMNT-IMP: ABNORMAL

## 2023-05-24 PROCEDURE — G0277 HBOT, FULL BODY CHAMBER, 30M: HCPCS

## 2023-05-24 PROCEDURE — 82962 GLUCOSE BLOOD TEST: CPT

## 2023-05-24 PROCEDURE — 11042 DBRDMT SUBQ TIS 1ST 20SQCM/<: CPT

## 2023-05-24 PROCEDURE — 99183 HYPERBARIC OXYGEN THERAPY: CPT | Performed by: FAMILY MEDICINE

## 2023-05-24 RX ORDER — BETAMETHASONE DIPROPIONATE 0.05 %
OINTMENT (GRAM) TOPICAL ONCE
Status: CANCELLED | OUTPATIENT
Start: 2023-05-24 | End: 2023-05-24

## 2023-05-24 RX ORDER — LIDOCAINE HYDROCHLORIDE 20 MG/ML
JELLY TOPICAL ONCE
Status: CANCELLED | OUTPATIENT
Start: 2023-05-24 | End: 2023-05-24

## 2023-05-24 RX ORDER — CLOBETASOL PROPIONATE 0.5 MG/G
OINTMENT TOPICAL ONCE
Status: CANCELLED | OUTPATIENT
Start: 2023-05-24 | End: 2023-05-24

## 2023-05-24 RX ORDER — BACITRACIN ZINC AND POLYMYXIN B SULFATE 500; 1000 [USP'U]/G; [USP'U]/G
OINTMENT TOPICAL ONCE
Status: CANCELLED | OUTPATIENT
Start: 2023-05-24 | End: 2023-05-24

## 2023-05-24 RX ORDER — BACITRACIN, NEOMYCIN, POLYMYXIN B 400; 3.5; 5 [USP'U]/G; MG/G; [USP'U]/G
OINTMENT TOPICAL ONCE
Status: CANCELLED | OUTPATIENT
Start: 2023-05-24 | End: 2023-05-24

## 2023-05-24 RX ORDER — LIDOCAINE HYDROCHLORIDE 20 MG/ML
JELLY TOPICAL ONCE
Status: COMPLETED | OUTPATIENT
Start: 2023-05-24 | End: 2023-05-24

## 2023-05-24 RX ORDER — GENTAMICIN SULFATE 1 MG/G
OINTMENT TOPICAL ONCE
Status: CANCELLED | OUTPATIENT
Start: 2023-05-24 | End: 2023-05-24

## 2023-05-24 RX ORDER — GINSENG 100 MG
CAPSULE ORAL ONCE
Status: CANCELLED | OUTPATIENT
Start: 2023-05-24 | End: 2023-05-24

## 2023-05-24 RX ORDER — LIDOCAINE 50 MG/G
OINTMENT TOPICAL ONCE
Status: CANCELLED | OUTPATIENT
Start: 2023-05-24 | End: 2023-05-24

## 2023-05-24 RX ORDER — LIDOCAINE 40 MG/G
CREAM TOPICAL ONCE
Status: CANCELLED | OUTPATIENT
Start: 2023-05-24 | End: 2023-05-24

## 2023-05-24 RX ORDER — LIDOCAINE HYDROCHLORIDE 40 MG/ML
SOLUTION TOPICAL ONCE
Status: CANCELLED | OUTPATIENT
Start: 2023-05-24 | End: 2023-05-24

## 2023-05-24 RX ADMIN — LIDOCAINE HYDROCHLORIDE: 20 JELLY TOPICAL at 10:50

## 2023-05-24 NOTE — FLOWSHEET NOTE
05/24/23 0934   Wound 02/24/23 Right;Plantar #1   Date First Assessed/Time First Assessed: 02/24/23 1314   Present on Hospital Admission: Yes  Primary Wound Type: Diabetic Ulcer  Wound Location Orientation: Right;Plantar  Wound Description (Comments): #1   Wound Image     Wound Etiology Diabetic Elizabeth 3   Dressing Status Intact   Wound Cleansed Vashe   Dressing/Treatment Alginate with Ag   Offloading for Diabetic Foot Ulcers Offloading ordered   Wound Length (cm) 3 cm   Wound Width (cm) 2.7 cm   Wound Depth (cm) 0.3 cm   Wound Surface Area (cm^2) 8.1 cm^2   Change in Wound Size % (l*w) -12.5   Wound Volume (cm^3) 2.43 cm^3   Wound Healing % -69   Post-Procedure Length (cm) 3 cm   Post-Procedure Width (cm) 3 cm   Post-Procedure Depth (cm) 0.2 cm   Post-Procedure Surface Area (cm^2) 9 cm^2   Post-Procedure Volume (cm^3) 1.8 cm^3   Wound Assessment Granulation tissue   Drainage Amount Moderate   Drainage Description Serosanguinous   Odor Mild   Emily-wound Assessment Edematous; Maceration; Warm   Wound Thickness Description not for Pressure Injury Full thickness

## 2023-05-24 NOTE — WOUND CARE
Discharge Instructions for  Asad Pillai  [address] [place] [address]  [address], [address]  Phone [phone]   Fax [phone]      NAME:  Tameka Gtz OF BIRTH:  1985  MEDICAL RECORD NUMBER:  [id number]  DATE:  5/24/2023    Return Appointment:   1 week with Nathan Adkins, DO  Return Appointment: Friday with  Dr. Yuliet De Souza        Instructions:   Right Plantar Foot:  Do not get wound wet in shower, pool or tub. May purchase cast cover at local pharmacy to keep dry in shower. Cleanse wound with normal saline. Apply Apply alginate with silver. Cut product to wound size and apply to wound bed. Cover with Optilock and rolled gauze. Change with cast change. Apply TCC EZ 3\" to right lower leg. Change once a week. *DO NOT DRIVE WITH CAST ON RIGHT FOOT Odilia Perez MD.      Should you experience increased redness, swelling, pain, foul odor, size of wound(s), or have a temperature over 101 degrees please contact the [address] [place] [address] Road at [phone] or if after hours contact your primary care physician or go to the hospital emergency department. PLEASE NOTE: IF YOU ARE UNABLE TO OBTAIN WOUND SUPPLIES, CONTINUE TO USE THE SUPPLIES YOU HAVE AVAILABLE UNTIL YOU ARE ABLE TO REACH US. IT IS MOST IMPORTANT TO KEEP THE WOUND COVERED AT ALL TIMES.     Electronically signed Jimbo Kaiser RN on 5/24/2023 at 10:29 AM

## 2023-05-24 NOTE — PROGRESS NOTES
HBO PROGRESS NOTE      NAME: [address] RECORD NUMBER:  [id number]  AGE: 42 y.o. GENDER: female  : 1985  EPISODE DATE:  2023     Subjective     HBO Treatment Number: 7 out of Total Treatments: 40    HBO Diagnosis:             Indications: Chronic Refractory Osteomyelitis to ___ (site) (Right Foot)    Safety checks performed prior to treatment. See doc flowsheets for documentation.     Objective        Recent Labs     23  0713 23  0901   POCGLU 141* 149*     Pre treatment Vital Signs       Temp: 97.4 °F (36.3 °C)     Pulse: 94     Respirations: 18     BP: (!) 136/93       Post treatment Vital Signs  Temp: 97.4 °F (36.3 °C)  Pulse: 88  Respirations: 18  BP: 130/87    Assessment        HBO Diagnosis:   Problem List Items Addressed This Visit          Endocrine    Diabetic ulcer of right midfoot associated with type 2 diabetes mellitus, with necrosis of muscle (HCC) - Primary (Chronic)    Relevant Orders    Hypoglycemial protocol    POCT glucose    Care order/instruction    Care order/instruction    Care order/instruction    Care order/instruction    Care order/instruction    Care order/instruction    Care order/instruction    Care order/instruction       Other    * (Principal) Chronic refractory osteomyelitis of foot, right (HCC) (Chronic)    Relevant Orders    Hypoglycemial protocol    POCT glucose    Care order/instruction    Care order/instruction    Care order/instruction    Care order/instruction    Care order/instruction    Care order/instruction    Care order/instruction    Care order/instruction    Notify physician (specify)    Hyperbaric Oxygen Therapy       Physical Exam        General Appearance:  alert and oriented to person, place and time, well-developed and well-nourished, in no acute distress    Pre Tympanic Membrane Assessment:  Right: Normal  Left: Normal    Post Tympanic Membrane Assessment:  Left: Normal  Right: Normal    Pulmonary/Chest:  clear to

## 2023-05-24 NOTE — WOUND CARE
Total Contact Cast    NAME:  Ramiro Cruz  YOB: 1985  MEDICAL RECORD NUMBER:  [id number]  DATE:  5/24/2023    Goal:  Patient will maintain integrity of cast, avoid mobility hazards, and report complications that may occur (foul odor, pain, numbness, cracked cast). Applied ordered dressing over wound(s)   Applied cast padding  Applied foam padding  Applied per Dr. Brittany Monet was applied in the [address] [place] [address] Road to right lower leg  Applied cast material fiberglass  Applied cast material plaster   Allow cast to dry   Instructed patient to report to health care provider, including wound care center, any back pain, hip pain, or leg pain, numbness of toes, or any odor coming from the cast.   Instructed patient not to stick any foreign objects down into cast.  Instructed patient to utilize assistive devices(crutches, cane or walker) as ordered. Instructed patient to continue offloading as directed.      Applied cast per  Guidelines    Electronically signed by Odette Reese RN on 5/24/2023 at 10:28 AM

## 2023-05-25 ENCOUNTER — HOSPITAL ENCOUNTER (OUTPATIENT)
Dept: WOUND CARE | Age: 38
Discharge: HOME | End: 2023-05-25
Payer: COMMERCIAL

## 2023-05-25 VITALS
DIASTOLIC BLOOD PRESSURE: 88 MMHG | TEMPERATURE: 97.5 F | RESPIRATION RATE: 18 BRPM | SYSTOLIC BLOOD PRESSURE: 130 MMHG | HEART RATE: 89 BPM | OXYGEN SATURATION: 99 %

## 2023-05-25 DIAGNOSIS — L97.413 DIABETIC ULCER OF RIGHT MIDFOOT ASSOCIATED WITH TYPE 2 DIABETES MELLITUS, WITH NECROSIS OF MUSCLE (HCC): Primary | ICD-10-CM

## 2023-05-25 DIAGNOSIS — M86.671 CHRONIC REFRACTORY OSTEOMYELITIS OF FOOT, RIGHT (HCC): ICD-10-CM

## 2023-05-25 DIAGNOSIS — E11.621 DIABETIC ULCER OF RIGHT MIDFOOT ASSOCIATED WITH TYPE 2 DIABETES MELLITUS, WITH NECROSIS OF MUSCLE (HCC): Primary | ICD-10-CM

## 2023-05-25 LAB
GLUCOSE BLD STRIP.AUTO-MCNC: 185 MG/DL (ref 65–100)
GLUCOSE BLD STRIP.AUTO-MCNC: 198 MG/DL (ref 65–100)
SERVICE CMNT-IMP: ABNORMAL
SERVICE CMNT-IMP: ABNORMAL

## 2023-05-25 PROCEDURE — 82962 GLUCOSE BLOOD TEST: CPT

## 2023-05-25 PROCEDURE — 99183 HYPERBARIC OXYGEN THERAPY: CPT | Performed by: SURGERY

## 2023-05-25 PROCEDURE — G0277 HBOT, FULL BODY CHAMBER, 30M: HCPCS

## 2023-05-25 NOTE — PROGRESS NOTES
HBO PROGRESS NOTE  NAME: Ming Gutierrez RECORD NUMBER:  [id number]  AGE: 42 y.o. GENDER: female  : 1985  EPISODE DATE:  2023     Subjective   HBO Treatment Number: 8 out of Total Treatments: 40    HBO Diagnosis:           Indications: Chronic Refractory Osteomyelitis to ___ (site) (Right Foot)    Safety checks performed prior to treatment. See doc flowsheets for documentation.       Objective      No results found for: [facility]     23  0901 23  0659   POCGLU 149* 198*     Pre treatment Vital Signs       Temp: 97.5 °F (36.4 °C)     Pulse: 99     Respirations: 18     BP: 113/77       Post treatment Vital Signs  Temp: 97.5 °F (36.4 °C)  Pulse: 89  Respirations: 18  BP: 130/88    Assessment      HBO Diagnosis:   Problem List Items Addressed This Visit          Endocrine    Diabetic ulcer of right midfoot associated with type 2 diabetes mellitus, with necrosis of muscle (HCC) - Primary (Chronic)    Relevant Orders    Hypoglycemial protocol    POCT glucose    Care order/instruction    Care order/instruction    Care order/instruction    Care order/instruction    Care order/instruction    Care order/instruction    Care order/instruction    Care order/instruction       Other    Chronic refractory osteomyelitis of foot, right (HCC) (Chronic)    Relevant Orders    Hypoglycemial protocol    POCT glucose    Care order/instruction    Care order/instruction    Care order/instruction    Care order/instruction    Care order/instruction    Care order/instruction    Care order/instruction    Care order/instruction    Notify physician (specify)    Hyperbaric Oxygen Therapy         Physical Exam:  General Appearance: alert, interacfive    Pre Tympanic Membrane Assessment:  Right: Normal  Left: Normal    Post Tympanic Membrane Assessment:  Left: Normal  Right: Normal    Pulmonary/Chest: No audible wheezing      Patient place in a fully body Monoplace serial number         Treatment Start

## 2023-05-26 ENCOUNTER — HOSPITAL ENCOUNTER (OUTPATIENT)
Dept: WOUND CARE | Age: 38
Discharge: HOME | End: 2023-05-26
Payer: COMMERCIAL

## 2023-05-26 VITALS
SYSTOLIC BLOOD PRESSURE: 104 MMHG | DIASTOLIC BLOOD PRESSURE: 71 MMHG | OXYGEN SATURATION: 99 % | TEMPERATURE: 97.5 F | HEART RATE: 97 BPM | RESPIRATION RATE: 18 BRPM

## 2023-05-26 DIAGNOSIS — L97.413 DIABETIC ULCER OF RIGHT MIDFOOT ASSOCIATED WITH TYPE 2 DIABETES MELLITUS, WITH NECROSIS OF MUSCLE (HCC): Primary | ICD-10-CM

## 2023-05-26 DIAGNOSIS — E11.621 DIABETIC ULCER OF RIGHT MIDFOOT ASSOCIATED WITH TYPE 2 DIABETES MELLITUS, WITH NECROSIS OF MUSCLE (HCC): Primary | ICD-10-CM

## 2023-05-26 DIAGNOSIS — M86.671 CHRONIC REFRACTORY OSTEOMYELITIS OF FOOT, RIGHT (HCC): ICD-10-CM

## 2023-05-26 LAB
GLUCOSE BLD STRIP.AUTO-MCNC: 149 MG/DL (ref 65–100)
GLUCOSE BLD STRIP.AUTO-MCNC: 181 MG/DL (ref 65–100)
SERVICE CMNT-IMP: ABNORMAL
SERVICE CMNT-IMP: ABNORMAL

## 2023-05-26 PROCEDURE — 29445 APPL RIGID TOT CNTC LEG CAST: CPT

## 2023-05-26 PROCEDURE — 82962 GLUCOSE BLOOD TEST: CPT

## 2023-05-26 PROCEDURE — G0277 HBOT, FULL BODY CHAMBER, 30M: HCPCS

## 2023-05-26 RX ORDER — LIDOCAINE 40 MG/G
CREAM TOPICAL ONCE
OUTPATIENT
Start: 2023-05-26 | End: 2023-05-26

## 2023-05-26 RX ORDER — LIDOCAINE 50 MG/G
OINTMENT TOPICAL ONCE
OUTPATIENT
Start: 2023-05-26 | End: 2023-05-26

## 2023-05-26 RX ORDER — BACITRACIN, NEOMYCIN, POLYMYXIN B 400; 3.5; 5 [USP'U]/G; MG/G; [USP'U]/G
OINTMENT TOPICAL ONCE
OUTPATIENT
Start: 2023-05-26 | End: 2023-05-26

## 2023-05-26 RX ORDER — BETAMETHASONE DIPROPIONATE 0.05 %
OINTMENT (GRAM) TOPICAL ONCE
OUTPATIENT
Start: 2023-05-26 | End: 2023-05-26

## 2023-05-26 RX ORDER — LIDOCAINE HYDROCHLORIDE 40 MG/ML
SOLUTION TOPICAL ONCE
OUTPATIENT
Start: 2023-05-26 | End: 2023-05-26

## 2023-05-26 RX ORDER — GENTAMICIN SULFATE 1 MG/G
OINTMENT TOPICAL ONCE
OUTPATIENT
Start: 2023-05-26 | End: 2023-05-26

## 2023-05-26 RX ORDER — BACITRACIN ZINC AND POLYMYXIN B SULFATE 500; 1000 [USP'U]/G; [USP'U]/G
OINTMENT TOPICAL ONCE
OUTPATIENT
Start: 2023-05-26 | End: 2023-05-26

## 2023-05-26 RX ORDER — LIDOCAINE HYDROCHLORIDE 20 MG/ML
JELLY TOPICAL ONCE
OUTPATIENT
Start: 2023-05-26 | End: 2023-05-26

## 2023-05-26 RX ORDER — CLOBETASOL PROPIONATE 0.5 MG/G
OINTMENT TOPICAL ONCE
OUTPATIENT
Start: 2023-05-26 | End: 2023-05-26

## 2023-05-26 RX ORDER — GINSENG 100 MG
CAPSULE ORAL ONCE
OUTPATIENT
Start: 2023-05-26 | End: 2023-05-26

## 2023-05-26 NOTE — FLOWSHEET NOTE
05/26/23 0928   Wound 02/24/23 Right;Plantar #1   Date First Assessed/Time First Assessed: 02/24/23 1314   Present on Hospital Admission: Yes  Primary Wound Type: Diabetic Ulcer  Wound Location Orientation: Right;Plantar  Wound Description (Comments): #1   Wound Image    Wound Etiology Diabetic Elizabeth 3   Dressing Status Old drainage noted   Wound Cleansed Vashe   Offloading for Diabetic Foot Ulcers Total contact cast   Wound Length (cm) 2 cm   Wound Width (cm) 2.5 cm   Wound Depth (cm) 0.1 cm   Wound Surface Area (cm^2) 5 cm^2   Change in Wound Size % (l*w) 30.56   Wound Volume (cm^3) 0.5 cm^3   Wound Healing % 65   Wound Assessment Pink/red   Drainage Amount Moderate   Drainage Description Serosanguinous   Odor Mild   Emily-wound Assessment Edematous   Margins Attached edges   Wound Thickness Description not for Pressure Injury Full thickness

## 2023-05-26 NOTE — WOUND CARE
Total Contact Cast    NAME:  Emily Waller  YOB: 1985  MEDICAL RECORD NUMBER:  [id number]  DATE:  5/26/2023    Goal:  Patient will maintain integrity of cast, avoid mobility hazards, and report complications that may occur (foul odor, pain, numbness, cracked cast). Removed old contact cast if indicated and wash extremity with soap and water. Applied ordered dressing. Applied foam padding  Applied cast padding included in the kit   Applied per nursing and Dewig Purchase, DO  Applied to: [] Left Lower Leg [x] Right Lower Leg  Allow cast to dry. Instructed patient to report to health care provider, including wound care center, any back pain, hip pain, or leg pain, numbness of toes, or any odor coming from the cast.   Instructed patient not to stick any foreign objects down into cast.   Instructed patient to utilize assistive devices(crutches, cane or walker) as ordered   Instructed patient to continue offloading as directed.      Applied cast per  Guidelines    Electronically signed by Taylor Parish RN on 5/26/2023 at 10:12 AM

## 2023-05-26 NOTE — PROGRESS NOTES
HBO PROGRESS NOTE      NAME: [address] RECORD NUMBER:  [id number]  AGE: 42 y.o. GENDER: female  : 1985  EPISODE DATE:  2023     Subjective     HBO Treatment Number: 6 out of Total Treatments: 40    HBO Diagnosis:             Indications: Chronic Refractory Osteomyelitis to ___ (site) (Right Foot)    Safety checks performed prior to treatment. See doc flowsheets for documentation.     Objective        Recent Labs     23  0659 23  0900   POCGLU 198* 185*     Pre treatment Vital Signs       Temp: 97.5 °F (36.4 °C)     Pulse: 94     Respirations: 18     BP: 132/85       Post treatment Vital Signs  Temp: 97.5 °F (36.4 °C)  Pulse: 90  Respirations: 18  BP: (!) 147/88    Assessment        HBO Diagnosis:   Problem List Items Addressed This Visit          Endocrine    Diabetic ulcer of right midfoot associated with type 2 diabetes mellitus, with necrosis of muscle (HCC) - Primary (Chronic)    Relevant Orders    POCT glucose       Other    * (Principal) Chronic refractory osteomyelitis of foot, right (HCC) (Chronic)    Relevant Orders    POCT glucose       Physical Exam        General Appearance:  alert and oriented to person, place and time, well-developed and well-nourished, in no acute distress    Pre Tympanic Membrane Assessment:  Right: Normal  Left: Normal    Post Tympanic Membrane Assessment:  Left: Normal  Right: Normal    Pulmonary/Chest:  clear to auscultation bilaterally- no wheezes, rales or rhonchi, normal air movement, no respiratory distress    Cardiovascular:  normal    Plan        Patient placed in a full body Monoplace Chamber #: [id number]  Treatment Start Time: 0703     Pressure Reached Time: 0712  QUIQUE : 2  Number of Air Breaks:        Decompression Time: 0843   Treatment End Time: 9584  Length of Treatment: 90 Minutes  Symptoms Noted During Treatment: None  Total Treatment Time (min): 108    Treatment Completion Status: Treatment completed without issue (D/C

## 2023-05-26 NOTE — WOUND CARE
Discharge Instructions for  Asad Pillai  [address] [facility] [address], [address]  Phone [phone]   Fax [phone]      NAME:  Lara Mejia OF BIRTH:  1985  MEDICAL RECORD NUMBER:  [id number]  DATE:  5/26/2023    Return Appointment:     Return Appointment:   Tuesday or Wednesday for cast change with Dr Douglas Bello        Instructions:   Right Plantar Foot:  Do not get wound wet in shower, pool or tub. May purchase cast cover at local pharmacy to keep dry in shower. Cleanse wound with normal saline. Apply Apply alginate with silver. Cut product to wound size and apply to wound bed. Cover with Optilock  Change with cast change. Apply TCC EZ 3\" to right lower leg. Change once a week. *DO NOT DRIVE WITH CAST ON RIGHT FOOT Avril Urban MD.      Should you experience increased redness, swelling, pain, foul odor, size of wound(s), or have a temperature over 101 degrees please contact the [address] [place] [address] Road at [phone] or if after hours contact your primary care physician or go to the hospital emergency department. PLEASE NOTE: IF YOU ARE UNABLE TO OBTAIN WOUND SUPPLIES, CONTINUE TO USE THE SUPPLIES YOU HAVE AVAILABLE UNTIL YOU ARE ABLE TO REACH US. IT IS MOST IMPORTANT TO KEEP THE WOUND COVERED AT ALL TIMES.     Electronically signed Abdias Moore RN on 5/26/2023 at 10:11 AM

## 2023-05-30 ENCOUNTER — HOSPITAL ENCOUNTER (OUTPATIENT)
Dept: WOUND CARE | Age: 38
Discharge: HOME | End: 2023-05-30
Payer: COMMERCIAL

## 2023-05-30 VITALS
SYSTOLIC BLOOD PRESSURE: 128 MMHG | HEART RATE: 95 BPM | TEMPERATURE: 97.4 F | OXYGEN SATURATION: 99 % | DIASTOLIC BLOOD PRESSURE: 89 MMHG | RESPIRATION RATE: 18 BRPM

## 2023-05-30 DIAGNOSIS — M86.671 CHRONIC REFRACTORY OSTEOMYELITIS OF FOOT, RIGHT (HCC): ICD-10-CM

## 2023-05-30 DIAGNOSIS — L97.413 DIABETIC ULCER OF RIGHT MIDFOOT ASSOCIATED WITH TYPE 2 DIABETES MELLITUS, WITH NECROSIS OF MUSCLE (HCC): Primary | ICD-10-CM

## 2023-05-30 DIAGNOSIS — E11.621 DIABETIC ULCER OF RIGHT MIDFOOT ASSOCIATED WITH TYPE 2 DIABETES MELLITUS, WITH NECROSIS OF MUSCLE (HCC): Primary | ICD-10-CM

## 2023-05-30 LAB
GLUCOSE BLD STRIP.AUTO-MCNC: 156 MG/DL (ref 65–100)
GLUCOSE BLD STRIP.AUTO-MCNC: 224 MG/DL (ref 65–100)
SERVICE CMNT-IMP: ABNORMAL
SERVICE CMNT-IMP: ABNORMAL

## 2023-05-30 PROCEDURE — 99183 HYPERBARIC OXYGEN THERAPY: CPT | Performed by: FAMILY MEDICINE

## 2023-05-30 PROCEDURE — 82962 GLUCOSE BLOOD TEST: CPT

## 2023-05-30 PROCEDURE — G0277 HBOT, FULL BODY CHAMBER, 30M: HCPCS

## 2023-05-30 NOTE — PROGRESS NOTES
HBO PROGRESS NOTE      NAME: [address] RECORD NUMBER:  [id number]  AGE: 42 y.o. GENDER: female  : 1985  EPISODE DATE:  2023     Subjective     HBO Treatment Number: 10 out of Total Treatments: 40    HBO Diagnosis:             Indications: Chronic Refractory Osteomyelitis to ___ (site) (Right Foot)    Safety checks performed prior to treatment. See doc flowsheets for documentation.     Objective        Recent Labs     23  0657 23  0902   POCGLU 156* 224*     Pre treatment Vital Signs       Temp: 97.4 °F (36.3 °C)     Pulse: (!) 109     Respirations: 18     BP: (!) 148/92       Post treatment Vital Signs  Temp: 97.4 °F (36.3 °C)  Pulse: 95  Respirations: 18  BP: 128/89    Assessment        HBO Diagnosis:   Problem List Items Addressed This Visit          Endocrine    Diabetic ulcer of right midfoot associated with type 2 diabetes mellitus, with necrosis of muscle (HCC) - Primary (Chronic)    Relevant Orders    Hypoglycemial protocol    POCT glucose    Care order/instruction    Care order/instruction    Care order/instruction    Care order/instruction    Care order/instruction    Care order/instruction    Care order/instruction    Care order/instruction       Other    * (Principal) Chronic refractory osteomyelitis of foot, right (HCC) (Chronic)    Relevant Orders    Hypoglycemial protocol    POCT glucose    Care order/instruction    Care order/instruction    Care order/instruction    Care order/instruction    Care order/instruction    Care order/instruction    Care order/instruction    Care order/instruction    Notify physician (specify)    Hyperbaric Oxygen Therapy       Physical Exam        General Appearance:  alert and oriented to person, place and time, well-developed and well-nourished, in no acute distress    Pre Tympanic Membrane Assessment:  Right: Normal  Left: Normal    Post Tympanic Membrane Assessment:  Left: Normal  Right: Normal    Pulmonary/Chest:  clear to

## 2023-05-31 ENCOUNTER — HOSPITAL ENCOUNTER (OUTPATIENT)
Dept: WOUND CARE | Age: 38
Discharge: HOME | End: 2023-05-31
Payer: COMMERCIAL

## 2023-05-31 VITALS
SYSTOLIC BLOOD PRESSURE: 133 MMHG | RESPIRATION RATE: 18 BRPM | DIASTOLIC BLOOD PRESSURE: 85 MMHG | HEART RATE: 100 BPM | TEMPERATURE: 98.2 F | OXYGEN SATURATION: 99 %

## 2023-05-31 DIAGNOSIS — L97.413 DIABETIC ULCER OF RIGHT MIDFOOT ASSOCIATED WITH TYPE 2 DIABETES MELLITUS, WITH NECROSIS OF MUSCLE (HCC): Primary | ICD-10-CM

## 2023-05-31 DIAGNOSIS — M86.671 CHRONIC REFRACTORY OSTEOMYELITIS OF FOOT, RIGHT (HCC): ICD-10-CM

## 2023-05-31 DIAGNOSIS — E11.621 DIABETIC ULCER OF RIGHT MIDFOOT ASSOCIATED WITH TYPE 2 DIABETES MELLITUS, WITH NECROSIS OF MUSCLE (HCC): Primary | ICD-10-CM

## 2023-05-31 LAB
GLUCOSE BLD STRIP.AUTO-MCNC: 110 MG/DL (ref 65–100)
GLUCOSE BLD STRIP.AUTO-MCNC: 153 MG/DL (ref 65–100)
GLUCOSE BLD STRIP.AUTO-MCNC: 156 MG/DL (ref 65–100)
SERVICE CMNT-IMP: ABNORMAL

## 2023-05-31 PROCEDURE — 11042 DBRDMT SUBQ TIS 1ST 20SQCM/<: CPT

## 2023-05-31 PROCEDURE — G0277 HBOT, FULL BODY CHAMBER, 30M: HCPCS

## 2023-05-31 PROCEDURE — 82962 GLUCOSE BLOOD TEST: CPT

## 2023-05-31 RX ORDER — BETAMETHASONE DIPROPIONATE 0.05 %
OINTMENT (GRAM) TOPICAL ONCE
OUTPATIENT
Start: 2023-05-31 | End: 2023-05-31

## 2023-05-31 RX ORDER — LIDOCAINE 50 MG/G
OINTMENT TOPICAL ONCE
OUTPATIENT
Start: 2023-05-31 | End: 2023-05-31

## 2023-05-31 RX ORDER — CLOBETASOL PROPIONATE 0.5 MG/G
OINTMENT TOPICAL ONCE
OUTPATIENT
Start: 2023-05-31 | End: 2023-05-31

## 2023-05-31 RX ORDER — BACITRACIN, NEOMYCIN, POLYMYXIN B 400; 3.5; 5 [USP'U]/G; MG/G; [USP'U]/G
OINTMENT TOPICAL ONCE
OUTPATIENT
Start: 2023-05-31 | End: 2023-05-31

## 2023-05-31 RX ORDER — LIDOCAINE HYDROCHLORIDE 20 MG/ML
JELLY TOPICAL ONCE
OUTPATIENT
Start: 2023-05-31 | End: 2023-05-31

## 2023-05-31 RX ORDER — GENTAMICIN SULFATE 1 MG/G
OINTMENT TOPICAL ONCE
OUTPATIENT
Start: 2023-05-31 | End: 2023-05-31

## 2023-05-31 RX ORDER — LIDOCAINE HYDROCHLORIDE 40 MG/ML
SOLUTION TOPICAL ONCE
OUTPATIENT
Start: 2023-05-31 | End: 2023-05-31

## 2023-05-31 RX ORDER — GINSENG 100 MG
CAPSULE ORAL ONCE
OUTPATIENT
Start: 2023-05-31 | End: 2023-05-31

## 2023-05-31 RX ORDER — LIDOCAINE 40 MG/G
CREAM TOPICAL ONCE
OUTPATIENT
Start: 2023-05-31 | End: 2023-05-31

## 2023-05-31 RX ORDER — BACITRACIN ZINC AND POLYMYXIN B SULFATE 500; 1000 [USP'U]/G; [USP'U]/G
OINTMENT TOPICAL ONCE
OUTPATIENT
Start: 2023-05-31 | End: 2023-05-31

## 2023-05-31 NOTE — FLOWSHEET NOTE
05/31/23 0934   Wound 02/24/23 Right;Plantar #1   Date First Assessed/Time First Assessed: 02/24/23 1314   Present on Hospital Admission: Yes  Primary Wound Type: Diabetic Ulcer  Wound Location Orientation: Right;Plantar  Wound Description (Comments): #1   Wound Image    Wound Etiology Diabetic Elizabeth 3   Dressing Status Old drainage noted   Wound Cleansed Cleansed with saline   Dressing/Treatment Alginate with Ag   Offloading for Diabetic Foot Ulcers Total contact cast   Wound Length (cm) 1.8 cm   Wound Width (cm) 2.5 cm   Wound Depth (cm) 0.1 cm   Wound Surface Area (cm^2) 4.5 cm^2   Change in Wound Size % (l*w) 37.5   Wound Volume (cm^3) 0.45 cm^3   Wound Healing % 69   Wound Assessment Granulation tissue   Drainage Amount Moderate   Drainage Description Serosanguinous   Odor Mild   Emily-wound Assessment Maceration   Wound Thickness Description not for Pressure Injury Full thickness

## 2023-05-31 NOTE — PROGRESS NOTES
HBO PROGRESS NOTE  NAME: Ming Gutierrez RECORD NUMBER:  [id number]  AGE: 42 y.o. GENDER: female  : 1985  EPISODE DATE:  2023     Subjective   HBO Treatment Number: 12 out of Total Treatments: 40    HBO Diagnosis:           Indications: Chronic Refractory Osteomyelitis to ___ (site) (Right Foot)    Safety checks performed prior to treatment. See doc flowsheets for documentation.       Objective      No results found for: [facility]     23  0703 23  0858   POCGLU 116* 186*     Pre treatment Vital Signs       Temp: 97.8 °F (36.6 °C)     Pulse: (!) 102     Respirations: 20     BP: (!) 135/90       Post treatment Vital Signs  Temp: 97.8 °F (36.6 °C)  Pulse: 94  Respirations: 18  BP: 101/78    Assessment      HBO Diagnosis:   Problem List Items Addressed This Visit          Endocrine    Diabetic ulcer of right midfoot associated with type 2 diabetes mellitus, with necrosis of muscle (HCC) - Primary (Chronic)    Relevant Orders    Hypoglycemial protocol    POCT glucose    Care order/instruction    Care order/instruction    Care order/instruction    Care order/instruction    Care order/instruction    Care order/instruction    Care order/instruction    Care order/instruction       Other    Chronic refractory osteomyelitis of foot, right (HCC) (Chronic)    Relevant Orders    Hypoglycemial protocol    POCT glucose    Care order/instruction    Care order/instruction    Care order/instruction    Care order/instruction    Care order/instruction    Care order/instruction    Care order/instruction    Care order/instruction    Notify physician (specify)    Hyperbaric Oxygen Therapy         Physical Exam:  General Appearance: alert, interacfive    Pre Tympanic Membrane Assessment:  Right: Normal  Left: Normal    Post Tympanic Membrane Assessment:  Left: Normal  Right: Normal    Pulmonary/Chest: No audible wheezing      Patient place in a fully body Monoplace serial number         Treatment

## 2023-05-31 NOTE — WOUND CARE
Total Contact Cast    NAME:  Charmayne Friendly  YOB: 1985  MEDICAL RECORD NUMBER:  [id number]  DATE:  5/31/2023    Goal:  Patient will maintain integrity of cast, avoid mobility hazards, and report complications that may occur (foul odor, pain, numbness, cracked cast). Removed old contact cast if indicated and wash extremity with soap and water. Applied ordered dressing. Applied foam padding  Applied cast padding included in the kit   Applied per nursing and Levell Evens, DO  Applied to: [] Left Lower Leg [x] Right Lower Leg  Allow cast to dry. Instructed patient to report to health care provider, including wound care center, any back pain, hip pain, or leg pain, numbness of toes, or any odor coming from the cast.   Instructed patient not to stick any foreign objects down into cast.   Instructed patient to utilize assistive devices(crutches, cane or walker) as ordered   Instructed patient to continue offloading as directed.      Applied cast per  Guidelines    Electronically signed by Sarai Salas RN on 5/31/2023 at 10:18 AM

## 2023-05-31 NOTE — WOUND CARE
Discharge Instructions for  Asad Pillai  [address] [place] [address], [address] [place] [address]  Phone [phone]   Fax [phone]      NAME:  Lizzeth Ramirez OF BIRTH:  1985  MEDICAL RECORD NUMBER:  [id number]  DATE:  5/31/2023    Return Appointment:   Tuesday or Wednesday for cast change with Dr Nazario Hazel    Instructions:   Right Plantar Foot:  Do not get wound wet in shower, pool or tub. May purchase cast cover at local pharmacy to keep dry in shower. Cleanse wound with normal saline. Apply Apply alginate with silver. Cut product to wound size and apply to wound bed. Cover with Optilock  Change with cast change. Apply TCC EZ 3\" to right lower leg. Change once a week. *DO NOT DRIVE WITH CAST ON RIGHT FOOT Ivana Marti MD.      Should you experience increased redness, swelling, pain, foul odor, size of wound(s), or have a temperature over 101 degrees please contact the [address] [place] [address] Road at [phone] or if after hours contact your primary care physician or go to the hospital emergency department. PLEASE NOTE: IF YOU ARE UNABLE TO OBTAIN WOUND SUPPLIES, CONTINUE TO USE THE SUPPLIES YOU HAVE AVAILABLE UNTIL YOU ARE ABLE TO REACH US. IT IS MOST IMPORTANT TO KEEP THE WOUND COVERED AT ALL TIMES.     Electronically signed Rickie Costa RN on 5/31/2023 at 9:53 AM

## 2023-06-01 ENCOUNTER — HOSPITAL ENCOUNTER (OUTPATIENT)
Dept: WOUND CARE | Age: 38
Discharge: HOME | End: 2023-06-01
Payer: COMMERCIAL

## 2023-06-01 VITALS
DIASTOLIC BLOOD PRESSURE: 78 MMHG | OXYGEN SATURATION: 99 % | SYSTOLIC BLOOD PRESSURE: 101 MMHG | TEMPERATURE: 97.8 F | RESPIRATION RATE: 18 BRPM | HEART RATE: 94 BPM

## 2023-06-01 DIAGNOSIS — M86.671 CHRONIC REFRACTORY OSTEOMYELITIS OF FOOT, RIGHT (HCC): ICD-10-CM

## 2023-06-01 DIAGNOSIS — L97.413 DIABETIC ULCER OF RIGHT MIDFOOT ASSOCIATED WITH TYPE 2 DIABETES MELLITUS, WITH NECROSIS OF MUSCLE (HCC): Primary | ICD-10-CM

## 2023-06-01 DIAGNOSIS — E11.621 DIABETIC ULCER OF RIGHT MIDFOOT ASSOCIATED WITH TYPE 2 DIABETES MELLITUS, WITH NECROSIS OF MUSCLE (HCC): Primary | ICD-10-CM

## 2023-06-01 LAB
GLUCOSE BLD STRIP.AUTO-MCNC: 111 MG/DL (ref 65–100)
GLUCOSE BLD STRIP.AUTO-MCNC: 116 MG/DL (ref 65–100)
GLUCOSE BLD STRIP.AUTO-MCNC: 186 MG/DL (ref 65–100)
SERVICE CMNT-IMP: ABNORMAL

## 2023-06-01 PROCEDURE — 82962 GLUCOSE BLOOD TEST: CPT

## 2023-06-01 PROCEDURE — G0277 HBOT, FULL BODY CHAMBER, 30M: HCPCS

## 2023-06-02 ENCOUNTER — HOSPITAL ENCOUNTER (OUTPATIENT)
Dept: WOUND CARE | Age: 38
Discharge: HOME | End: 2023-06-02
Payer: COMMERCIAL

## 2023-06-02 VITALS
DIASTOLIC BLOOD PRESSURE: 88 MMHG | SYSTOLIC BLOOD PRESSURE: 122 MMHG | TEMPERATURE: 97.6 F | RESPIRATION RATE: 18 BRPM | OXYGEN SATURATION: 100 % | HEART RATE: 92 BPM

## 2023-06-02 DIAGNOSIS — E11.621 DIABETIC ULCER OF RIGHT MIDFOOT ASSOCIATED WITH TYPE 2 DIABETES MELLITUS, WITH NECROSIS OF MUSCLE (HCC): Primary | ICD-10-CM

## 2023-06-02 DIAGNOSIS — L97.413 DIABETIC ULCER OF RIGHT MIDFOOT ASSOCIATED WITH TYPE 2 DIABETES MELLITUS, WITH NECROSIS OF MUSCLE (HCC): Primary | ICD-10-CM

## 2023-06-02 DIAGNOSIS — M86.671 CHRONIC REFRACTORY OSTEOMYELITIS OF FOOT, RIGHT (HCC): ICD-10-CM

## 2023-06-02 LAB
GLUCOSE BLD STRIP.AUTO-MCNC: 115 MG/DL (ref 65–100)
GLUCOSE BLD STRIP.AUTO-MCNC: 133 MG/DL (ref 65–100)
SERVICE CMNT-IMP: ABNORMAL
SERVICE CMNT-IMP: ABNORMAL

## 2023-06-02 PROCEDURE — G0277 HBOT, FULL BODY CHAMBER, 30M: HCPCS

## 2023-06-02 PROCEDURE — 82962 GLUCOSE BLOOD TEST: CPT

## 2023-06-05 ENCOUNTER — HOSPITAL ENCOUNTER (OUTPATIENT)
Dept: WOUND CARE | Age: 38
Discharge: HOME | End: 2023-06-05
Payer: COMMERCIAL

## 2023-06-05 VITALS
SYSTOLIC BLOOD PRESSURE: 115 MMHG | DIASTOLIC BLOOD PRESSURE: 81 MMHG | RESPIRATION RATE: 18 BRPM | OXYGEN SATURATION: 98 % | TEMPERATURE: 98.1 F | HEART RATE: 88 BPM

## 2023-06-05 DIAGNOSIS — E11.621 DIABETIC ULCER OF RIGHT MIDFOOT ASSOCIATED WITH TYPE 2 DIABETES MELLITUS, WITH NECROSIS OF MUSCLE (HCC): Primary | ICD-10-CM

## 2023-06-05 DIAGNOSIS — L97.413 DIABETIC ULCER OF RIGHT MIDFOOT ASSOCIATED WITH TYPE 2 DIABETES MELLITUS, WITH NECROSIS OF MUSCLE (HCC): Primary | ICD-10-CM

## 2023-06-05 DIAGNOSIS — M86.671 CHRONIC REFRACTORY OSTEOMYELITIS OF FOOT, RIGHT (HCC): ICD-10-CM

## 2023-06-05 LAB
GLUCOSE BLD STRIP.AUTO-MCNC: 154 MG/DL (ref 65–100)
GLUCOSE BLD STRIP.AUTO-MCNC: 163 MG/DL (ref 65–100)
SERVICE CMNT-IMP: ABNORMAL
SERVICE CMNT-IMP: ABNORMAL

## 2023-06-05 PROCEDURE — G0277 HBOT, FULL BODY CHAMBER, 30M: HCPCS

## 2023-06-05 PROCEDURE — 82962 GLUCOSE BLOOD TEST: CPT

## 2023-06-05 PROCEDURE — 99183 HYPERBARIC OXYGEN THERAPY: CPT | Performed by: SURGERY

## 2023-06-05 NOTE — PROGRESS NOTES
Time: 2402 Door Closed      Pressure Reached  QUIQUE : 2 QUIQUE  Number of Air Breaks: none        Decompression Time   Treatment End Time: 3326 Door Opened         Length of Treatment: 90 Minutes         Adverse Event:      I was present on these premises and immediately available to furnish assistance & direction throughout the HBO procedure today. MD spoke with patient regarding any problems or questions related to HBO therapy. Reviewed HBO treatment profile with HBO  and verified no adverse reactions noted. Plan      Patient placed in a full body Monoplace Chamber #: [id number]  Treatment Start Time: 9481     Pressure Reached Time: 0716  QUIQUE : 2  Number of Air Breaks:        Decompression Time: 9299   Treatment End Time: 2481  Length of Treatment: 90 Minutes     Total Treatment Time (min): 107  Treatment Completion Status: Treatment completed without issue (D/C instructions reviewed no questions)      I was present on these premises and immediately available to furnish assistance & direction throughout the HBO Treatment. Jake Smyth is a 42 y.o. female  successfully completed today's hyperbaric oxygen treatment at [facility] and HBO therapy. In my clinical judgement, ongoing HBO therapy is necessary at this time to assist with wound healing, preservation of limb, life, or function. Supervision and attendance of Hyperbaric Oxygen Therapy provided. Continue HBO treatment as outlined in the treatment plan. Hyperbaric Oxygen: Ms. Kusum Coppola tolerated: Treatment Number: 14 without issue. Discharge Instructions were explained and given to Ms.  Nancy   Electronically signed by Georgina Ta MD on 6/5/2023 at 11:39 AM

## 2023-06-06 ENCOUNTER — OFFICE VISIT (OUTPATIENT)
Dept: ENT CLINIC | Age: 38
End: 2023-06-06
Payer: COMMERCIAL

## 2023-06-06 ENCOUNTER — HOSPITAL ENCOUNTER (OUTPATIENT)
Dept: WOUND CARE | Age: 38
Discharge: HOME | End: 2023-06-06
Payer: COMMERCIAL

## 2023-06-06 VITALS
DIASTOLIC BLOOD PRESSURE: 60 MMHG | BODY MASS INDEX: 36.07 KG/M2 | WEIGHT: 238 LBS | HEIGHT: 68 IN | SYSTOLIC BLOOD PRESSURE: 108 MMHG

## 2023-06-06 VITALS
HEART RATE: 92 BPM | RESPIRATION RATE: 18 BRPM | TEMPERATURE: 97.4 F | DIASTOLIC BLOOD PRESSURE: 83 MMHG | OXYGEN SATURATION: 100 % | SYSTOLIC BLOOD PRESSURE: 118 MMHG

## 2023-06-06 DIAGNOSIS — E11.621 DIABETIC ULCER OF RIGHT MIDFOOT ASSOCIATED WITH TYPE 2 DIABETES MELLITUS, WITH NECROSIS OF MUSCLE (HCC): Primary | ICD-10-CM

## 2023-06-06 DIAGNOSIS — H92.03 OTALGIA, BILATERAL: ICD-10-CM

## 2023-06-06 DIAGNOSIS — L97.413 DIABETIC ULCER OF RIGHT MIDFOOT ASSOCIATED WITH TYPE 2 DIABETES MELLITUS, WITH NECROSIS OF MUSCLE (HCC): Primary | ICD-10-CM

## 2023-06-06 DIAGNOSIS — H65.113 ACUTE MUCOID OTITIS MEDIA OF BOTH EARS: ICD-10-CM

## 2023-06-06 DIAGNOSIS — H65.93 FLUID LEVEL BEHIND TYMPANIC MEMBRANE OF BOTH EARS: Primary | Chronic | ICD-10-CM

## 2023-06-06 DIAGNOSIS — M86.671 CHRONIC REFRACTORY OSTEOMYELITIS OF FOOT, RIGHT (HCC): ICD-10-CM

## 2023-06-06 LAB
GLUCOSE BLD STRIP.AUTO-MCNC: 167 MG/DL (ref 65–100)
SERVICE CMNT-IMP: ABNORMAL

## 2023-06-06 PROCEDURE — 99204 OFFICE O/P NEW MOD 45 MIN: CPT | Performed by: PHYSICIAN ASSISTANT

## 2023-06-06 PROCEDURE — 82962 GLUCOSE BLOOD TEST: CPT

## 2023-06-06 PROCEDURE — 99183 HYPERBARIC OXYGEN THERAPY: CPT | Performed by: FAMILY MEDICINE

## 2023-06-06 PROCEDURE — 29445 APPL RIGID TOT CNTC LEG CAST: CPT

## 2023-06-06 PROCEDURE — 99213 OFFICE O/P EST LOW 20 MIN: CPT

## 2023-06-06 PROCEDURE — 69433 CREATE EARDRUM OPENING: CPT | Performed by: PHYSICIAN ASSISTANT

## 2023-06-06 RX ORDER — LIDOCAINE HYDROCHLORIDE 20 MG/ML
JELLY TOPICAL ONCE
OUTPATIENT
Start: 2023-06-06 | End: 2023-06-06

## 2023-06-06 RX ORDER — GENTAMICIN SULFATE 1 MG/G
OINTMENT TOPICAL ONCE
OUTPATIENT
Start: 2023-06-06 | End: 2023-06-06

## 2023-06-06 RX ORDER — LIDOCAINE 40 MG/G
CREAM TOPICAL ONCE
OUTPATIENT
Start: 2023-06-06 | End: 2023-06-06

## 2023-06-06 RX ORDER — BACITRACIN ZINC AND POLYMYXIN B SULFATE 500; 1000 [USP'U]/G; [USP'U]/G
OINTMENT TOPICAL ONCE
OUTPATIENT
Start: 2023-06-06 | End: 2023-06-06

## 2023-06-06 RX ORDER — LIDOCAINE 50 MG/G
OINTMENT TOPICAL ONCE
OUTPATIENT
Start: 2023-06-06 | End: 2023-06-06

## 2023-06-06 RX ORDER — IBUPROFEN 200 MG
TABLET ORAL ONCE
OUTPATIENT
Start: 2023-06-06 | End: 2023-06-06

## 2023-06-06 RX ORDER — CLOBETASOL PROPIONATE 0.5 MG/G
OINTMENT TOPICAL ONCE
OUTPATIENT
Start: 2023-06-06 | End: 2023-06-06

## 2023-06-06 RX ORDER — BETAMETHASONE DIPROPIONATE 0.05 %
OINTMENT (GRAM) TOPICAL ONCE
OUTPATIENT
Start: 2023-06-06 | End: 2023-06-06

## 2023-06-06 RX ORDER — LIDOCAINE HYDROCHLORIDE 40 MG/ML
SOLUTION TOPICAL ONCE
OUTPATIENT
Start: 2023-06-06 | End: 2023-06-06

## 2023-06-06 RX ORDER — GINSENG 100 MG
CAPSULE ORAL ONCE
OUTPATIENT
Start: 2023-06-06 | End: 2023-06-06

## 2023-06-06 ASSESSMENT — ENCOUNTER SYMPTOMS
ABDOMINAL PAIN: 0
EYE DISCHARGE: 0
COUGH: 0

## 2023-06-06 ASSESSMENT — PAIN DESCRIPTION - LOCATION: LOCATION: EAR

## 2023-06-06 ASSESSMENT — PAIN SCALES - GENERAL: PAINLEVEL_OUTOF10: 7

## 2023-06-06 ASSESSMENT — PAIN DESCRIPTION - ORIENTATION: ORIENTATION: LEFT;RIGHT

## 2023-06-06 ASSESSMENT — PAIN DESCRIPTION - DESCRIPTORS: DESCRIPTORS: THROBBING;PRESSURE

## 2023-06-06 NOTE — FLOWSHEET NOTE
06/06/23 0828   Wound 02/24/23 Right;Plantar #1   Date First Assessed/Time First Assessed: 02/24/23 1314   Present on Hospital Admission: Yes  Primary Wound Type: Diabetic Ulcer  Wound Location Orientation: Right;Plantar  Wound Description (Comments): #1   Wound Etiology Diabetic Elizabeth 3   Wound Length (cm) 1.6 cm   Wound Width (cm) 2.4 cm   Wound Depth (cm) 0.1 cm   Wound Surface Area (cm^2) 3.84 cm^2   Change in Wound Size % (l*w) 46.67   Wound Volume (cm^3) 0.384 cm^3   Wound Healing % 73   Wound Assessment Epithelialization;Pink/red   Drainage Amount Moderate   Drainage Description Serosanguinous   Emily-wound Assessment Edematous   Wound Thickness Description not for Pressure Injury Full thickness

## 2023-06-06 NOTE — PROGRESS NOTES
Luis Medley is a 42 y.o. female presents today for ear tube placement. She is undergoing hyperbaric oxygen therapy for osteomyelitis of her right foot, as a complication of diabetic ulcer. Referred by the wound care center. Her ears have been hurting and each time she undergoes treatment she has had 3 treatments and each one gets worse. Hearing loss, pain, and pressure, without otorrhea. Chief Complaint   Patient presents with    New Patient    Ear Problem     Patient here for ear pressure. Patient Active Problem List   Diagnosis    Right hip pain    Diabetic ulcer of right midfoot associated with type 2 diabetes mellitus, with necrosis of muscle (HCC)    Chronic refractory osteomyelitis of foot, right (HCC)    Otalgia, bilateral        Reviewed and updated this visit by provider:  Tobacco  Allergies  Meds  Problems  Med Hx  Surg Hx  Fam Hx         Review of Systems   Constitutional:  Negative for fever. HENT:  Negative for ear discharge and ear pain. Eyes:  Negative for discharge. Respiratory:  Negative for cough. Cardiovascular:  Negative for chest pain. Gastrointestinal:  Negative for abdominal pain. Genitourinary:  Negative for difficulty urinating. Musculoskeletal:  Negative for neck pain. Skin:  Negative for rash. Allergic/Immunologic: Negative for environmental allergies. Neurological:  Negative for dizziness. Hematological:  Negative for adenopathy. Psychiatric/Behavioral:  Negative for agitation. /60 (Site: Left Upper Arm, Position: Sitting)   Ht 5' 8\" (1.727 m)   Wt 238 lb (108 kg)   BMI 36.19 kg/m²     Physical Exam:    General: Well developed, obese, in no acute distress  Communication: The patient communicates appropriately for their age.   Voice: Normal.  Head, Face, and Salivary Glands: No head or facial abnormalities present, No masses or lesions present, Overall appearance is normal, No abnormality of parotid or submandibular glands

## 2023-06-06 NOTE — DISCHARGE INSTRUCTIONS
Discharge Instructions for  Asad Pillai  [address] [place] [address] Laney Caba, [address] [facility] [address]  Phone [phone]   Fax [phone]      NAME:  Sahara Chávez OF BIRTH:  1985  MEDICAL RECORD NUMBER:  [id number]  DATE:  @ED@    Return Appointment:   1 week with Mirta Castro DO      Instructions: Right Plantar Foot:  Do not get wound wet in shower, pool or tub. May purchase cast cover at local pharmacy to keep dry in shower. Cleanse wound with normal saline. Apply collagen with silver. Cut product to approximate wound size and apply to wound bed. Apply Apply alginate with silver. Cut product to wound size and apply to wound bed. Cover with Optilock or abd  Change with cast change. Apply TCC EZ 3\" to right lower leg. Change once a week. *DO NOT DRIVE WITH CAST ON RIGHT FOOT Stacie Brown MD.           Should you experience increased redness, swelling, pain, foul odor, size of wound(s), or have a temperature over 101 degrees please contact the [address] [place] [address] Road at [phone] or if after hours contact your primary care physician or go to the hospital emergency department. PLEASE NOTE: IF YOU ARE UNABLE TO OBTAIN WOUND SUPPLIES, CONTINUE TO USE THE SUPPLIES YOU HAVE AVAILABLE UNTIL YOU ARE ABLE TO REACH US. IT IS MOST IMPORTANT TO KEEP THE WOUND COVERED AT ALL TIMES.     Electronically signed Reid Chaney RN on 6/6/2023 at 8:42 AM

## 2023-06-06 NOTE — WOUND CARE
Discharge Instructions for  Asad Pillai  [address] [place] [address] [facility], [address]  Phone [phone]   Fax [phone]      NAME:  Kylie Kent OF BIRTH:  1985  MEDICAL RECORD NUMBER:  [id number]  DATE:  6/6/2023    Return Appointment:   Tuesday or Wednesday for cast change with Dr Donta Mendez    Instructions:   Right Plantar Foot:  Do not get wound wet in shower, pool or tub. May purchase cast cover at local pharmacy to keep dry in shower. Cleanse wound with normal saline. Apply collagen with silver. Cut product to approximate wound size and apply to wound bed. Apply Apply alginate with silver. Cut product to wound size and apply to wound bed. Cover with Optilock or abd  Change with cast change. Apply TCC EZ 3\" to right lower leg. Change once a week. *DO NOT DRIVE WITH CAST ON RIGHT FOOT Daysi Corbett MD.      Should you experience increased redness, swelling, pain, foul odor, size of wound(s), or have a temperature over 101 degrees please contact the [address] [place] [address] Road at [phone] or if after hours contact your primary care physician or go to the hospital emergency department. PLEASE NOTE: IF YOU ARE UNABLE TO OBTAIN WOUND SUPPLIES, CONTINUE TO USE THE SUPPLIES YOU HAVE AVAILABLE UNTIL YOU ARE ABLE TO REACH US. IT IS MOST IMPORTANT TO KEEP THE WOUND COVERED AT ALL TIMES.     Electronically signed Kim Whitten RN on 6/6/2023 at 8:33 AM

## 2023-06-06 NOTE — WOUND CARE
Total Contact Cast    NAME:  Heaven Pimentel  YOB: 1985  MEDICAL RECORD NUMBER:  [id number]  DATE:  6/6/2023    Goal:  Patient will maintain integrity of cast, avoid mobility hazards, and report complications that may occur (foul odor, pain, numbness, cracked cast). Removed old contact cast if indicated and wash extremity with soap and water. Applied ordered dressing. Applied foam padding  Applied cast padding included in the kit   Applied per nursing and Janak Crane,   Applied to: [] Left Lower Leg [x] Right Lower Leg  Allow cast to dry. Instructed patient to report to health care provider, including wound care center, any back pain, hip pain, or leg pain, numbness of toes, or any odor coming from the cast.   Instructed patient not to stick any foreign objects down into cast.   Instructed patient to utilize assistive devices(crutches, cane or walker) as ordered   Instructed patient to continue offloading as directed.      Applied cast per  Guidelines    Electronically signed by Olivia Marshall RN on 6/6/2023 at 8:31 AM

## 2023-06-06 NOTE — PROGRESS NOTES
Assessment:  Left: Grade III  Right: Grade III    Pulmonary/Chest:  clear to auscultation bilaterally- no wheezes, rales or rhonchi, normal air movement, no respiratory distress    Cardiovascular:  normal    Plan        Patient placed in a full body Monoplace Chamber #: [id number]  Treatment Start Time: 0704        QUIQUE : 2  Number of Air Breaks:  Treatment Status: Other (Comment) (paused treatment for ear pain)     Decompression Time: 0711   Treatment End Time: 0715  Length of Treatment: 90 Minutes  Symptoms Noted During Treatment: None  Total Treatment Time (min): 11    Treatment Completion Status: Treatment aborted due to issue    I was present on these premises and immediately available to furnish assistance & direction throughout the HBO Treatment. Rolando Weaver is a 42 y.o. female  did not successfully complete today's hyperbaric oxygen treatment at [facility] and HBO therapy. Bilateral ear pain. Barotrauma to ears. Pt sent to ENT today for possible PE tubes    In my clinical judgement, ongoing HBO therapy is  necessary at this time to assist with wound healing, preservation of limb, life, or function. Supervision and attendance of Hyperbaric Oxygen Therapy provided. Continue HBO treatment as outlined in the treatment plan. Hyperbaric Oxygen: Ms. Rylie Amado tolerated: Treatment Number: 15 with  issue. Discharge Instructions were explained and given to Ms. Cruz     Electronically signed by Jacky Gonzalez DO on 6/6/2023 at 7:31 AM

## 2023-06-07 ENCOUNTER — HOSPITAL ENCOUNTER (OUTPATIENT)
Dept: WOUND CARE | Age: 38
Discharge: HOME | End: 2023-06-07
Payer: COMMERCIAL

## 2023-06-07 VITALS
TEMPERATURE: 97 F | HEART RATE: 88 BPM | OXYGEN SATURATION: 99 % | DIASTOLIC BLOOD PRESSURE: 71 MMHG | RESPIRATION RATE: 18 BRPM | SYSTOLIC BLOOD PRESSURE: 101 MMHG

## 2023-06-07 DIAGNOSIS — L97.413 DIABETIC ULCER OF RIGHT MIDFOOT ASSOCIATED WITH TYPE 2 DIABETES MELLITUS, WITH NECROSIS OF MUSCLE (HCC): Primary | ICD-10-CM

## 2023-06-07 DIAGNOSIS — H92.03 OTALGIA, BILATERAL: ICD-10-CM

## 2023-06-07 DIAGNOSIS — M86.671 CHRONIC REFRACTORY OSTEOMYELITIS OF FOOT, RIGHT (HCC): ICD-10-CM

## 2023-06-07 DIAGNOSIS — E11.621 DIABETIC ULCER OF RIGHT MIDFOOT ASSOCIATED WITH TYPE 2 DIABETES MELLITUS, WITH NECROSIS OF MUSCLE (HCC): Primary | ICD-10-CM

## 2023-06-07 LAB
GLUCOSE BLD STRIP.AUTO-MCNC: 139 MG/DL (ref 65–100)
GLUCOSE BLD STRIP.AUTO-MCNC: 159 MG/DL (ref 65–100)
SERVICE CMNT-IMP: ABNORMAL
SERVICE CMNT-IMP: ABNORMAL

## 2023-06-07 PROCEDURE — G0277 HBOT, FULL BODY CHAMBER, 30M: HCPCS

## 2023-06-07 PROCEDURE — 82962 GLUCOSE BLOOD TEST: CPT

## 2023-06-07 RX ORDER — CIPROFLOXACIN AND DEXAMETHASONE 3; 1 MG/ML; MG/ML
4 SUSPENSION/ DROPS AURICULAR (OTIC) 2 TIMES DAILY
Qty: 7.5 ML | Refills: 1 | Status: SHIPPED | OUTPATIENT
Start: 2023-06-07 | End: 2023-07-15

## 2023-06-08 ENCOUNTER — HOSPITAL ENCOUNTER (OUTPATIENT)
Dept: WOUND CARE | Age: 38
Discharge: HOME | End: 2023-06-08
Payer: COMMERCIAL

## 2023-06-08 VITALS
SYSTOLIC BLOOD PRESSURE: 118 MMHG | TEMPERATURE: 97 F | RESPIRATION RATE: 18 BRPM | OXYGEN SATURATION: 98 % | DIASTOLIC BLOOD PRESSURE: 83 MMHG | HEART RATE: 92 BPM

## 2023-06-08 DIAGNOSIS — L97.413 DIABETIC ULCER OF RIGHT MIDFOOT ASSOCIATED WITH TYPE 2 DIABETES MELLITUS, WITH NECROSIS OF MUSCLE (HCC): Primary | ICD-10-CM

## 2023-06-08 DIAGNOSIS — M86.671 CHRONIC REFRACTORY OSTEOMYELITIS OF FOOT, RIGHT (HCC): ICD-10-CM

## 2023-06-08 DIAGNOSIS — E11.621 DIABETIC ULCER OF RIGHT MIDFOOT ASSOCIATED WITH TYPE 2 DIABETES MELLITUS, WITH NECROSIS OF MUSCLE (HCC): Primary | ICD-10-CM

## 2023-06-08 DIAGNOSIS — H92.03 OTALGIA, BILATERAL: ICD-10-CM

## 2023-06-08 LAB
GLUCOSE BLD STRIP.AUTO-MCNC: 160 MG/DL (ref 65–100)
GLUCOSE BLD STRIP.AUTO-MCNC: 242 MG/DL (ref 65–100)
SERVICE CMNT-IMP: ABNORMAL
SERVICE CMNT-IMP: ABNORMAL

## 2023-06-08 PROCEDURE — 82962 GLUCOSE BLOOD TEST: CPT

## 2023-06-08 PROCEDURE — 99183 HYPERBARIC OXYGEN THERAPY: CPT | Performed by: FAMILY MEDICINE

## 2023-06-08 PROCEDURE — G0277 HBOT, FULL BODY CHAMBER, 30M: HCPCS

## 2023-06-08 NOTE — PROGRESS NOTES
HBO PROGRESS NOTE      NAME: [address] RECORD NUMBER:  [id number]  AGE: 42 y.o. GENDER: female  : 1985  EPISODE DATE:  2023     Subjective     HBO Treatment Number: 17 out of Total Treatments: 40    HBO Diagnosis:             Indications: Chronic Refractory Osteomyelitis to ___ (site) (Right Foot)    Safety checks performed prior to treatment. See doc flowsheets for documentation.     Objective        Recent Labs     23  0705 23  0903   POCGLU 160* 242*     Pre treatment Vital Signs       Temp: 97 °F (36.1 °C)     Pulse: 89     Respirations: 18     BP: 101/68       Post treatment Vital Signs  Temp: 97 °F (36.1 °C)  Pulse: 92  Respirations: 18  BP: 118/83    Assessment        HBO Diagnosis:   Problem List Items Addressed This Visit          Endocrine    Diabetic ulcer of right midfoot associated with type 2 diabetes mellitus, with necrosis of muscle (HCC) - Primary (Chronic)    Relevant Orders    Hypoglycemial protocol    POCT glucose    Care order/instruction    Care order/instruction    Care order/instruction    Care order/instruction    Care order/instruction    Care order/instruction    Care order/instruction    Care order/instruction       Other    * (Principal) Chronic refractory osteomyelitis of foot, right (HCC) (Chronic)    Relevant Orders    Hypoglycemial protocol    POCT glucose    Care order/instruction    Care order/instruction    Care order/instruction    Care order/instruction    Care order/instruction    Care order/instruction    Care order/instruction    Care order/instruction    Notify physician (specify)    Hyperbaric Oxygen Therapy    Otalgia, bilateral    Relevant Orders    Notify physician (specify)    Hyperbaric Oxygen Therapy       Physical Exam        General Appearance:  alert and oriented to person, place and time, well-developed and well-nourished, in no acute distress    Pre Tympanic Membrane Assessment:  Right: Normal  Left: Normal    Post Tympanic

## 2023-06-09 ENCOUNTER — HOSPITAL ENCOUNTER (OUTPATIENT)
Dept: WOUND CARE | Age: 38
Discharge: HOME | End: 2023-06-09
Payer: COMMERCIAL

## 2023-06-09 VITALS
HEART RATE: 80 BPM | RESPIRATION RATE: 18 BRPM | TEMPERATURE: 97.4 F | OXYGEN SATURATION: 99 % | SYSTOLIC BLOOD PRESSURE: 106 MMHG | DIASTOLIC BLOOD PRESSURE: 74 MMHG

## 2023-06-09 DIAGNOSIS — E11.621 DIABETIC ULCER OF RIGHT MIDFOOT ASSOCIATED WITH TYPE 2 DIABETES MELLITUS, WITH NECROSIS OF MUSCLE (HCC): Primary | ICD-10-CM

## 2023-06-09 DIAGNOSIS — M86.671 CHRONIC REFRACTORY OSTEOMYELITIS OF FOOT, RIGHT (HCC): ICD-10-CM

## 2023-06-09 DIAGNOSIS — H92.03 OTALGIA, BILATERAL: ICD-10-CM

## 2023-06-09 DIAGNOSIS — L97.413 DIABETIC ULCER OF RIGHT MIDFOOT ASSOCIATED WITH TYPE 2 DIABETES MELLITUS, WITH NECROSIS OF MUSCLE (HCC): Primary | ICD-10-CM

## 2023-06-09 LAB
GLUCOSE BLD STRIP.AUTO-MCNC: 206 MG/DL (ref 65–100)
GLUCOSE BLD STRIP.AUTO-MCNC: 281 MG/DL (ref 65–100)
SERVICE CMNT-IMP: ABNORMAL
SERVICE CMNT-IMP: ABNORMAL

## 2023-06-09 PROCEDURE — G0277 HBOT, FULL BODY CHAMBER, 30M: HCPCS

## 2023-06-09 PROCEDURE — 82962 GLUCOSE BLOOD TEST: CPT

## 2023-06-19 ENCOUNTER — HOSPITAL ENCOUNTER (OUTPATIENT)
Dept: WOUND CARE | Age: 38
Discharge: HOME | End: 2023-06-19
Payer: COMMERCIAL

## 2023-06-19 VITALS — HEIGHT: 68 IN | WEIGHT: 238 LBS | BODY MASS INDEX: 36.07 KG/M2

## 2023-06-19 VITALS
RESPIRATION RATE: 18 BRPM | DIASTOLIC BLOOD PRESSURE: 75 MMHG | TEMPERATURE: 98.3 F | SYSTOLIC BLOOD PRESSURE: 105 MMHG | HEART RATE: 92 BPM | OXYGEN SATURATION: 98 %

## 2023-06-19 DIAGNOSIS — H92.03 OTALGIA, BILATERAL: ICD-10-CM

## 2023-06-19 DIAGNOSIS — E11.621 DIABETIC ULCER OF RIGHT MIDFOOT ASSOCIATED WITH TYPE 2 DIABETES MELLITUS, WITH NECROSIS OF MUSCLE (HCC): Primary | ICD-10-CM

## 2023-06-19 DIAGNOSIS — M86.671 CHRONIC REFRACTORY OSTEOMYELITIS OF FOOT, RIGHT (HCC): ICD-10-CM

## 2023-06-19 DIAGNOSIS — L97.413 DIABETIC ULCER OF RIGHT MIDFOOT ASSOCIATED WITH TYPE 2 DIABETES MELLITUS, WITH NECROSIS OF MUSCLE (HCC): Primary | ICD-10-CM

## 2023-06-19 LAB
GLUCOSE BLD STRIP.AUTO-MCNC: 127 MG/DL (ref 65–100)
GLUCOSE BLD STRIP.AUTO-MCNC: 139 MG/DL (ref 65–100)
SERVICE CMNT-IMP: ABNORMAL
SERVICE CMNT-IMP: ABNORMAL

## 2023-06-19 PROCEDURE — G0277 HBOT, FULL BODY CHAMBER, 30M: HCPCS

## 2023-06-19 PROCEDURE — 29445 APPL RIGID TOT CNTC LEG CAST: CPT

## 2023-06-19 PROCEDURE — 82962 GLUCOSE BLOOD TEST: CPT

## 2023-06-19 PROCEDURE — 99183 HYPERBARIC OXYGEN THERAPY: CPT | Performed by: FAMILY MEDICINE

## 2023-06-19 RX ORDER — LIDOCAINE HYDROCHLORIDE 20 MG/ML
JELLY TOPICAL ONCE
OUTPATIENT
Start: 2023-06-19 | End: 2023-06-19

## 2023-06-19 RX ORDER — LIDOCAINE HYDROCHLORIDE 40 MG/ML
SOLUTION TOPICAL ONCE
OUTPATIENT
Start: 2023-06-19 | End: 2023-06-19

## 2023-06-19 RX ORDER — GENTAMICIN SULFATE 1 MG/G
OINTMENT TOPICAL ONCE
OUTPATIENT
Start: 2023-06-19 | End: 2023-06-19

## 2023-06-19 RX ORDER — LIDOCAINE 50 MG/G
OINTMENT TOPICAL ONCE
OUTPATIENT
Start: 2023-06-19 | End: 2023-06-19

## 2023-06-19 RX ORDER — GINSENG 100 MG
CAPSULE ORAL ONCE
OUTPATIENT
Start: 2023-06-19 | End: 2023-06-19

## 2023-06-19 RX ORDER — BACITRACIN ZINC AND POLYMYXIN B SULFATE 500; 1000 [USP'U]/G; [USP'U]/G
OINTMENT TOPICAL ONCE
OUTPATIENT
Start: 2023-06-19 | End: 2023-06-19

## 2023-06-19 RX ORDER — CLOBETASOL PROPIONATE 0.5 MG/G
OINTMENT TOPICAL ONCE
OUTPATIENT
Start: 2023-06-19 | End: 2023-06-19

## 2023-06-19 RX ORDER — IBUPROFEN 200 MG
TABLET ORAL ONCE
OUTPATIENT
Start: 2023-06-19 | End: 2023-06-19

## 2023-06-19 RX ORDER — BETAMETHASONE DIPROPIONATE 0.05 %
OINTMENT (GRAM) TOPICAL ONCE
OUTPATIENT
Start: 2023-06-19 | End: 2023-06-19

## 2023-06-19 RX ORDER — LIDOCAINE 40 MG/G
CREAM TOPICAL ONCE
OUTPATIENT
Start: 2023-06-19 | End: 2023-06-19

## 2023-06-19 NOTE — WOUND CARE
Total Contact Cast    NAME:  Hema Mosqueda  YOB: 1985  MEDICAL RECORD NUMBER:  [id number]  DATE:  6/19/2023    Goal:  Patient will maintain integrity of cast, avoid mobility hazards, and report complications that may occur (foul odor, pain, numbness, cracked cast). Removed old contact cast if indicated and wash extremity with soap and water. Applied ordered dressing. Applied foam padding  Applied cast padding included in the kit   Applied per nursing and Jensen Frazier,   Applied to: [] Left Lower Leg [x] Right Lower Leg  Allow cast to dry. Instructed patient to report to health care provider, including wound care center, any back pain, hip pain, or leg pain, numbness of toes, or any odor coming from the cast.   Instructed patient not to stick any foreign objects down into cast.   Instructed patient to utilize assistive devices(crutches, cane or walker) as ordered   Instructed patient to continue offloading as directed.      Applied cast per  Guidelines    Electronically signed by Nickie Cody RN on 6/19/2023 at 10:01 AM

## 2023-06-19 NOTE — FLOWSHEET NOTE
06/19/23 0959   Wound 02/24/23 Right;Plantar #1   Date First Assessed/Time First Assessed: 02/24/23 1314   Present on Hospital Admission: Yes  Primary Wound Type: Diabetic Ulcer  Wound Location Orientation: Right;Plantar  Wound Description (Comments): #1   Wound Image    Wound Etiology Diabetic Elizabeth 3   Dressing Status Old drainage noted   Wound Cleansed Vashe   Dressing/Treatment Collagen with Ag;Alginate with Ag   Offloading for Diabetic Foot Ulcers Total contact cast   Wound Length (cm) 1 cm   Wound Width (cm) 2 cm   Wound Depth (cm) 0.1 cm   Wound Surface Area (cm^2) 2 cm^2   Change in Wound Size % (l*w) 72.22   Wound Volume (cm^3) 0.2 cm^3   Wound Healing % 86   Wound Assessment Epithelialization; Hyper granulation tissue   Drainage Amount Moderate   Drainage Description Serosanguinous   Odor None   Emily-wound Assessment Intact   Margins Attached edges   Wound Thickness Description not for Pressure Injury Full thickness

## 2023-06-19 NOTE — WOUND CARE
Discharge Instructions for  Asad Pillai  [address] [place] [address]  [facility] [address], [address]  Phone [phone]   Fax [phone]      NAME:  Jaylene Trujillo OF BIRTH:  1985  MEDICAL RECORD NUMBER:  [id number]  DATE:  6/19/2023    Return Appointment:   1 week with Zulema Aguilera DO      Instructions:   Right Plantar Foot:  Cleanse wound with normal saline. Vashe  Apply collagen with silver. Cut product to approximate wound size and apply to wound bed. Apply alginate with silver. Cut product to wound size and apply to wound bed. Cover with Optilock or ABD  Change dressing with each Total Contact Cast application. Apply TCC EZ 3\" to right lower leg. [employer] after cast cures. Change once a week. *DO NOT DRIVE WITH CAST ON RIGHT FOOT Glo Melvin MD.    Provide TCC education and safety measures. Referral to [facility], Dr. Leni Ackerman with [facility]. Should you experience increased redness, swelling, pain, foul odor, size of wound(s), or have a temperature over 101 degrees please contact the [address] [place] [address] Road at [phone] or if after hours contact your primary care physician or go to the hospital emergency department. PLEASE NOTE: IF YOU ARE UNABLE TO OBTAIN WOUND SUPPLIES, CONTINUE TO USE THE SUPPLIES YOU HAVE AVAILABLE UNTIL YOU ARE ABLE TO REACH US. IT IS MOST IMPORTANT TO KEEP THE WOUND COVERED AT ALL TIMES.     Electronically signed Link MICK Camp on 6/19/2023 at 10:02 AM

## 2023-06-19 NOTE — PROGRESS NOTES
HBO PROGRESS NOTE      NAME: [address] RECORD NUMBER:  [id number]  AGE: 42 y.o. GENDER: female  : 1985  EPISODE DATE:  2023     Subjective     HBO Treatment Number: 23 out of Total Treatments: 40    HBO Diagnosis:             Indications: Chronic Refractory Osteomyelitis to ___ (site) (right foot)    Safety checks performed prior to treatment. See doc flowsheets for documentation.     Objective        Recent Labs     23  0706 23  0908   POCGLU 139* 127*     Pre treatment Vital Signs       Temp: 98.3 °F (36.8 °C)     Pulse: 95     Respirations: 18     BP: 91/60       Post treatment Vital Signs  Temp: 98.3 °F (36.8 °C)  Pulse: 92  Respirations: 18  BP: 105/75    Assessment        HBO Diagnosis:   Problem List Items Addressed This Visit          Endocrine    Diabetic ulcer of right midfoot associated with type 2 diabetes mellitus, with necrosis of muscle (HCC) - Primary (Chronic)    Relevant Orders    Hypoglycemial protocol    POCT glucose    Care order/instruction    Care order/instruction    Care order/instruction    Care order/instruction    Care order/instruction    Care order/instruction    Care order/instruction    Care order/instruction       Other    * (Principal) Chronic refractory osteomyelitis of foot, right (HCC) (Chronic)    Relevant Orders    Hypoglycemial protocol    POCT glucose    Care order/instruction    Care order/instruction    Care order/instruction    Care order/instruction    Care order/instruction    Care order/instruction    Care order/instruction    Care order/instruction    Notify physician (specify)    Hyperbaric Oxygen Therapy    Otalgia, bilateral    Relevant Orders    Notify physician (specify)    Hyperbaric Oxygen Therapy       Physical Exam        General Appearance:  alert and oriented to person, place and time, well-developed and well-nourished, in no acute distress    Pre Tympanic Membrane Assessment:  Right: Normal  Left: Normal    Post

## 2023-06-19 NOTE — DISCHARGE INSTRUCTIONS
Discharge Instructions for  Asad Pillai  [address] [place] [address] Laney Caba, [address]  Phone [phone]   Fax [phone]      NAME:  Serjio Mills OF BIRTH:  1985  MEDICAL RECORD NUMBER:  [id number]  DATE:  @ED@    Return Appointment:   1 week with Ming Borja,       Instructions: ***        Should you experience increased redness, swelling, pain, foul odor, size of wound(s), or have a temperature over 101 degrees please contact the [address] [place] [address] Road at [phone] or if after hours contact your primary care physician or go to the hospital emergency department. PLEASE NOTE: IF YOU ARE UNABLE TO OBTAIN WOUND SUPPLIES, CONTINUE TO USE THE SUPPLIES YOU HAVE AVAILABLE UNTIL YOU ARE ABLE TO REACH US. IT IS MOST IMPORTANT TO KEEP THE WOUND COVERED AT ALL TIMES.     Electronically signed Evette Spence RN on 6/19/2023 at 10:03 AM

## 2023-06-20 ENCOUNTER — HOSPITAL ENCOUNTER (OUTPATIENT)
Dept: WOUND CARE | Age: 38
Discharge: HOME | End: 2023-06-20
Payer: COMMERCIAL

## 2023-06-20 VITALS
TEMPERATURE: 97.8 F | OXYGEN SATURATION: 100 % | RESPIRATION RATE: 18 BRPM | DIASTOLIC BLOOD PRESSURE: 63 MMHG | SYSTOLIC BLOOD PRESSURE: 96 MMHG | HEART RATE: 96 BPM

## 2023-06-20 DIAGNOSIS — H92.03 OTALGIA, BILATERAL: ICD-10-CM

## 2023-06-20 DIAGNOSIS — E11.621 DIABETIC ULCER OF RIGHT MIDFOOT ASSOCIATED WITH TYPE 2 DIABETES MELLITUS, WITH NECROSIS OF MUSCLE (HCC): Primary | ICD-10-CM

## 2023-06-20 DIAGNOSIS — M86.671 CHRONIC REFRACTORY OSTEOMYELITIS OF FOOT, RIGHT (HCC): ICD-10-CM

## 2023-06-20 DIAGNOSIS — L97.413 DIABETIC ULCER OF RIGHT MIDFOOT ASSOCIATED WITH TYPE 2 DIABETES MELLITUS, WITH NECROSIS OF MUSCLE (HCC): Primary | ICD-10-CM

## 2023-06-20 LAB
GLUCOSE BLD STRIP.AUTO-MCNC: 147 MG/DL (ref 65–100)
GLUCOSE BLD STRIP.AUTO-MCNC: 222 MG/DL (ref 65–100)
SERVICE CMNT-IMP: ABNORMAL
SERVICE CMNT-IMP: ABNORMAL

## 2023-06-20 PROCEDURE — 82962 GLUCOSE BLOOD TEST: CPT

## 2023-06-20 PROCEDURE — G0277 HBOT, FULL BODY CHAMBER, 30M: HCPCS

## 2023-06-20 PROCEDURE — 99183 HYPERBARIC OXYGEN THERAPY: CPT | Performed by: FAMILY MEDICINE

## 2023-06-21 NOTE — PROGRESS NOTES
HBO PROGRESS NOTE      NAME: [address] RECORD NUMBER:  [id number]  AGE: 42 y.o. GENDER: female  : 1985  EPISODE DATE:  2023     Subjective     HBO Treatment Number: 24 out of Total Treatments: 40    HBO Diagnosis:             Indications: Chronic Refractory Osteomyelitis to ___ (site) (right foot)    Safety checks performed prior to treatment. See doc flowsheets for documentation.     Objective        Recent Labs     23  0659 23  0913   POCGLU 147* 222*     Pre treatment Vital Signs       Temp: 97.8 °F (36.6 °C)     Pulse: 98     Respirations: 18     BP: 112/78       Post treatment Vital Signs  Temp: 97.8 °F (36.6 °C)  Pulse: 96  Respirations: 18  BP: 96/63    Assessment        HBO Diagnosis:   Problem List Items Addressed This Visit          Endocrine    Diabetic ulcer of right midfoot associated with type 2 diabetes mellitus, with necrosis of muscle (HCC) - Primary (Chronic)    Relevant Orders    Hypoglycemial protocol    POCT glucose    Care order/instruction    Care order/instruction    Care order/instruction    Care order/instruction    Care order/instruction    Care order/instruction    Care order/instruction    Care order/instruction       Other    * (Principal) Chronic refractory osteomyelitis of foot, right (HCC) (Chronic)    Relevant Orders    Hypoglycemial protocol    POCT glucose    Care order/instruction    Care order/instruction    Care order/instruction    Care order/instruction    Care order/instruction    Care order/instruction    Care order/instruction    Care order/instruction    Notify physician (specify)    Hyperbaric Oxygen Therapy    Otalgia, bilateral    Relevant Orders    Notify physician (specify)    Hyperbaric Oxygen Therapy       Physical Exam        General Appearance:  alert and oriented to person, place and time, well-developed and well-nourished, in no acute distress    Pre Tympanic Membrane Assessment:  Right: Normal  Left: Normal    Post

## 2023-06-22 ENCOUNTER — HOSPITAL ENCOUNTER (OUTPATIENT)
Dept: WOUND CARE | Age: 38
Discharge: HOME | End: 2023-06-22
Payer: COMMERCIAL

## 2023-06-22 VITALS
OXYGEN SATURATION: 100 % | HEART RATE: 98 BPM | DIASTOLIC BLOOD PRESSURE: 83 MMHG | SYSTOLIC BLOOD PRESSURE: 118 MMHG | RESPIRATION RATE: 18 BRPM | TEMPERATURE: 97.6 F

## 2023-06-22 DIAGNOSIS — E11.621 DIABETIC ULCER OF RIGHT MIDFOOT ASSOCIATED WITH TYPE 2 DIABETES MELLITUS, WITH NECROSIS OF MUSCLE (HCC): Primary | ICD-10-CM

## 2023-06-22 DIAGNOSIS — H92.03 OTALGIA, BILATERAL: ICD-10-CM

## 2023-06-22 DIAGNOSIS — M86.671 CHRONIC REFRACTORY OSTEOMYELITIS OF FOOT, RIGHT (HCC): ICD-10-CM

## 2023-06-22 DIAGNOSIS — L97.413 DIABETIC ULCER OF RIGHT MIDFOOT ASSOCIATED WITH TYPE 2 DIABETES MELLITUS, WITH NECROSIS OF MUSCLE (HCC): Primary | ICD-10-CM

## 2023-06-22 LAB
GLUCOSE BLD STRIP.AUTO-MCNC: 186 MG/DL (ref 65–100)
GLUCOSE BLD STRIP.AUTO-MCNC: 202 MG/DL (ref 65–100)
SERVICE CMNT-IMP: ABNORMAL
SERVICE CMNT-IMP: ABNORMAL

## 2023-06-22 PROCEDURE — G0277 HBOT, FULL BODY CHAMBER, 30M: HCPCS

## 2023-06-22 PROCEDURE — 82962 GLUCOSE BLOOD TEST: CPT

## 2023-06-22 NOTE — PROGRESS NOTES
HBO PROGRESS NOTE  NAME: Ming Gutierrez RECORD NUMBER:  [id number]  AGE: 42 y.o. GENDER: female  : 1985  EPISODE DATE:  2023     Subjective   HBO Treatment Number: 25 out of Total Treatments: 40    HBO Diagnosis:           Indications: Chronic Refractory Osteomyelitis to ___ (site) (Right Foot)    Safety checks performed prior to treatment. See doc flowsheets for documentation.       Objective      No results found for: [facility]     23  0913 23  0657   POCGLU 222* 202*     Pre treatment Vital Signs       Temp: 97.6 °F (36.4 °C)     Pulse: 99     Respirations: 18     BP: 119/82       Post treatment Vital Signs  Temp: 97.6 °F (36.4 °C)  Pulse: 99  Respirations: 18  BP: 119/82    Assessment      HBO Diagnosis:   Problem List Items Addressed This Visit          Endocrine    Diabetic ulcer of right midfoot associated with type 2 diabetes mellitus, with necrosis of muscle (HCC) - Primary (Chronic)    Relevant Orders    Hypoglycemial protocol    POCT glucose    Care order/instruction    Care order/instruction    Care order/instruction    Care order/instruction    Care order/instruction    Care order/instruction    Care order/instruction    Care order/instruction       Other    Chronic refractory osteomyelitis of foot, right (HCC) (Chronic)    Relevant Orders    Hypoglycemial protocol    POCT glucose    Care order/instruction    Care order/instruction    Care order/instruction    Care order/instruction    Care order/instruction    Care order/instruction    Care order/instruction    Care order/instruction    Notify physician (specify)    Hyperbaric Oxygen Therapy    Otalgia, bilateral    Relevant Orders    Notify physician (specify)    Hyperbaric Oxygen Therapy         Physical Exam:  General Appearance: alert, interacfive    Pre Tympanic Membrane Assessment:  Right: Normal  Left: Normal    Post Tympanic Membrane Assessment:  Left: Normal  Right: Normal    Pulmonary/Chest: No

## 2023-06-23 ENCOUNTER — HOSPITAL ENCOUNTER (OUTPATIENT)
Dept: WOUND CARE | Age: 38
Discharge: HOME | End: 2023-06-23
Payer: COMMERCIAL

## 2023-06-23 VITALS
HEART RATE: 63 BPM | SYSTOLIC BLOOD PRESSURE: 111 MMHG | RESPIRATION RATE: 18 BRPM | DIASTOLIC BLOOD PRESSURE: 68 MMHG | TEMPERATURE: 98.1 F | OXYGEN SATURATION: 100 %

## 2023-06-23 DIAGNOSIS — H92.03 OTALGIA, BILATERAL: ICD-10-CM

## 2023-06-23 DIAGNOSIS — M86.671 CHRONIC REFRACTORY OSTEOMYELITIS OF FOOT, RIGHT (HCC): ICD-10-CM

## 2023-06-23 DIAGNOSIS — E11.621 DIABETIC ULCER OF RIGHT MIDFOOT ASSOCIATED WITH TYPE 2 DIABETES MELLITUS, WITH NECROSIS OF MUSCLE (HCC): Primary | ICD-10-CM

## 2023-06-23 DIAGNOSIS — L97.413 DIABETIC ULCER OF RIGHT MIDFOOT ASSOCIATED WITH TYPE 2 DIABETES MELLITUS, WITH NECROSIS OF MUSCLE (HCC): Primary | ICD-10-CM

## 2023-06-23 LAB
GLUCOSE BLD STRIP.AUTO-MCNC: 104 MG/DL (ref 65–100)
GLUCOSE BLD STRIP.AUTO-MCNC: 152 MG/DL (ref 65–100)
GLUCOSE BLD STRIP.AUTO-MCNC: 159 MG/DL (ref 65–100)
SERVICE CMNT-IMP: ABNORMAL

## 2023-06-23 PROCEDURE — 82962 GLUCOSE BLOOD TEST: CPT

## 2023-06-23 PROCEDURE — G0277 HBOT, FULL BODY CHAMBER, 30M: HCPCS

## 2023-06-24 ENCOUNTER — HOSPITAL ENCOUNTER (EMERGENCY)
Age: 38
Discharge: HOME | End: 2023-06-25
Attending: GENERAL PRACTICE
Payer: COMMERCIAL

## 2023-06-24 VITALS
BODY MASS INDEX: 36.07 KG/M2 | DIASTOLIC BLOOD PRESSURE: 107 MMHG | TEMPERATURE: 98.4 F | RESPIRATION RATE: 17 BRPM | HEIGHT: 68 IN | WEIGHT: 238 LBS | OXYGEN SATURATION: 98 % | SYSTOLIC BLOOD PRESSURE: 140 MMHG | HEART RATE: 100 BPM

## 2023-06-24 DIAGNOSIS — J06.9 ACUTE UPPER RESPIRATORY INFECTION: Primary | ICD-10-CM

## 2023-06-24 PROCEDURE — 99282 EMERGENCY DEPT VISIT SF MDM: CPT

## 2023-06-24 ASSESSMENT — PAIN SCALES - GENERAL: PAINLEVEL_OUTOF10: 5

## 2023-06-24 ASSESSMENT — PAIN DESCRIPTION - LOCATION: LOCATION: THROAT

## 2023-06-24 ASSESSMENT — LIFESTYLE VARIABLES
HOW MANY STANDARD DRINKS CONTAINING ALCOHOL DO YOU HAVE ON A TYPICAL DAY: PATIENT DOES NOT DRINK
HOW OFTEN DO YOU HAVE A DRINK CONTAINING ALCOHOL: NEVER

## 2023-06-26 ENCOUNTER — HOSPITAL ENCOUNTER (OUTPATIENT)
Dept: WOUND CARE | Age: 38
Discharge: HOME | End: 2023-06-26
Payer: COMMERCIAL

## 2023-06-26 VITALS — BODY MASS INDEX: 36.07 KG/M2 | HEIGHT: 68 IN | WEIGHT: 238 LBS

## 2023-06-26 VITALS
SYSTOLIC BLOOD PRESSURE: 100 MMHG | RESPIRATION RATE: 20 BRPM | DIASTOLIC BLOOD PRESSURE: 73 MMHG | TEMPERATURE: 97.3 F | HEART RATE: 93 BPM | OXYGEN SATURATION: 100 %

## 2023-06-26 DIAGNOSIS — E11.621 DIABETIC ULCER OF RIGHT MIDFOOT ASSOCIATED WITH TYPE 2 DIABETES MELLITUS, WITH NECROSIS OF MUSCLE (HCC): Primary | ICD-10-CM

## 2023-06-26 DIAGNOSIS — M86.671 CHRONIC REFRACTORY OSTEOMYELITIS OF FOOT, RIGHT (HCC): ICD-10-CM

## 2023-06-26 DIAGNOSIS — L97.413 DIABETIC ULCER OF RIGHT MIDFOOT ASSOCIATED WITH TYPE 2 DIABETES MELLITUS, WITH NECROSIS OF MUSCLE (HCC): Primary | ICD-10-CM

## 2023-06-26 DIAGNOSIS — H92.03 OTALGIA, BILATERAL: ICD-10-CM

## 2023-06-26 LAB
GLUCOSE BLD STRIP.AUTO-MCNC: 127 MG/DL (ref 65–100)
GLUCOSE BLD STRIP.AUTO-MCNC: 133 MG/DL (ref 65–100)
SERVICE CMNT-IMP: ABNORMAL
SERVICE CMNT-IMP: ABNORMAL

## 2023-06-26 PROCEDURE — 99213 OFFICE O/P EST LOW 20 MIN: CPT | Performed by: FAMILY MEDICINE

## 2023-06-26 PROCEDURE — 17250 CHEM CAUT OF GRANLTJ TISSUE: CPT

## 2023-06-26 PROCEDURE — 82962 GLUCOSE BLOOD TEST: CPT

## 2023-06-26 PROCEDURE — 99183 HYPERBARIC OXYGEN THERAPY: CPT | Performed by: FAMILY MEDICINE

## 2023-06-26 PROCEDURE — G0277 HBOT, FULL BODY CHAMBER, 30M: HCPCS

## 2023-06-26 RX ORDER — GINSENG 100 MG
CAPSULE ORAL ONCE
OUTPATIENT
Start: 2023-06-26 | End: 2023-06-26

## 2023-06-26 RX ORDER — IBUPROFEN 200 MG
TABLET ORAL ONCE
OUTPATIENT
Start: 2023-06-26 | End: 2023-06-26

## 2023-06-26 RX ORDER — LIDOCAINE HYDROCHLORIDE 20 MG/ML
JELLY TOPICAL ONCE
OUTPATIENT
Start: 2023-06-26 | End: 2023-06-26

## 2023-06-26 RX ORDER — BETAMETHASONE DIPROPIONATE 0.05 %
OINTMENT (GRAM) TOPICAL ONCE
OUTPATIENT
Start: 2023-06-26 | End: 2023-06-26

## 2023-06-26 RX ORDER — LIDOCAINE 50 MG/G
OINTMENT TOPICAL ONCE
OUTPATIENT
Start: 2023-06-26 | End: 2023-06-26

## 2023-06-26 RX ORDER — GENTAMICIN SULFATE 1 MG/G
OINTMENT TOPICAL ONCE
OUTPATIENT
Start: 2023-06-26 | End: 2023-06-26

## 2023-06-26 RX ORDER — BACITRACIN ZINC AND POLYMYXIN B SULFATE 500; 1000 [USP'U]/G; [USP'U]/G
OINTMENT TOPICAL ONCE
OUTPATIENT
Start: 2023-06-26 | End: 2023-06-26

## 2023-06-26 RX ORDER — CLOBETASOL PROPIONATE 0.5 MG/G
OINTMENT TOPICAL ONCE
OUTPATIENT
Start: 2023-06-26 | End: 2023-06-26

## 2023-06-26 RX ORDER — LIDOCAINE HYDROCHLORIDE 40 MG/ML
SOLUTION TOPICAL ONCE
OUTPATIENT
Start: 2023-06-26 | End: 2023-06-26

## 2023-06-26 RX ORDER — LIDOCAINE 40 MG/G
CREAM TOPICAL ONCE
OUTPATIENT
Start: 2023-06-26 | End: 2023-06-26

## 2023-07-07 ENCOUNTER — APPOINTMENT (OUTPATIENT)
Dept: WOUND CARE | Age: 38
End: 2023-07-07
Payer: COMMERCIAL

## 2023-07-18 ENCOUNTER — HOSPITAL ENCOUNTER (OUTPATIENT)
Dept: WOUND CARE | Age: 38
Discharge: HOME | End: 2023-07-18
Payer: COMMERCIAL

## 2023-07-18 VITALS
BODY MASS INDEX: 36.07 KG/M2 | SYSTOLIC BLOOD PRESSURE: 138 MMHG | HEART RATE: 90 BPM | TEMPERATURE: 97.1 F | DIASTOLIC BLOOD PRESSURE: 85 MMHG | HEIGHT: 68 IN | WEIGHT: 238 LBS

## 2023-07-18 DIAGNOSIS — L97.413 DIABETIC ULCER OF RIGHT MIDFOOT ASSOCIATED WITH TYPE 2 DIABETES MELLITUS, WITH NECROSIS OF MUSCLE (HCC): Primary | ICD-10-CM

## 2023-07-18 DIAGNOSIS — E11.621 DIABETIC ULCER OF RIGHT MIDFOOT ASSOCIATED WITH TYPE 2 DIABETES MELLITUS, WITH NECROSIS OF MUSCLE (HCC): Primary | ICD-10-CM

## 2023-07-18 DIAGNOSIS — M86.671 CHRONIC REFRACTORY OSTEOMYELITIS OF FOOT, RIGHT (HCC): ICD-10-CM

## 2023-07-18 PROCEDURE — 11042 DBRDMT SUBQ TIS 1ST 20SQCM/<: CPT

## 2023-07-18 RX ORDER — LIDOCAINE 40 MG/G
CREAM TOPICAL ONCE
OUTPATIENT
Start: 2023-07-18 | End: 2023-07-18

## 2023-07-18 RX ORDER — IBUPROFEN 200 MG
TABLET ORAL ONCE
OUTPATIENT
Start: 2023-07-18 | End: 2023-07-18

## 2023-07-18 RX ORDER — BETAMETHASONE DIPROPIONATE 0.05 %
OINTMENT (GRAM) TOPICAL ONCE
OUTPATIENT
Start: 2023-07-18 | End: 2023-07-18

## 2023-07-18 RX ORDER — LIDOCAINE HYDROCHLORIDE 40 MG/ML
SOLUTION TOPICAL ONCE
OUTPATIENT
Start: 2023-07-18 | End: 2023-07-18

## 2023-07-18 RX ORDER — CLOBETASOL PROPIONATE 0.5 MG/G
OINTMENT TOPICAL ONCE
OUTPATIENT
Start: 2023-07-18 | End: 2023-07-18

## 2023-07-18 RX ORDER — BACITRACIN ZINC AND POLYMYXIN B SULFATE 500; 1000 [USP'U]/G; [USP'U]/G
OINTMENT TOPICAL ONCE
OUTPATIENT
Start: 2023-07-18 | End: 2023-07-18

## 2023-07-18 RX ORDER — GENTAMICIN SULFATE 1 MG/G
OINTMENT TOPICAL ONCE
OUTPATIENT
Start: 2023-07-18 | End: 2023-07-18

## 2023-07-18 RX ORDER — LIDOCAINE HYDROCHLORIDE 20 MG/ML
JELLY TOPICAL ONCE
Status: DISCONTINUED | OUTPATIENT
Start: 2023-07-18 | End: 2023-07-19 | Stop reason: HOSPADM

## 2023-07-18 RX ORDER — LIDOCAINE 50 MG/G
OINTMENT TOPICAL ONCE
OUTPATIENT
Start: 2023-07-18 | End: 2023-07-18

## 2023-07-18 RX ORDER — GINSENG 100 MG
CAPSULE ORAL ONCE
OUTPATIENT
Start: 2023-07-18 | End: 2023-07-18

## 2023-07-18 RX ORDER — LIDOCAINE HYDROCHLORIDE 20 MG/ML
JELLY TOPICAL ONCE
OUTPATIENT
Start: 2023-07-18 | End: 2023-07-18

## 2023-07-18 ASSESSMENT — PAIN SCALES - GENERAL: PAINLEVEL_OUTOF10: 5

## 2023-07-18 NOTE — WOUND CARE
Discharge Instructions for  [address]  [facility]  [address], [address]  Phone [phone]   Fax [phone]      NAME:  Padmini Nguyen OF BIRTH:  1985  MEDICAL RECORD NUMBER:  [id number]  DATE:  7/18/2023    Return Appointment:   1 week  with Marie Day DO      Instructions:   Right Plantar Foot:  Cleanse wound with normal saline. Vashe  Apply collagen with silver. Cut product to approximate wound size and apply to wound bed. Apply alginate with silver. Cut product to wound size and apply to wound bed. Cover with Optilock or ABD    Do not get wound wet in shower, pool or tub. May purchase cast cover at local pharmacy to keep dry in shower. TCC applied today       Limit walking as much as possible. Should you experience increased redness, swelling, pain, foul odor, size of wound(s), or have a temperature over 101 degrees please contact the [address] [place] [address] Deny Yusuf at [phone] or if after hours contact your primary care physician or go to the hospital emergency department. PLEASE NOTE: IF YOU ARE UNABLE TO OBTAIN WOUND SUPPLIES, CONTINUE TO USE THE SUPPLIES YOU HAVE AVAILABLE UNTIL YOU ARE ABLE TO REACH US. IT IS MOST IMPORTANT TO KEEP THE WOUND COVERED AT ALL TIMES.     Electronically signed Magdi Baldwin RN on 7/18/2023 at 2:34 PM

## 2023-07-18 NOTE — WOUND CARE
Total Contact Cast    NAME:  Flaquita Calles  YOB: 1985  MEDICAL RECORD NUMBER:  [id number]  DATE:  7/18/2023    Goal:  Patient will maintain integrity of cast, avoid mobility hazards, and report complications that may occur (foul odor, pain, numbness, cracked cast). Removed old contact cast if indicated and wash extremity with soap and water  Applied ordered dressing over wound(s)   Applied cast padding  Applied foam padding  Applied per Dr. Mix  was applied in the [address] [place] [address] to left lower leg  Total Contact Cast was applied in the [address] [place] [address] to right lower leg  Applied cast material fiberglass  Applied cast material plaster   Allow cast to dry   Instructed patient to report to health care provider, including wound care center, any back pain, hip pain, or leg pain, numbness of toes, or any odor coming from the cast.   Instructed patient not to stick any foreign objects down into cast.  Instructed patient to utilize assistive devices(crutches, cane or walker) as ordered. Instructed patient to continue offloading as directed.      Applied cast per  Guidelines    Electronically signed by Lauryn Oneill RN on 7/18/2023 at 2:36 PM

## 2023-07-18 NOTE — DISCHARGE INSTRUCTIONS
Discharge Instructions for  Chaitanya Hernandez  [address], [address] [place] [address]  Phone [phone]   Fax [phone]      NAME:  Jourdan Little OF BIRTH:  1985  MEDICAL RECORD NUMBER:  [id number]  DATE:  @ED@    Return Appointment:   1 week with Bean , DO      Instructions: Right Plantar Foot:  Cleanse wound with normal saline. Vashe  Apply collagen with silver. Cut product to approximate wound size and apply to wound bed. Apply alginate with silver. Cut product to wound size and apply to wound bed. Cover with Optilock or ABD     Do not get wound wet in shower, pool or tub. May purchase cast cover at local pharmacy to keep dry in shower. TCC applied today         Limit walking as much as possible. Should you experience increased redness, swelling, pain, foul odor, size of wound(s), or have a temperature over 101 degrees please contact the [address] [place] [address] Deny Yusuf at [phone] or if after hours contact your primary care physician or go to the hospital emergency department. PLEASE NOTE: IF YOU ARE UNABLE TO OBTAIN WOUND SUPPLIES, CONTINUE TO USE THE SUPPLIES YOU HAVE AVAILABLE UNTIL YOU ARE ABLE TO REACH US. IT IS MOST IMPORTANT TO KEEP THE WOUND COVERED AT ALL TIMES.     Electronically signed Joselin Luis RN on 7/18/2023 at 2:34 PM

## 2023-07-18 NOTE — FLOWSHEET NOTE
07/18/23 1432   Wound 02/24/23 Right;Plantar #1   Date First Assessed/Time First Assessed: 02/24/23 1314   Present on Hospital Admission: Yes  Primary Wound Type: Diabetic Ulcer  Wound Location Orientation: Right;Plantar  Wound Description (Comments): #1   Wound Image     Wound Etiology Diabetic Elizabeth 3   Dressing Status Old drainage noted   Wound Cleansed Cleansed with saline   Dressing/Treatment Alginate with Ag   Offloading for Diabetic Foot Ulcers Offloading ordered   Wound Length (cm) 1 cm   Wound Width (cm) 2.1 cm   Wound Depth (cm) 0.1 cm   Wound Surface Area (cm^2) 2.1 cm^2   Change in Wound Size % (l*w) 70.83   Wound Volume (cm^3) 0.21 cm^3   Wound Healing % 85   Post-Procedure Length (cm) 1 cm   Post-Procedure Width (cm) 2.1 cm   Post-Procedure Depth (cm) 0.1 cm   Post-Procedure Surface Area (cm^2) 2.1 cm^2   Post-Procedure Volume (cm^3) 0.21 cm^3   Wound Assessment Pink/red   Drainage Amount Moderate   Drainage Description Serosanguinous   Odor None   Emily-wound Assessment Hyperkeratosis (callous)   Wound Thickness Description not for Pressure Injury Full thickness     Post debridement

## 2023-07-18 NOTE — FLOWSHEET NOTE
07/18/23 1432   Wound 02/24/23 Right;Plantar #1   Date First Assessed/Time First Assessed: 02/24/23 1314   Present on Hospital Admission: Yes  Primary Wound Type: Diabetic Ulcer  Wound Location Orientation: Right;Plantar  Wound Description (Comments): #1   Wound Image    Wound Etiology Diabetic Elizabeth 3   Dressing Status Old drainage noted   Wound Cleansed Cleansed with saline   Dressing/Treatment Alginate with Ag   Offloading for Diabetic Foot Ulcers Offloading ordered   Wound Length (cm) 1 cm   Wound Width (cm) 2.1 cm   Wound Depth (cm) 0.1 cm   Wound Surface Area (cm^2) 2.1 cm^2   Change in Wound Size % (l*w) 70.83   Wound Volume (cm^3) 0.21 cm^3   Wound Healing % 85   Wound Assessment Pink/red   Drainage Amount Moderate   Drainage Description Serosanguinous   Odor None   Emily-wound Assessment Hyperkeratosis (callous)   Wound Thickness Description not for Pressure Injury Full thickness

## 2023-07-25 ENCOUNTER — HOSPITAL ENCOUNTER (OUTPATIENT)
Dept: WOUND CARE | Age: 38
Discharge: HOME | End: 2023-07-25
Payer: COMMERCIAL

## 2023-07-25 VITALS
BODY MASS INDEX: 36.37 KG/M2 | HEART RATE: 99 BPM | RESPIRATION RATE: 20 BRPM | WEIGHT: 240 LBS | TEMPERATURE: 97.7 F | SYSTOLIC BLOOD PRESSURE: 108 MMHG | HEIGHT: 68 IN | DIASTOLIC BLOOD PRESSURE: 64 MMHG

## 2023-07-25 DIAGNOSIS — M86.671 CHRONIC REFRACTORY OSTEOMYELITIS OF FOOT, RIGHT (HCC): ICD-10-CM

## 2023-07-25 DIAGNOSIS — L97.413 DIABETIC ULCER OF RIGHT MIDFOOT ASSOCIATED WITH TYPE 2 DIABETES MELLITUS, WITH NECROSIS OF MUSCLE (HCC): Primary | ICD-10-CM

## 2023-07-25 DIAGNOSIS — E11.621 DIABETIC ULCER OF RIGHT MIDFOOT ASSOCIATED WITH TYPE 2 DIABETES MELLITUS, WITH NECROSIS OF MUSCLE (HCC): Primary | ICD-10-CM

## 2023-07-25 PROCEDURE — 11042 DBRDMT SUBQ TIS 1ST 20SQCM/<: CPT

## 2023-07-25 RX ORDER — LIDOCAINE 50 MG/G
OINTMENT TOPICAL ONCE
OUTPATIENT
Start: 2023-07-25 | End: 2023-07-25

## 2023-07-25 RX ORDER — LIDOCAINE HYDROCHLORIDE 20 MG/ML
JELLY TOPICAL ONCE
Status: COMPLETED | OUTPATIENT
Start: 2023-07-25 | End: 2023-07-25

## 2023-07-25 RX ORDER — BETAMETHASONE DIPROPIONATE 0.05 %
OINTMENT (GRAM) TOPICAL ONCE
OUTPATIENT
Start: 2023-07-25 | End: 2023-07-25

## 2023-07-25 RX ORDER — CLOBETASOL PROPIONATE 0.5 MG/G
OINTMENT TOPICAL ONCE
OUTPATIENT
Start: 2023-07-25 | End: 2023-07-25

## 2023-07-25 RX ORDER — LIDOCAINE HYDROCHLORIDE 40 MG/ML
SOLUTION TOPICAL ONCE
OUTPATIENT
Start: 2023-07-25 | End: 2023-07-25

## 2023-07-25 RX ORDER — LIDOCAINE HYDROCHLORIDE 20 MG/ML
JELLY TOPICAL ONCE
OUTPATIENT
Start: 2023-07-25 | End: 2023-07-25

## 2023-07-25 RX ORDER — LIDOCAINE 40 MG/G
CREAM TOPICAL ONCE
OUTPATIENT
Start: 2023-07-25 | End: 2023-07-25

## 2023-07-25 RX ORDER — GINSENG 100 MG
CAPSULE ORAL ONCE
OUTPATIENT
Start: 2023-07-25 | End: 2023-07-25

## 2023-07-25 RX ORDER — GENTAMICIN SULFATE 1 MG/G
OINTMENT TOPICAL ONCE
OUTPATIENT
Start: 2023-07-25 | End: 2023-07-25

## 2023-07-25 RX ORDER — IBUPROFEN 200 MG
TABLET ORAL ONCE
OUTPATIENT
Start: 2023-07-25 | End: 2023-07-25

## 2023-07-25 RX ORDER — BACITRACIN ZINC AND POLYMYXIN B SULFATE 500; 1000 [USP'U]/G; [USP'U]/G
OINTMENT TOPICAL ONCE
OUTPATIENT
Start: 2023-07-25 | End: 2023-07-25

## 2023-07-25 RX ADMIN — LIDOCAINE HYDROCHLORIDE: 20 JELLY TOPICAL at 15:12

## 2023-07-25 NOTE — WOUND CARE
[facility]  [address] [facility]  [address], [address] [place]   Phone [phone]   Fax [phone]      NAME:  Dewayne Cerrato OF BIRTH:  1985  MEDICAL RECORD NUMBER:  [id number]  DATE:  7/25/2023    Return Appointment:   1 week with Guille Bennett DO      Instructions:   Right Plantar Foot:  Cleanse wound with normal saline. Vashe  Apply collagen with silver. Cut product to approximate wound size and apply to wound bed. Apply alginate with silver. Cut product to wound size and apply to wound bed. Cover with Optilock or ABD  Change dressing with each Total Contact Cast application. Apply TCC EZ 3\" to right lower leg. [employer] after cast cures. Change once a week. *DO NOT Dejon Miller MD.    Provided TCC education and safety measures. Should you experience increased redness, swelling, pain, foul odor, size of wound(s), or have a temperature over 101 degrees please contact the [address] [place] [address] Deny Yusuf at [phone] or if after hours contact your primary care physician or go to the hospital emergency department. PLEASE NOTE: IF YOU ARE UNABLE TO OBTAIN WOUND SUPPLIES, CONTINUE TO USE THE SUPPLIES YOU HAVE AVAILABLE UNTIL YOU ARE ABLE TO REACH US. IT IS MOST IMPORTANT TO KEEP THE WOUND COVERED AT ALL TIMES.     Electronically signed Marifer Beltrán RN on 7/25/2023 at 3:02 PM

## 2023-07-25 NOTE — DISCHARGE INSTRUCTIONS
Discharge Instructions for  [address], [address] [place] [address]  Phone [phone]   Fax [phone]      NAME:  Susu Blakely OF BIRTH:  1985  MEDICAL RECORD NUMBER:  [id number]  DATE:  @ED@    Return Appointment:   1 week with Stephane Cantu DO      Instructions: Right Plantar Foot:  Cleanse wound with normal saline. Vashe  Apply collagen with silver. Cut product to approximate wound size and apply to wound bed. Apply alginate with silver. Cut product to wound size and apply to wound bed. Cover with Optilock or ABD  Change dressing with each Total Contact Cast application. Apply TCC EZ 3\" to right lower leg. [employer] after cast cures. Change once a week. *DO NOT Nuris Varghese MD.     Provided TCC education and safety measures. Should you experience increased redness, swelling, pain, foul odor, size of wound(s), or have a temperature over 101 degrees please contact the [address] [place] [address] Deny Yusuf at [phone] or if after hours contact your primary care physician or go to the hospital emergency department. PLEASE NOTE: IF YOU ARE UNABLE TO OBTAIN WOUND SUPPLIES, CONTINUE TO USE THE SUPPLIES YOU HAVE AVAILABLE UNTIL YOU ARE ABLE TO REACH US. IT IS MOST IMPORTANT TO KEEP THE WOUND COVERED AT ALL TIMES.     Electronically signed Shreyas Mayberry RN on 7/25/2023 at 3:07 PM

## 2023-07-25 NOTE — WOUND CARE
Total Contact Cast    NAME:  Tree Canseco  YOB: 1985  MEDICAL RECORD NUMBER:  [id number]  DATE:  7/25/2023    Goal:  Patient will maintain integrity of cast, avoid mobility hazards, and report complications that may occur (foul odor, pain, numbness, cracked cast). Removed old contact cast if indicated and wash extremity with soap and water. Applied ordered dressing. Applied foam padding  Applied cast padding included in the kit   Applied per nursing and Jemima Double, DO  Applied to: [] Left Lower Leg [x] Right Lower Leg  Allow cast to dry. Instructed patient to report to health care provider, including wound care center, any back pain, hip pain, or leg pain, numbness of toes, or any odor coming from the cast.   Instructed patient not to stick any foreign objects down into cast.   Instructed patient to utilize assistive devices(crutches, cane or walker) as ordered   Instructed patient to continue offloading as directed.      Applied cast per  Guidelines    Electronically signed by Loy Shetty RN on 7/25/2023 at 3:03 PM

## 2023-07-25 NOTE — FLOWSHEET NOTE
07/25/23 1451   Right Leg Edema Point of Measurement   Leg circumference 41.5 cm   Ankle circumference 24 cm   Foot circumference 24 cm   Wound 02/24/23 Right;Plantar #1   Date First Assessed/Time First Assessed: 02/24/23 1314   Present on Hospital Admission: Yes  Primary Wound Type: Diabetic Ulcer  Wound Location Orientation: Right;Plantar  Wound Description (Comments): #1   Wound Image     Wound Etiology Diabetic Elizabeth 3   Dressing Status Old drainage noted   Wound Cleansed Vashe   Dressing/Treatment Alginate with Ag;Collagen with Ag   Offloading for Diabetic Foot Ulcers Total contact cast   Wound Length (cm) 1.3 cm   Wound Width (cm) 2 cm   Wound Depth (cm) 0.1 cm   Wound Surface Area (cm^2) 2.6 cm^2   Change in Wound Size % (l*w) 63.89   Wound Volume (cm^3) 0.26 cm^3   Wound Healing % 82   Post-Procedure Length (cm) 1.3 cm   Post-Procedure Width (cm) 2 cm   Post-Procedure Depth (cm) 0.1 cm   Post-Procedure Surface Area (cm^2) 2.6 cm^2   Post-Procedure Volume (cm^3) 0.26 cm^3   Wound Assessment Pink/red   Drainage Amount Moderate   Drainage Description Serosanguinous   Odor None   Emily-wound Assessment Intact   Wound Thickness Description not for Pressure Injury Full thickness

## 2023-08-02 ENCOUNTER — HOSPITAL ENCOUNTER (OUTPATIENT)
Dept: WOUND CARE | Age: 38
Discharge: HOME | End: 2023-08-02
Payer: COMMERCIAL

## 2023-08-02 VITALS
WEIGHT: 244 LBS | TEMPERATURE: 99.1 F | DIASTOLIC BLOOD PRESSURE: 65 MMHG | HEIGHT: 68 IN | SYSTOLIC BLOOD PRESSURE: 101 MMHG | BODY MASS INDEX: 36.98 KG/M2 | RESPIRATION RATE: 18 BRPM | HEART RATE: 103 BPM

## 2023-08-02 DIAGNOSIS — E11.621 DIABETIC ULCER OF RIGHT MIDFOOT ASSOCIATED WITH TYPE 2 DIABETES MELLITUS, WITH NECROSIS OF MUSCLE (HCC): Primary | ICD-10-CM

## 2023-08-02 DIAGNOSIS — M86.671 CHRONIC REFRACTORY OSTEOMYELITIS OF FOOT, RIGHT (HCC): ICD-10-CM

## 2023-08-02 DIAGNOSIS — L97.413 DIABETIC ULCER OF RIGHT MIDFOOT ASSOCIATED WITH TYPE 2 DIABETES MELLITUS, WITH NECROSIS OF MUSCLE (HCC): Primary | ICD-10-CM

## 2023-08-02 PROCEDURE — 11042 DBRDMT SUBQ TIS 1ST 20SQCM/<: CPT

## 2023-08-02 RX ORDER — GENTAMICIN SULFATE 1 MG/G
OINTMENT TOPICAL ONCE
OUTPATIENT
Start: 2023-08-02 | End: 2023-08-02

## 2023-08-02 RX ORDER — CLOBETASOL PROPIONATE 0.5 MG/G
OINTMENT TOPICAL ONCE
OUTPATIENT
Start: 2023-08-02 | End: 2023-08-02

## 2023-08-02 RX ORDER — BETAMETHASONE DIPROPIONATE 0.05 %
OINTMENT (GRAM) TOPICAL ONCE
OUTPATIENT
Start: 2023-08-02 | End: 2023-08-02

## 2023-08-02 RX ORDER — GINSENG 100 MG
CAPSULE ORAL ONCE
OUTPATIENT
Start: 2023-08-02 | End: 2023-08-02

## 2023-08-02 RX ORDER — LIDOCAINE 50 MG/G
OINTMENT TOPICAL ONCE
OUTPATIENT
Start: 2023-08-02 | End: 2023-08-02

## 2023-08-02 RX ORDER — LIDOCAINE HYDROCHLORIDE 20 MG/ML
JELLY TOPICAL ONCE
OUTPATIENT
Start: 2023-08-02 | End: 2023-08-02

## 2023-08-02 RX ORDER — LIDOCAINE 40 MG/G
CREAM TOPICAL ONCE
OUTPATIENT
Start: 2023-08-02 | End: 2023-08-02

## 2023-08-02 RX ORDER — IBUPROFEN 200 MG
TABLET ORAL ONCE
OUTPATIENT
Start: 2023-08-02 | End: 2023-08-02

## 2023-08-02 RX ORDER — LIDOCAINE HYDROCHLORIDE 40 MG/ML
SOLUTION TOPICAL ONCE
OUTPATIENT
Start: 2023-08-02 | End: 2023-08-02

## 2023-08-02 RX ORDER — BACITRACIN ZINC AND POLYMYXIN B SULFATE 500; 1000 [USP'U]/G; [USP'U]/G
OINTMENT TOPICAL ONCE
OUTPATIENT
Start: 2023-08-02 | End: 2023-08-02

## 2023-08-02 RX ORDER — LIDOCAINE HYDROCHLORIDE 20 MG/ML
JELLY TOPICAL ONCE
Status: COMPLETED | OUTPATIENT
Start: 2023-08-02 | End: 2023-08-02

## 2023-08-02 RX ADMIN — LIDOCAINE HYDROCHLORIDE: 20 JELLY TOPICAL at 15:57

## 2023-08-02 NOTE — WOUND CARE
Total Contact Cast    NAME:  Radha Torres  YOB: 1985  MEDICAL RECORD NUMBER:  [id number]  DATE:  8/2/2023    Goal:  Patient will maintain integrity of cast, avoid mobility hazards, and report complications that may occur (foul odor, pain, numbness, cracked cast). Removed old contact cast if indicated and wash extremity with soap and water  Applied ordered dressing over wound(s)   Applied cast padding  Applied foam padding  Applied per dr. Woody Chavez and 's instructions  Total Contact Cast was applied in the [address] [place] [address] to right lower leg  Applied cast material fiberglass  Applied cast material plaster   Allow cast to dry   Instructed patient to report to health care provider, including wound care center, any back pain, hip pain, or leg pain, numbness of toes, or any odor coming from the cast.   Instructed patient not to stick any foreign objects down into cast.  Instructed patient to utilize assistive devices(crutches, cane or walker) as ordered. Instructed patient to continue offloading as directed. Applied cast per  Guidelines    Electronically signed by Magdalene Krishna.  Kiki Bernal RN on 8/2/2023 at 4:06 PM

## 2023-08-02 NOTE — DISCHARGE INSTRUCTIONS
Right Plantar Foot:  Cleanse wound with normal saline. Vashe post debridement. Apply collagen with silver. Cut product to approximate wound size and apply to wound bed. Apply alginate (plain). Cut product to wound size and apply to wound bed. Cover with Optilock or ABD  Change dressing with each Total Contact Cast application. Apply TCC EZ 3\" to right lower leg. [employer] after cast cures. Change once a week. *DO NOT Lincoln Peabody MD.     Provided TCC education and safety measures. Continue Doxycycline as prescribed.

## 2023-08-02 NOTE — FLOWSHEET NOTE
Wound Assessment and Post Debridement Note     08/02/23 1512   Wound 02/24/23 Right;Plantar #1   Date First Assessed/Time First Assessed: 02/24/23 1314   Present on Hospital Admission: Yes  Primary Wound Type: Diabetic Ulcer  Wound Location Orientation: Right;Plantar  Wound Description (Comments): #1   Wound Image     Wound Etiology Diabetic Elizabeth 3   Dressing Status Old drainage noted   Wound Cleansed Cleansed with saline; Vashe   Dressing/Treatment Alginate;Collagen with Ag   Offloading for Diabetic Foot Ulcers Total contact cast   Wound Length (cm) 1.1 cm   Wound Width (cm) 1.9 cm   Wound Depth (cm) 0.1 cm   Wound Surface Area (cm^2) 2.09 cm^2   Change in Wound Size % (l*w) 70.97   Wound Volume (cm^3) 0.209 cm^3   Wound Healing % 85   Post-Procedure Length (cm) 1.1 cm   Post-Procedure Width (cm) 1.9 cm   Post-Procedure Depth (cm) 0.1 cm   Post-Procedure Surface Area (cm^2) 2.09 cm^2   Post-Procedure Volume (cm^3) 0.209 cm^3   Wound Assessment Granulation tissue   Drainage Amount Moderate   Drainage Description Serosanguinous   Odor None   Emily-wound Assessment Intact   Margins Attached edges   Wound Thickness Description not for Pressure Injury Full thickness   Pain Assessment   Pain Assessment None - Denies Pain     Patient is not on ANTICOAGULANT THERAPY.

## 2023-08-02 NOTE — WOUND CARE
Discharge Instructions for  [address]  [facility]  [address], [address]   Phone [phone]   Fax [phone]      NAME:  Ryne Chris OF BIRTH:  1985  MEDICAL RECORD NUMBER:  [id number]  DATE:  8/2/2023    Return Appointment:   1 week with Tyrone Jon DO    Instructions:   Right Plantar Foot:  Cleanse wound with normal saline. Vashe post debridement. Apply collagen with silver. Cut product to approximate wound size and apply to wound bed. Apply alginate (plain). Cut product to wound size and apply to wound bed. Cover with Optilock or ABD  Change dressing with each Total Contact Cast application. Apply TCC EZ 3\" to right lower leg. [employer] after cast cures. Change once a week. *DO NOT Kisha Santiago MD.     Provided TCC education and safety measures. Continue Doxycycline as prescribed. Should you experience increased redness, swelling, pain, foul odor, size of wound(s), or have a temperature over 101 degrees please contact the [facility] [address] Den[address] Yusuf at [phone] or if after hours contact your primary care physician or go to the hospital emergency department. PLEASE NOTE: IF YOU ARE UNABLE TO OBTAIN WOUND SUPPLIES, CONTINUE TO USE THE SUPPLIES YOU HAVE AVAILABLE UNTIL YOU ARE ABLE TO REACH US. IT IS MOST IMPORTANT TO KEEP THE WOUND COVERED AT ALL TIMES. Electronically signed Bari Marte.  Gena Rai RN on 8/2/2023 at 4:04 PM

## 2023-08-09 ENCOUNTER — HOSPITAL ENCOUNTER (OUTPATIENT)
Dept: WOUND CARE | Age: 38
Discharge: HOME | End: 2023-08-09
Payer: COMMERCIAL

## 2023-08-09 VITALS
HEART RATE: 90 BPM | DIASTOLIC BLOOD PRESSURE: 62 MMHG | OXYGEN SATURATION: 97 % | HEIGHT: 68 IN | SYSTOLIC BLOOD PRESSURE: 99 MMHG | BODY MASS INDEX: 36.37 KG/M2 | RESPIRATION RATE: 18 BRPM | WEIGHT: 240 LBS | TEMPERATURE: 98.2 F

## 2023-08-09 DIAGNOSIS — L97.413 DIABETIC ULCER OF RIGHT MIDFOOT ASSOCIATED WITH TYPE 2 DIABETES MELLITUS, WITH NECROSIS OF MUSCLE (HCC): Primary | ICD-10-CM

## 2023-08-09 DIAGNOSIS — E11.621 DIABETIC ULCER OF RIGHT MIDFOOT ASSOCIATED WITH TYPE 2 DIABETES MELLITUS, WITH NECROSIS OF MUSCLE (HCC): Primary | ICD-10-CM

## 2023-08-09 DIAGNOSIS — M86.671 CHRONIC REFRACTORY OSTEOMYELITIS OF FOOT, RIGHT (HCC): ICD-10-CM

## 2023-08-09 PROCEDURE — 11042 DBRDMT SUBQ TIS 1ST 20SQCM/<: CPT

## 2023-08-09 RX ORDER — BETAMETHASONE DIPROPIONATE 0.05 %
OINTMENT (GRAM) TOPICAL ONCE
OUTPATIENT
Start: 2023-08-09 | End: 2023-08-09

## 2023-08-09 RX ORDER — LIDOCAINE 50 MG/G
OINTMENT TOPICAL ONCE
OUTPATIENT
Start: 2023-08-09 | End: 2023-08-09

## 2023-08-09 RX ORDER — GENTAMICIN SULFATE 1 MG/G
OINTMENT TOPICAL ONCE
OUTPATIENT
Start: 2023-08-09 | End: 2023-08-09

## 2023-08-09 RX ORDER — LIDOCAINE 40 MG/G
CREAM TOPICAL ONCE
OUTPATIENT
Start: 2023-08-09 | End: 2023-08-09

## 2023-08-09 RX ORDER — LIDOCAINE HYDROCHLORIDE 40 MG/ML
SOLUTION TOPICAL ONCE
OUTPATIENT
Start: 2023-08-09 | End: 2023-08-09

## 2023-08-09 RX ORDER — IBUPROFEN 200 MG
TABLET ORAL ONCE
OUTPATIENT
Start: 2023-08-09 | End: 2023-08-09

## 2023-08-09 RX ORDER — LIDOCAINE HYDROCHLORIDE 20 MG/ML
JELLY TOPICAL ONCE
Status: COMPLETED | OUTPATIENT
Start: 2023-08-09 | End: 2023-08-09

## 2023-08-09 RX ORDER — BACITRACIN ZINC AND POLYMYXIN B SULFATE 500; 1000 [USP'U]/G; [USP'U]/G
OINTMENT TOPICAL ONCE
OUTPATIENT
Start: 2023-08-09 | End: 2023-08-09

## 2023-08-09 RX ORDER — GINSENG 100 MG
CAPSULE ORAL ONCE
OUTPATIENT
Start: 2023-08-09 | End: 2023-08-09

## 2023-08-09 RX ORDER — LIDOCAINE HYDROCHLORIDE 20 MG/ML
JELLY TOPICAL ONCE
OUTPATIENT
Start: 2023-08-09 | End: 2023-08-09

## 2023-08-09 RX ORDER — CLOBETASOL PROPIONATE 0.5 MG/G
OINTMENT TOPICAL ONCE
OUTPATIENT
Start: 2023-08-09 | End: 2023-08-09

## 2023-08-09 RX ADMIN — LIDOCAINE HYDROCHLORIDE: 20 JELLY TOPICAL at 09:51

## 2023-08-09 NOTE — FLOWSHEET NOTE
08/09/23 0928   Wound 02/24/23 Right;Plantar #1   Date First Assessed/Time First Assessed: 02/24/23 1314   Present on Hospital Admission: Yes  Primary Wound Type: Diabetic Ulcer  Wound Location Orientation: Right;Plantar  Wound Description (Comments): #1   Wound Image    Wound Etiology Diabetic Elizabeth 3   Dressing Status Old drainage noted   Wound Cleansed Soap and water   Dressing/Treatment Collagen;Alginate   Offloading for Diabetic Foot Ulcers Total contact cast   Wound Length (cm) 1 cm   Wound Width (cm) 2.2 cm   Wound Depth (cm) 0.1 cm   Wound Surface Area (cm^2) 2.2 cm^2   Change in Wound Size % (l*w) 69.44   Wound Volume (cm^3) 0.22 cm^3   Wound Healing % 85   Wound Assessment Pink/red   Drainage Amount Moderate (25-50%)   Drainage Description Serosanguinous   Odor Mild   Wound Thickness Description not for Pressure Injury Full thickness   Pain Assessment   Pain Assessment 0-10   Pain Level 5   Patient's Stated Pain Goal 0 - No pain   Pain Location Foot   Pain Orientation Right   Pain Descriptors Burning; Throbbing

## 2023-08-09 NOTE — WOUND CARE
Discharge Instructions for  [address] [facility]  [address], [address] [facility]  Phone [phone]   Fax [phone]      NAME:  Ryne Chris OF BIRTH:  1985  MEDICAL RECORD NUMBER:  [id number]  DATE:  8/9/2023    Return Appointment:   1 week with Tyrone Jon DO    Instructions:   Right Plantar Foot:  Cleanse wound with normal saline. Vashe post debridement. Apply collagen with silver. Cut product to approximate wound size and apply to wound bed. Hydrofera Blue: Cut to wound size, moisten with saline, and apply to wound bed. Cover with Optilock  Apply TCC EZ 3\" to right lower leg. Apply boot over cast after cast cures to aloow for limited walking. Change dressing in 1 week at wound center     Do not get cast wet in shower. Cover with cast cover. DO NOT Kisha Santiago MD.  Provided TCC education and safety measures. Continue Doxycycline as prescribed. Call Dr. Lawanda Fajardo to make an appointment to evaluate for surgical intervention; referral already entered at previous visit. Phone number for [address]: [phone]. Should you experience increased redness, swelling, pain, foul odor, size of wound(s), or have a temperature over 101 degrees please contact the [address] Yusuf at [phone] or if after hours contact your primary care physician or go to the hospital emergency department. PLEASE NOTE: IF YOU ARE UNABLE TO OBTAIN WOUND SUPPLIES, CONTINUE TO USE THE SUPPLIES YOU HAVE AVAILABLE UNTIL YOU ARE ABLE TO REACH US. IT IS MOST IMPORTANT TO KEEP THE WOUND COVERED AT ALL TIMES.     Electronically signed Jhon Moya PT, [facility] on 8/9/2023 at 9:53 AM

## 2023-08-09 NOTE — WOUND CARE
Total Contact Cast    NAME:  Damon Corbett  YOB: 1985  MEDICAL RECORD NUMBER:  [id number]  DATE:  8/9/2023    Goal:  Patient will maintain integrity of cast, avoid mobility hazards, and report complications that may occur (foul odor, pain, numbness, cracked cast). Removed old contact cast if indicated and wash extremity with soap and water  Applied ordered dressing over wound(s)   Applied cast padding  Applied per Dr. Cody Bates was applied in the [address] [place] [address] to right lower leg  Applied cast material fiberglass  Allow cast to dry   Instructed patient to report to health care provider, including wound care center, any back pain, hip pain, or leg pain, numbness of toes, or any odor coming from the cast.   Instructed patient not to stick any foreign objects down into cast.  Instructed patient to utilize assistive devices(crutches, cane or walker) as ordered. Instructed patient to continue offloading as directed.      Applied cast per  Guidelines    Electronically signed by Josey Taylor PT, [facility] on 8/9/2023 at 10:23 AM

## 2023-08-09 NOTE — DISCHARGE INSTRUCTIONS
Return Appointment:   1 week with Haja Pond DO     Instructions:   Right Plantar Foot:  Cleanse wound with normal saline. Vashe post debridement. Apply collagen with silver. Cut product to approximate wound size and apply to wound bed. Hydrofera Blue: Cut to wound size, moisten with saline, and apply to wound bed. Cover with Optilock  Apply TCC EZ 3\" to right lower leg. Apply boot over cast after cast cures to aloow for limited walking. Change dressing in 1 week at wound center     Do not get cast wet in shower. Cover with cast cover. DO NOT Anderson Thomas MD.  Provided TCC education and safety measures. Continue Doxycycline as prescribed. Call Dr. Cassie Calderón to make an appointment to evaluate for surgical intervention; referral already entered at previous visit. Phone number for [address]: [phone].

## 2023-08-16 PROBLEM — M14.679: Chronic | Status: ACTIVE | Noted: 2023-08-16

## 2023-08-16 PROBLEM — M14.679: Status: ACTIVE | Noted: 2023-08-16

## 2023-08-18 ENCOUNTER — HOSPITAL ENCOUNTER (OUTPATIENT)
Dept: WOUND CARE | Age: 38
Discharge: HOME | End: 2023-08-18
Payer: COMMERCIAL

## 2023-08-18 VITALS
WEIGHT: 239 LBS | DIASTOLIC BLOOD PRESSURE: 84 MMHG | OXYGEN SATURATION: 98 % | BODY MASS INDEX: 36.22 KG/M2 | SYSTOLIC BLOOD PRESSURE: 133 MMHG | TEMPERATURE: 97.8 F | HEART RATE: 88 BPM | RESPIRATION RATE: 18 BRPM | HEIGHT: 68 IN

## 2023-08-18 DIAGNOSIS — M86.671 CHRONIC REFRACTORY OSTEOMYELITIS OF FOOT, RIGHT (HCC): ICD-10-CM

## 2023-08-18 DIAGNOSIS — E11.621 DIABETIC ULCER OF RIGHT MIDFOOT ASSOCIATED WITH TYPE 2 DIABETES MELLITUS, WITH NECROSIS OF MUSCLE (HCC): Primary | ICD-10-CM

## 2023-08-18 DIAGNOSIS — M14.679 CHARCOT'S ARTHROPATHY OF FOREFOOT: ICD-10-CM

## 2023-08-18 DIAGNOSIS — L97.413 DIABETIC ULCER OF RIGHT MIDFOOT ASSOCIATED WITH TYPE 2 DIABETES MELLITUS, WITH NECROSIS OF MUSCLE (HCC): Primary | ICD-10-CM

## 2023-08-18 PROCEDURE — 11042 DBRDMT SUBQ TIS 1ST 20SQCM/<: CPT

## 2023-08-18 RX ORDER — GINSENG 100 MG
CAPSULE ORAL ONCE
OUTPATIENT
Start: 2023-08-18 | End: 2023-08-18

## 2023-08-18 RX ORDER — BACITRACIN ZINC AND POLYMYXIN B SULFATE 500; 1000 [USP'U]/G; [USP'U]/G
OINTMENT TOPICAL ONCE
OUTPATIENT
Start: 2023-08-18 | End: 2023-08-18

## 2023-08-18 RX ORDER — BETAMETHASONE DIPROPIONATE 0.05 %
OINTMENT (GRAM) TOPICAL ONCE
OUTPATIENT
Start: 2023-08-18 | End: 2023-08-18

## 2023-08-18 RX ORDER — LIDOCAINE HYDROCHLORIDE 20 MG/ML
JELLY TOPICAL ONCE
OUTPATIENT
Start: 2023-08-18 | End: 2023-08-18

## 2023-08-18 RX ORDER — SEMAGLUTIDE 2.68 MG/ML
INJECTION, SOLUTION SUBCUTANEOUS
COMMUNITY
Start: 2023-08-12

## 2023-08-18 RX ORDER — LIDOCAINE 50 MG/G
OINTMENT TOPICAL ONCE
OUTPATIENT
Start: 2023-08-18 | End: 2023-08-18

## 2023-08-18 RX ORDER — GENTAMICIN SULFATE 1 MG/G
OINTMENT TOPICAL ONCE
OUTPATIENT
Start: 2023-08-18 | End: 2023-08-18

## 2023-08-18 RX ORDER — DOXYCYCLINE HYCLATE 100 MG
100 TABLET ORAL 2 TIMES DAILY
COMMUNITY
Start: 2023-07-20

## 2023-08-18 RX ORDER — TRAZODONE HYDROCHLORIDE 150 MG/1
150 TABLET ORAL
COMMUNITY
Start: 2023-07-20

## 2023-08-18 RX ORDER — LIDOCAINE 40 MG/G
CREAM TOPICAL ONCE
OUTPATIENT
Start: 2023-08-18 | End: 2023-08-18

## 2023-08-18 RX ORDER — IBUPROFEN 200 MG
TABLET ORAL ONCE
OUTPATIENT
Start: 2023-08-18 | End: 2023-08-18

## 2023-08-18 RX ORDER — LIDOCAINE HYDROCHLORIDE 40 MG/ML
SOLUTION TOPICAL ONCE
OUTPATIENT
Start: 2023-08-18 | End: 2023-08-18

## 2023-08-18 RX ORDER — CLOBETASOL PROPIONATE 0.5 MG/G
OINTMENT TOPICAL ONCE
OUTPATIENT
Start: 2023-08-18 | End: 2023-08-18

## 2023-08-18 RX ORDER — LIDOCAINE HYDROCHLORIDE 20 MG/ML
JELLY TOPICAL ONCE
Status: COMPLETED | OUTPATIENT
Start: 2023-08-18 | End: 2023-08-18

## 2023-08-18 RX ADMIN — LIDOCAINE HYDROCHLORIDE: 20 JELLY TOPICAL at 11:36

## 2023-08-18 NOTE — FLOWSHEET NOTE
08/18/23 1127   Right Leg Edema Point of Measurement   Leg circumference 41 cm   Ankle circumference 23.5 cm   Foot circumference 24 cm   Compression Therapy   (total contact cast)   Wound 02/24/23 Right;Plantar #1   Date First Assessed/Time First Assessed: 02/24/23 1314   Present on Hospital Admission: Yes  Primary Wound Type: Diabetic Ulcer  Wound Location Orientation: Right;Plantar  Wound Description (Comments): #1   Wound Image     Wound Etiology Diabetic Elizabeth 3   Dressing Status Old drainage noted   Wound Cleansed Soap and water   Dressing/Treatment Collagen;Hydrofera blue   Offloading for Diabetic Foot Ulcers Total contact cast   Wound Length (cm) 0.9 cm   Wound Width (cm) 1.9 cm   Wound Depth (cm) 0.1 cm   Wound Surface Area (cm^2) 1.71 cm^2   Change in Wound Size % (l*w) 76.25   Wound Volume (cm^3) 0.171 cm^3   Wound Healing % 88   Post-Procedure Length (cm) 0.9 cm   Post-Procedure Width (cm) 1.9 cm   Post-Procedure Depth (cm) 0.1 cm   Post-Procedure Surface Area (cm^2) 1.71 cm^2   Post-Procedure Volume (cm^3) 0.171 cm^3   Wound Assessment Pink/red   Drainage Amount Moderate (25-50%)   Drainage Description Serosanguinous   Odor Mild   Wound Thickness Description not for Pressure Injury Full thickness   Pain Assessment   Pain Assessment None - Denies Pain     Post-debridement

## 2023-08-18 NOTE — FLOWSHEET NOTE
08/18/23 1127   Right Leg Edema Point of Measurement   Leg circumference 41 cm   Ankle circumference 23.5 cm   Foot circumference 24 cm   Compression Therapy   (total contact cast)   Wound 02/24/23 Right;Plantar #1   Date First Assessed/Time First Assessed: 02/24/23 1314   Present on Hospital Admission: Yes  Primary Wound Type: Diabetic Ulcer  Wound Location Orientation: Right;Plantar  Wound Description (Comments): #1   Wound Image    Wound Etiology Diabetic Elizabeth 3   Dressing Status Old drainage noted   Wound Cleansed Soap and water   Dressing/Treatment Collagen;Hydrofera blue   Offloading for Diabetic Foot Ulcers Total contact cast   Wound Length (cm) 0.9 cm   Wound Width (cm) 1.9 cm   Wound Depth (cm) 0.1 cm   Wound Surface Area (cm^2) 1.71 cm^2   Change in Wound Size % (l*w) 76.25   Wound Volume (cm^3) 0.171 cm^3   Wound Healing % 88   Wound Assessment Pink/red   Drainage Amount Moderate (25-50%)   Drainage Description Serosanguinous   Odor Mild   Wound Thickness Description not for Pressure Injury Full thickness   Pain Assessment   Pain Assessment None - Denies Pain

## 2023-08-18 NOTE — WOUND CARE
Discharge Instructions for  [address], [address] [place] [address]  Phone [phone]   Fax [phone]      NAME:  Padmini Nguyen OF BIRTH:  1985  MEDICAL RECORD NUMBER:  [id number]  DATE:  8/18/2023    Return Appointment:   1 week with Marie Day DO    Instructions:   Right Plantar Foot:  Cleanse wound with normal saline. Vashe post debridement. Apply collagen with silver. Cut product to approximate wound size and apply to wound bed. Hydrofera Blue: Cut to wound size, moisten with saline, and apply to wound bed. Cover with Optilock  Apply TCC EZ 3\" to right lower leg. Apply boot over cast after cast cures to aloow for limited walking. Change dressing in 1 week at wound center     Do not get cast wet in shower. Cover with cast cover. DO NOT Caro Romo MD.  Provided TCC education and safety measures. Continue Doxycycline as prescribed. Call Dr. Caryl Quevedo to make an appointment to evaluate for surgical intervention; referral already entered at previous visit. Phone number for [address] [place] [address] Drive: [phone]. Should you experience increased redness, swelling, pain, foul odor, size of wound(s), or have a temperature over 101 degrees please contact the [address] at [phone] or if after hours contact your primary care physician or go to the hospital emergency department. PLEASE NOTE: IF YOU ARE UNABLE TO OBTAIN WOUND SUPPLIES, CONTINUE TO USE THE SUPPLIES YOU HAVE AVAILABLE UNTIL YOU ARE ABLE TO REACH US. IT IS MOST IMPORTANT TO KEEP THE WOUND COVERED AT ALL TIMES.     Electronically signed Jessica Waddell PT, [facility] on 8/18/2023 at 11:52 AM

## 2023-08-18 NOTE — DISCHARGE INSTRUCTIONS
Return Appointment:   1 week with Robe Walker DO     Instructions:   Right Plantar Foot:  Cleanse wound with normal saline. Vashe post debridement. Apply collagen with silver. Cut product to approximate wound size and apply to wound bed. Hydrofera Blue: Cut to wound size, moisten with saline, and apply to wound bed. Cover with Optilock  Apply TCC EZ 3\" to right lower leg. Apply boot over cast after cast cures to aloow for limited walking. Change dressing in 1 week at wound center     Do not get cast wet in shower. Cover with cast cover. DO NOT Grisel Gonzales MD.  Provided TCC education and safety measures. Continue Doxycycline as prescribed. Call Dr. Vanessa Mcnally to make an appointment to evaluate for surgical intervention; referral already entered at previous visit. Phone number for [address]: [phone].

## 2023-08-18 NOTE — WOUND CARE
Total Contact Cast    NAME:  Megan Clark  YOB: 1985  MEDICAL RECORD NUMBER:  [id number]  DATE:  8/18/2023    Goal:  Patient will maintain integrity of cast, avoid mobility hazards, and report complications that may occur (foul odor, pain, numbness, cracked cast). Removed old contact cast if indicated and wash extremity with soap and water  Applied ordered dressing over wound(s)   Applied cast padding  Applied per Dr. Edna Davis was applied in the [address] [place] [address] to right lower leg  Applied cast material fiberglass  Instructed patient to report to health care provider, including wound care center, any back pain, hip pain, or leg pain, numbness of toes, or any odor coming from the cast.   Instructed patient not to stick any foreign objects down into cast.  Instructed patient to utilize assistive devices(crutches, cane or walker) as ordered. Instructed patient to continue offloading as directed.      Applied cast per  Guidelines    Electronically signed by Jhon Moya PT, [address] [place] [address] on 8/18/2023 at 11:56 AM

## 2023-08-29 ENCOUNTER — HOSPITAL ENCOUNTER (OUTPATIENT)
Dept: WOUND CARE | Age: 38
Discharge: HOME | End: 2023-08-29
Payer: COMMERCIAL

## 2023-08-29 VITALS
TEMPERATURE: 97.4 F | HEIGHT: 68 IN | HEART RATE: 93 BPM | RESPIRATION RATE: 18 BRPM | BODY MASS INDEX: 36.07 KG/M2 | DIASTOLIC BLOOD PRESSURE: 69 MMHG | SYSTOLIC BLOOD PRESSURE: 103 MMHG | WEIGHT: 238 LBS

## 2023-08-29 DIAGNOSIS — L97.413 DIABETIC ULCER OF RIGHT MIDFOOT ASSOCIATED WITH TYPE 2 DIABETES MELLITUS, WITH NECROSIS OF MUSCLE (HCC): Primary | ICD-10-CM

## 2023-08-29 DIAGNOSIS — M86.671 CHRONIC REFRACTORY OSTEOMYELITIS OF FOOT, RIGHT (HCC): ICD-10-CM

## 2023-08-29 DIAGNOSIS — M14.679 CHARCOT'S ARTHROPATHY OF FOREFOOT: ICD-10-CM

## 2023-08-29 DIAGNOSIS — E11.621 DIABETIC ULCER OF RIGHT MIDFOOT ASSOCIATED WITH TYPE 2 DIABETES MELLITUS, WITH NECROSIS OF MUSCLE (HCC): Primary | ICD-10-CM

## 2023-08-29 PROCEDURE — 11042 DBRDMT SUBQ TIS 1ST 20SQCM/<: CPT

## 2023-08-29 RX ORDER — GENTAMICIN SULFATE 1 MG/G
OINTMENT TOPICAL ONCE
OUTPATIENT
Start: 2023-08-29 | End: 2023-08-29

## 2023-08-29 RX ORDER — LIDOCAINE HYDROCHLORIDE 40 MG/ML
SOLUTION TOPICAL ONCE
OUTPATIENT
Start: 2023-08-29 | End: 2023-08-29

## 2023-08-29 RX ORDER — BETAMETHASONE DIPROPIONATE 0.05 %
OINTMENT (GRAM) TOPICAL ONCE
OUTPATIENT
Start: 2023-08-29 | End: 2023-08-29

## 2023-08-29 RX ORDER — LIDOCAINE HYDROCHLORIDE 20 MG/ML
JELLY TOPICAL ONCE
OUTPATIENT
Start: 2023-08-29 | End: 2023-08-29

## 2023-08-29 RX ORDER — BACITRACIN ZINC AND POLYMYXIN B SULFATE 500; 1000 [USP'U]/G; [USP'U]/G
OINTMENT TOPICAL ONCE
OUTPATIENT
Start: 2023-08-29 | End: 2023-08-29

## 2023-08-29 RX ORDER — LIDOCAINE 40 MG/G
CREAM TOPICAL ONCE
OUTPATIENT
Start: 2023-08-29 | End: 2023-08-29

## 2023-08-29 RX ORDER — LIDOCAINE HYDROCHLORIDE 20 MG/ML
JELLY TOPICAL ONCE
Status: COMPLETED | OUTPATIENT
Start: 2023-08-29 | End: 2023-08-29

## 2023-08-29 RX ORDER — LIDOCAINE 50 MG/G
OINTMENT TOPICAL ONCE
OUTPATIENT
Start: 2023-08-29 | End: 2023-08-29

## 2023-08-29 RX ORDER — IBUPROFEN 200 MG
TABLET ORAL ONCE
OUTPATIENT
Start: 2023-08-29 | End: 2023-08-29

## 2023-08-29 RX ORDER — GINSENG 100 MG
CAPSULE ORAL ONCE
OUTPATIENT
Start: 2023-08-29 | End: 2023-08-29

## 2023-08-29 RX ORDER — CLOBETASOL PROPIONATE 0.5 MG/G
OINTMENT TOPICAL ONCE
OUTPATIENT
Start: 2023-08-29 | End: 2023-08-29

## 2023-08-29 RX ADMIN — LIDOCAINE HYDROCHLORIDE: 20 JELLY TOPICAL at 10:42

## 2023-08-29 ASSESSMENT — PAIN DESCRIPTION - ORIENTATION: ORIENTATION: RIGHT

## 2023-08-29 ASSESSMENT — PAIN DESCRIPTION - DESCRIPTORS: DESCRIPTORS: ACHING;THROBBING

## 2023-08-29 ASSESSMENT — PAIN SCALES - GENERAL: PAINLEVEL_OUTOF10: 5

## 2023-08-29 ASSESSMENT — PAIN DESCRIPTION - LOCATION: LOCATION: FOOT

## 2023-08-29 NOTE — WOUND CARE
Discharge Instructions for  [address], [address] [facility]  Phone [phone]   Fax [phone]      NAME:  Fox Knowles OF BIRTH:  1985  MEDICAL RECORD NUMBER:  [id number]  DATE:  8/29/2023    Return Appointment:   1 week with Lisa Chavez DO    Instructions:   Right Plantar Foot:  Cleanse wound with normal saline. Vashe post debridement. Apply collagen with silver. Cut product to approximate wound size and apply to wound bed. Hydrofera Blue: Cut to wound size, moisten with saline, and apply to wound bed. Cover with Optilock  Apply TCC EZ 3\" to right lower leg. Apply boot over cast after cast cures to aloow for limited walking. Change dressing in 1 week at wound center     Do not get cast wet in shower. Cover with cast cover. DO NOT Cruzito Gibson MD.  Provided TCC education and safety measures. Continue Doxycycline as prescribed. Call Dr. Augustina Eid to make an appointment to evaluate for surgical intervention; referral already entered at previous visit. Phone number for [address] [place] [address] Drive: [phone]       Should you experience increased redness, swelling, pain, foul odor, size of wound(s), or have a temperature over 101 degrees please contact the [address] at [phone] or if after hours contact your primary care physician or go to the hospital emergency department. PLEASE NOTE: IF YOU ARE UNABLE TO OBTAIN WOUND SUPPLIES, CONTINUE TO USE THE SUPPLIES YOU HAVE AVAILABLE UNTIL YOU ARE ABLE TO REACH US. IT IS MOST IMPORTANT TO KEEP THE WOUND COVERED AT ALL TIMES.     Electronically signed Connor Samuels RN, [facility] on 8/29/2023 at 10:14 AM

## 2023-08-29 NOTE — FLOWSHEET NOTE
08/29/23 0938   Right Leg Edema Point of Measurement   Leg circumference 39 cm   Ankle circumference 23 cm   Foot circumference 23.5 cm   Compression Therapy   (Total Contact Cast)   Wound 02/24/23 Right;Plantar #1   Date First Assessed/Time First Assessed: 02/24/23 1314   Present on Hospital Admission: Yes  Primary Wound Type: Diabetic Ulcer  Wound Location Orientation: Right;Plantar  Wound Description (Comments): #1   Wound Image     Wound Etiology Diabetic Elizabeth 3   Dressing Status Old drainage noted   Wound Cleansed Soap and water   Dressing/Treatment Collagen;Hydrofera blue   Offloading for Diabetic Foot Ulcers Total contact cast   Wound Length (cm) 0.8 cm   Wound Width (cm) 1.4 cm   Wound Depth (cm) 0.1 cm   Wound Surface Area (cm^2) 1.12 cm^2   Change in Wound Size % (l*w) 84.44   Wound Volume (cm^3) 0.112 cm^3   Wound Healing % 92   Post-Procedure Length (cm) 0.8 cm   Post-Procedure Width (cm) 1.4 cm   Post-Procedure Depth (cm) 0.1 cm   Post-Procedure Surface Area (cm^2) 1.12 cm^2   Post-Procedure Volume (cm^3) 0.112 cm^3   Wound Assessment Granulation tissue   Drainage Amount Moderate (25-50%)   Drainage Description Serosanguinous   Odor Mild   Wound Thickness Description not for Pressure Injury Full thickness   Pain Assessment   Pain Assessment 0-10   Pain Level 5   Pain Location Foot   Pain Orientation Right   Pain Descriptors Aching; Throbbing

## 2023-08-29 NOTE — WOUND CARE
Total Contact Cast    NAME:  Dell Wolff  YOB: 1985  MEDICAL RECORD NUMBER:  [id number]  DATE:  8/29/2023    Goal:  Patient will maintain integrity of cast, avoid mobility hazards, and report complications that may occur (foul odor, pain, numbness, cracked cast). Removed old contact cast if indicated and wash extremity with soap and water. Applied ordered dressing. Applied foam padding  Applied cast padding included in the kit   Applied per colton and Ivan Magdaleno,   Applied to: [] Left Lower Leg [x] Right Lower Leg  Allow cast to dry. Instructed patient to report to health care provider, including wound care center, any back pain, hip pain, or leg pain, numbness of toes, or any odor coming from the cast.   Instructed patient not to stick any foreign objects down into cast.   Instructed patient to utilize assistive devices(crutches, cane or walker) as ordered   Instructed patient to continue offloading as directed.      Applied cast per  Guidelines    Electronically signed by Andrew Osorio RN on 8/29/2023 at 10:44 AM

## 2023-09-05 ENCOUNTER — HOSPITAL ENCOUNTER (OUTPATIENT)
Dept: WOUND CARE | Age: 38
Discharge: HOME | End: 2023-09-05
Payer: COMMERCIAL

## 2023-09-05 VITALS
HEIGHT: 68 IN | DIASTOLIC BLOOD PRESSURE: 71 MMHG | TEMPERATURE: 98.3 F | SYSTOLIC BLOOD PRESSURE: 109 MMHG | WEIGHT: 239 LBS | HEART RATE: 103 BPM | BODY MASS INDEX: 36.22 KG/M2

## 2023-09-05 PROCEDURE — 11042 DBRDMT SUBQ TIS 1ST 20SQCM/<: CPT

## 2023-09-05 NOTE — FLOWSHEET NOTE
Pre & post debridement     09/05/23 1558   Right Leg Edema Point of Measurement   Leg circumference 40 cm   Ankle circumference 24 cm   Foot circumference 24 cm   Compression Therapy Compression not ordered   Wound 02/24/23 Right;Plantar #1   Date First Assessed/Time First Assessed: 02/24/23 1314   Present on Hospital Admission: Yes  Primary Wound Type: Diabetic Ulcer  Wound Location Orientation: Right;Plantar  Wound Description (Comments): #1   Wound Image     Wound Etiology Diabetic Elizabeth 3   Dressing Status Old drainage noted   Wound Cleansed Cleansed with saline; Soap and water;Vashe   Dressing/Treatment Collagen;Hydrofera blue   Offloading for Diabetic Foot Ulcers Total contact cast   Wound Length (cm) 1 cm   Wound Width (cm) 1.4 cm   Wound Depth (cm) 0.1 cm   Wound Surface Area (cm^2) 1.4 cm^2   Change in Wound Size % (l*w) 80.56   Wound Volume (cm^3) 0.14 cm^3   Wound Healing % 90   Post-Procedure Length (cm) 1 cm   Post-Procedure Width (cm) 1.4 cm   Post-Procedure Depth (cm) 0.1 cm   Post-Procedure Surface Area (cm^2) 1.4 cm^2   Post-Procedure Volume (cm^3) 0.14 cm^3   Wound Assessment Granulation tissue   Drainage Amount Moderate (25-50%)   Drainage Description Serosanguinous   Odor Mild   Emily-wound Assessment Intact   Margins Attached edges   Wound Thickness Description not for Pressure Injury Full thickness

## 2023-09-05 NOTE — WOUND CARE
Total Contact Cast    NAME:  Emery Sahu  YOB: 1985  MEDICAL RECORD NUMBER:  [id number]  DATE:  9/5/2023    Goal:  Patient will maintain integrity of cast, avoid mobility hazards, and report complications that may occur (foul odor, pain, numbness, cracked cast). Removed old contact cast if indicated and wash extremity with soap and water. Applied ordered dressing. Applied foam padding  Applied cast padding included in the kit   Applied per nursing and Matt Whalen DO  Applied to: [] Left Lower Leg [x] Right Lower Leg  Allow cast to dry. Instructed patient to report to health care provider, including wound care center, any back pain, hip pain, or leg pain, numbness of toes, or any odor coming from the cast.   Instructed patient not to stick any foreign objects down into cast.   Instructed patient to utilize assistive devices(crutches, cane or walker) as ordered   Instructed patient to continue offloading as directed.      Applied cast per  Guidelines    Electronically signed by Jaycee Davis RN on 9/5/2023 at 4:38 PM

## 2023-09-05 NOTE — WOUND CARE
[facility]  [facility]  [address], [address] [facility]  Phone [phone]   Fax [phone]      NAME:  Peter Hernandez OF BIRTH:  1985  MEDICAL RECORD NUMBER:  [id number]  DATE:  9/5/2023    Return Appointment:   1 week with Tri Rodriguez DO    Instructions:   Right Plantar Foot:  Cleanse wound with normal saline. Vashe post debridement. Apply collagen with silver. Cut product to approximate wound size and apply to wound bed. Hydrofera Blue: Cut to wound size, moisten with saline, and apply to wound bed. Secured with Safetac & Mepilex Up. Apply TCC EZ 3\" to right lower leg. Apply boot over cast after cast cures to allow for limited walking. Change dressing in 1 week at wound center     Do not get cast wet in shower. Cover with cast cover. DO NOT Victoriano Coyle MD.  Provided TCC education and safety measures. Continue Doxycycline as prescribed. Call Dr. Sree Santiago to make an appointment to evaluate for surgical intervention; referral already entered at previous visit. Phone number for [address]: [phone]       Should you experience increased redness, swelling, pain, foul odor, size of wound(s), or have a temperature over 101 degrees please contact the [id number] at [phone] or if after hours contact your primary care physician or go to the hospital emergency department. PLEASE NOTE: IF YOU ARE UNABLE TO OBTAIN WOUND SUPPLIES, CONTINUE TO USE THE SUPPLIES YOU HAVE AVAILABLE UNTIL YOU ARE ABLE TO REACH US. IT IS MOST IMPORTANT TO KEEP THE WOUND COVERED AT ALL TIMES.     Electronically signed Lexis Andersen RN, [address] [place] [address] on 9/5/2023 at 4:36 PM

## 2023-09-14 ENCOUNTER — HOSPITAL ENCOUNTER (OUTPATIENT)
Dept: WOUND CARE | Age: 38
Discharge: HOME | End: 2023-09-14
Payer: COMMERCIAL

## 2023-09-14 VITALS — WEIGHT: 237 LBS | HEIGHT: 68 IN | TEMPERATURE: 97.6 F | BODY MASS INDEX: 35.92 KG/M2 | RESPIRATION RATE: 18 BRPM

## 2023-09-14 DIAGNOSIS — L97.413 DIABETIC ULCER OF RIGHT MIDFOOT ASSOCIATED WITH TYPE 2 DIABETES MELLITUS, WITH NECROSIS OF MUSCLE (HCC): Primary | ICD-10-CM

## 2023-09-14 DIAGNOSIS — E11.621 DIABETIC ULCER OF RIGHT MIDFOOT ASSOCIATED WITH TYPE 2 DIABETES MELLITUS, WITH NECROSIS OF MUSCLE (HCC): Primary | ICD-10-CM

## 2023-09-14 DIAGNOSIS — M14.679 CHARCOT'S ARTHROPATHY OF FOREFOOT: ICD-10-CM

## 2023-09-14 DIAGNOSIS — M86.671 CHRONIC REFRACTORY OSTEOMYELITIS OF FOOT, RIGHT (HCC): ICD-10-CM

## 2023-09-14 PROCEDURE — 11042 DBRDMT SUBQ TIS 1ST 20SQCM/<: CPT

## 2023-09-14 RX ORDER — BETAMETHASONE DIPROPIONATE 0.05 %
OINTMENT (GRAM) TOPICAL ONCE
OUTPATIENT
Start: 2023-09-14 | End: 2023-09-14

## 2023-09-14 RX ORDER — LIDOCAINE 50 MG/G
OINTMENT TOPICAL ONCE
OUTPATIENT
Start: 2023-09-14 | End: 2023-09-14

## 2023-09-14 RX ORDER — LIDOCAINE HYDROCHLORIDE 20 MG/ML
JELLY TOPICAL ONCE
Status: COMPLETED | OUTPATIENT
Start: 2023-09-14 | End: 2023-09-14

## 2023-09-14 RX ORDER — LIDOCAINE 40 MG/G
CREAM TOPICAL ONCE
OUTPATIENT
Start: 2023-09-14 | End: 2023-09-14

## 2023-09-14 RX ORDER — BACITRACIN ZINC AND POLYMYXIN B SULFATE 500; 1000 [USP'U]/G; [USP'U]/G
OINTMENT TOPICAL ONCE
OUTPATIENT
Start: 2023-09-14 | End: 2023-09-14

## 2023-09-14 RX ORDER — LIDOCAINE HYDROCHLORIDE 40 MG/ML
SOLUTION TOPICAL ONCE
OUTPATIENT
Start: 2023-09-14 | End: 2023-09-14

## 2023-09-14 RX ORDER — GENTAMICIN SULFATE 1 MG/G
OINTMENT TOPICAL ONCE
OUTPATIENT
Start: 2023-09-14 | End: 2023-09-14

## 2023-09-14 RX ORDER — CLOBETASOL PROPIONATE 0.5 MG/G
OINTMENT TOPICAL ONCE
OUTPATIENT
Start: 2023-09-14 | End: 2023-09-14

## 2023-09-14 RX ORDER — LIDOCAINE HYDROCHLORIDE 20 MG/ML
JELLY TOPICAL ONCE
OUTPATIENT
Start: 2023-09-14 | End: 2023-09-14

## 2023-09-14 RX ORDER — GINSENG 100 MG
CAPSULE ORAL ONCE
OUTPATIENT
Start: 2023-09-14 | End: 2023-09-14

## 2023-09-14 RX ORDER — IBUPROFEN 200 MG
TABLET ORAL ONCE
OUTPATIENT
Start: 2023-09-14 | End: 2023-09-14

## 2023-09-14 RX ADMIN — LIDOCAINE HYDROCHLORIDE: 20 JELLY TOPICAL at 15:57

## 2023-09-14 ASSESSMENT — PAIN DESCRIPTION - ORIENTATION: ORIENTATION: RIGHT

## 2023-09-14 ASSESSMENT — PAIN DESCRIPTION - LOCATION: LOCATION: FOOT

## 2023-09-14 ASSESSMENT — PAIN SCALES - GENERAL: PAINLEVEL_OUTOF10: 8

## 2023-09-14 ASSESSMENT — PAIN DESCRIPTION - DESCRIPTORS: DESCRIPTORS: ACHING

## 2023-09-14 NOTE — FLOWSHEET NOTE
09/14/23 1453   Right Leg Edema Point of Measurement   Leg circumference 40 cm   Ankle circumference 24 cm   Foot circumference 25 cm   Compression Therapy   (Total contact cast)   Wound 02/24/23 Right;Plantar #1   Date First Assessed/Time First Assessed: 02/24/23 1314   Present on Hospital Admission: Yes  Primary Wound Type: Diabetic Ulcer  Wound Location Orientation: Right;Plantar  Wound Description (Comments): #1   Wound Image     Wound Etiology Diabetic Elizabeth 3   Dressing Status Old drainage noted   Wound Cleansed Cleansed with saline; Soap and water;Vashe   Dressing/Treatment Collagen;Hydrofera blue   Offloading for Diabetic Foot Ulcers Total contact cast   Wound Length (cm) 1 cm   Wound Width (cm) 1.2 cm   Wound Depth (cm) 0.1 cm   Wound Surface Area (cm^2) 1.2 cm^2   Change in Wound Size % (l*w) 83.33   Wound Volume (cm^3) 0.12 cm^3   Wound Healing % 92   Post-Procedure Length (cm) 1 cm   Post-Procedure Width (cm) 1.2 cm   Post-Procedure Depth (cm) 0.1 cm   Post-Procedure Surface Area (cm^2) 1.2 cm^2   Post-Procedure Volume (cm^3) 0.12 cm^3   Wound Assessment Granulation tissue   Drainage Amount Moderate (25-50%)   Drainage Description Serosanguinous   Odor Mild   Emily-wound Assessment Intact   Margins Attached edges   Wound Thickness Description not for Pressure Injury Full thickness   Pain Assessment   Pain Assessment 0-10   Pain Level 8   Pain Location Foot   Pain Orientation Right   Pain Descriptors Aching

## 2023-09-14 NOTE — WOUND CARE
Total Contact Cast    NAME:  David Cervantes  YOB: 1985  MEDICAL RECORD NUMBER:  [id number]  DATE:  9/14/2023    Goal:  Patient will maintain integrity of cast, avoid mobility hazards, and report complications that may occur (foul odor, pain, numbness, cracked cast). Removed old contact cast if indicated and wash extremity with soap and water. Applied ordered dressing. Applied foam padding  Applied cast padding included in the kit   Applied per nursing and Mariana Jennings, DO  Applied to: [] Left Lower Leg [x] Right Lower Leg  Allow cast to dry. Instructed patient to report to health care provider, including wound care center, any back pain, hip pain, or leg pain, numbness of toes, or any odor coming from the cast.   Instructed patient not to stick any foreign objects down into cast.   Instructed patient to utilize assistive devices(crutches, cane or walker) as ordered   Instructed patient to continue offloading as directed.      Applied cast per  Guidelines    Electronically signed by Melvin Aden RN on 9/14/2023 at 4:04 PM

## 2023-09-14 NOTE — WOUND CARE
[facility]  [address] [facility]  [address], [address] [facility]  Phone [phone]   Fax [phone]      NAME:  Juan Tavarez OF BIRTH:  1985  MEDICAL RECORD NUMBER:  [id number]  DATE:  9/14/2023    Return Appointment:   1 week with Darlen Sacks, DO    Instructions:   Right Plantar Foot:  Cleanse wound with normal saline. Vashe post debridement. Apply collagen with silver. Cut product to approximate wound size and apply to wound bed. Hydrofera Blue: Cut to wound size, moisten with saline, and apply to wound bed. Secured with Safetac & Mepilex Up. Apply TCC EZ 3\" to right lower leg. Apply boot over cast after cast cures to allow for limited walking. Change dressing in 1 week at wound center     Do not get cast wet in shower. Cover with cast cover. DO NOT Cristy Madrigal MD.  Provided TCC education and safety measures. Continue Doxycycline as prescribed. Call Dr. Danie Melo to make an appointment to evaluate for surgical intervention; referral already entered at previous visit. Phone number for [address]: [phone]       Should you experience increased redness, swelling, pain, foul odor, size of wound(s), or have a temperature over 101 degrees please contact the [id number] at [phone] or if after hours contact your primary care physician or go to the hospital emergency department. PLEASE NOTE: IF YOU ARE UNABLE TO OBTAIN WOUND SUPPLIES, CONTINUE TO USE THE SUPPLIES YOU HAVE AVAILABLE UNTIL YOU ARE ABLE TO REACH US. IT IS MOST IMPORTANT TO KEEP THE WOUND COVERED AT ALL TIMES.     Electronically signed Radha Victoria RN, [facility] on 9/14/2023 at 3:50 PM

## 2023-09-19 ENCOUNTER — HOSPITAL ENCOUNTER (OUTPATIENT)
Dept: WOUND CARE | Age: 38
Discharge: HOME | End: 2023-09-19
Payer: COMMERCIAL

## 2023-09-19 VITALS
WEIGHT: 239 LBS | BODY MASS INDEX: 36.22 KG/M2 | HEART RATE: 93 BPM | SYSTOLIC BLOOD PRESSURE: 150 MMHG | RESPIRATION RATE: 18 BRPM | DIASTOLIC BLOOD PRESSURE: 90 MMHG | HEIGHT: 68 IN

## 2023-09-19 PROCEDURE — 11042 DBRDMT SUBQ TIS 1ST 20SQCM/<: CPT

## 2023-09-19 ASSESSMENT — PAIN SCALES - GENERAL: PAINLEVEL_OUTOF10: 4

## 2023-09-19 ASSESSMENT — PAIN DESCRIPTION - DESCRIPTORS: DESCRIPTORS: SHOOTING

## 2023-09-19 ASSESSMENT — PAIN DESCRIPTION - ORIENTATION: ORIENTATION: RIGHT

## 2023-09-19 ASSESSMENT — PAIN DESCRIPTION - FREQUENCY: FREQUENCY: INTERMITTENT

## 2023-09-19 ASSESSMENT — PAIN DESCRIPTION - LOCATION: LOCATION: FOOT

## 2023-09-19 NOTE — WOUND CARE
Total Contact Cast    NAME:  Arvind Verduzco  YOB: 1985  MEDICAL RECORD NUMBER:  [id number]  DATE:  9/19/2023    Goal:  Patient will maintain integrity of cast, avoid mobility hazards, and report complications that may occur (foul odor, pain, numbness, cracked cast). Removed old contact cast if indicated and wash extremity with soap and water  Applied ordered dressing over wound(s)   Applied cast padding  Applied foam padding  Applied per doctor's order  Total Contact Cast was applied in the [address] [place] [address] to right lower leg  Applied cast material fiberglass  Applied cast material plaster   Allow cast to dry   Instructed patient to report to health care provider, including wound care center, any back pain, hip pain, or leg pain, numbness of toes, or any odor coming from the cast.   Instructed patient not to stick any foreign objects down into cast.  Instructed patient to utilize assistive devices(crutches, cane or walker) as ordered. Instructed patient to continue offloading as directed. Applied cast per  Guidelines    Electronically signed by Elvira Dubon.  Myriam Atkinson RN on 9/19/2023 at 3:51 PM

## 2023-09-19 NOTE — WOUND CARE
Discharge Instructions for  [address] [facility]  [address], [address] [facility]  Phone [phone]   Fax [phone]      NAME:  Dvaid Salinas OF BIRTH:  1985  MEDICAL RECORD NUMBER:  [id number]  DATE:  9/19/2023    Return Appointment:   1 week with Chantal Garcia DO      Instructions:   Right Plantar Foot:  Cleanse wound with normal saline. Vashe post debridement. Apply collagen with silver. Cut product to approximate wound size and apply to wound bed. Hydrofera Blue: Cut to wound size, moisten with saline, and apply to wound bed. Secured with Safetac & Mepilex Up. Apply TCC EZ 3\" to right lower leg. Apply boot over cast after cast cures to allow for limited walking. Change dressing in 1 week at wound center     Do not get cast wet in shower. Cover with cast cover. DO NOT Lincoln Peabody MD.  Provided TCC education and safety measures. Continue Doxycycline as prescribed. Call Dr. Emili Dumont to make an appointment to evaluate for surgical intervention; referral already entered at previous visit. Phone number for [address]: [phone]         Should you experience increased redness, swelling, pain, foul odor, size of wound(s), or have a temperature over 101 degrees please contact the [id number] at [phone] or if after hours contact your primary care physician or go to the hospital emergency department. PLEASE NOTE: IF YOU ARE UNABLE TO OBTAIN WOUND SUPPLIES, CONTINUE TO USE THE SUPPLIES YOU HAVE AVAILABLE UNTIL YOU ARE ABLE TO REACH US. IT IS MOST IMPORTANT TO KEEP THE WOUND COVERED AT ALL TIMES. Electronically signed Holly Spencer RN on 9/19/2023 at 3:53 PM

## 2023-09-19 NOTE — DISCHARGE INSTRUCTIONS
Cleanse wound with normal saline. Vashe post debridement. Apply collagen with silver. Cut product to approximate wound size and apply to wound bed. Hydrofera Blue: Cut to wound size, moisten with saline, and apply to wound bed. Secured with Safetac & Mepilex Up. Apply TCC EZ 3\" to right lower leg. Apply boot over cast after cast cures to allow for limited walking. Change dressing in 1 week at wound center     Do not get cast wet in shower. Cover with cast cover. DO NOT Sis Chao MD.  Provided TCC education and safety measures. Continue Doxycycline as prescribed. Call Dr. Mariel Krishna to make an appointment to evaluate for surgical intervention; referral already entered at previous visit.  Phone number for [facility]: [phone]

## 2023-09-19 NOTE — FLOWSHEET NOTE
09/19/23 1529   Right Leg Edema Point of Measurement   Leg circumference 41 cm   Ankle circumference 23 cm   Foot circumference 25 cm   Compression Therapy Other   Wound 02/24/23 Right;Plantar #1   Date First Assessed/Time First Assessed: 02/24/23 1314   Present on Hospital Admission: Yes  Primary Wound Type: Diabetic Ulcer  Wound Location Orientation: Right;Plantar  Wound Description (Comments): #1   Wound Image    Wound Etiology Diabetic Elizabeth 3   Dressing Status Old drainage noted   Wound Cleansed Cleansed with saline; Soap and water;Vashe   Dressing/Treatment Collagen;Hydrofera blue   Offloading for Diabetic Foot Ulcers Total contact cast   Wound Length (cm) 0.9 cm   Wound Width (cm) 1 cm   Wound Depth (cm) 0.1 cm   Wound Surface Area (cm^2) 0.9 cm^2   Change in Wound Size % (l*w) 87.5   Wound Volume (cm^3) 0.09 cm^3   Wound Healing % 94   Wound Assessment Granulation tissue   Drainage Amount Moderate (25-50%)   Drainage Description Serosanguinous   Odor Mild   Emily-wound Assessment Maceration   Wound Thickness Description not for Pressure Injury Full thickness

## 2023-09-19 NOTE — FLOWSHEET NOTE
Pre and Post Debridement Notes:   09/19/23 1529   Right Leg Edema Point of Measurement   Leg circumference 41 cm   Ankle circumference 23 cm   Foot circumference 25 cm   Compression Therapy Other   Wound 02/24/23 Right;Plantar #1   Date First Assessed/Time First Assessed: 02/24/23 1314   Present on Hospital Admission: Yes  Primary Wound Type: Diabetic Ulcer  Wound Location Orientation: Right;Plantar  Wound Description (Comments): #1   Wound Image     Wound Etiology Diabetic Elizabeth 3   Dressing Status Old drainage noted   Wound Cleansed Cleansed with saline; Soap and water;Vashe   Dressing/Treatment Collagen;Hydrofera blue   Offloading for Diabetic Foot Ulcers Total contact cast   Wound Length (cm) 0.9 cm   Wound Width (cm) 1 cm   Wound Depth (cm) 0.1 cm   Wound Surface Area (cm^2) 0.9 cm^2   Change in Wound Size % (l*w) 87.5   Wound Volume (cm^3) 0.09 cm^3   Wound Healing % 94   Post-Procedure Length (cm) 0.9 cm   Post-Procedure Width (cm) 1 cm   Post-Procedure Depth (cm) 0.1 cm   Post-Procedure Surface Area (cm^2) 0.9 cm^2   Post-Procedure Volume (cm^3) 0.09 cm^3   Wound Assessment Granulation tissue   Drainage Amount Moderate (25-50%)   Drainage Description Serosanguinous   Odor Mild   Emily-wound Assessment Maceration   Wound Thickness Description not for Pressure Injury Full thickness

## 2023-10-03 ENCOUNTER — TELEPHONE (OUTPATIENT)
Dept: WOUND CARE | Age: 38
End: 2023-10-03

## 2023-10-03 NOTE — TELEPHONE ENCOUNTER
Patient called stating that she is unable to return to have cast removed it has been on for 2 weeks at this point, stressed to the patient the importance of changing the cast every week, pt able to come this Thursday October 5th at 8 am to have cast removed. Pt also instructed if unable to come to this appointment pt needs to go to the ER to have the cast removed ASAP. Pt verbalized understanding.

## 2023-10-05 ENCOUNTER — HOSPITAL ENCOUNTER (OUTPATIENT)
Dept: WOUND CARE | Age: 38
Discharge: HOME | End: 2023-10-05
Payer: COMMERCIAL

## 2023-10-05 VITALS
SYSTOLIC BLOOD PRESSURE: 127 MMHG | BODY MASS INDEX: 35.16 KG/M2 | HEIGHT: 68 IN | DIASTOLIC BLOOD PRESSURE: 68 MMHG | WEIGHT: 232 LBS | HEART RATE: 92 BPM

## 2023-10-05 DIAGNOSIS — L97.413 DIABETIC ULCER OF RIGHT MIDFOOT ASSOCIATED WITH TYPE 2 DIABETES MELLITUS, WITH NECROSIS OF MUSCLE (HCC): Primary | ICD-10-CM

## 2023-10-05 DIAGNOSIS — M14.679 CHARCOT'S ARTHROPATHY OF FOREFOOT: ICD-10-CM

## 2023-10-05 DIAGNOSIS — M86.671 CHRONIC REFRACTORY OSTEOMYELITIS OF FOOT, RIGHT (HCC): ICD-10-CM

## 2023-10-05 DIAGNOSIS — E11.621 DIABETIC ULCER OF RIGHT MIDFOOT ASSOCIATED WITH TYPE 2 DIABETES MELLITUS, WITH NECROSIS OF MUSCLE (HCC): Primary | ICD-10-CM

## 2023-10-05 PROCEDURE — 11042 DBRDMT SUBQ TIS 1ST 20SQCM/<: CPT

## 2023-10-05 PROCEDURE — 11042 DBRDMT SUBQ TIS 1ST 20SQCM/<: CPT | Performed by: FAMILY MEDICINE

## 2023-10-05 RX ORDER — CLOBETASOL PROPIONATE 0.5 MG/G
OINTMENT TOPICAL ONCE
OUTPATIENT
Start: 2023-10-05 | End: 2023-10-05

## 2023-10-05 RX ORDER — BACITRACIN ZINC AND POLYMYXIN B SULFATE 500; 1000 [USP'U]/G; [USP'U]/G
OINTMENT TOPICAL ONCE
OUTPATIENT
Start: 2023-10-05 | End: 2023-10-05

## 2023-10-05 RX ORDER — IBUPROFEN 200 MG
TABLET ORAL ONCE
OUTPATIENT
Start: 2023-10-05 | End: 2023-10-05

## 2023-10-05 RX ORDER — LIDOCAINE HYDROCHLORIDE 20 MG/ML
JELLY TOPICAL ONCE
OUTPATIENT
Start: 2023-10-05 | End: 2023-10-05

## 2023-10-05 RX ORDER — LIDOCAINE HYDROCHLORIDE 40 MG/ML
SOLUTION TOPICAL ONCE
OUTPATIENT
Start: 2023-10-05 | End: 2023-10-05

## 2023-10-05 RX ORDER — LIDOCAINE HYDROCHLORIDE 20 MG/ML
JELLY TOPICAL ONCE
Status: COMPLETED | OUTPATIENT
Start: 2023-10-05 | End: 2023-10-05

## 2023-10-05 RX ORDER — TRIAMCINOLONE ACETONIDE 1 MG/G
OINTMENT TOPICAL ONCE
OUTPATIENT
Start: 2023-10-05 | End: 2023-10-05

## 2023-10-05 RX ORDER — LIDOCAINE 50 MG/G
OINTMENT TOPICAL ONCE
OUTPATIENT
Start: 2023-10-05 | End: 2023-10-05

## 2023-10-05 RX ORDER — GENTAMICIN SULFATE 1 MG/G
OINTMENT TOPICAL ONCE
OUTPATIENT
Start: 2023-10-05 | End: 2023-10-05

## 2023-10-05 RX ORDER — GINSENG 100 MG
CAPSULE ORAL ONCE
OUTPATIENT
Start: 2023-10-05 | End: 2023-10-05

## 2023-10-05 RX ORDER — BETAMETHASONE DIPROPIONATE 0.05 %
OINTMENT (GRAM) TOPICAL ONCE
OUTPATIENT
Start: 2023-10-05 | End: 2023-10-05

## 2023-10-05 RX ORDER — LIDOCAINE 40 MG/G
CREAM TOPICAL ONCE
OUTPATIENT
Start: 2023-10-05 | End: 2023-10-05

## 2023-10-05 RX ADMIN — LIDOCAINE HYDROCHLORIDE: 20 JELLY TOPICAL at 08:46

## 2023-10-05 NOTE — FLOWSHEET NOTE
10/05/23 0834   Wound 02/24/23 Right;Plantar #1   Date First Assessed/Time First Assessed: 02/24/23 1314   Present on Hospital Admission: Yes  Primary Wound Type: Diabetic Ulcer  Wound Location Orientation: Right;Plantar  Wound Description (Comments): #1   Wound Image     Wound Etiology Diabetic Elizabeth 3   Dressing Status Old drainage noted   Wound Cleansed Cleansed with saline; Soap and water;Vashe   Dressing/Treatment Collagen;Hydrofera blue   Offloading for Diabetic Foot Ulcers Total contact cast   Wound Length (cm) 0.5 cm   Wound Width (cm) 0.6 cm   Wound Depth (cm) 0.1 cm   Wound Surface Area (cm^2) 0.3 cm^2   Change in Wound Size % (l*w) 95.83   Wound Volume (cm^3) 0.03 cm^3   Wound Healing % 98   Post-Procedure Length (cm) 0.5 cm   Post-Procedure Width (cm) 0.6 cm   Post-Procedure Depth (cm) 0.1 cm   Post-Procedure Surface Area (cm^2) 0.3 cm^2   Post-Procedure Volume (cm^3) 0.03 cm^3   Wound Assessment Granulation tissue   Drainage Amount Moderate (25-50%)   Drainage Description Serosanguinous   Odor Mild   Emily-wound Assessment Maceration   Wound Thickness Description not for Pressure Injury Full thickness

## 2023-10-05 NOTE — FLOWSHEET NOTE
10/05/23 0834   Wound 02/24/23 Right;Plantar #1   Date First Assessed/Time First Assessed: 02/24/23 1314   Present on Hospital Admission: Yes  Primary Wound Type: Diabetic Ulcer  Wound Location Orientation: Right;Plantar  Wound Description (Comments): #1   Wound Image    Wound Etiology Diabetic Elizabeth 3   Dressing Status Old drainage noted   Wound Cleansed Cleansed with saline; Soap and water;Vashe   Dressing/Treatment Collagen;Hydrofera blue   Offloading for Diabetic Foot Ulcers Total contact cast   Wound Length (cm) 0.5 cm   Wound Width (cm) 0.6 cm   Wound Depth (cm) 0.1 cm   Wound Surface Area (cm^2) 0.3 cm^2   Change in Wound Size % (l*w) 95.83   Wound Volume (cm^3) 0.03 cm^3   Wound Healing % 98   Wound Assessment Granulation tissue   Drainage Amount Moderate (25-50%)   Drainage Description Serosanguinous   Odor Mild   Emily-wound Assessment Maceration   Wound Thickness Description not for Pressure Injury Full thickness

## 2023-10-05 NOTE — PROGRESS NOTES
Procedure Note  Indications: Based on my examination of this patient's wound(s)/ulcer(s) today, debridement is required to promote healing and evaluate the wound base. Patient canceled appointment last week. She has had the cast on for 2 weeks. It being difficult for her to get in here from work to get the cast change. Today we will change her to a defender boot and see if we can keep this minimized. I believe patient need surgical intervention which is pending. Even if we completely heal this wound I think this will come back to the profound deformity of the forefoot. We will try to maintain this and have her come every other week since it is hard for her to get in here and do a trial with defender boot. Our goal will be to maintain this until surgical intervention can be considered and performed. Debridement: Excisional Debridement    Using: curette the wound(s)/ulcer(s) was/were debrided down through and including the removal of epidermis, dermis, and subcutaneous tissue. Performed by: Darline Gitelman, DO  Consent obtained: Yes  Time out taken: Yes  Pain Control:         Devitalized Tissue Debrided: fibrin, biofilm, slough, necrotic/eschar, and callus    Pre Debridement Measurements:  Are located in the [place]  Documentation Flow Sheet    Diabetic/Pressure/Non Pressure Ulcers only:  Ulcer: Diabetic ulcer, fat layer exposed     Wound/Ulcer #: 1  Post Debridement Measurements:  Wound/Ulcer Descriptions are Pre Debridement except measurements:  Wound 02/24/23 Right;Plantar #1 (Active)   Wound Image    10/05/23 0834   Wound Etiology Diabetic Elizabeth 3 10/05/23 0834   Dressing Status Old drainage noted 10/05/23 0834   Wound Cleansed Cleansed with saline; Soap and water;Vashe 10/05/23 0834   Dressing/Treatment Collagen;Hydrofera blue 10/05/23 0834   Offloading for Diabetic Foot Ulcers Total contact cast 10/05/23 0834   Wound Length (cm) 0.5 cm 10/05/23 0834   Wound Width (cm) 0.6 cm 10/05/23 0834   Wound

## 2023-10-05 NOTE — WOUND CARE
Discharge Instructions for  [address] [facility]  [address], [address] [facility]  Phone [phone]   Fax [phone]      NAME:  Marie Martin OF BIRTH:  1985  MEDICAL RECORD NUMBER:  [id number]  DATE:  10/5/2023    Return Appointment:   1 week with Susan Shine DO      Instructions:   Right Plantar Foot:  Cleanse wound with normal saline. Vashe post debridement. Apply collagen with silver. Cut product to approximate wound size and apply to wound bed. Hydrofera Blue: Cut to wound size, moisten with saline, and apply to wound bed. Secured with Safetac & Mepilex Up. Obtain defender boot   And wear continuously any time your foot hits the ground     Continue Doxycycline as prescribed. Call Dr. Kevin Hoang to make an appointment to evaluate for surgical intervention; referral already entered at previous visit. Phone number for [address]: [phone]         Should you experience increased redness, swelling, pain, foul odor, size of wound(s), or have a temperature over 101 degrees please contact the [facility] [address] at [phone] or if after hours contact your primary care physician or go to the hospital emergency department. PLEASE NOTE: IF YOU ARE UNABLE TO OBTAIN WOUND SUPPLIES, CONTINUE TO USE THE SUPPLIES YOU HAVE AVAILABLE UNTIL YOU ARE ABLE TO REACH US. IT IS MOST IMPORTANT TO KEEP THE WOUND COVERED AT ALL TIMES.     Electronically signed Deb Carrera RN on 10/5/2023 at 8:47 AM

## 2023-10-05 NOTE — DISCHARGE INSTRUCTIONS
Discharge Instructions for  [address], [address] [place] [address]  Phone [phone]   Fax [phone]      NAME:  Sabrina Peter OF BIRTH:  1985  MEDICAL RECORD NUMBER:  [id number]  DATE:  @ED@    Return Appointment:   2 weeks with Gigi Minor DO      Instructions: ***        Should you experience increased redness, swelling, pain, foul odor, size of wound(s), or have a temperature over 101 degrees please contact the [facility] [address] at [phone] or if after hours contact your primary care physician or go to the hospital emergency department. PLEASE NOTE: IF YOU ARE UNABLE TO OBTAIN WOUND SUPPLIES, CONTINUE TO USE THE SUPPLIES YOU HAVE AVAILABLE UNTIL YOU ARE ABLE TO REACH US. IT IS MOST IMPORTANT TO KEEP THE WOUND COVERED AT ALL TIMES.     Electronically signed Linh Swan RN on 10/5/2023 at 9:14 AM

## 2023-10-09 ENCOUNTER — HOSPITAL ENCOUNTER (OUTPATIENT)
Dept: WOUND CARE | Age: 38
Discharge: HOME | End: 2023-10-09
Payer: COMMERCIAL

## 2023-10-09 VITALS
DIASTOLIC BLOOD PRESSURE: 85 MMHG | SYSTOLIC BLOOD PRESSURE: 141 MMHG | HEIGHT: 68 IN | RESPIRATION RATE: 18 BRPM | WEIGHT: 231 LBS | TEMPERATURE: 97.5 F | BODY MASS INDEX: 35.01 KG/M2 | HEART RATE: 99 BPM

## 2023-10-09 PROCEDURE — 11042 DBRDMT SUBQ TIS 1ST 20SQCM/<: CPT | Performed by: FAMILY MEDICINE

## 2023-10-09 PROCEDURE — 11042 DBRDMT SUBQ TIS 1ST 20SQCM/<: CPT

## 2023-10-09 NOTE — PROGRESS NOTES
10/09/23 1327   Wound Length (cm) 0.6 cm 10/09/23 1327   Wound Width (cm) 0.6 cm 10/09/23 1327   Wound Depth (cm) 0.1 cm 10/09/23 1327   Wound Surface Area (cm^2) 0.36 cm^2 10/09/23 1327   Change in Wound Size % (l*w) 95 10/09/23 1327   Wound Volume (cm^3) 0.036 cm^3 10/09/23 1327   Wound Healing % 98 10/09/23 1327   Post-Procedure Length (cm) 0.5 cm 10/05/23 0834   Post-Procedure Width (cm) 0.6 cm 10/05/23 0834   Post-Procedure Depth (cm) 0.1 cm 10/05/23 0834   Post-Procedure Surface Area (cm^2) 0.3 cm^2 10/05/23 0834   Post-Procedure Volume (cm^3) 0.03 cm^3 10/05/23 0834   Wound Assessment Pink/red 10/09/23 1327   Drainage Amount Small (< 25%) 10/09/23 1327   Drainage Description Serosanguinous 10/09/23 1327   Odor None 10/09/23 1327   Emily-wound Assessment Intact 10/09/23 1327   Margins Attached edges 09/14/23 1453   Wound Thickness Description not for Pressure Injury Full thickness 10/09/23 1327   Number of days: 227        Total Surface Area Debrided:  0.3 sq cm   Estimated Blood Loss: Minimal amount blood loss . Hemostasis Achieved:  by pressure  Procedural Pain: 1  / 10   Post Procedural Pain: 1 / 10   Response to treatment: Patient tolerated procedure well with minimal complaints of pain.

## 2023-10-09 NOTE — FLOWSHEET NOTE
10/09/23 1327   Wound 02/24/23 Right;Plantar #1   Date First Assessed/Time First Assessed: 02/24/23 1314   Present on Hospital Admission: Yes  Primary Wound Type: Diabetic Ulcer  Wound Location Orientation: Right;Plantar  Wound Description (Comments): #1   Wound Image    Wound Etiology Diabetic Elizabeth 3   Dressing Status Old drainage noted   Wound Cleansed Cleansed with saline   Dressing/Treatment Collagen with Ag;Hydrofera blue   Offloading for Diabetic Foot Ulcers Air cast boot   Wound Length (cm) 0.6 cm   Wound Width (cm) 0.6 cm   Wound Depth (cm) 0.1 cm   Wound Surface Area (cm^2) 0.36 cm^2   Change in Wound Size % (l*w) 95   Wound Volume (cm^3) 0.036 cm^3   Wound Healing % 98   Wound Assessment Pink/red   Drainage Amount Small (< 25%)   Drainage Description Serosanguinous   Odor None   Emily-wound Assessment Intact   Wound Thickness Description not for Pressure Injury Full thickness   Pain Assessment   Pain Assessment None - Denies Pain

## 2023-10-09 NOTE — WOUND CARE
Discharge Instructions for  [address] [facility]  [address], [address] [facility]  Phone [phone]   Fax [phone]      NAME:  Ray Land OF BIRTH:  1985  MEDICAL RECORD NUMBER:  [id number]  DATE:  10/9/2023    Return Appointment:   1 week with Henna Azul DO      Instructions:   Right Plantar Foot:  Cleanse wound with normal saline. Vashe post debridement. Apply collagen with silver. Cut product to approximate wound size and apply to wound bed. Hydrofera Blue: Cut to wound size, moisten with saline, and apply to wound bed. Secured with Safetac & Mepilex Up. Obtain defender boot   And wear continuously any time your foot hits the ground    Right heel:  Clean wound with saline. Xeroform- apply to wound bed. Cover with foam dressing. Change 2-3x/week. Continue Doxycycline as prescribed. Call Dr. Lucinda Mcadams to make an appointment to evaluate for surgical intervention; referral already entered at previous visit. Phone number for [address]: [phone]       Should you experience increased redness, swelling, pain, foul odor, size of wound(s), or have a temperature over 101 degrees please contact the [facility] [address] at [phone] or if after hours contact your primary care physician or go to the hospital emergency department. PLEASE NOTE: IF YOU ARE UNABLE TO OBTAIN WOUND SUPPLIES, CONTINUE TO USE THE SUPPLIES YOU HAVE AVAILABLE UNTIL YOU ARE ABLE TO REACH US. IT IS MOST IMPORTANT TO KEEP THE WOUND COVERED AT ALL TIMES.     Electronically signed Edu Miguel RN on 10/9/2023 at 1:41 PM

## 2023-10-24 ENCOUNTER — HOSPITAL ENCOUNTER (OUTPATIENT)
Dept: WOUND CARE | Age: 38
Discharge: HOME | End: 2023-10-24
Payer: COMMERCIAL

## 2023-10-24 VITALS
RESPIRATION RATE: 18 BRPM | BODY MASS INDEX: 34.86 KG/M2 | SYSTOLIC BLOOD PRESSURE: 133 MMHG | DIASTOLIC BLOOD PRESSURE: 92 MMHG | WEIGHT: 230 LBS | TEMPERATURE: 98.1 F | HEIGHT: 68 IN | HEART RATE: 96 BPM

## 2023-10-24 DIAGNOSIS — L97.413 DIABETIC ULCER OF RIGHT MIDFOOT ASSOCIATED WITH TYPE 2 DIABETES MELLITUS, WITH NECROSIS OF MUSCLE (HCC): Primary | ICD-10-CM

## 2023-10-24 DIAGNOSIS — E11.621 DIABETIC ULCER OF RIGHT MIDFOOT ASSOCIATED WITH TYPE 2 DIABETES MELLITUS, WITH NECROSIS OF MUSCLE (HCC): Primary | ICD-10-CM

## 2023-10-24 DIAGNOSIS — M14.679 CHARCOT'S ARTHROPATHY OF FOREFOOT: ICD-10-CM

## 2023-10-24 DIAGNOSIS — M86.671 CHRONIC REFRACTORY OSTEOMYELITIS OF FOOT, RIGHT (HCC): ICD-10-CM

## 2023-10-24 PROCEDURE — 11042 DBRDMT SUBQ TIS 1ST 20SQCM/<: CPT

## 2023-10-24 RX ORDER — BACITRACIN ZINC AND POLYMYXIN B SULFATE 500; 1000 [USP'U]/G; [USP'U]/G
OINTMENT TOPICAL ONCE
OUTPATIENT
Start: 2023-10-24 | End: 2023-10-24

## 2023-10-24 RX ORDER — GENTAMICIN SULFATE 1 MG/G
OINTMENT TOPICAL ONCE
OUTPATIENT
Start: 2023-10-24 | End: 2023-10-24

## 2023-10-24 RX ORDER — GINSENG 100 MG
CAPSULE ORAL ONCE
OUTPATIENT
Start: 2023-10-24 | End: 2023-10-24

## 2023-10-24 RX ORDER — BETAMETHASONE DIPROPIONATE 0.05 %
OINTMENT (GRAM) TOPICAL ONCE
OUTPATIENT
Start: 2023-10-24 | End: 2023-10-24

## 2023-10-24 RX ORDER — IBUPROFEN 200 MG
TABLET ORAL ONCE
OUTPATIENT
Start: 2023-10-24 | End: 2023-10-24

## 2023-10-24 RX ORDER — LIDOCAINE 40 MG/G
CREAM TOPICAL ONCE
OUTPATIENT
Start: 2023-10-24 | End: 2023-10-24

## 2023-10-24 RX ORDER — LIDOCAINE HYDROCHLORIDE 40 MG/ML
SOLUTION TOPICAL ONCE
OUTPATIENT
Start: 2023-10-24 | End: 2023-10-24

## 2023-10-24 RX ORDER — LIDOCAINE HYDROCHLORIDE 20 MG/ML
JELLY TOPICAL ONCE
OUTPATIENT
Start: 2023-10-24 | End: 2023-10-24

## 2023-10-24 RX ORDER — LIDOCAINE HYDROCHLORIDE 20 MG/ML
JELLY TOPICAL ONCE
Status: COMPLETED | OUTPATIENT
Start: 2023-10-24 | End: 2023-10-24

## 2023-10-24 RX ORDER — CLOBETASOL PROPIONATE 0.5 MG/G
OINTMENT TOPICAL ONCE
OUTPATIENT
Start: 2023-10-24 | End: 2023-10-24

## 2023-10-24 RX ORDER — TRIAMCINOLONE ACETONIDE 1 MG/G
OINTMENT TOPICAL ONCE
OUTPATIENT
Start: 2023-10-24 | End: 2023-10-24

## 2023-10-24 RX ORDER — LIDOCAINE 50 MG/G
OINTMENT TOPICAL ONCE
OUTPATIENT
Start: 2023-10-24 | End: 2023-10-24

## 2023-10-24 RX ADMIN — LIDOCAINE HYDROCHLORIDE: 20 JELLY TOPICAL at 15:57

## 2023-10-24 NOTE — FLOWSHEET NOTE
10/24/23 1511   Right Leg Edema Point of Measurement   Leg circumference 40 cm   Ankle circumference 23 cm   Foot circumference 23 cm   Compression Therapy Tubular elastic support bandage   Wound 02/24/23 Right;Plantar #1   Date First Assessed/Time First Assessed: 02/24/23 1314   Present on Hospital Admission: Yes  Primary Wound Type: Diabetic Ulcer  Wound Location Orientation: Right;Plantar  Wound Description (Comments): #1   Wound Image     Wound Etiology Diabetic Elizabeth 3   Dressing Status Old drainage noted   Wound Cleansed Cleansed with saline   Dressing/Treatment Collagen with Ag;Hydrofera blue   Offloading for Diabetic Foot Ulcers Air cast boot   Wound Length (cm) 0.6 cm   Wound Width (cm) 0.5 cm   Wound Depth (cm) 0.1 cm   Wound Surface Area (cm^2) 0.3 cm^2   Change in Wound Size % (l*w) 95.83   Wound Volume (cm^3) 0.03 cm^3   Wound Healing % 98   Post-Procedure Length (cm) 0.6 cm   Post-Procedure Width (cm) 0.5 cm   Post-Procedure Depth (cm) 0.1 cm   Post-Procedure Surface Area (cm^2) 0.3 cm^2   Post-Procedure Volume (cm^3) 0.03 cm^3   Wound Assessment Pink/red   Drainage Amount Small (< 25%)   Drainage Description Serosanguinous   Odor None   Emily-wound Assessment Intact   Wound Thickness Description not for Pressure Injury Full thickness   Pain Assessment   Pain Assessment None - Denies Pain

## 2023-10-24 NOTE — WOUND CARE
Discharge Instructions for  [address] [facility]  [address], [address]  Phone [phone]   Fax [phone]      NAME:  Joanne Casillas OF BIRTH:  1985  MEDICAL RECORD NUMBER:  [id number]  DATE:  10/24/2023    Return Appointment:   3 weeks with Martina Shin DO      Instructions:   Right Plantar Foot:  Cleanse wound with normal saline. Vashe post debridement. Apply collagen with silver. Cut product to approximate wound size and apply to wound bed. Hydrofera Blue: Cut to wound size, moisten with saline, and apply to wound bed. Secured with Safetac & Mepilex Up. Obtain defender boot   And wear continuously any time your foot hits the ground    Right heel:  Clean wound with saline. Xeroform- apply to wound bed. Cover with foam dressing. Change 2-3x/week. Call Dr. Kimberly Calix to make an appointment to evaluate for surgical intervention; referral already entered at previous visit. Phone number for [address]: [phone]     Should you experience increased redness, swelling, pain, foul odor, size of wound(s), or have a temperature over 101 degrees please contact the [facility] [address] at [phone] or if after hours contact your primary care physician or go to the hospital emergency department. PLEASE NOTE: IF YOU ARE UNABLE TO OBTAIN WOUND SUPPLIES, CONTINUE TO USE THE SUPPLIES YOU HAVE AVAILABLE UNTIL YOU ARE ABLE TO REACH US. IT IS MOST IMPORTANT TO KEEP THE WOUND COVERED AT ALL TIMES.     Electronically signed Ulises Frias RN on 10/24/2023 at 3:40 PM

## 2023-11-09 ENCOUNTER — OFFICE VISIT (OUTPATIENT)
Dept: ORTHOPEDIC SURGERY | Age: 38
End: 2023-11-09

## 2023-11-09 DIAGNOSIS — L97.413 DIABETIC ULCER OF RIGHT MIDFOOT ASSOCIATED WITH TYPE 2 DIABETES MELLITUS, WITH NECROSIS OF MUSCLE (HCC): Primary | Chronic | ICD-10-CM

## 2023-11-09 DIAGNOSIS — E11.621 DIABETIC ULCER OF RIGHT MIDFOOT ASSOCIATED WITH TYPE 2 DIABETES MELLITUS, WITH NECROSIS OF MUSCLE (HCC): Primary | Chronic | ICD-10-CM

## 2023-11-09 DIAGNOSIS — M79.671 RIGHT FOOT PAIN: ICD-10-CM

## 2023-11-09 NOTE — PROGRESS NOTES
willing to do this. However she states she cannot undergo surgery until January and refuses any operative intervention until January. The patient was thoroughly informed regarding the Treatment Plan. Through shared decision making the patient refused amputations, refused all my medical recommendations and refused to have surgery until January. She understands this is against my recommendations and she is putting her foot in her life in jeopardy. At this point she refuses to proceed with surgery         Past Medical History:   Diagnosis Date    Asthma     Diabetes mellitus ([address])     Hypertension      No past surgical history on file. Allergies   Allergen Reactions    Morphine Anaphylaxis    Metformin Nausea And Vomiting and Nausea Only    Gabapentin Diarrhea    Mirtazapine Other (See Comments) and Seizure     seizure  seizure  seizure  Other reaction(s): Other (See Comments)  seizure  seizure  seizure  seizure  seizure      Sulfamethoxazole-Trimethoprim Diarrhea and Other (See Comments)    Insulin Glargine-Lixisenatide Nausea And Vomiting    Metformin And Related Rash    Penicillins Rash, Hives and Other (See Comments)     Other reaction(s): Rash-Allergy           Current Outpatient Medications:     OZEMPIC, 2 MG/DOSE, 8 MG/3ML SOPN, , Disp: , Rfl:     traZODone (DESYREL) 150 MG tablet, Take 1 tablet by mouth, Disp: , Rfl:     doxycycline hyclate (VIBRA-TABS) 100 MG tablet, Take 1 tablet by mouth 2 times daily, Disp: , Rfl:     acyclovir (ZOVIRAX) 200 MG capsule, , Disp: , Rfl:     Blood Glucose Monitoring Suppl (FREESTYLE FREEDOM LITE) w/Device KIT, , Disp: , Rfl:     cyclobenzaprine (FLEXERIL) 10 MG tablet, , Disp: , Rfl:     oxyCODONE-acetaminophen (PERCOCET)  MG per tablet, Take 1 tablet by mouth every 8 hours as needed. , Disp: , Rfl:     naloxone 4 MG/0.1ML LIQD nasal spray, 1 spray once as needed, Disp: , Rfl:     insulin lispro (HUMALOG KWIKPEN) 200 UNIT/ML SOPN pen, Inject 10 Units into the

## 2023-11-10 PROBLEM — M79.671 RIGHT FOOT PAIN: Status: ACTIVE | Noted: 2023-11-09

## 2023-11-16 ENCOUNTER — HOSPITAL ENCOUNTER (OUTPATIENT)
Dept: WOUND CARE | Age: 38
Discharge: HOME | End: 2023-11-16
Payer: COMMERCIAL

## 2023-11-16 VITALS
DIASTOLIC BLOOD PRESSURE: 76 MMHG | BODY MASS INDEX: 36.22 KG/M2 | SYSTOLIC BLOOD PRESSURE: 107 MMHG | HEIGHT: 68 IN | HEART RATE: 95 BPM | WEIGHT: 239 LBS

## 2023-11-16 DIAGNOSIS — M14.679 CHARCOT'S ARTHROPATHY OF FOREFOOT: ICD-10-CM

## 2023-11-16 DIAGNOSIS — L97.413 DIABETIC ULCER OF RIGHT MIDFOOT ASSOCIATED WITH TYPE 2 DIABETES MELLITUS, WITH NECROSIS OF MUSCLE (HCC): Primary | ICD-10-CM

## 2023-11-16 DIAGNOSIS — M86.671 CHRONIC REFRACTORY OSTEOMYELITIS OF FOOT, RIGHT (HCC): ICD-10-CM

## 2023-11-16 DIAGNOSIS — E11.621 DIABETIC ULCER OF RIGHT MIDFOOT ASSOCIATED WITH TYPE 2 DIABETES MELLITUS, WITH NECROSIS OF MUSCLE (HCC): Primary | ICD-10-CM

## 2023-11-16 PROCEDURE — 11042 DBRDMT SUBQ TIS 1ST 20SQCM/<: CPT

## 2023-11-16 RX ORDER — LIDOCAINE HYDROCHLORIDE 40 MG/ML
SOLUTION TOPICAL ONCE
OUTPATIENT
Start: 2023-11-16 | End: 2023-11-16

## 2023-11-16 RX ORDER — BETAMETHASONE DIPROPIONATE 0.05 %
OINTMENT (GRAM) TOPICAL ONCE
OUTPATIENT
Start: 2023-11-16 | End: 2023-11-16

## 2023-11-16 RX ORDER — LIDOCAINE 50 MG/G
OINTMENT TOPICAL ONCE
OUTPATIENT
Start: 2023-11-16 | End: 2023-11-16

## 2023-11-16 RX ORDER — LIDOCAINE HYDROCHLORIDE 20 MG/ML
JELLY TOPICAL ONCE
Status: COMPLETED | OUTPATIENT
Start: 2023-11-16 | End: 2023-11-16

## 2023-11-16 RX ORDER — CLOBETASOL PROPIONATE 0.5 MG/G
OINTMENT TOPICAL ONCE
OUTPATIENT
Start: 2023-11-16 | End: 2023-11-16

## 2023-11-16 RX ORDER — GENTAMICIN SULFATE 1 MG/G
OINTMENT TOPICAL ONCE
OUTPATIENT
Start: 2023-11-16 | End: 2023-11-16

## 2023-11-16 RX ORDER — LIDOCAINE HYDROCHLORIDE 20 MG/ML
JELLY TOPICAL ONCE
OUTPATIENT
Start: 2023-11-16 | End: 2023-11-16

## 2023-11-16 RX ORDER — TRIAMCINOLONE ACETONIDE 1 MG/G
OINTMENT TOPICAL ONCE
OUTPATIENT
Start: 2023-11-16 | End: 2023-11-16

## 2023-11-16 RX ORDER — LIDOCAINE 40 MG/G
CREAM TOPICAL ONCE
OUTPATIENT
Start: 2023-11-16 | End: 2023-11-16

## 2023-11-16 RX ORDER — GINSENG 100 MG
CAPSULE ORAL ONCE
OUTPATIENT
Start: 2023-11-16 | End: 2023-11-16

## 2023-11-16 RX ORDER — BACITRACIN ZINC AND POLYMYXIN B SULFATE 500; 1000 [USP'U]/G; [USP'U]/G
OINTMENT TOPICAL ONCE
OUTPATIENT
Start: 2023-11-16 | End: 2023-11-16

## 2023-11-16 RX ORDER — IBUPROFEN 200 MG
TABLET ORAL ONCE
OUTPATIENT
Start: 2023-11-16 | End: 2023-11-16

## 2023-11-16 RX ADMIN — LIDOCAINE HYDROCHLORIDE: 20 JELLY TOPICAL at 15:43

## 2023-11-16 NOTE — DISCHARGE INSTRUCTIONS
Discharge Instructions for  [address] [facility]  [address] [facility], [address] [place] [address]  Phone [phone]   Fax [phone]      NAME:  Jourdan Little OF BIRTH:  1985  MEDICAL RECORD NUMBER:  [id number]  DATE:  @ED@    Return Appointment:   2 weeks with Bean , DO      Instructions: Right Plantar Foot:  Cleanse wound with normal saline. Vashe post debridement. Apply collagen with silver. Cut product to approximate wound size and apply to wound bed. Hydrofera Blue: Cut to wound size, moisten with saline, and apply to wound bed. Secured with Safetac & Mepilex Up. Obtain defender boot   And wear continuously any time your foot hits the ground  sing. Dr. Lord Rahman for surgery Jan or Feb of next year  Plan for cast in 2 weeks         Should you experience increased redness, swelling, pain, foul odor, size of wound(s), or have a temperature over 101 degrees please contact the [address] at [phone] or if after hours contact your primary care physician or go to the hospital emergency department. PLEASE NOTE: IF YOU ARE UNABLE TO OBTAIN WOUND SUPPLIES, CONTINUE TO USE THE SUPPLIES YOU HAVE AVAILABLE UNTIL YOU ARE ABLE TO REACH US. IT IS MOST IMPORTANT TO KEEP THE WOUND COVERED AT ALL TIMES.     Electronically signed Joselin Luis RN on 11/16/2023 at 3:52 PM

## 2023-11-16 NOTE — FLOWSHEET NOTE
11/16/23 1524   Right Leg Edema Point of Measurement   Leg circumference 40 cm   Ankle circumference 25 cm   Foot circumference 23 cm   Compression Therapy Tubular elastic support bandage   Wound 02/24/23 Right;Plantar #1   Date First Assessed/Time First Assessed: 02/24/23 1314   Present on Hospital Admission: Yes  Primary Wound Type: Diabetic Ulcer  Wound Location Orientation: Right;Plantar  Wound Description (Comments): #1   Wound Image     Wound Etiology Diabetic Elizabeth 3   Dressing Status Old drainage noted   Wound Cleansed Cleansed with saline   Dressing/Treatment Hydrofera blue   Offloading for Diabetic Foot Ulcers Air cast boot   Wound Length (cm) 1.1 cm   Wound Width (cm) 1.5 cm   Wound Depth (cm) 0.1 cm   Wound Surface Area (cm^2) 1.65 cm^2   Change in Wound Size % (l*w) 77.08   Wound Volume (cm^3) 0.165 cm^3   Wound Healing % 89   Post-Procedure Length (cm) 1.1 cm   Post-Procedure Width (cm) 1.5 cm   Post-Procedure Depth (cm) 0.1 cm   Post-Procedure Surface Area (cm^2) 1.65 cm^2   Post-Procedure Volume (cm^3) 0.165 cm^3   Wound Assessment Pink/red   Drainage Amount Small (< 25%)   Drainage Description Serosanguinous   Odor None   Emily-wound Assessment Intact   Wound Thickness Description not for Pressure Injury Full thickness   Pain Assessment   Pain Assessment None - Denies Pain     Post debridement

## 2023-11-16 NOTE — FLOWSHEET NOTE
11/16/23 1524   Right Leg Edema Point of Measurement   Leg circumference 40 cm   Ankle circumference 25 cm   Foot circumference 23 cm   Compression Therapy Tubular elastic support bandage   Wound 02/24/23 Right;Plantar #1   Date First Assessed/Time First Assessed: 02/24/23 1314   Present on Hospital Admission: Yes  Primary Wound Type: Diabetic Ulcer  Wound Location Orientation: Right;Plantar  Wound Description (Comments): #1   Wound Image    Wound Etiology Diabetic Elizabeth 3   Dressing Status Old drainage noted   Wound Cleansed Cleansed with saline   Dressing/Treatment Hydrofera blue   Offloading for Diabetic Foot Ulcers Air cast boot   Wound Length (cm) 1.1 cm   Wound Width (cm) 1.5 cm   Wound Depth (cm) 0.1 cm   Wound Surface Area (cm^2) 1.65 cm^2   Change in Wound Size % (l*w) 77.08   Wound Volume (cm^3) 0.165 cm^3   Wound Healing % 89   Wound Assessment Pink/red   Drainage Amount Small (< 25%)   Drainage Description Serosanguinous   Odor None   Emily-wound Assessment Intact   Wound Thickness Description not for Pressure Injury Full thickness   Pain Assessment   Pain Assessment None - Denies Pain

## 2023-11-16 NOTE — WOUND CARE
Discharge Instructions for  [address]  [facility]  [address], [address]  Phone [phone]   Fax [phone]      NAME:  Arvind Metzger OF BIRTH:  1985  MEDICAL RECORD NUMBER:  [id number]  DATE:  11/16/2023    Return Appointment:   2 weeks with Hector Richards DO      Instructions:   Right Plantar Foot:  Cleanse wound with normal saline. Vashe post debridement. Apply collagen with silver. Cut product to approximate wound size and apply to wound bed. Hydrofera Blue: Cut to wound size, moisten with saline, and apply to wound bed. Secured with Safetac & Mepilex Up. Obtain defender boot   And wear continuously any time your foot hits the ground  sing. Dr. Aman Menon for surgery Jan or Feb of next year  Plan for cast in 2 weeks     Should you experience increased redness, swelling, pain, foul odor, size of wound(s), or have a temperature over 101 degrees please contact the [facility] [address]uf at [phone] or if after hours contact your primary care physician or go to the hospital emergency department. PLEASE NOTE: IF YOU ARE UNABLE TO OBTAIN WOUND SUPPLIES, CONTINUE TO USE THE SUPPLIES YOU HAVE AVAILABLE UNTIL YOU ARE ABLE TO REACH US. IT IS MOST IMPORTANT TO KEEP THE WOUND COVERED AT ALL TIMES.     Electronically signed Martha Gibbons RN on 11/16/2023 at 3:44 PM

## 2023-11-28 ENCOUNTER — HOSPITAL ENCOUNTER (OUTPATIENT)
Dept: WOUND CARE | Age: 38
Discharge: HOME | End: 2023-11-28
Payer: COMMERCIAL

## 2023-11-28 VITALS
WEIGHT: 240 LBS | SYSTOLIC BLOOD PRESSURE: 108 MMHG | DIASTOLIC BLOOD PRESSURE: 73 MMHG | HEART RATE: 97 BPM | RESPIRATION RATE: 18 BRPM | HEIGHT: 68 IN | TEMPERATURE: 98.2 F | BODY MASS INDEX: 36.37 KG/M2 | OXYGEN SATURATION: 95 %

## 2023-11-28 DIAGNOSIS — E11.621 DIABETIC ULCER OF RIGHT MIDFOOT ASSOCIATED WITH TYPE 2 DIABETES MELLITUS, WITH NECROSIS OF MUSCLE (HCC): Primary | ICD-10-CM

## 2023-11-28 DIAGNOSIS — L97.413 DIABETIC ULCER OF RIGHT MIDFOOT ASSOCIATED WITH TYPE 2 DIABETES MELLITUS, WITH NECROSIS OF MUSCLE (HCC): Primary | ICD-10-CM

## 2023-11-28 DIAGNOSIS — M86.671 CHRONIC REFRACTORY OSTEOMYELITIS OF FOOT, RIGHT (HCC): ICD-10-CM

## 2023-11-28 DIAGNOSIS — M14.679 CHARCOT'S ARTHROPATHY OF FOREFOOT: ICD-10-CM

## 2023-11-28 PROCEDURE — 11042 DBRDMT SUBQ TIS 1ST 20SQCM/<: CPT

## 2023-11-28 RX ORDER — LIDOCAINE HYDROCHLORIDE 40 MG/ML
SOLUTION TOPICAL ONCE
OUTPATIENT
Start: 2023-11-28 | End: 2023-11-28

## 2023-11-28 RX ORDER — GINSENG 100 MG
CAPSULE ORAL ONCE
OUTPATIENT
Start: 2023-11-28 | End: 2023-11-28

## 2023-11-28 RX ORDER — IBUPROFEN 200 MG
TABLET ORAL ONCE
OUTPATIENT
Start: 2023-11-28 | End: 2023-11-28

## 2023-11-28 RX ORDER — TRAZODONE HYDROCHLORIDE 100 MG/1
100 TABLET ORAL NIGHTLY PRN
COMMUNITY
Start: 2023-09-19

## 2023-11-28 RX ORDER — BETAMETHASONE DIPROPIONATE 0.05 %
OINTMENT (GRAM) TOPICAL ONCE
OUTPATIENT
Start: 2023-11-28 | End: 2023-11-28

## 2023-11-28 RX ORDER — CLOBETASOL PROPIONATE 0.5 MG/G
OINTMENT TOPICAL ONCE
OUTPATIENT
Start: 2023-11-28 | End: 2023-11-28

## 2023-11-28 RX ORDER — TRIAMCINOLONE ACETONIDE 1 MG/G
OINTMENT TOPICAL ONCE
OUTPATIENT
Start: 2023-11-28 | End: 2023-11-28

## 2023-11-28 RX ORDER — GENTAMICIN SULFATE 1 MG/G
OINTMENT TOPICAL ONCE
OUTPATIENT
Start: 2023-11-28 | End: 2023-11-28

## 2023-11-28 RX ORDER — BUSPIRONE HYDROCHLORIDE 15 MG/1
15 TABLET ORAL 2 TIMES DAILY
COMMUNITY
Start: 2023-10-31

## 2023-11-28 RX ORDER — LIDOCAINE 40 MG/G
CREAM TOPICAL ONCE
OUTPATIENT
Start: 2023-11-28 | End: 2023-11-28

## 2023-11-28 RX ORDER — DOXYCYCLINE HYCLATE 100 MG/1
100 CAPSULE ORAL 2 TIMES DAILY
COMMUNITY
Start: 2023-10-22 | End: 2023-11-30

## 2023-11-28 RX ORDER — LIDOCAINE HYDROCHLORIDE 20 MG/ML
JELLY TOPICAL ONCE
OUTPATIENT
Start: 2023-11-28 | End: 2023-11-28

## 2023-11-28 RX ORDER — BUSPIRONE HYDROCHLORIDE 15 MG/1
15 TABLET ORAL 2 TIMES DAILY
COMMUNITY
End: 2023-11-28

## 2023-11-28 RX ORDER — LIDOCAINE 50 MG/G
OINTMENT TOPICAL ONCE
OUTPATIENT
Start: 2023-11-28 | End: 2023-11-28

## 2023-11-28 RX ORDER — LIDOCAINE HYDROCHLORIDE 20 MG/ML
JELLY TOPICAL ONCE
Status: COMPLETED | OUTPATIENT
Start: 2023-11-28 | End: 2023-11-28

## 2023-11-28 RX ORDER — DOXYCYCLINE HYCLATE 100 MG
100 TABLET ORAL 2 TIMES DAILY
Qty: 60 TABLET | Refills: 2 | Status: SHIPPED | OUTPATIENT
Start: 2023-11-28 | End: 2024-02-26

## 2023-11-28 RX ORDER — BACITRACIN ZINC AND POLYMYXIN B SULFATE 500; 1000 [USP'U]/G; [USP'U]/G
OINTMENT TOPICAL ONCE
OUTPATIENT
Start: 2023-11-28 | End: 2023-11-28

## 2023-11-28 RX ADMIN — LIDOCAINE HYDROCHLORIDE: 20 JELLY TOPICAL at 16:19

## 2023-11-28 NOTE — DISCHARGE INSTRUCTIONS
Return Appointment:   1 week with Martell Luna DO     Instructions:      Right Plantar Foot:  Cleanse wound with normal saline. Vashe post debridement. Do not get wet in shower. May obtain cast cover to keep dry in shower. Hydrofera Ready: Cut to wound size, place in wound bed, shiny side out. Cover with Mepilex transfer  Secure with Optilock and tape  Apply TCC-EZ 3\" with walking boot. Patient to wear walking boot at all times when standing or walking. Change cast in 1 week at wound center. Do not drive with cast on right foot.   Obtain labwork (HgbA1C) at outpatient lab  Obtain refill of antibiotic at pharmacy and take as prescribed until complete  Dr. Al Friedman for surgery Jan or Feb of next year

## 2023-11-28 NOTE — FLOWSHEET NOTE
11/28/23 1504   Right Leg Edema Point of Measurement   Leg circumference 42 cm   Ankle circumference 24 cm   Foot circumference 23.5 cm   Compression Therapy Tubular elastic support bandage   Wound 02/24/23 Right;Plantar #1   Date First Assessed/Time First Assessed: 02/24/23 1314   Present on Hospital Admission: Yes  Primary Wound Type: Diabetic Ulcer  Wound Location Orientation: Right;Plantar  Wound Description (Comments): #1   Wound Image     Wound Etiology Diabetic Elizabeth 3   Dressing Status Old drainage noted   Wound Cleansed Cleansed with saline   Dressing/Treatment Hydrofera blue   Offloading for Diabetic Foot Ulcers Air cast boot   Wound Length (cm) 1.1 cm   Wound Width (cm) 1.7 cm   Wound Depth (cm) 0.1 cm   Wound Surface Area (cm^2) 1.87 cm^2   Change in Wound Size % (l*w) 74.03   Wound Volume (cm^3) 0.187 cm^3   Wound Healing % 87   Post-Procedure Length (cm) 1.1 cm   Post-Procedure Width (cm) 1.7 cm   Post-Procedure Depth (cm) 0.1 cm   Post-Procedure Surface Area (cm^2) 1.87 cm^2   Post-Procedure Volume (cm^3) 0.187 cm^3   Wound Assessment Pink/red   Drainage Amount Large (50-75% saturated)   Drainage Description Serosanguinous   Odor Mild   Emily-wound Assessment Maceration   Wound Thickness Description not for Pressure Injury Full thickness   Pain Assessment   Pain Assessment 0-10   Pain Level 4   Patient's Stated Pain Goal 0 - No pain   Pain Location Foot   Pain Orientation Right   Pain Descriptors Aching

## 2023-11-28 NOTE — WOUND CARE
Total Contact Cast    NAME:  Isi Saleh  YOB: 1985  MEDICAL RECORD NUMBER:  [id number]  DATE:  11/28/2023    Goal:  Patient will maintain integrity of cast, avoid mobility hazards, and report complications that may occur (foul odor, pain, numbness, cracked cast). Applied ordered dressing over wound(s)   Applied cast padding  Applied per Dr. Alvaro Loaiza was applied in the [address] [place] [address] to right lower leg  Applied cast material fiberglass  Allow cast to dry   Instructed patient to report to health care provider, including wound care center, any back pain, hip pain, or leg pain, numbness of toes, or any odor coming from the cast.   Instructed patient not to stick any foreign objects down into cast.  Instructed patient to utilize assistive devices(crutches, cane or walker) as ordered. Instructed patient to continue offloading as directed.      Applied cast per  Guidelines    Electronically signed by Hieu Gómez PT, [facility] on 11/28/2023 at 4:35 PM

## 2023-11-28 NOTE — WOUND CARE
[facility] Instructions [facility]  [facility]  [address], [address]  Phone [phone]   Fax [phone]      NAME:  Ryne Chris OF BIRTH:  1985  MEDICAL RECORD NUMBER:  [id number]  DATE:  11/28/2023    Return Appointment:   1 week with Tyrone Jon DO    Instructions:     Right Plantar Foot:  Cleanse wound with normal saline. Vashe post debridement. Do not get wet in shower. May obtain cast cover to keep dry in shower. Hydrofera Ready: Cut to wound size, place in wound bed, shiny side out. Cover with Mepilex transfer  Secure with Optilock and tape  Apply TCC-EZ 3\" with walking boot. Patient to wear walking boot at all times when standing or walking. Change cast in 1 week at wound center. Do not drive with cast on right foot. Obtain labwork (HgbA1C) at outpatient lab  Obtain refill of antibiotic at pharmacy and take as prescribed until complete  Dr. Marce Regalado for surgery Jan or Feb of next year        Should you experience increased redness, swelling, pain, foul odor, size of wound(s), or have a temperature over 101 degrees please contact the [facility] [address] at [phone] or if after hours contact your primary care physician or go to the hospital emergency department. PLEASE NOTE: IF YOU ARE UNABLE TO OBTAIN WOUND SUPPLIES, CONTINUE TO USE THE SUPPLIES YOU HAVE AVAILABLE UNTIL YOU ARE ABLE TO REACH US. IT IS MOST IMPORTANT TO KEEP THE WOUND COVERED AT ALL TIMES.     Electronically signed Jhon Moya PT, [facility] on 11/28/2023 at 4:28 PM

## 2023-11-28 NOTE — FLOWSHEET NOTE
11/28/23 1504   Right Leg Edema Point of Measurement   Leg circumference 42 cm   Ankle circumference 24 cm   Foot circumference 23.5 cm   Compression Therapy Tubular elastic support bandage   Wound 02/24/23 Right;Plantar #1   Date First Assessed/Time First Assessed: 02/24/23 1314   Present on Hospital Admission: Yes  Primary Wound Type: Diabetic Ulcer  Wound Location Orientation: Right;Plantar  Wound Description (Comments): #1   Wound Image    Wound Etiology Diabetic Elizabeth 3   Dressing Status Old drainage noted   Wound Cleansed Cleansed with saline   Dressing/Treatment Hydrofera blue   Offloading for Diabetic Foot Ulcers Air cast boot   Wound Length (cm) 1.1 cm   Wound Width (cm) 1.7 cm   Wound Depth (cm) 0.1 cm   Wound Surface Area (cm^2) 1.87 cm^2   Change in Wound Size % (l*w) 74.03   Wound Volume (cm^3) 0.187 cm^3   Wound Healing % 87   Wound Assessment Pink/red   Drainage Amount Large (50-75% saturated)   Drainage Description Serosanguinous   Odor Mild   Emily-wound Assessment Maceration   Wound Thickness Description not for Pressure Injury Full thickness   Pain Assessment   Pain Assessment 0-10   Pain Level 4   Patient's Stated Pain Goal 0 - No pain   Pain Location Foot   Pain Orientation Right   Pain Descriptors Aching

## 2023-11-29 LAB
EST. AVERAGE GLUCOSE BLD GHB EST-MCNC: 278 MG/DL
HBA1C MFR BLD: 11.3 % (ref 4.8–5.6)

## 2023-11-30 ENCOUNTER — APPOINTMENT (OUTPATIENT)
Dept: CT IMAGING | Age: 38
End: 2023-11-30
Payer: OTHER MISCELLANEOUS

## 2023-11-30 ENCOUNTER — HOSPITAL ENCOUNTER (EMERGENCY)
Age: 38
Discharge: HOME | End: 2023-11-30
Payer: OTHER MISCELLANEOUS

## 2023-11-30 VITALS
OXYGEN SATURATION: 97 % | DIASTOLIC BLOOD PRESSURE: 71 MMHG | BODY MASS INDEX: 36.37 KG/M2 | HEIGHT: 68 IN | TEMPERATURE: 97.9 F | HEART RATE: 100 BPM | WEIGHT: 240 LBS | SYSTOLIC BLOOD PRESSURE: 118 MMHG | RESPIRATION RATE: 14 BRPM
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
## 2023-11-30 DIAGNOSIS — R91.1 LEFT LOWER LOBE PULMONARY NODULE: ICD-10-CM

## 2023-11-30 DIAGNOSIS — T14.8XXA BRUISING: ICD-10-CM

## 2023-11-30 LAB
ALBUMIN SERPL-MCNC: 3.3 G/DL (ref 3.5–5)
ALBUMIN/GLOB SERPL: 0.9 (ref 0.4–1.6)
ALP SERPL-CCNC: 112 U/L (ref 50–136)
ALT SERPL-CCNC: 27 U/L (ref 12–65)
ANION GAP SERPL CALC-SCNC: 6 MMOL/L (ref 2–11)
AST SERPL-CCNC: 17 U/L (ref 15–37)
BASOPHILS # BLD: 0 K/UL (ref 0–0.2)
BASOPHILS NFR BLD: 0 % (ref 0–2)
BILIRUB SERPL-MCNC: 0.4 MG/DL (ref 0.2–1.1)
BUN SERPL-MCNC: 16 MG/DL (ref 6–23)
CALCIUM SERPL-MCNC: 9.4 MG/DL (ref 8.3–10.4)
CHLORIDE SERPL-SCNC: 104 MMOL/L (ref 101–110)
CO2 SERPL-SCNC: 26 MMOL/L (ref 21–32)
CREAT SERPL-MCNC: 0.65 MG/DL (ref 0.6–1)
DIFFERENTIAL METHOD BLD: ABNORMAL
EOSINOPHIL # BLD: 0.1 K/UL (ref 0–0.8)
EOSINOPHIL NFR BLD: 0 % (ref 0.5–7.8)
ERYTHROCYTE [DISTWIDTH] IN BLOOD BY AUTOMATED COUNT: 14 % (ref 11.9–14.6)
GLOBULIN SER CALC-MCNC: 3.8 G/DL (ref 2.8–4.5)
GLUCOSE SERPL-MCNC: 204 MG/DL (ref 65–100)
HCG UR QL: NEGATIVE
HCT VFR BLD AUTO: 41.5 % (ref 35.8–46.3)
HGB BLD-MCNC: 13.4 G/DL (ref 11.7–15.4)
IMM GRANULOCYTES # BLD AUTO: 0.1 K/UL (ref 0–0.5)
IMM GRANULOCYTES NFR BLD AUTO: 1 % (ref 0–5)
LYMPHOCYTES # BLD: 1.4 K/UL (ref 0.5–4.6)
LYMPHOCYTES NFR BLD: 12 % (ref 13–44)
MCH RBC QN AUTO: 26.5 PG (ref 26.1–32.9)
MCHC RBC AUTO-ENTMCNC: 32.3 G/DL (ref 31.4–35)
MCV RBC AUTO: 82 FL (ref 82–102)
MONOCYTES # BLD: 0.6 K/UL (ref 0.1–1.3)
MONOCYTES NFR BLD: 5 % (ref 4–12)
NEUTS SEG # BLD: 9.3 K/UL (ref 1.7–8.2)
NEUTS SEG NFR BLD: 82 % (ref 43–78)
NRBC # BLD: 0 K/UL (ref 0–0.2)
PLATELET # BLD AUTO: 298 K/UL (ref 150–450)
PMV BLD AUTO: 8.7 FL (ref 9.4–12.3)
POTASSIUM SERPL-SCNC: 4.1 MMOL/L (ref 3.5–5.1)
PROT SERPL-MCNC: 7.1 G/DL (ref 6.3–8.2)
RBC # BLD AUTO: 5.06 M/UL (ref 4.05–5.2)
SODIUM SERPL-SCNC: 136 MMOL/L (ref 133–143)
WBC # BLD AUTO: 11.5 K/UL (ref 4.3–11.1)

## 2023-11-30 PROCEDURE — 85025 COMPLETE CBC W/AUTO DIFF WBC: CPT

## 2023-11-30 PROCEDURE — 71260 CT THORAX DX C+: CPT

## 2023-11-30 PROCEDURE — 81025 URINE PREGNANCY TEST: CPT

## 2023-11-30 PROCEDURE — 6360000002 HC RX W HCPCS

## 2023-11-30 PROCEDURE — 80053 COMPREHEN METABOLIC PANEL: CPT

## 2023-11-30 PROCEDURE — 72125 CT NECK SPINE W/O DYE: CPT

## 2023-11-30 PROCEDURE — 99285 EMERGENCY DEPT VISIT HI MDM: CPT

## 2023-11-30 PROCEDURE — 96374 THER/PROPH/DIAG INJ IV PUSH: CPT

## 2023-11-30 PROCEDURE — 70450 CT HEAD/BRAIN W/O DYE: CPT

## 2023-11-30 PROCEDURE — 6360000004 HC RX CONTRAST MEDICATION

## 2023-11-30 RX ORDER — METHOCARBAMOL 750 MG/1
750 TABLET, FILM COATED ORAL 4 TIMES DAILY
Qty: 28 TABLET | Refills: 0 | Status: SHIPPED | OUTPATIENT
Start: 2023-11-30 | End: 2023-12-07

## 2023-11-30 RX ORDER — KETOROLAC TROMETHAMINE 30 MG/ML
30 INJECTION, SOLUTION INTRAMUSCULAR; INTRAVENOUS
Status: COMPLETED | OUTPATIENT
Start: 2023-11-30 | End: 2023-11-30

## 2023-11-30 RX ADMIN — IOPAMIDOL 100 ML: 755 INJECTION, SOLUTION INTRAVENOUS at 13:58

## 2023-11-30 RX ADMIN — KETOROLAC TROMETHAMINE 30 MG: 30 INJECTION, SOLUTION INTRAMUSCULAR at 13:48

## 2023-11-30 ASSESSMENT — LIFESTYLE VARIABLES: HOW MANY STANDARD DRINKS CONTAINING ALCOHOL DO YOU HAVE ON A TYPICAL DAY: PATIENT DOES NOT DRINK

## 2023-11-30 ASSESSMENT — PAIN SCALES - GENERAL
PAINLEVEL_OUTOF10: 7
PAINLEVEL_OUTOF10: 8

## 2023-11-30 ASSESSMENT — ENCOUNTER SYMPTOMS
BACK PAIN: 0
VOMITING: 0
ABDOMINAL PAIN: 0
NAUSEA: 0
SHORTNESS OF BREATH: 0
COUGH: 0

## 2023-11-30 ASSESSMENT — PAIN DESCRIPTION - FREQUENCY
FREQUENCY: CONTINUOUS
FREQUENCY: CONTINUOUS

## 2023-11-30 ASSESSMENT — PAIN - FUNCTIONAL ASSESSMENT: PAIN_FUNCTIONAL_ASSESSMENT: 0-10

## 2023-11-30 ASSESSMENT — PAIN DESCRIPTION - LOCATION
LOCATION: HEAD;OTHER (COMMENT)
LOCATION: HEAD

## 2023-11-30 ASSESSMENT — PAIN DESCRIPTION - DESCRIPTORS
DESCRIPTORS: ACHING;DISCOMFORT
DESCRIPTORS: ACHING;DISCOMFORT

## 2023-11-30 ASSESSMENT — PAIN DESCRIPTION - PAIN TYPE: TYPE: ACUTE PAIN
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
## 2023-12-03 ENCOUNTER — APPOINTMENT (OUTPATIENT)
Dept: CT IMAGING | Age: 38
End: 2023-12-03
Payer: OTHER MISCELLANEOUS

## 2023-12-03 ENCOUNTER — HOSPITAL ENCOUNTER (EMERGENCY)
Age: 38
Discharge: HOME | End: 2023-12-03
Payer: OTHER MISCELLANEOUS

## 2023-12-03 VITALS
HEART RATE: 81 BPM | OXYGEN SATURATION: 99 % | TEMPERATURE: 97.7 F | SYSTOLIC BLOOD PRESSURE: 141 MMHG | BODY MASS INDEX: 36.49 KG/M2 | DIASTOLIC BLOOD PRESSURE: 74 MMHG | WEIGHT: 240 LBS | RESPIRATION RATE: 18 BRPM

## 2023-12-03 DIAGNOSIS — N39.0 URINARY TRACT INFECTION WITHOUT HEMATURIA, SITE UNSPECIFIED: ICD-10-CM

## 2023-12-03 DIAGNOSIS — R11.2 NAUSEA AND VOMITING, UNSPECIFIED VOMITING TYPE: ICD-10-CM

## 2023-12-03 DIAGNOSIS — G44.309 POST-TRAUMATIC HEADACHE, NOT INTRACTABLE, UNSPECIFIED CHRONICITY PATTERN: Primary | ICD-10-CM

## 2023-12-03 LAB
ALBUMIN SERPL-MCNC: 3.4 G/DL (ref 3.5–5)
ALBUMIN/GLOB SERPL: 0.9 (ref 0.4–1.6)
ALP SERPL-CCNC: 106 U/L (ref 50–136)
ALT SERPL-CCNC: 21 U/L (ref 12–65)
ANION GAP SERPL CALC-SCNC: 5 MMOL/L (ref 2–11)
APPEARANCE UR: ABNORMAL
AST SERPL-CCNC: 12 U/L (ref 15–37)
BACTERIA URNS QL MICRO: 0 /HPF
BASOPHILS # BLD: 0 K/UL (ref 0–0.2)
BASOPHILS NFR BLD: 0 % (ref 0–2)
BILIRUB SERPL-MCNC: 0.3 MG/DL (ref 0.2–1.1)
BILIRUB UR QL: NEGATIVE
BUN SERPL-MCNC: 15 MG/DL (ref 6–23)
CALCIUM SERPL-MCNC: 9.4 MG/DL (ref 8.3–10.4)
CHLORIDE SERPL-SCNC: 101 MMOL/L (ref 101–110)
CO2 SERPL-SCNC: 28 MMOL/L (ref 21–32)
COLOR UR: ABNORMAL
CREAT SERPL-MCNC: 0.74 MG/DL (ref 0.6–1)
DIFFERENTIAL METHOD BLD: ABNORMAL
EOSINOPHIL # BLD: 0 K/UL (ref 0–0.8)
EOSINOPHIL NFR BLD: 0 % (ref 0.5–7.8)
EPI CELLS #/AREA URNS HPF: ABNORMAL /HPF
ERYTHROCYTE [DISTWIDTH] IN BLOOD BY AUTOMATED COUNT: 14 % (ref 11.9–14.6)
GLOBULIN SER CALC-MCNC: 4 G/DL (ref 2.8–4.5)
GLUCOSE SERPL-MCNC: 157 MG/DL (ref 65–100)
GLUCOSE UR STRIP.AUTO-MCNC: 500 MG/DL
HCG UR QL: NEGATIVE
HCT VFR BLD AUTO: 41.7 % (ref 35.8–46.3)
HGB BLD-MCNC: 13.1 G/DL (ref 11.7–15.4)
HGB UR QL STRIP: NEGATIVE
IMM GRANULOCYTES # BLD AUTO: 0 K/UL (ref 0–0.5)
IMM GRANULOCYTES NFR BLD AUTO: 0 % (ref 0–5)
KETONES UR QL STRIP.AUTO: NEGATIVE MG/DL
LEUKOCYTE ESTERASE UR QL STRIP.AUTO: ABNORMAL
LIPASE SERPL-CCNC: 51 U/L (ref 73–393)
LYMPHOCYTES # BLD: 1.2 K/UL (ref 0.5–4.6)
LYMPHOCYTES NFR BLD: 9 % (ref 13–44)
MAGNESIUM SERPL-MCNC: 1.9 MG/DL (ref 1.8–2.4)
MCH RBC QN AUTO: 26 PG (ref 26.1–32.9)
MCHC RBC AUTO-ENTMCNC: 31.4 G/DL (ref 31.4–35)
MCV RBC AUTO: 82.7 FL (ref 82–102)
MONOCYTES # BLD: 0.5 K/UL (ref 0.1–1.3)
MONOCYTES NFR BLD: 4 % (ref 4–12)
NEUTS SEG # BLD: 11 K/UL (ref 1.7–8.2)
NEUTS SEG NFR BLD: 87 % (ref 43–78)
NITRITE UR QL STRIP.AUTO: NEGATIVE
NRBC # BLD: 0 K/UL (ref 0–0.2)
PH UR STRIP: 8.5 (ref 5–9)
PLATELET # BLD AUTO: 318 K/UL (ref 150–450)
PMV BLD AUTO: 8.6 FL (ref 9.4–12.3)
POTASSIUM SERPL-SCNC: 4 MMOL/L (ref 3.5–5.1)
PROT SERPL-MCNC: 7.4 G/DL (ref 6.3–8.2)
PROT UR STRIP-MCNC: 100 MG/DL
RBC # BLD AUTO: 5.04 M/UL (ref 4.05–5.2)
RBC #/AREA URNS HPF: ABNORMAL /HPF
SODIUM SERPL-SCNC: 134 MMOL/L (ref 133–143)
SP GR UR REFRACTOMETRY: 1.03 (ref 1–1.02)
UROBILINOGEN UR QL STRIP.AUTO: 0.2 EU/DL (ref 0.2–1)
WBC # BLD AUTO: 12.7 K/UL (ref 4.3–11.1)
WBC URNS QL MICRO: ABNORMAL /HPF

## 2023-12-03 PROCEDURE — 99284 EMERGENCY DEPT VISIT MOD MDM: CPT

## 2023-12-03 PROCEDURE — 87086 URINE CULTURE/COLONY COUNT: CPT

## 2023-12-03 PROCEDURE — 6360000002 HC RX W HCPCS: Performed by: PHYSICIAN ASSISTANT

## 2023-12-03 PROCEDURE — 96374 THER/PROPH/DIAG INJ IV PUSH: CPT

## 2023-12-03 PROCEDURE — 70450 CT HEAD/BRAIN W/O DYE: CPT

## 2023-12-03 PROCEDURE — 85025 COMPLETE CBC W/AUTO DIFF WBC: CPT

## 2023-12-03 PROCEDURE — 96375 TX/PRO/DX INJ NEW DRUG ADDON: CPT

## 2023-12-03 PROCEDURE — 80053 COMPREHEN METABOLIC PANEL: CPT

## 2023-12-03 PROCEDURE — 83735 ASSAY OF MAGNESIUM: CPT

## 2023-12-03 PROCEDURE — 2580000003 HC RX 258: Performed by: PHYSICIAN ASSISTANT

## 2023-12-03 PROCEDURE — 81001 URINALYSIS AUTO W/SCOPE: CPT

## 2023-12-03 PROCEDURE — 81025 URINE PREGNANCY TEST: CPT

## 2023-12-03 PROCEDURE — 83690 ASSAY OF LIPASE: CPT

## 2023-12-03 RX ORDER — DIPHENHYDRAMINE HYDROCHLORIDE 50 MG/ML
25 INJECTION INTRAMUSCULAR; INTRAVENOUS
Status: COMPLETED | OUTPATIENT
Start: 2023-12-03 | End: 2023-12-03

## 2023-12-03 RX ORDER — 0.9 % SODIUM CHLORIDE 0.9 %
1000 INTRAVENOUS SOLUTION INTRAVENOUS ONCE
Status: COMPLETED | OUTPATIENT
Start: 2023-12-03 | End: 2023-12-03

## 2023-12-03 RX ORDER — METOCLOPRAMIDE HYDROCHLORIDE 5 MG/ML
10 INJECTION INTRAMUSCULAR; INTRAVENOUS
Status: COMPLETED | OUTPATIENT
Start: 2023-12-03 | End: 2023-12-03

## 2023-12-03 RX ORDER — CEPHALEXIN 500 MG/1
500 CAPSULE ORAL 2 TIMES DAILY
Qty: 10 CAPSULE | Refills: 0 | Status: SHIPPED | OUTPATIENT
Start: 2023-12-03 | End: 2023-12-08

## 2023-12-03 RX ADMIN — METOCLOPRAMIDE 10 MG: 5 INJECTION, SOLUTION INTRAMUSCULAR; INTRAVENOUS at 11:39

## 2023-12-03 RX ADMIN — DIPHENHYDRAMINE HYDROCHLORIDE 25 MG: 50 INJECTION INTRAMUSCULAR; INTRAVENOUS at 11:39

## 2023-12-03 RX ADMIN — SODIUM CHLORIDE 1000 ML: 9 INJECTION, SOLUTION INTRAVENOUS at 11:39

## 2023-12-03 ASSESSMENT — ENCOUNTER SYMPTOMS
VOMITING: 1
NAUSEA: 1
RESPIRATORY NEGATIVE: 1

## 2023-12-03 ASSESSMENT — PAIN - FUNCTIONAL ASSESSMENT
PAIN_FUNCTIONAL_ASSESSMENT: 0-10
PAIN_FUNCTIONAL_ASSESSMENT: 0-10

## 2023-12-03 ASSESSMENT — PAIN SCALES - GENERAL
PAINLEVEL_OUTOF10: 6
PAINLEVEL_OUTOF10: 2

## 2023-12-03 NOTE — ED PROVIDER NOTES
is seen. No abnormal  extra-axial fluid collections are seen. No evidence for acute hydrocephalus is  seen. No evidence of midline shift or herniation is seen. No abnormal edema  pattern is seen in a vascular distribution to suggest large artery infarction. Evaluation with bone windows shows no acute osseous changes. The visualized  sinuses, middle ears, and mastoid air cells are well aerated. Impression    1. No acute intracranial process evident by noncontrast CT study of the head. This report was made using voice transcription. Despite my best efforts to avoid  any, transcription errors may persist. If there is any question about the  accuracy of the report or need for clarification, then please call [phone], or text me through perfectserv for clarification or correction.     CBC with Auto Differential   Result Value Ref Range    WBC 12.7 (H) 4.3 - 11.1 K/uL    RBC 5.04 4.05 - 5.2 M/uL    Hemoglobin 13.1 11.7 - 15.4 g/dL    Hematocrit 41.7 35.8 - 46.3 %    MCV 82.7 82.0 - 102.0 FL    MCH 26.0 (L) 26.1 - 32.9 PG    MCHC 31.4 31.4 - 35.0 g/dL    RDW 14.0 11.9 - 14.6 %    Platelets 707 166 - 874 K/uL    MPV 8.6 (L) 9.4 - 12.3 FL    nRBC 0.00 0.0 - 0.2 K/uL    Differential Type AUTOMATED      Neutrophils % 87 (H) 43 - 78 %    Lymphocytes % 9 (L) 13 - 44 %    Monocytes % 4 4.0 - 12.0 %    Eosinophils % 0 (L) 0.5 - 7.8 %    Basophils % 0 0.0 - 2.0 %    Immature Granulocytes 0 0.0 - 5.0 %    Neutrophils Absolute 11.0 (H) 1.7 - 8.2 K/UL    Lymphocytes Absolute 1.2 0.5 - 4.6 K/UL    Monocytes Absolute 0.5 0.1 - 1.3 K/UL    Eosinophils Absolute 0.0 0.0 - 0.8 K/UL    Basophils Absolute 0.0 0.0 - 0.2 K/UL    Absolute Immature Granulocyte 0.0 0.0 - 0.5 K/UL   CMP   Result Value Ref Range    Sodium 134 133 - 143 mmol/L    Potassium 4.0 3.5 - 5.1 mmol/L    Chloride 101 101 - 110 mmol/L    CO2 28 21 - 32 mmol/L    Anion Gap 5 2 - 11 mmol/L    Glucose 157 (H) 65 - 100 mg/dL    BUN 15 6 - 23 MG/DL
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
## 2023-12-06 LAB
BACTERIA SPEC CULT: NORMAL
BACTERIA SPEC CULT: NORMAL
SERVICE CMNT-IMP: NORMAL

## 2024-01-06 ENCOUNTER — APPOINTMENT (OUTPATIENT)
Dept: GENERAL RADIOLOGY | Age: 39
DRG: 872 | End: 2024-01-06
Payer: COMMERCIAL

## 2024-01-06 ENCOUNTER — HOSPITAL ENCOUNTER (EMERGENCY)
Age: 39
Discharge: HOME | DRG: 872 | End: 2024-01-06
Payer: COMMERCIAL

## 2024-01-06 VITALS
BODY MASS INDEX: 34.86 KG/M2 | WEIGHT: 230 LBS | OXYGEN SATURATION: 99 % | TEMPERATURE: 98.6 F | HEART RATE: 118 BPM | HEIGHT: 68 IN | DIASTOLIC BLOOD PRESSURE: 70 MMHG | RESPIRATION RATE: 16 BRPM | SYSTOLIC BLOOD PRESSURE: 104 MMHG

## 2024-01-06 DIAGNOSIS — S91.309A OPEN WOUND OF FOOT EXCLUDING TOES: Primary | ICD-10-CM

## 2024-01-06 LAB
ALBUMIN SERPL-MCNC: 2.8 G/DL (ref 3.5–5)
ALBUMIN/GLOB SERPL: 0.7 (ref 0.4–1.6)
ALP SERPL-CCNC: 99 U/L (ref 50–130)
ALT SERPL-CCNC: 11 U/L (ref 12–65)
ANION GAP SERPL CALC-SCNC: 9 MMOL/L (ref 2–11)
AST SERPL-CCNC: 13 U/L (ref 15–37)
BASOPHILS # BLD: 0 K/UL (ref 0–0.2)
BASOPHILS NFR BLD: 0 % (ref 0–2)
BILIRUB SERPL-MCNC: 0.4 MG/DL (ref 0.2–1.1)
BUN SERPL-MCNC: 20 MG/DL (ref 6–23)
CALCIUM SERPL-MCNC: 9.4 MG/DL (ref 8.3–10.4)
CHLORIDE SERPL-SCNC: 109 MMOL/L (ref 103–113)
CO2 SERPL-SCNC: 21 MMOL/L (ref 21–32)
CREAT SERPL-MCNC: 0.74 MG/DL (ref 0.6–1)
DIFFERENTIAL METHOD BLD: ABNORMAL
EOSINOPHIL # BLD: 0.1 K/UL (ref 0–0.8)
EOSINOPHIL NFR BLD: 1 % (ref 0.5–7.8)
ERYTHROCYTE [DISTWIDTH] IN BLOOD BY AUTOMATED COUNT: 13.8 % (ref 11.9–14.6)
FLUAV RNA SPEC QL NAA+PROBE: NOT DETECTED
FLUBV RNA SPEC QL NAA+PROBE: NOT DETECTED
GLOBULIN SER CALC-MCNC: 3.9 G/DL (ref 2.8–4.5)
GLUCOSE SERPL-MCNC: 188 MG/DL (ref 65–100)
HCT VFR BLD AUTO: 36.8 % (ref 35.8–46.3)
HGB BLD-MCNC: 12.1 G/DL (ref 11.7–15.4)
IMM GRANULOCYTES # BLD AUTO: 0.1 K/UL (ref 0–0.5)
IMM GRANULOCYTES NFR BLD AUTO: 1 % (ref 0–5)
LYMPHOCYTES # BLD: 1.5 K/UL (ref 0.5–4.6)
LYMPHOCYTES NFR BLD: 13 % (ref 13–44)
MCH RBC QN AUTO: 26.7 PG (ref 26.1–32.9)
MCHC RBC AUTO-ENTMCNC: 32.9 G/DL (ref 31.4–35)
MCV RBC AUTO: 81.1 FL (ref 82–102)
MONOCYTES # BLD: 0.9 K/UL (ref 0.1–1.3)
MONOCYTES NFR BLD: 8 % (ref 4–12)
NEUTS SEG # BLD: 9 K/UL (ref 1.7–8.2)
NEUTS SEG NFR BLD: 78 % (ref 43–78)
NRBC # BLD: 0 K/UL (ref 0–0.2)
PLATELET # BLD AUTO: 282 K/UL (ref 150–450)
PMV BLD AUTO: 9.1 FL (ref 9.4–12.3)
POTASSIUM SERPL-SCNC: 4.5 MMOL/L (ref 3.5–5.1)
PROT SERPL-MCNC: 6.7 G/DL (ref 6.3–8.2)
RBC # BLD AUTO: 4.54 M/UL (ref 4.05–5.2)
SARS-COV-2 RDRP RESP QL NAA+PROBE: NOT DETECTED
SODIUM SERPL-SCNC: 139 MMOL/L (ref 136–146)
SOURCE: NORMAL
WBC # BLD AUTO: 11.6 K/UL (ref 4.3–11.1)

## 2024-01-06 PROCEDURE — 87635 SARS-COV-2 COVID-19 AMP PRB: CPT

## 2024-01-06 PROCEDURE — 36416 COLLJ CAPILLARY BLOOD SPEC: CPT

## 2024-01-06 PROCEDURE — 87502 INFLUENZA DNA AMP PROBE: CPT

## 2024-01-06 PROCEDURE — 6370000000 HC RX 637 (ALT 250 FOR IP): Performed by: PHYSICIAN ASSISTANT

## 2024-01-06 PROCEDURE — 71045 X-RAY EXAM CHEST 1 VIEW: CPT

## 2024-01-06 PROCEDURE — 73630 X-RAY EXAM OF FOOT: CPT

## 2024-01-06 PROCEDURE — 80053 COMPREHEN METABOLIC PANEL: CPT

## 2024-01-06 PROCEDURE — 99284 EMERGENCY DEPT VISIT MOD MDM: CPT

## 2024-01-06 PROCEDURE — 85025 COMPLETE CBC W/AUTO DIFF WBC: CPT

## 2024-01-06 RX ORDER — ONDANSETRON 4 MG/1
4 TABLET, ORALLY DISINTEGRATING ORAL
Status: COMPLETED | OUTPATIENT
Start: 2024-01-06 | End: 2024-01-06

## 2024-01-06 RX ORDER — HYDROCODONE BITARTRATE AND ACETAMINOPHEN 5; 325 MG/1; MG/1
1 TABLET ORAL
Status: COMPLETED | OUTPATIENT
Start: 2024-01-06 | End: 2024-01-06

## 2024-01-06 RX ADMIN — ONDANSETRON 4 MG: 4 TABLET, ORALLY DISINTEGRATING ORAL at 16:17

## 2024-01-06 RX ADMIN — HYDROCODONE BITARTRATE AND ACETAMINOPHEN 1 TABLET: 5; 325 TABLET ORAL at 16:17

## 2024-01-06 ASSESSMENT — PAIN SCALES - GENERAL
PAINLEVEL_OUTOF10: 7
PAINLEVEL_OUTOF10: 8

## 2024-01-06 ASSESSMENT — PAIN DESCRIPTION - LOCATION: LOCATION: BACK;HIP;LEG

## 2024-01-06 ASSESSMENT — PAIN DESCRIPTION - ORIENTATION: ORIENTATION: RIGHT

## 2024-01-06 ASSESSMENT — PAIN - FUNCTIONAL ASSESSMENT: PAIN_FUNCTIONAL_ASSESSMENT: 0-10

## 2024-01-06 NOTE — DISCHARGE INSTRUCTIONS
Wash foot daily with antibacterial soap, continue at home meds, change dressing twice a day, call wound clinic o Monday to try to get sooner appt than next Thursday , call your surgeon's office Monday to arrange follow up appt

## 2024-01-06 NOTE — ED TRIAGE NOTES
Patient reports flu exposure last week, has had dry cough and fatigue. Patient reports feeling anxious and has had tightness in right chest. Patient has ongoing wound to right foot with multiple debridement, has pain in foot.

## 2024-01-06 NOTE — ED NOTES
I have reviewed discharge instructions with the patient.  The patient verbalized understanding.    Patient left ED via Discharge Method: wheelchair to Home with self.    Opportunity for questions and clarification provided.       Patient given 0 scripts.         To continue your aftercare when you leave the hospital, you may receive an automated call from our care team to check in on how you are doing.  This is a free service and part of our promise to provide the best care and service to meet your aftercare needs.” If you have questions, or wish to unsubscribe from this service please call [phone].  Thank you for Choosing our [facility] Emergency Department.

## 2024-01-06 NOTE — ED PROVIDER NOTES
Emergency Department Provider Note       PCP: Zeyad Salinas MD   Age: 38 y.o.   Sex: female     DISPOSITION       No diagnosis found.    Medical Decision Making     Complexity of Problems Addressed:  1 or more acute illnesses that pose a threat to life or bodily function.     Data Reviewed and Analyzed:  I independently ordered and reviewed each unique test.  I reviewed external records: provider visit note from PCP.  I reviewed external records: provider visit note from outside specialist.       I interpreted the X-rays chest x-ray unremarkable right foot x-ray shows swelling but no air in the soft tissues no obvious fracture.  I interpreted the CBC CMP normal.    Discussion of management or test interpretation.  38-year-old female with chronic ulcer to the right foot she is scheduled to have surgery to the foot on January 29.  She states she is currently on twice a day doxycycline.  She has missed some appointments with wound care due to the holidays but is scheduled for this coming Thursday.  Wound was cleaned and dressed was advised to change dressing twice a day call the wound center on Monday for possible sooner appointment.  Work note given for the next 3 days she is to elevate the foot and again clean twice a day return for any worsening symptoms      Risk of Complications and/or Morbidity of Patient Management:  Shared medical decision making was utilized in creating the patients health plan today.         History       Pt to er c/o rt sided chest pain  woke with this am about 10:30, also worsening rt foot drainage, for past 3 days,  chills started yesterday, increased fatigue past 2 days, using advil for pain, next wound care appt next Thursday, to have rt foot surgery 1-29-24    Past Medical History:  No date: Asthma  No date: Diabetes mellitus (HCC)  No date: Hypertension            Review of Systems   All other systems reviewed and are negative.      Physical Exam     Vitals signs and      Medications given during this emergency department visit:  Medications - No data to display    New Prescriptions    No medications on file        Past Medical History:   Diagnosis Date    Asthma     Diabetes mellitus (HCC)     Hypertension         History reviewed. No pertinent surgical history.     Social History     Socioeconomic History    Marital status: Single     Spouse name: None    Number of children: None    Years of education: None    Highest education level: None   Tobacco Use    Smoking status: Never    Smokeless tobacco: Never   Vaping Use    Vaping Use: Never used   Substance and Sexual Activity    Alcohol use: Not Currently    Drug use: Not Currently        Previous Medications    ACYCLOVIR (ZOVIRAX) 200 MG CAPSULE        BLOOD GLUCOSE MONITORING SUPPL (FREESTYLE FREEDOM LITE) W/DEVICE KIT        BUSPIRONE (BUSPAR) 15 MG TABLET    Take 15 mg by mouth 2 times daily    CETIRIZINE (ZYRTEC) 10 MG TABLET    Take 1 tablet by mouth daily    CONTINUOUS BLOOD GLUC TRANSMIT (DEXCOM G6 TRANSMITTER) MISC    USE TO CHECK BLOOD GLUCOSE AS DIRECTED    DOXYCYCLINE HYCLATE (VIBRA-TABS) 100 MG TABLET    Take 1 tablet by mouth 2 times daily    FLUOXETINE (PROZAC) 40 MG CAPSULE    Take 1 capsule by mouth daily    IBUPROFEN (ADVIL;MOTRIN) 800 MG TABLET    TAKE 1 TABLET BY MOUTH EVERY 8 HOURS AS NEEDED FOR MODERATE PAIN    INSULIN LISPRO (HUMALOG KWIKPEN) 200 UNIT/ML SOPN PEN    Inject 10 Units into the skin 3 times daily (with meals)    LISINOPRIL-HYDROCHLOROTHIAZIDE (PRINZIDE;ZESTORETIC) 20-12.5 MG PER TABLET    Take 1 tablet by mouth daily    NALOXONE 4 MG/0.1ML LIQD NASAL SPRAY    1 spray once as needed    OZEMPIC, 2 MG/DOSE, 8 MG/3ML SOPN        TRAZODONE (DESYREL) 100 MG TABLET    Take 1 tablet by mouth nightly as needed    TRESIBA FLEXTOUCH 100 UNIT/ML SOPN    Inject 50 Units into the skin at bedtime        No results found for any visits on 01/06/24.     No orders to display                     Voice dictation

## 2024-01-08 ENCOUNTER — HOSPITAL ENCOUNTER (INPATIENT)
Age: 39
LOS: 7 days | Discharge: HOME | DRG: 872 | End: 2024-01-15
Attending: EMERGENCY MEDICINE | Admitting: HOSPITALIST
Payer: COMMERCIAL

## 2024-01-08 ENCOUNTER — APPOINTMENT (OUTPATIENT)
Dept: GENERAL RADIOLOGY | Age: 39
DRG: 872 | End: 2024-01-08
Payer: COMMERCIAL

## 2024-01-08 DIAGNOSIS — L08.9 RIGHT FOOT INFECTION: ICD-10-CM

## 2024-01-08 DIAGNOSIS — M86.671 CHRONIC REFRACTORY OSTEOMYELITIS OF FOOT, RIGHT (HCC): Chronic | ICD-10-CM

## 2024-01-08 DIAGNOSIS — A41.9 SEPTICEMIA (HCC): Primary | ICD-10-CM

## 2024-01-08 LAB
ALBUMIN SERPL-MCNC: 2.8 G/DL (ref 3.5–5)
ALBUMIN/GLOB SERPL: 0.6 (ref 0.4–1.6)
ALP SERPL-CCNC: 125 U/L (ref 50–136)
ALT SERPL-CCNC: 17 U/L (ref 12–65)
ANION GAP SERPL CALC-SCNC: 7 MMOL/L (ref 2–11)
AST SERPL-CCNC: 9 U/L (ref 15–37)
BASOPHILS # BLD: 0 K/UL (ref 0–0.2)
BASOPHILS NFR BLD: 0 % (ref 0–2)
BILIRUB SERPL-MCNC: 0.6 MG/DL (ref 0.2–1.1)
BUN SERPL-MCNC: 17 MG/DL (ref 6–23)
CALCIUM SERPL-MCNC: 9.3 MG/DL (ref 8.3–10.4)
CHLORIDE SERPL-SCNC: 103 MMOL/L (ref 103–113)
CO2 SERPL-SCNC: 24 MMOL/L (ref 21–32)
CREAT SERPL-MCNC: 0.97 MG/DL (ref 0.6–1)
DIFFERENTIAL METHOD BLD: ABNORMAL
EOSINOPHIL # BLD: 0 K/UL (ref 0–0.8)
EOSINOPHIL NFR BLD: 0 % (ref 0.5–7.8)
ERYTHROCYTE [DISTWIDTH] IN BLOOD BY AUTOMATED COUNT: 13.3 % (ref 11.9–14.6)
FLUAV RNA SPEC QL NAA+PROBE: NOT DETECTED
FLUBV RNA SPEC QL NAA+PROBE: NOT DETECTED
GLOBULIN SER CALC-MCNC: 4.8 G/DL (ref 2.8–4.5)
GLUCOSE BLD STRIP.AUTO-MCNC: 271 MG/DL (ref 65–100)
GLUCOSE SERPL-MCNC: 298 MG/DL (ref 65–100)
HCT VFR BLD AUTO: 35.6 % (ref 35.8–46.3)
HGB BLD-MCNC: 11.6 G/DL (ref 11.7–15.4)
IMM GRANULOCYTES # BLD AUTO: 0 K/UL (ref 0–0.5)
IMM GRANULOCYTES NFR BLD AUTO: 0 % (ref 0–5)
LACTATE SERPL-SCNC: 1.2 MMOL/L (ref 0.4–2)
LACTATE SERPL-SCNC: 2.6 MMOL/L (ref 0.4–2)
LYMPHOCYTES # BLD: 0.3 K/UL (ref 0.5–4.6)
LYMPHOCYTES NFR BLD: 3 % (ref 13–44)
MCH RBC QN AUTO: 26.6 PG (ref 26.1–32.9)
MCHC RBC AUTO-ENTMCNC: 32.6 G/DL (ref 31.4–35)
MCV RBC AUTO: 81.7 FL (ref 82–102)
MONOCYTES # BLD: 0.9 K/UL (ref 0.1–1.3)
MONOCYTES NFR BLD: 8 % (ref 4–12)
NEUTS SEG # BLD: 9.1 K/UL (ref 1.7–8.2)
NEUTS SEG NFR BLD: 88 % (ref 43–78)
NRBC # BLD: 0 K/UL (ref 0–0.2)
PLATELET # BLD AUTO: 344 K/UL (ref 150–450)
PMV BLD AUTO: 8.7 FL (ref 9.4–12.3)
POTASSIUM SERPL-SCNC: 3.6 MMOL/L (ref 3.5–5.1)
PROCALCITONIN SERPL-MCNC: 0.96 NG/ML (ref 0–0.49)
PROT SERPL-MCNC: 7.6 G/DL (ref 6.3–8.2)
RBC # BLD AUTO: 4.36 M/UL (ref 4.05–5.2)
SARS-COV-2 RDRP RESP QL NAA+PROBE: NOT DETECTED
SERVICE CMNT-IMP: ABNORMAL
SODIUM SERPL-SCNC: 134 MMOL/L (ref 136–146)
SOURCE: NORMAL
WBC # BLD AUTO: 10.4 K/UL (ref 4.3–11.1)

## 2024-01-08 PROCEDURE — 87070 CULTURE OTHR SPECIMN AEROBIC: CPT

## 2024-01-08 PROCEDURE — 84145 PROCALCITONIN (PCT): CPT

## 2024-01-08 PROCEDURE — 99285 EMERGENCY DEPT VISIT HI MDM: CPT

## 2024-01-08 PROCEDURE — 6360000002 HC RX W HCPCS: Performed by: HOSPITALIST

## 2024-01-08 PROCEDURE — 83605 ASSAY OF LACTIC ACID: CPT

## 2024-01-08 PROCEDURE — 2580000003 HC RX 258: Performed by: HOSPITALIST

## 2024-01-08 PROCEDURE — 81001 URINALYSIS AUTO W/SCOPE: CPT

## 2024-01-08 PROCEDURE — 6370000000 HC RX 637 (ALT 250 FOR IP): Performed by: EMERGENCY MEDICINE

## 2024-01-08 PROCEDURE — 96375 TX/PRO/DX INJ NEW DRUG ADDON: CPT

## 2024-01-08 PROCEDURE — 87154 CUL TYP ID BLD PTHGN 6+ TRGT: CPT

## 2024-01-08 PROCEDURE — 87205 SMEAR GRAM STAIN: CPT

## 2024-01-08 PROCEDURE — 87502 INFLUENZA DNA AMP PROBE: CPT

## 2024-01-08 PROCEDURE — 87186 SC STD MICRODIL/AGAR DIL: CPT

## 2024-01-08 PROCEDURE — 85025 COMPLETE CBC W/AUTO DIFF WBC: CPT

## 2024-01-08 PROCEDURE — 96365 THER/PROPH/DIAG IV INF INIT: CPT

## 2024-01-08 PROCEDURE — 80053 COMPREHEN METABOLIC PANEL: CPT

## 2024-01-08 PROCEDURE — 6370000000 HC RX 637 (ALT 250 FOR IP): Performed by: HOSPITALIST

## 2024-01-08 PROCEDURE — 87040 BLOOD CULTURE FOR BACTERIA: CPT

## 2024-01-08 PROCEDURE — 93005 ELECTROCARDIOGRAM TRACING: CPT | Performed by: EMERGENCY MEDICINE

## 2024-01-08 PROCEDURE — 87077 CULTURE AEROBIC IDENTIFY: CPT

## 2024-01-08 PROCEDURE — 2580000003 HC RX 258: Performed by: EMERGENCY MEDICINE

## 2024-01-08 PROCEDURE — 71045 X-RAY EXAM CHEST 1 VIEW: CPT

## 2024-01-08 PROCEDURE — 6360000002 HC RX W HCPCS: Performed by: EMERGENCY MEDICINE

## 2024-01-08 PROCEDURE — 1100000000 HC RM PRIVATE

## 2024-01-08 PROCEDURE — 96366 THER/PROPH/DIAG IV INF ADDON: CPT

## 2024-01-08 PROCEDURE — 82962 GLUCOSE BLOOD TEST: CPT

## 2024-01-08 PROCEDURE — 87076 CULTURE ANAEROBE IDENT EACH: CPT

## 2024-01-08 PROCEDURE — 87635 SARS-COV-2 COVID-19 AMP PRB: CPT

## 2024-01-08 RX ORDER — INSULIN LISPRO 100 [IU]/ML
0-4 INJECTION, SOLUTION INTRAVENOUS; SUBCUTANEOUS NIGHTLY
Status: DISCONTINUED | OUTPATIENT
Start: 2024-01-08 | End: 2024-01-10 | Stop reason: HOSPADM

## 2024-01-08 RX ORDER — INSULIN LISPRO 100 [IU]/ML
0-8 INJECTION, SOLUTION INTRAVENOUS; SUBCUTANEOUS
Status: DISCONTINUED | OUTPATIENT
Start: 2024-01-08 | End: 2024-01-10 | Stop reason: HOSPADM

## 2024-01-08 RX ORDER — SODIUM CHLORIDE 9 MG/ML
INJECTION, SOLUTION INTRAVENOUS PRN
Status: DISCONTINUED | OUTPATIENT
Start: 2024-01-08 | End: 2024-01-10 | Stop reason: HOSPADM

## 2024-01-08 RX ORDER — CLINDAMYCIN HYDROCHLORIDE 150 MG/1
300 CAPSULE ORAL EVERY 8 HOURS SCHEDULED
Status: DISCONTINUED | OUTPATIENT
Start: 2024-01-08 | End: 2024-01-09

## 2024-01-08 RX ORDER — 0.9 % SODIUM CHLORIDE 0.9 %
1000 INTRAVENOUS SOLUTION INTRAVENOUS
Status: COMPLETED | OUTPATIENT
Start: 2024-01-08 | End: 2024-01-08

## 2024-01-08 RX ORDER — POTASSIUM CHLORIDE 20 MEQ/1
40 TABLET, EXTENDED RELEASE ORAL PRN
Status: DISCONTINUED | OUTPATIENT
Start: 2024-01-08 | End: 2024-01-10 | Stop reason: HOSPADM

## 2024-01-08 RX ORDER — CLONIDINE HYDROCHLORIDE 0.1 MG/1
0.2 TABLET ORAL 2 TIMES DAILY PRN
Status: DISCONTINUED | OUTPATIENT
Start: 2024-01-08 | End: 2024-01-10 | Stop reason: HOSPADM

## 2024-01-08 RX ORDER — ONDANSETRON 2 MG/ML
4 INJECTION INTRAMUSCULAR; INTRAVENOUS
Status: COMPLETED | OUTPATIENT
Start: 2024-01-08 | End: 2024-01-08

## 2024-01-08 RX ORDER — IPRATROPIUM BROMIDE AND ALBUTEROL SULFATE 2.5; .5 MG/3ML; MG/3ML
1 SOLUTION RESPIRATORY (INHALATION) EVERY 4 HOURS PRN
Status: DISCONTINUED | OUTPATIENT
Start: 2024-01-08 | End: 2024-01-10 | Stop reason: HOSPADM

## 2024-01-08 RX ORDER — POTASSIUM CHLORIDE 7.45 MG/ML
10 INJECTION INTRAVENOUS PRN
Status: DISCONTINUED | OUTPATIENT
Start: 2024-01-08 | End: 2024-01-10 | Stop reason: HOSPADM

## 2024-01-08 RX ORDER — SODIUM CHLORIDE 0.9 % (FLUSH) 0.9 %
5-40 SYRINGE (ML) INJECTION PRN
Status: DISCONTINUED | OUTPATIENT
Start: 2024-01-08 | End: 2024-01-10 | Stop reason: HOSPADM

## 2024-01-08 RX ORDER — HYDROCODONE BITARTRATE AND ACETAMINOPHEN 7.5; 325 MG/1; MG/1
1 TABLET ORAL EVERY 4 HOURS PRN
Status: DISCONTINUED | OUTPATIENT
Start: 2024-01-08 | End: 2024-01-09

## 2024-01-08 RX ORDER — MAGNESIUM SULFATE IN WATER 40 MG/ML
2000 INJECTION, SOLUTION INTRAVENOUS PRN
Status: DISCONTINUED | OUTPATIENT
Start: 2024-01-08 | End: 2024-01-10 | Stop reason: HOSPADM

## 2024-01-08 RX ORDER — SODIUM CHLORIDE 0.9 % (FLUSH) 0.9 %
5-40 SYRINGE (ML) INJECTION EVERY 12 HOURS SCHEDULED
Status: DISCONTINUED | OUTPATIENT
Start: 2024-01-08 | End: 2024-01-10 | Stop reason: HOSPADM

## 2024-01-08 RX ORDER — ACETAMINOPHEN 325 MG/1
650 TABLET ORAL EVERY 6 HOURS PRN
Status: DISCONTINUED | OUTPATIENT
Start: 2024-01-08 | End: 2024-01-10 | Stop reason: HOSPADM

## 2024-01-08 RX ORDER — INSULIN GLARGINE 100 [IU]/ML
20 INJECTION, SOLUTION SUBCUTANEOUS NIGHTLY
Status: DISCONTINUED | OUTPATIENT
Start: 2024-01-08 | End: 2024-01-09

## 2024-01-08 RX ORDER — ONDANSETRON 2 MG/ML
4 INJECTION INTRAMUSCULAR; INTRAVENOUS EVERY 6 HOURS PRN
Status: DISCONTINUED | OUTPATIENT
Start: 2024-01-08 | End: 2024-01-10 | Stop reason: HOSPADM

## 2024-01-08 RX ORDER — INSULIN LISPRO 100 [IU]/ML
7 INJECTION, SOLUTION INTRAVENOUS; SUBCUTANEOUS
Status: DISCONTINUED | OUTPATIENT
Start: 2024-01-08 | End: 2024-01-10 | Stop reason: HOSPADM

## 2024-01-08 RX ORDER — ONDANSETRON 4 MG/1
4 TABLET, ORALLY DISINTEGRATING ORAL EVERY 8 HOURS PRN
Status: DISCONTINUED | OUTPATIENT
Start: 2024-01-08 | End: 2024-01-10 | Stop reason: HOSPADM

## 2024-01-08 RX ORDER — ACETAMINOPHEN 500 MG
1000 TABLET ORAL ONCE
Status: COMPLETED | OUTPATIENT
Start: 2024-01-08 | End: 2024-01-08

## 2024-01-08 RX ORDER — ACETAMINOPHEN 650 MG/1
650 SUPPOSITORY RECTAL EVERY 6 HOURS PRN
Status: DISCONTINUED | OUTPATIENT
Start: 2024-01-08 | End: 2024-01-10 | Stop reason: HOSPADM

## 2024-01-08 RX ORDER — POLYETHYLENE GLYCOL 3350 17 G/17G
17 POWDER, FOR SOLUTION ORAL DAILY PRN
Status: DISCONTINUED | OUTPATIENT
Start: 2024-01-08 | End: 2024-01-10 | Stop reason: HOSPADM

## 2024-01-08 RX ORDER — ONDANSETRON 4 MG/1
4 TABLET, ORALLY DISINTEGRATING ORAL EVERY 8 HOURS PRN
Status: DISCONTINUED | OUTPATIENT
Start: 2024-01-08 | End: 2024-01-08 | Stop reason: SDUPTHER

## 2024-01-08 RX ORDER — ENOXAPARIN SODIUM 100 MG/ML
30 INJECTION SUBCUTANEOUS 2 TIMES DAILY
Status: DISCONTINUED | OUTPATIENT
Start: 2024-01-08 | End: 2024-01-10 | Stop reason: HOSPADM

## 2024-01-08 RX ADMIN — Medication 2500 MG: at 17:32

## 2024-01-08 RX ADMIN — ACETAMINOPHEN 1000 MG: 500 TABLET, FILM COATED ORAL at 17:17

## 2024-01-08 RX ADMIN — ONDANSETRON 4 MG: 2 INJECTION INTRAMUSCULAR; INTRAVENOUS at 17:16

## 2024-01-08 RX ADMIN — WATER 1000 MG: 1 INJECTION INTRAMUSCULAR; INTRAVENOUS; SUBCUTANEOUS at 17:27

## 2024-01-08 RX ADMIN — CLINDAMYCIN HYDROCHLORIDE 300 MG: 150 CAPSULE ORAL at 23:15

## 2024-01-08 RX ADMIN — HYDROMORPHONE HYDROCHLORIDE 1 MG: 1 INJECTION, SOLUTION INTRAMUSCULAR; INTRAVENOUS; SUBCUTANEOUS at 17:15

## 2024-01-08 RX ADMIN — ONDANSETRON 4 MG: 2 INJECTION INTRAMUSCULAR; INTRAVENOUS at 22:45

## 2024-01-08 RX ADMIN — HYDROMORPHONE HYDROCHLORIDE 1 MG: 1 INJECTION, SOLUTION INTRAMUSCULAR; INTRAVENOUS; SUBCUTANEOUS at 22:44

## 2024-01-08 RX ADMIN — SODIUM CHLORIDE, PRESERVATIVE FREE 10 ML: 5 INJECTION INTRAVENOUS at 23:16

## 2024-01-08 RX ADMIN — SODIUM CHLORIDE 1000 ML: 9 INJECTION, SOLUTION INTRAVENOUS at 17:13

## 2024-01-08 ASSESSMENT — PAIN SCALES - GENERAL
PAINLEVEL_OUTOF10: 8
PAINLEVEL_OUTOF10: 9

## 2024-01-08 ASSESSMENT — PAIN DESCRIPTION - DESCRIPTORS: DESCRIPTORS: ACHING

## 2024-01-08 ASSESSMENT — PAIN DESCRIPTION - LOCATION
LOCATION: FOOT
LOCATION: ABDOMEN

## 2024-01-08 ASSESSMENT — PAIN - FUNCTIONAL ASSESSMENT: PAIN_FUNCTIONAL_ASSESSMENT: 0-10

## 2024-01-08 NOTE — ED TRIAGE NOTES
Patient advises last Wednesday she started having increased pain to right foot with diabetic wound present, since that time patient is with fever and abdominal pain with nausea, vomiting and diarrhea. Patient advises recent sugar around 170s.

## 2024-01-08 NOTE — ED PROVIDER NOTES
Emergency Department Provider Note       PCP: Zeyad Salinas MD   Age: 38 y.o.   Sex: female     DISPOSITION Decision To Admit 01/08/2024 06:31:46 PM       ICD-10-CM    1. Septicemia (HCC)  A41.9       2. Right foot infection  L08.9           Medical Decision Making     Complexity of Problems Addressed:  1 or more acute illnesses that pose a threat to life or bodily function.     Data Reviewed and Analyzed:   I independently ordered and reviewed each unique test.  I reviewed external records: provider visit note from PCP.  I reviewed external records: provider visit note from outside specialist.  I reviewed external records: previous lab results from outside ED.  I reviewed external records: previous imaging study including radiologist interpretation.       I independently ordered and interpreted the ED EKG in the absence of a Cardiologist.    Rate: 133  EKG Interpretation: EKG Interpretation: sinus rhythm, no evidence of arrhythmia  ST Segments: Normal ST segments - NO STEMI      I independently interpreted the cardiac monitor rhythm strip sinus tach.  I interpreted the X-rays infiltrate present.    Discussion of management or test interpretation.  Patient presents the emergency department today with sepsis, source is most likely from a right soft tissue infection/possible osteomyelitis.  However, she also has a possible infiltrate on chest x-ray.  Will give ceftriaxone (patient has tolerated cephalosporins in the past) and vancomycin.  Will begin fluid resuscitation with 1 L normal saline.  Lactic acid is pending at this time.  Will plan on admission to the hospital as she may need expedited surgery on her right foot for partial amputation.  It does look like she is scheduled for the surgery on January 29.  The patient was admitted and I have discussed patient management with the admitting provider.    Risk of Complications and/or Morbidity of Patient Management:  Parental controlled substances given  Units into the skin 3 times daily (with meals)    LISINOPRIL-HYDROCHLOROTHIAZIDE (PRINZIDE;ZESTORETIC) 20-12.5 MG PER TABLET    Take 1 tablet by mouth daily    NALOXONE 4 MG/0.1ML LIQD NASAL SPRAY    1 spray once as needed    OZEMPIC, 2 MG/DOSE, 8 MG/3ML SOPN        TRAZODONE (DESYREL) 100 MG TABLET    Take 1 tablet by mouth nightly as needed    TRESIBA FLEXTOUCH 100 UNIT/ML SOPN    Inject 50 Units into the skin at bedtime        Results for orders placed or performed during the hospital encounter of 01/08/24   COVID-19, Rapid    Specimen: Nasopharyngeal   Result Value Ref Range    Source NASAL SWAB      SARS-CoV-2, Rapid Not detected NOTD     Influenza A/B, Molecular    Specimen: Nasopharyngeal   Result Value Ref Range    Influenza A, ROBERTO Not detected NOTD      Influenza B, ROBERTO Not detected NOTD     XR CHEST PORTABLE    Narrative    Chest X-ray    INDICATION: Sepsis    COMPARISON: Chest radiograph on 624    AP/PA view of the chest was obtained.    FINDINGS: Subtle bibasilar opacities most pronounced left lung base. No gross  effusion. No pneumothorax..  The heart size is normal.  The bony thorax is  intact.        Impression    Subtle bibasilar opacities most pronounced left lung base likely  atelectasis and less likely an early pneumonia.     Comprehensive Metabolic Panel   Result Value Ref Range    Sodium 134 (L) 136 - 146 mmol/L    Potassium 3.6 3.5 - 5.1 mmol/L    Chloride 103 103 - 113 mmol/L    CO2 24 21 - 32 mmol/L    Anion Gap 7 2 - 11 mmol/L    Glucose 298 (H) 65 - 100 mg/dL    BUN 17 6 - 23 MG/DL    Creatinine 0.97 0.6 - 1.0 MG/DL    Est, Glom Filt Rate >60 >60 ml/min/1.73m2    Calcium 9.3 8.3 - 10.4 MG/DL    Total Bilirubin 0.6 0.2 - 1.1 MG/DL    ALT 17 12 - 65 U/L    AST 9 (L) 15 - 37 U/L    Alk Phosphatase 125 50 - 136 U/L    Total Protein 7.6 6.3 - 8.2 g/dL    Albumin 2.8 (L) 3.5 - 5.0 g/dL    Globulin 4.8 (H) 2.8 - 4.5 g/dL    Albumin/Globulin Ratio 0.6 0.4 - 1.6     CBC with Auto Differential

## 2024-01-09 ENCOUNTER — ANESTHESIA EVENT (OUTPATIENT)
Dept: SURGERY | Age: 39
End: 2024-01-09
Payer: COMMERCIAL

## 2024-01-09 ENCOUNTER — APPOINTMENT (OUTPATIENT)
Dept: GENERAL RADIOLOGY | Age: 39
DRG: 872 | End: 2024-01-09
Payer: COMMERCIAL

## 2024-01-09 DIAGNOSIS — L97.413 DIABETIC ULCER OF RIGHT MIDFOOT ASSOCIATED WITH TYPE 2 DIABETES MELLITUS, WITH NECROSIS OF MUSCLE (HCC): Primary | ICD-10-CM

## 2024-01-09 DIAGNOSIS — E11.621 DIABETIC ULCER OF RIGHT MIDFOOT ASSOCIATED WITH TYPE 2 DIABETES MELLITUS, WITH NECROSIS OF MUSCLE (HCC): Primary | ICD-10-CM

## 2024-01-09 PROBLEM — R11.0 NAUSEA: Status: ACTIVE | Noted: 2024-01-09

## 2024-01-09 PROBLEM — L08.9 RIGHT FOOT INFECTION: Status: ACTIVE | Noted: 2024-01-09

## 2024-01-09 LAB
APPEARANCE UR: ABNORMAL
BACTERIA URNS QL MICRO: 0 /HPF
BILIRUB UR QL: NEGATIVE
COLOR UR: ABNORMAL
EKG ATRIAL RATE: 133 BPM
EKG DIAGNOSIS: NORMAL
EKG P AXIS: 41 DEGREES
EKG P-R INTERVAL: 140 MS
EKG Q-T INTERVAL: 318 MS
EKG QRS DURATION: 74 MS
EKG QTC CALCULATION (BAZETT): 473 MS
EKG R AXIS: 73 DEGREES
EKG T AXIS: 48 DEGREES
EKG VENTRICULAR RATE: 133 BPM
EPI CELLS #/AREA URNS HPF: ABNORMAL /HPF
GLUCOSE BLD STRIP.AUTO-MCNC: 123 MG/DL (ref 65–100)
GLUCOSE BLD STRIP.AUTO-MCNC: 131 MG/DL (ref 65–100)
GLUCOSE BLD STRIP.AUTO-MCNC: 183 MG/DL (ref 65–100)
GLUCOSE BLD STRIP.AUTO-MCNC: 323 MG/DL (ref 65–100)
GLUCOSE UR STRIP.AUTO-MCNC: >1000 MG/DL
HGB UR QL STRIP: ABNORMAL
KETONES UR QL STRIP.AUTO: NEGATIVE MG/DL
LEUKOCYTE ESTERASE UR QL STRIP.AUTO: NEGATIVE
NITRITE UR QL STRIP.AUTO: NEGATIVE
PH UR STRIP: 5.5 (ref 5–9)
PROT UR STRIP-MCNC: 100 MG/DL
RBC #/AREA URNS HPF: ABNORMAL /HPF
SERVICE CMNT-IMP: ABNORMAL
SP GR UR REFRACTOMETRY: >1.035 (ref 1–1.02)
UROBILINOGEN UR QL STRIP.AUTO: 1 EU/DL (ref 0.2–1)
VANCOMYCIN SERPL-MCNC: 20.7 UG/ML
WBC URNS QL MICRO: ABNORMAL /HPF

## 2024-01-09 PROCEDURE — 73620 X-RAY EXAM OF FOOT: CPT

## 2024-01-09 PROCEDURE — 82962 GLUCOSE BLOOD TEST: CPT

## 2024-01-09 PROCEDURE — 2580000003 HC RX 258: Performed by: HOSPITALIST

## 2024-01-09 PROCEDURE — 6360000002 HC RX W HCPCS: Performed by: HOSPITALIST

## 2024-01-09 PROCEDURE — 6360000002 HC RX W HCPCS: Performed by: NURSE PRACTITIONER

## 2024-01-09 PROCEDURE — 87641 MR-STAPH DNA AMP PROBE: CPT

## 2024-01-09 PROCEDURE — 1100000000 HC RM PRIVATE

## 2024-01-09 PROCEDURE — 6370000000 HC RX 637 (ALT 250 FOR IP): Performed by: NURSE PRACTITIONER

## 2024-01-09 PROCEDURE — 36415 COLL VENOUS BLD VENIPUNCTURE: CPT

## 2024-01-09 PROCEDURE — 6370000000 HC RX 637 (ALT 250 FOR IP): Performed by: HOSPITALIST

## 2024-01-09 PROCEDURE — 93010 ELECTROCARDIOGRAM REPORT: CPT | Performed by: INTERNAL MEDICINE

## 2024-01-09 PROCEDURE — 97161 PT EVAL LOW COMPLEX 20 MIN: CPT

## 2024-01-09 PROCEDURE — 99221 1ST HOSP IP/OBS SF/LOW 40: CPT | Performed by: PHYSICIAN ASSISTANT

## 2024-01-09 PROCEDURE — 97530 THERAPEUTIC ACTIVITIES: CPT

## 2024-01-09 PROCEDURE — 80202 ASSAY OF VANCOMYCIN: CPT

## 2024-01-09 RX ORDER — OXYCODONE AND ACETAMINOPHEN 10; 325 MG/1; MG/1
1 TABLET ORAL EVERY 6 HOURS PRN
Status: DISCONTINUED | OUTPATIENT
Start: 2024-01-09 | End: 2024-01-10 | Stop reason: HOSPADM

## 2024-01-09 RX ORDER — DEXTROSE MONOHYDRATE 100 MG/ML
INJECTION, SOLUTION INTRAVENOUS CONTINUOUS PRN
Status: DISCONTINUED | OUTPATIENT
Start: 2024-01-09 | End: 2024-01-10 | Stop reason: HOSPADM

## 2024-01-09 RX ORDER — CLINDAMYCIN PHOSPHATE 600 MG/50ML
600 INJECTION, SOLUTION INTRAVENOUS EVERY 8 HOURS
Status: DISCONTINUED | OUTPATIENT
Start: 2024-01-09 | End: 2024-01-10 | Stop reason: HOSPADM

## 2024-01-09 RX ORDER — CLINDAMYCIN PHOSPHATE 600 MG/50ML
600 INJECTION, SOLUTION INTRAVENOUS EVERY 8 HOURS
Status: DISCONTINUED | OUTPATIENT
Start: 2024-01-09 | End: 2024-01-09 | Stop reason: SDUPTHER

## 2024-01-09 RX ORDER — BUSPIRONE HYDROCHLORIDE 5 MG/1
30 TABLET ORAL 2 TIMES DAILY
Status: DISCONTINUED | OUTPATIENT
Start: 2024-01-09 | End: 2024-01-10 | Stop reason: HOSPADM

## 2024-01-09 RX ORDER — TRAZODONE HYDROCHLORIDE 50 MG/1
100 TABLET ORAL NIGHTLY PRN
Status: DISCONTINUED | OUTPATIENT
Start: 2024-01-09 | End: 2024-01-10 | Stop reason: HOSPADM

## 2024-01-09 RX ORDER — PROCHLORPERAZINE EDISYLATE 5 MG/ML
10 INJECTION INTRAMUSCULAR; INTRAVENOUS EVERY 6 HOURS PRN
Status: DISCONTINUED | OUTPATIENT
Start: 2024-01-09 | End: 2024-01-10 | Stop reason: HOSPADM

## 2024-01-09 RX ORDER — FLUOXETINE HYDROCHLORIDE 20 MG/1
40 CAPSULE ORAL DAILY
Status: DISCONTINUED | OUTPATIENT
Start: 2024-01-09 | End: 2024-01-10 | Stop reason: HOSPADM

## 2024-01-09 RX ADMIN — HYDROMORPHONE HYDROCHLORIDE 1 MG: 1 INJECTION, SOLUTION INTRAMUSCULAR; INTRAVENOUS; SUBCUTANEOUS at 11:46

## 2024-01-09 RX ADMIN — INSULIN LISPRO 6 UNITS: 100 INJECTION, SOLUTION INTRAVENOUS; SUBCUTANEOUS at 08:06

## 2024-01-09 RX ADMIN — TRAZODONE HYDROCHLORIDE 100 MG: 50 TABLET ORAL at 01:02

## 2024-01-09 RX ADMIN — ONDANSETRON 4 MG: 2 INJECTION INTRAMUSCULAR; INTRAVENOUS at 06:05

## 2024-01-09 RX ADMIN — CLINDAMYCIN HYDROCHLORIDE 300 MG: 150 CAPSULE ORAL at 06:04

## 2024-01-09 RX ADMIN — HYDROMORPHONE HYDROCHLORIDE 1 MG: 1 INJECTION, SOLUTION INTRAMUSCULAR; INTRAVENOUS; SUBCUTANEOUS at 03:46

## 2024-01-09 RX ADMIN — INSULIN HUMAN 17 UNITS: 100 INJECTION, SUSPENSION SUBCUTANEOUS at 16:27

## 2024-01-09 RX ADMIN — INSULIN LISPRO 7 UNITS: 100 INJECTION, SOLUTION INTRAVENOUS; SUBCUTANEOUS at 16:27

## 2024-01-09 RX ADMIN — VANCOMYCIN HYDROCHLORIDE 1250 MG: 10 INJECTION, POWDER, LYOPHILIZED, FOR SOLUTION INTRAVENOUS at 11:49

## 2024-01-09 RX ADMIN — CLINDAMYCIN PHOSPHATE 600 MG: 600 INJECTION, SOLUTION INTRAVENOUS at 15:44

## 2024-01-09 RX ADMIN — HYDROCODONE BITARTRATE AND ACETAMINOPHEN 1 TABLET: 7.5; 325 TABLET ORAL at 06:03

## 2024-01-09 RX ADMIN — OXYCODONE AND ACETAMINOPHEN 1 TABLET: 10; 325 TABLET ORAL at 22:32

## 2024-01-09 RX ADMIN — ONDANSETRON 4 MG: 2 INJECTION INTRAMUSCULAR; INTRAVENOUS at 11:44

## 2024-01-09 RX ADMIN — SODIUM CHLORIDE, PRESERVATIVE FREE 10 ML: 5 INJECTION INTRAVENOUS at 08:06

## 2024-01-09 RX ADMIN — CLINDAMYCIN PHOSPHATE 600 MG: 600 INJECTION, SOLUTION INTRAVENOUS at 22:37

## 2024-01-09 RX ADMIN — PROCHLORPERAZINE EDISYLATE 10 MG: 5 INJECTION INTRAMUSCULAR; INTRAVENOUS at 16:33

## 2024-01-09 RX ADMIN — BUSPIRONE HYDROCHLORIDE 30 MG: 5 TABLET ORAL at 22:32

## 2024-01-09 RX ADMIN — FLUOXETINE 40 MG: 20 CAPSULE ORAL at 08:05

## 2024-01-09 RX ADMIN — INSULIN LISPRO 7 UNITS: 100 INJECTION, SOLUTION INTRAVENOUS; SUBCUTANEOUS at 08:05

## 2024-01-09 RX ADMIN — SODIUM CHLORIDE, PRESERVATIVE FREE 10 ML: 5 INJECTION INTRAVENOUS at 20:10

## 2024-01-09 RX ADMIN — OXYCODONE AND ACETAMINOPHEN 1 TABLET: 10; 325 TABLET ORAL at 16:33

## 2024-01-09 RX ADMIN — BUSPIRONE HYDROCHLORIDE 30 MG: 5 TABLET ORAL at 08:05

## 2024-01-09 RX ADMIN — INSULIN LISPRO 7 UNITS: 100 INJECTION, SOLUTION INTRAVENOUS; SUBCUTANEOUS at 11:48

## 2024-01-09 RX ADMIN — TRAZODONE HYDROCHLORIDE 100 MG: 50 TABLET ORAL at 22:32

## 2024-01-09 ASSESSMENT — PAIN SCALES - GENERAL
PAINLEVEL_OUTOF10: 8
PAINLEVEL_OUTOF10: 8
PAINLEVEL_OUTOF10: 10
PAINLEVEL_OUTOF10: 9
PAINLEVEL_OUTOF10: 6

## 2024-01-09 ASSESSMENT — PAIN DESCRIPTION - LOCATION
LOCATION: FOOT
LOCATION: FOOT
LOCATION: FOOT;HIP
LOCATION: FOOT
LOCATION: FOOT

## 2024-01-09 ASSESSMENT — PAIN DESCRIPTION - ORIENTATION: ORIENTATION: RIGHT

## 2024-01-09 NOTE — PROGRESS NOTES
TRANSFER - IN REPORT:    Verbal report received from MICK Pedraza on Deepa Cruz  [place] [facility] [place] of [place] care      Report consisted of patient's Situation, Background, Assessment and   Recommendations(SBAR).     Information from the following report(s) Nurse Handoff Report was reviewed with the receiving nurse.    Opportunity for questions and clarification was provided.      Assessment completed upon patient's arrival to unit and care assumed.

## 2024-01-09 NOTE — DIABETES MGMT
Patient admitted with sepsis. Admitting blood glucose 298. Blood glucose ranged 271-298 yesterday with patient receiving no diabetes medications. Blood glucose this morning was 323. Most recent poc glucose 123. Creatinine 0.97. GFR >60. Reviewed patient current regimen: Humalog correctional insulin, Humalog 7 units with meals, and Lantus 20 units nightly. Noted per chart review patient refused Lantus stating she is allergic to this. Patient would likely benefit from NPH in place of basal insulin to aid in compliance Provider updated via [employer] regarding recommendations and patient glycemic control. Update at 1331: note glucose 123. If glucose continues to trend down or patient appetite decreases patient may benefit from a reduction in prandial insulin to reduce risk of hypoglycemia. Per chart review A1c 11.3% November 28, 2023.

## 2024-01-09 NOTE — CONSULTS
[facility]  Consultation Note    Patient ID:  Name: Deepa Cruz  MRN: [id number]  AGE: 38 y.o.  : 1985    Date of Consultation:  2024  Referring Physician:  Hospitalist     Subjective: Pt complains of right plantar foot wound with increased drainage since last Wednesday. She has had a wound here for over 2 years and had been seeing the wound center for treatments without much improvement. She met with Dr. Nicole in November who recommended a transmetatarsal amputation which the patient declined. She repeated that desire again today and refuses an amputation. She was scheduled by Dr. Nicole for metatarsal head resection on  but presented to the hospital due to increased drainage and myalgias and subjective fevers.  She has increased pain in the right foot that she says radiates up her right leg to her hip. She has a history of chronic low back pain. She denies any groin pain. They have no other orthopedic concerns at this time.      Past Medical History Includes:   Past Medical History:   Diagnosis Date    Asthma     Diabetes mellitus (HCC)     Hypertension    , No past surgical history on file.  Family History: No family history on file.   Social History:   Social History     Tobacco Use    Smoking status: Never    Smokeless tobacco: Never   Substance Use Topics    Alcohol use: Not Currently       ALLERGIES:   Allergies   Allergen Reactions    Morphine Anaphylaxis    Metformin Nausea And Vomiting and Nausea Only    Amoxicillin Hives    Gabapentin Diarrhea    Mirtazapine Other (See Comments) and Seizure     seizure  seizure  seizure  Other reaction(s): Other (See Comments)  seizure  seizure  seizure  seizure  seizure      Sulfamethoxazole-Trimethoprim Diarrhea and Other (See Comments)    Insulin Glargine-Lixisenatide Diarrhea    Metformin And Related Rash    Penicillins Rash, Hives and Other (See Comments)     Other reaction(s): Rash-Allergy          Patient Medications    Current  Oral BID PRN    ipratropium 0.5 mg-albuterol 2.5 mg (DUONEB) nebulizer solution 1 Dose  1 Dose Inhalation Q4H PRN    clindamycin (CLEOCIN) capsule 300 mg  300 mg Oral 3 times per day    ondansetron (ZOFRAN-ODT) disintegrating tablet 4 mg  4 mg Oral Q8H PRN    vancomycin (VANCOCIN) 1250 mg in sodium chloride 0.9% 250 mL IVPB  1,250 mg IntraVENous Q18H         Review of Systems:  Pertinent items are noted in HPI.    Physical Exam:      General: NAD, Alert, Oriented x 3, morbidly obese    Mental Status: Appropriate   Psych: Normal Affect, Normal Mood    HEENT: Normal Cephalic/Atraumatic, PERRL   Lungs: Respirations even and unlabored, Breath Sounds were clear, no respiratory distress   Heart: Regular Rate and Rhythm   Vascular: Distal pulses intact  Skin: unchanged from image on chart from . No ascending redness. Ulcerated wound appears the same with no granulation.   Musculoskeletal: no pain on dorsiflexion/plantarflexion of the right ankle. Tenderness of the wound   Lymphatic: No lympahdenopathy, No distal edema   Neuro: No gross deficits   Abdomen: Soft, Non tender, No distension      VITALS: Patient Vitals for the past 8 hrs:   BP Temp Temp src Pulse Resp SpO2   24 0817 101/65 97.9 °F (36.6 °C) Oral 89 16 97 %   24 0719 111/77 98 °F (36.7 °C) Oral 89 16 95 %   24 0340 120/85 -- -- 90 -- --    , Temp (24hrs), Av °F (37.2 °C), Min:97.9 °F (36.6 °C), Max:101 °F (38.3 °C)           Diagnosis   Patient Active Problem List   Diagnosis    Right hip pain    Diabetic ulcer of right midfoot associated with type 2 diabetes mellitus, with necrosis of muscle (HCC)    Chronic refractory osteomyelitis of foot, right (HCC)    Otalgia, bilateral    Charcot's arthropathy of forefoot    Right foot pain    Sepsis (HCC)          Assessment      Charcot arthropathy of the right foot   Acute on Chronic OM of the right foot   Right diabetic foot wound - non-healing     Medical Decision Making:     X-rays were

## 2024-01-09 NOTE — H&P
[unfilled]    INTERNAL MEDICINE H&P/CONSULT    Subjective:     38 years old female with history of DM, HTN, asthma, presents with worsening right foot drainage a/w chronic OM, for past 3 days,  chills started yesterday, increased fatigue past 2 days, using advil for pain, next wound care appt next Thursday, to have rt foot surgery 1-29-24 by Dr Nicole.  On further questioning, she had fever and not much pain. Hx of mrsa in wound but had some foul smell to her this week. She ha recent debridement.    Past Medical History:   Diagnosis Date    Asthma     Diabetes mellitus (HCC)     Hypertension       No past surgical history on file.   Prior to Admission medications    Medication Sig Start Date End Date Taking? Authorizing Provider   traZODone (DESYREL) 100 MG tablet Take 1 tablet by mouth nightly as needed 9/19/23   Jace Del Rio MD   busPIRone (BUSPAR) 15 MG tablet Take 15 mg by mouth 2 times daily 10/31/23   Jace Del Rio MD   doxycycline hyclate (VIBRA-TABS) 100 MG tablet Take 1 tablet by mouth 2 times daily 11/28/23 2/26/24  Cristian Da Silva DO   OZEMPIC, 2 MG/DOSE, 8 MG/3ML SOPN  8/12/23   Jace Del Rio MD   acyclovir (ZOVIRAX) 200 MG capsule  4/12/23   Jace Del Rio MD   Blood Glucose Monitoring Suppl (FREESTYLE FREEDOM LITE) w/Device KIT  5/26/23   Jace Del Rio MD   naloxone 4 MG/0.1ML LIQD nasal spray 1 spray once as needed 3/16/22   Jace Del Rio MD   insulin lispro (HUMALOG KWIKPEN) 200 UNIT/ML SOPN pen Inject 10 Units into the skin 3 times daily (with meals) 2/1/23   Jace Del Rio MD   TRESIBA FLEXTOUCH 100 UNIT/ML SOPN Inject 50 Units into the skin at bedtime 2/18/23   Jace Del Rio MD   ibuprofen (ADVIL;MOTRIN) 800 MG tablet TAKE 1 TABLET BY MOUTH EVERY 8 HOURS AS NEEDED FOR MODERATE PAIN 2/18/23   Jace Del Rio MD   FLUoxetine (PROZAC) 40 MG capsule Take 1 capsule by mouth daily 2/1/23   Jace Del Rio MD  Immature Granulocytes 0 0.0 - 5.0 %    Neutrophils Absolute 9.1 (H) 1.7 - 8.2 K/UL    Lymphocytes Absolute 0.3 (L) 0.5 - 4.6 K/UL    Monocytes Absolute 0.9 0.1 - 1.3 K/UL    Eosinophils Absolute 0.0 0.0 - 0.8 K/UL    Basophils Absolute 0.0 0.0 - 0.2 K/UL    Absolute Immature Granulocyte 0.0 0.0 - 0.5 K/UL   Lactate, Sepsis    Collection Time: 01/08/24  4:11 PM   Result Value Ref Range    Lactic Acid, Sepsis 2.6 (H) 0.4 - 2.0 MMOL/L   Procalcitonin    Collection Time: 01/08/24  4:11 PM   Result Value Ref Range    Procalcitonin 0.96 (H) 0.00 - 0.49 ng/mL   COVID-19, Rapid    Collection Time: 01/08/24  4:11 PM    Specimen: Nasopharyngeal   Result Value Ref Range    Source NASAL SWAB      SARS-CoV-2, Rapid Not detected NOTD     Influenza A/B, Molecular    Collection Time: 01/08/24  4:11 PM    Specimen: Nasopharyngeal   Result Value Ref Range    Influenza A, ROBERTO Not detected NOTD      Influenza B, ROBERTO Not detected NOTD         Assessment:     1- Right foot ulcer infection, with sepsis, w/ chronic osteomyelitis and MRSA growth  2- NIDDM, not well controlled  3- Hyponatremia, mild, to follow  4- History of HTN, asthma. Stable.    Plan:     Clindamycin+ Vancomycin IV  Pain control  Follow blood and wound cultures  Surgery consulted  Blood pressure control  Lantus/ Humalog  AM lab as needed    Continue essential home medications.  Patient is full code.  Further management depends on patient progress.  Thank you for the oppourtinity to contribute in the care of your patient.  Time 40 minutes.    Signed By: Norberto Santana MD     January 8, 2024

## 2024-01-09 NOTE — PERIOP NOTE
Notified MICK Lane regarding arrival time for surgery 1/10/2024 at 11 AM.  Verbalized understanding.

## 2024-01-09 NOTE — CARE COORDINATION
01/09/24 1304   Service Assessment   Patient Orientation Alert and Oriented   Cognition Alert   History Provided By Patient   Primary Caregiver Self   Support Systems Children   Patient's Healthcare Decision Maker is: Legal Next of Kin   PCP Verified by CM Yes   Last Visit to PCP Within last 6 months   Prior Functional Level Independent in ADLs/IADLs   Current Functional Level Independent in ADLs/IADLs   Can patient return to prior living arrangement Yes   Ability to make needs known: Good   Family able to assist with home care needs: Yes   Would you like for me to discuss the discharge plan with any other family members/significant others, and if so, who? Yes   Financial Resources Other (Comment)  (commerical)   Community Resources None   Social/Functional History   Lives With Significant other   Condition of Participation: Discharge Planning   The Plan for Transition of Care is related to the following treatment goals: return home   The Patient and/or Patient Representative was provided with a Choice of Provider? Patient   The Patient and/Or Patient Representative agree with the Discharge Plan? Yes   Freedom of Choice list was provided with basic dialogue that supports the patient's individualized plan of care/goals, treatment preferences, and shares the quality data associated with the providers?  Yes     Assessment completed with patient in room. Patient lives with boyfriend and children., she reports being IND with all ADLS at baseline. Discharge plan is to return home. No discharge needs identified at this time.    Adrienne LONDON, ACM  [place]

## 2024-01-09 NOTE — PLAN OF CARE
Problem: Discharge Planning  Goal: Discharge to home or other facility with appropriate resources  Outcome: Progressing     Problem: Pain  Goal: Verbalizes/displays adequate comfort level or baseline comfort level  Outcome: Progressing     Problem: Skin/Tissue Integrity  Goal: Absence of new skin breakdown  Description: 1.  Monitor for areas of redness and/or skin breakdown  2.  Assess vascular access sites hourly  3.  Every 4-6 hours minimum:  Change oxygen saturation probe site  4.  Every 4-6 hours:  If on nasal continuous positive airway pressure, respiratory therapy assess nares and determine need for appliance change or resting period.  Outcome: Progressing     Problem: ABCDS Injury Assessment  Goal: Absence of physical injury  Outcome: Progressing

## 2024-01-09 NOTE — PROGRESS NOTES
VANCO DAILY FOLLOW UP NOTE  [facility]   Pharmacy Pharmacokinetic Monitoring Service - Vancomycin    Consulting Provider: Esther   Indication: SSTI  Target Concentration: Goal AUC/ELIZABETH 400-600 mg*hr/L  Day of Therapy: 1  Additional Antimicrobials: clindamycin PO    Patient eligible for piperacillin-tazobactam to cefepime auto-substitution per P&T approved protocol? N/A    Pertinent Laboratory Values:   Wt Readings from Last 1 Encounters:   01/08/24 104.3 kg (230 lb)     Temp Readings from Last 1 Encounters:   01/08/24 99.1 °F (37.3 °C) (Oral)     Recent Labs     01/06/24  1608 01/08/24  1611 01/08/24  1849   BUN 20 17  --    CREATININE 0.74 0.97  --    WBC 11.6* 10.4  --    PROCAL  --  0.96*  --    LACTSEPSIS  --  2.6* 1.2     Estimated Creatinine Clearance: 97 mL/min (based on SCr of 0.97 mg/dL).    No results found for: \"VANCOTROUGH\", \"VANCORANDOM\"    MRSA Nasal Swab: N/A. Non-respiratory infection    Assessment:  Date/Time Dose Concentration AUC         Note: Serum concentrations collected for AUC dosing may appear elevated if collected in close proximity to the dose administered, this is not necessarily an indication of toxicity    Plan:  Dosing recommendations based on Bayesian software  Start vancomycin 1250mg q18h  Anticipated AUC of 453 and trough concentration of 11.4 at steady state  Renal labs as indicated   Vancomycin concentrations will be ordered as clinically appropriate   Pharmacy will continue to monitor patient and adjust therapy as indicated    Thank you for the consult,  Nayely [facility]

## 2024-01-09 NOTE — PROGRESS NOTES
Hospitalist Progress Note   Admit Date:  2024  4:36 PM   Name:  Deepa Cruz   Age:  38 y.o.  Sex:  female  :  1985   MRN:  [id number]   Room:  [facility]    Presenting Complaint: Fever, Emesis, Abdominal Pain, and Wound Infection     Reason(s) for Admission: Septicemia (HCC) [A41.9]  Right foot infection [L08.9]  Sepsis (HCC) [A41.9]     Hospital Course:     Deepa cruz is a 38 yr old female with hx of charcots, T2DM, OM, DM wound,   HTN, who has been seeing out pt ortho surgeon Dr Nicole for her chronic OM, DM wound of the plantar surface of the right foot, that over the past few days has been draining more than usual and w a foul odor. She was already planned for an out pt surgical procedure ? Debridement or other, w Dr Nicole on 24.   Last xray rev'd from 24 showing Interval increase in diffuse soft tissue swelling. There is no evidence of fracture or other acute bony abnormality in the foot. No bony lesions are seen.  On arrival to the ED pt had a fever at 101, tachy HR of 138, WBC ct 10.4, PCT 0.96, and a LA level 2.6. Bld cx drawn. Dx sepsis 2/2 foot wound. Started on antibx and hospitalist to admit.     Subjective & 24hr Events (24):   Pt states nauseous this am despite zofran, asking for something else for nausea, will try compazine.   Having foot pain, norco po and IV dilaudid does not work for her, percocet does, will change pain meds.  PO clindamycin making her sick / nauseous, requesting her antibx be IV.   Xray this am showing - Persistent and increased subcutaneous air plantar base of the great toe, related to wound.  Persistent soft tissue swelling in the distal forefoot and great toe, likely soft tissue infection. Acute on chronic osteomyelitis great toe.  Ortho PA saw and pt does not want amputation, discussing w Dr Nicole next move.   I d/w Andry Austin and no need for a MRI at this time  IV antibx for now  All questions answered at bedside.     Assessment & Plan:  incubation. Further results to follow after overnight incubation.   POCT Glucose    Collection Time: 01/09/24  7:16 AM   Result Value Ref Range    POC Glucose 323 (H) 65 - 100 mg/dL    Performed by: Paty    POCT Glucose    Collection Time: 01/09/24 11:34 AM   Result Value Ref Range    POC Glucose 123 (H) 65 - 100 mg/dL    Performed by: Queta        I have personally reviewed imaging studies:  Other Studies:  XR FOOT RIGHT (2 VIEWS)   Final Result   1.  Persistent and increased subcutaneous air plantar base of the great toe,   related to wound.  Persistent soft tissue swelling in the distal forefoot and   great toe, likely soft tissue infection.   2.  Acute on chronic osteomyelitis great toe.      XR CHEST PORTABLE   Final Result   Subtle bibasilar opacities most pronounced left lung base likely   atelectasis and less likely an early pneumonia.             Current Meds:  Current Facility-Administered Medications   Medication Dose Route Frequency    busPIRone (BUSPAR) tablet 30 mg  30 mg Oral BID    FLUoxetine (PROZAC) capsule 40 mg  40 mg Oral Daily    traZODone (DESYREL) tablet 100 mg  100 mg Oral Nightly PRN    glucose chewable tablet 16 g  4 tablet Oral PRN    dextrose bolus 10% 125 mL  125 mL IntraVENous PRN    Or    dextrose bolus 10% 250 mL  250 mL IntraVENous PRN    glucagon (rDNA) injection 1 mg  1 mg SubCUTAneous PRN    dextrose 10 % infusion   IntraVENous Continuous PRN    insulin NPH (HumuLIN N;NovoLIN N) injection vial 17 Units  17 Units SubCUTAneous BID AC    prochlorperazine (COMPAZINE) injection 10 mg  10 mg IntraVENous Q6H PRN    oxyCODONE-acetaminophen (PERCOCET)  MG per tablet 1 tablet  1 tablet Oral Q6H PRN    clindamycin (CLEOCIN) 600 mg in 50 mL NS IVPB  600 mg IntraVENous Q8H    sodium chloride flush 0.9 % injection 5-40 mL  5-40 mL IntraVENous 2 times per day    sodium chloride flush 0.9 % injection 5-40 mL  5-40 mL IntraVENous PRN    0.9 % sodium chloride infusion

## 2024-01-09 NOTE — ED NOTES
TRANSFER - OUT REPORT:    Verbal report given to Manisha on Deepa Cruz  being transferred to [facility] for routine progression of patient care       Report consisted of patient's Situation, Background, Assessment and   Recommendations(SBAR).     Information from the following report(s) Nurse Handoff Report, ED Encounter Summary, ED SBAR, and MAR was reviewed with the receiving nurse.    Lines:   Peripheral IV 01/08/24 Right Antecubital (Active)       Peripheral IV 01/08/24 Left;Proximal;Ventral Forearm (Active)   Site Assessment Clean, dry & intact;Positional 01/08/24 1736   Line Status Specimen collected;Capped;Flushed;Normal saline locked;Brisk blood return 01/08/24 1736   Phlebitis Assessment Erythema at access site with or without pain 01/08/24 1736   Infiltration Assessment 1 01/08/24 1736   Dressing Status New dressing applied;Clean, dry & intact 01/08/24 1736   Dressing Type Transparent 01/08/24 1736   Dressing Intervention New 01/08/24 1736        Opportunity for questions and clarification was provided.      Patient transported with:  [employer]

## 2024-01-09 NOTE — PROGRESS NOTES
VANCO DAILY FOLLOW UP NOTE  [facility]   Pharmacy Pharmacokinetic Monitoring Service - Vancomycin    Consulting Provider: Esther   Indication: SSTI  Target Concentration: Goal AUC/ELIZABETH 400-600 mg*hr/L  Day of Therapy: 2  Additional Antimicrobials: clindamycin    Patient eligible for piperacillin-tazobactam to cefepime auto-substitution per P&T approved protocol? N/A    Pertinent Laboratory Values:   Wt Readings from Last 1 Encounters:   01/08/24 104.3 kg (230 lb)     Temp Readings from Last 1 Encounters:   01/09/24 97.9 °F (36.6 °C) (Oral)     Recent Labs     01/06/24  1608 01/08/24  1611 01/08/24  1849   BUN 20 17  --    CREATININE 0.74 0.97  --    WBC 11.6* 10.4  --    PROCAL  --  0.96*  --    LACTSEPSIS  --  2.6* 1.2     Estimated Creatinine Clearance: 97 mL/min (based on SCr of 0.97 mg/dL).    Lab Results   Component Value Date/Time    VANCORANDOM 20.7 01/09/2024 02:16 PM       MRSA Nasal Swab: N/A. Non-respiratory infection    Assessment:  Date/Time Dose Concentration AUC   1/9 1416 1250mg IV q18h 20.7 425   Note: Serum concentrations collected for AUC dosing may appear elevated if collected in close proximity to the dose administered, this is not necessarily an indication of toxicity    Plan:  Current dosing regimen is therapeutic  Continue current dose  Repeat vancomycin concentrations will be ordered as clinically appropriate   Pharmacy will continue to monitor patient and adjust therapy as indicated    Thank you for the consult,  Tyshawn Dunn, MengD, BCPS  1/9/2024

## 2024-01-09 NOTE — PROGRESS NOTES
ACUTE PHYSICAL THERAPY GOALS:   (Developed with and agreed upon by patient and/or caregiver.)   Patient will demonstrate independence with bed mobility within 1 treatment day.  -GOAL MET 1/9/24   Patient will demonstrate independence with sit <> stand transitions within 1 treatment day.  -GOAL MET 1/9/24   Patient will demonstrate independence with mobility with appropriate off-loading shoe within 1 treatment day.  -GOAL MET 1/9/24       PHYSICAL THERAPY Initial Assessment and Discharge  (Link to Caseload Tracking: PT Visit Days : 1  Acknowledge Orders  Time In/Out  PT Charge Capture  Rehab Caseload Tracker    Deepa Cruz is a 38 y.o. female   PRIMARY DIAGNOSIS: Sepsis (HCC)  Septicemia (HCC) [A41.9]  Right foot infection [L08.9]  Sepsis (HCC) [A41.9]       Reason for Referral: Unspecified Lack of Coordination (R27.9)  Inpatient: Payor: YESSY / Plan: [employer]MO / Product Type: *No Product type* /     ASSESSMENT:     REHAB RECOMMENDATIONS:   Recommendation to date pending progress:  Setting:  No further skilled physical therapy after discharge from hospital    Equipment:     Off-loading shoe     ASSESSMENT:  Ms. Cruz admitted with above diagnoses.  Patient has been dealing with her R foot wound for quite awhile and is scheduled for a metatarsal head resection with Dr. Nicole on 1/29 (has refused the recommended transmetatarsal amputation.  Patient is followed by wound care with orders for walking boot at all times when standing or walking. Patient's walking boot has been creating increased swelling-wound center aware.  Her pressure relief shoe was to have an insert that did not work so the shoe is not providing any relief.  Patient was provided with an off-loading shoe and was able to demonstrate independence with mobility while wearing it.  Patient is not demonstrating any acute or d/c PT needs at this time.       [facility] AM-PAC™ “6 Clicks” Basic Mobility Inpatient Short Form  AM-PAC Basic

## 2024-01-09 NOTE — WOUND CARE
Observed pictures of wounds from 12/22/23. Nurse today states area is about the same as the pictures with pink base , more edema now and erythema. Noted ortho consulted and may move planned outpatient surgery up and do soon, if possible. Recommend to cleanse with wound  and apply small foam dressing daily. Will follow  and plan to see tomorrow. If orthopedics chooses a different dressing defer to them, if surgery is tomorrow can follow after surgery, please call.  Nurse updated for today.

## 2024-01-09 NOTE — PROGRESS NOTES
==> I was notified of possible general surgery consult on this patient per Hospitalist PAWAN Wade related to a right foot plantar surface ulcer with Known Osteomyelitis and HX MRSA.      ==> I discussed with on call Surgeon ( Dr. Hansen) and she recommends either an Ortho MD or Podiatry consult butt not General Surgery.       I have relayed this information to Hospitalist NP Xenia Wade.

## 2024-01-09 NOTE — ANESTHESIA PRE PROCEDURE
Department of Anesthesiology  Preprocedure Note       Name:  Deepa Cruz   Age:  38 y.o.  :  1985                                          MRN:  [id number]         Date:  2024      Surgeon: Surgeon(s):  James Nicole MD    Procedure: Procedure(s):  RIGHT FOOT I & D pt @ [facility]  need podiatry tray ans Vanco powder    Medications prior to admission:   Prior to Admission medications    Medication Sig Start Date End Date Taking? Authorizing Provider   traZODone (DESYREL) 100 MG tablet Take 1 tablet by mouth nightly as needed 23   Jace Del Rio MD   busPIRone (BUSPAR) 15 MG tablet Take 30 mg by mouth 2 times daily 10/31/23   Jace Del Rio MD   doxycycline hyclate (VIBRA-TABS) 100 MG tablet Take 1 tablet by mouth 2 times daily 23  Cristian Da Silva DO   OZEMPIC, 2 MG/DOSE, 8 MG/3ML SOPN  23   Jace Del Rio MD   acyclovir (ZOVIRAX) 200 MG capsule Take 1 capsule by mouth 2 times daily 23   Jace Del Rio MD   Blood Glucose Monitoring Suppl (FREESTYLE FREEDOM LITE) w/Device KIT  23   Jace Del Rio MD   naloxone 4 MG/0.1ML LIQD nasal spray 1 spray once as needed 3/16/22   Jace Del Rio MD   insulin lispro (HUMALOG KWIKPEN) 200 UNIT/ML SOPN pen Inject 10 Units into the skin 3 times daily (with meals) 23   Jace Del Rio MD   TRESIBA FLEXTOUCH 100 UNIT/ML SOPN Inject 50 Units into the skin at bedtime 23   Jace Del Rio MD   ibuprofen (ADVIL;MOTRIN) 800 MG tablet TAKE 1 TABLET BY MOUTH EVERY 8 HOURS AS NEEDED FOR MODERATE PAIN 23   Jace Del Rio MD   FLUoxetine (PROZAC) 40 MG capsule Take 1 capsule by mouth daily 23   Jace Del Rio MD   Continuous Blood Gluc Transmit (DEXCOM G6 TRANSMITTER) MISC USE TO CHECK BLOOD GLUCOSE AS DIRECTED 22   Jace Del Rio MD   cetirizine (ZYRTEC) 10 MG tablet Take 1 tablet by mouth daily  Patient not taking: Reported on 2024

## 2024-01-10 ENCOUNTER — HOSPITAL ENCOUNTER (OUTPATIENT)
Age: 39
Setting detail: OUTPATIENT SURGERY
Discharge: ANOTHER HOSPITAL | End: 2024-01-10
Attending: ORTHOPAEDIC SURGERY | Admitting: ORTHOPAEDIC SURGERY
Payer: COMMERCIAL

## 2024-01-10 ENCOUNTER — APPOINTMENT (OUTPATIENT)
Dept: GENERAL RADIOLOGY | Age: 39
End: 2024-01-10
Attending: ORTHOPAEDIC SURGERY
Payer: COMMERCIAL

## 2024-01-10 ENCOUNTER — ANESTHESIA (OUTPATIENT)
Dept: SURGERY | Age: 39
End: 2024-01-10
Payer: COMMERCIAL

## 2024-01-10 VITALS
SYSTOLIC BLOOD PRESSURE: 115 MMHG | RESPIRATION RATE: 16 BRPM | HEART RATE: 78 BPM | DIASTOLIC BLOOD PRESSURE: 68 MMHG | OXYGEN SATURATION: 98 % | TEMPERATURE: 98 F

## 2024-01-10 DIAGNOSIS — L08.9 RIGHT FOOT INFECTION: ICD-10-CM

## 2024-01-10 PROBLEM — E87.6 HYPOKALEMIA: Status: ACTIVE | Noted: 2024-01-10

## 2024-01-10 LAB
ACCESSION NUMBER, LLC1M: NORMAL
ACINETOBACTER CALCOAC BAUMANNII COMPLEX BY PCR: NOT DETECTED
ALBUMIN SERPL-MCNC: 2.4 G/DL (ref 3.5–5)
ALBUMIN/GLOB SERPL: 0.6 (ref 0.4–1.6)
ALP SERPL-CCNC: 112 U/L (ref 50–136)
ALT SERPL-CCNC: 20 U/L (ref 12–65)
ANION GAP SERPL CALC-SCNC: 2 MMOL/L (ref 2–11)
AST SERPL-CCNC: 15 U/L (ref 15–37)
B FRAGILIS DNA BLD POS QL NAA+NON-PROBE: NOT DETECTED
BASOPHILS # BLD: 0 K/UL (ref 0–0.2)
BASOPHILS NFR BLD: 0 % (ref 0–2)
BILIRUB SERPL-MCNC: 0.2 MG/DL (ref 0.2–1.1)
BIOFIRE TEST COMMENT: NORMAL
BUN SERPL-MCNC: 15 MG/DL (ref 6–23)
C ALBICANS DNA BLD POS QL NAA+NON-PROBE: NOT DETECTED
C AURIS DNA BLD POS QL NAA+NON-PROBE: NOT DETECTED
C GATTII+NEOFOR DNA BLD POS QL NAA+N-PRB: NOT DETECTED
C GLABRATA DNA BLD POS QL NAA+NON-PROBE: NOT DETECTED
C KRUSEI DNA BLD POS QL NAA+NON-PROBE: NOT DETECTED
C PARAP DNA BLD POS QL NAA+NON-PROBE: NOT DETECTED
C TROPICLS DNA BLD POS QL NAA+NON-PROBE: NOT DETECTED
CALCIUM SERPL-MCNC: 8.4 MG/DL (ref 8.3–10.4)
CHLORIDE SERPL-SCNC: 106 MMOL/L (ref 103–113)
CO2 SERPL-SCNC: 30 MMOL/L (ref 21–32)
CREAT SERPL-MCNC: 0.7 MG/DL (ref 0.6–1)
CRP SERPL-MCNC: 9.3 MG/DL (ref 0–0.9)
DIFFERENTIAL METHOD BLD: ABNORMAL
E CLOAC COMP DNA BLD POS NAA+NON-PROBE: NOT DETECTED
E COLI DNA BLD POS QL NAA+NON-PROBE: NOT DETECTED
E FAECALIS DNA BLD POS QL NAA+NON-PROBE: NOT DETECTED
E FAECIUM DNA BLD POS QL NAA+NON-PROBE: NOT DETECTED
ENTEROBACTERALES DNA BLD POS NAA+N-PRB: NOT DETECTED
EOSINOPHIL # BLD: 0.2 K/UL (ref 0–0.8)
EOSINOPHIL NFR BLD: 4 % (ref 0.5–7.8)
ERYTHROCYTE [DISTWIDTH] IN BLOOD BY AUTOMATED COUNT: 13.5 % (ref 11.9–14.6)
EST. AVERAGE GLUCOSE BLD GHB EST-MCNC: 194 MG/DL
GLOBULIN SER CALC-MCNC: 3.9 G/DL (ref 2.8–4.5)
GLUCOSE BLD STRIP.AUTO-MCNC: 155 MG/DL (ref 65–100)
GLUCOSE BLD STRIP.AUTO-MCNC: 158 MG/DL (ref 65–100)
GLUCOSE BLD STRIP.AUTO-MCNC: 178 MG/DL (ref 65–100)
GLUCOSE SERPL-MCNC: 162 MG/DL (ref 65–100)
GP B STREP DNA BLD POS QL NAA+NON-PROBE: NOT DETECTED
HAEM INFLU DNA BLD POS QL NAA+NON-PROBE: NOT DETECTED
HBA1C MFR BLD: 8.4 % (ref 4.8–5.6)
HCT VFR BLD AUTO: 31.4 % (ref 35.8–46.3)
HGB BLD-MCNC: 9.9 G/DL (ref 11.7–15.4)
IMM GRANULOCYTES # BLD AUTO: 0 K/UL (ref 0–0.5)
IMM GRANULOCYTES NFR BLD AUTO: 0 % (ref 0–5)
K OXYTOCA DNA BLD POS QL NAA+NON-PROBE: NOT DETECTED
KLEBSIELLA SP DNA BLD POS QL NAA+NON-PRB: NOT DETECTED
KLEBSIELLA SP DNA BLD POS QL NAA+NON-PRB: NOT DETECTED
L MONOCYTOG DNA BLD POS QL NAA+NON-PROBE: NOT DETECTED
LYMPHOCYTES # BLD: 1.2 K/UL (ref 0.5–4.6)
LYMPHOCYTES NFR BLD: 24 % (ref 13–44)
MCH RBC QN AUTO: 26.8 PG (ref 26.1–32.9)
MCHC RBC AUTO-ENTMCNC: 31.5 G/DL (ref 31.4–35)
MCV RBC AUTO: 84.9 FL (ref 82–102)
MONOCYTES # BLD: 0.6 K/UL (ref 0.1–1.3)
MONOCYTES NFR BLD: 13 % (ref 4–12)
MRSA DNA SPEC QL NAA+PROBE: DETECTED
N MEN DNA BLD POS QL NAA+NON-PROBE: NOT DETECTED
NEUTS SEG # BLD: 2.9 K/UL (ref 1.7–8.2)
NEUTS SEG NFR BLD: 59 % (ref 43–78)
NRBC # BLD: 0 K/UL (ref 0–0.2)
P AERUGINOSA DNA BLD POS NAA+NON-PROBE: NOT DETECTED
PLATELET # BLD AUTO: 243 K/UL (ref 150–450)
PMV BLD AUTO: 8.9 FL (ref 9.4–12.3)
POTASSIUM SERPL-SCNC: 3.4 MMOL/L (ref 3.5–5.1)
PROCALCITONIN SERPL-MCNC: 2.51 NG/ML (ref 0–0.49)
PROT SERPL-MCNC: 6.3 G/DL (ref 6.3–8.2)
PROTEUS SP DNA BLD POS QL NAA+NON-PROBE: NOT DETECTED
RBC # BLD AUTO: 3.7 M/UL (ref 4.05–5.2)
RESISTANT GENE TARGETS: NORMAL
S AUREUS CPE NOSE QL NAA+PROBE: DETECTED
S AUREUS DNA BLD POS QL NAA+NON-PROBE: NOT DETECTED
S AUREUS+CONS DNA BLD POS NAA+NON-PROBE: NOT DETECTED
S EPIDERMIDIS DNA BLD POS QL NAA+NON-PRB: NOT DETECTED
S LUGDUNENSIS DNA BLD POS QL NAA+NON-PRB: NOT DETECTED
S MALTOPHILIA DNA BLD POS QL NAA+NON-PRB: NOT DETECTED
S MARCESCENS DNA BLD POS NAA+NON-PROBE: NOT DETECTED
S PNEUM DNA BLD POS QL NAA+NON-PROBE: NOT DETECTED
S PYO DNA BLD POS QL NAA+NON-PROBE: NOT DETECTED
SALMONELLA DNA BLD POS QL NAA+NON-PROBE: NOT DETECTED
SERVICE CMNT-IMP: ABNORMAL
SODIUM SERPL-SCNC: 138 MMOL/L (ref 136–146)
STREPTOCOCCUS DNA BLD POS NAA+NON-PROBE: NOT DETECTED
WBC # BLD AUTO: 4.9 K/UL (ref 4.3–11.1)

## 2024-01-10 PROCEDURE — 6360000002 HC RX W HCPCS: Performed by: ORTHOPAEDIC SURGERY

## 2024-01-10 PROCEDURE — 2720000010 HC SURG SUPPLY STERILE: Performed by: ORTHOPAEDIC SURGERY

## 2024-01-10 PROCEDURE — 6360000002 HC RX W HCPCS: Performed by: NURSE PRACTITIONER

## 2024-01-10 PROCEDURE — 36415 COLL VENOUS BLD VENIPUNCTURE: CPT

## 2024-01-10 PROCEDURE — 87075 CULTR BACTERIA EXCEPT BLOOD: CPT

## 2024-01-10 PROCEDURE — 6370000000 HC RX 637 (ALT 250 FOR IP): Performed by: ANESTHESIOLOGY

## 2024-01-10 PROCEDURE — 87070 CULTURE OTHR SPECIMN AEROBIC: CPT

## 2024-01-10 PROCEDURE — 83036 HEMOGLOBIN GLYCOSYLATED A1C: CPT

## 2024-01-10 PROCEDURE — 64445 NJX AA&/STRD SCIATIC NRV IMG: CPT | Performed by: ANESTHESIOLOGY

## 2024-01-10 PROCEDURE — 6370000000 HC RX 637 (ALT 250 FOR IP): Performed by: HOSPITALIST

## 2024-01-10 PROCEDURE — 7100000001 HC PACU RECOVERY - ADDTL 15 MIN: Performed by: ORTHOPAEDIC SURGERY

## 2024-01-10 PROCEDURE — 3600000012 HC SURGERY LEVEL 2 ADDTL 15MIN: Performed by: ORTHOPAEDIC SURGERY

## 2024-01-10 PROCEDURE — 6360000002 HC RX W HCPCS: Performed by: ANESTHESIOLOGY

## 2024-01-10 PROCEDURE — 86140 C-REACTIVE PROTEIN: CPT

## 2024-01-10 PROCEDURE — 87186 SC STD MICRODIL/AGAR DIL: CPT

## 2024-01-10 PROCEDURE — 6360000002 HC RX W HCPCS: Performed by: NURSE ANESTHETIST, CERTIFIED REGISTERED

## 2024-01-10 PROCEDURE — 2580000003 HC RX 258: Performed by: HOSPITALIST

## 2024-01-10 PROCEDURE — 85025 COMPLETE CBC W/AUTO DIFF WBC: CPT

## 2024-01-10 PROCEDURE — 82962 GLUCOSE BLOOD TEST: CPT

## 2024-01-10 PROCEDURE — 6370000000 HC RX 637 (ALT 250 FOR IP): Performed by: FAMILY MEDICINE

## 2024-01-10 PROCEDURE — 2709999900 HC NON-CHARGEABLE SUPPLY: Performed by: ORTHOPAEDIC SURGERY

## 2024-01-10 PROCEDURE — 2580000003 HC RX 258: Performed by: ANESTHESIOLOGY

## 2024-01-10 PROCEDURE — 64447 NJX AA&/STRD FEMORAL NRV IMG: CPT | Performed by: ANESTHESIOLOGY

## 2024-01-10 PROCEDURE — 1100000000 HC RM PRIVATE

## 2024-01-10 PROCEDURE — 84145 PROCALCITONIN (PCT): CPT

## 2024-01-10 PROCEDURE — 3700000001 HC ADD 15 MINUTES (ANESTHESIA): Performed by: ORTHOPAEDIC SURGERY

## 2024-01-10 PROCEDURE — 80053 COMPREHEN METABOLIC PANEL: CPT

## 2024-01-10 PROCEDURE — 2500000003 HC RX 250 WO HCPCS: Performed by: ANESTHESIOLOGY

## 2024-01-10 PROCEDURE — 2580000003 HC RX 258: Performed by: NURSE PRACTITIONER

## 2024-01-10 PROCEDURE — 3600000002 HC SURGERY LEVEL 2 BASE: Performed by: ORTHOPAEDIC SURGERY

## 2024-01-10 PROCEDURE — 3700000000 HC ANESTHESIA ATTENDED CARE: Performed by: ORTHOPAEDIC SURGERY

## 2024-01-10 PROCEDURE — 87205 SMEAR GRAM STAIN: CPT

## 2024-01-10 PROCEDURE — 7100000000 HC PACU RECOVERY - FIRST 15 MIN: Performed by: ORTHOPAEDIC SURGERY

## 2024-01-10 PROCEDURE — 6360000002 HC RX W HCPCS: Performed by: HOSPITALIST

## 2024-01-10 PROCEDURE — 6370000000 HC RX 637 (ALT 250 FOR IP): Performed by: NURSE PRACTITIONER

## 2024-01-10 RX ORDER — ACETAMINOPHEN 650 MG/1
650 SUPPOSITORY RECTAL EVERY 6 HOURS PRN
Status: DISCONTINUED | OUTPATIENT
Start: 2024-01-10 | End: 2024-01-15 | Stop reason: HOSPADM

## 2024-01-10 RX ORDER — CLONIDINE HYDROCHLORIDE 0.1 MG/1
0.2 TABLET ORAL 2 TIMES DAILY PRN
Status: DISCONTINUED | OUTPATIENT
Start: 2024-01-10 | End: 2024-01-15 | Stop reason: HOSPADM

## 2024-01-10 RX ORDER — CLINDAMYCIN PHOSPHATE 600 MG/50ML
INJECTION, SOLUTION INTRAVENOUS PRN
Status: DISCONTINUED | OUTPATIENT
Start: 2024-01-10 | End: 2024-01-10 | Stop reason: SDUPTHER

## 2024-01-10 RX ORDER — TRAZODONE HYDROCHLORIDE 50 MG/1
100 TABLET ORAL NIGHTLY PRN
Status: DISCONTINUED | OUTPATIENT
Start: 2024-01-10 | End: 2024-01-15 | Stop reason: HOSPADM

## 2024-01-10 RX ORDER — ONDANSETRON 2 MG/ML
4 INJECTION INTRAMUSCULAR; INTRAVENOUS EVERY 6 HOURS PRN
Status: DISCONTINUED | OUTPATIENT
Start: 2024-01-10 | End: 2024-01-15 | Stop reason: HOSPADM

## 2024-01-10 RX ORDER — SODIUM CHLORIDE 9 MG/ML
INJECTION, SOLUTION INTRAVENOUS PRN
Status: DISCONTINUED | OUTPATIENT
Start: 2024-01-10 | End: 2024-01-10 | Stop reason: HOSPADM

## 2024-01-10 RX ORDER — VANCOMYCIN HYDROCHLORIDE 1 G/20ML
INJECTION, POWDER, LYOPHILIZED, FOR SOLUTION INTRAVENOUS PRN
Status: DISCONTINUED | OUTPATIENT
Start: 2024-01-10 | End: 2024-01-10 | Stop reason: ALTCHOICE

## 2024-01-10 RX ORDER — BUPIVACAINE HYDROCHLORIDE AND EPINEPHRINE 2.5; 5 MG/ML; UG/ML
INJECTION, SOLUTION EPIDURAL; INFILTRATION; INTRACAUDAL; PERINEURAL
Status: COMPLETED | OUTPATIENT
Start: 2024-01-10 | End: 2024-01-10

## 2024-01-10 RX ORDER — BUPIVACAINE HYDROCHLORIDE AND EPINEPHRINE 5; 5 MG/ML; UG/ML
INJECTION, SOLUTION EPIDURAL; INTRACAUDAL; PERINEURAL
Status: COMPLETED | OUTPATIENT
Start: 2024-01-10 | End: 2024-01-10

## 2024-01-10 RX ORDER — MIDAZOLAM HYDROCHLORIDE 2 MG/2ML
2 INJECTION, SOLUTION INTRAMUSCULAR; INTRAVENOUS
Status: COMPLETED | OUTPATIENT
Start: 2024-01-10 | End: 2024-01-10

## 2024-01-10 RX ORDER — DIPHENHYDRAMINE HYDROCHLORIDE 50 MG/ML
12.5 INJECTION INTRAMUSCULAR; INTRAVENOUS
Status: DISCONTINUED | OUTPATIENT
Start: 2024-01-10 | End: 2024-01-10 | Stop reason: HOSPADM

## 2024-01-10 RX ORDER — INSULIN LISPRO 100 [IU]/ML
0-8 INJECTION, SOLUTION INTRAVENOUS; SUBCUTANEOUS
Status: DISCONTINUED | OUTPATIENT
Start: 2024-01-10 | End: 2024-01-15 | Stop reason: HOSPADM

## 2024-01-10 RX ORDER — MAGNESIUM SULFATE IN WATER 40 MG/ML
2000 INJECTION, SOLUTION INTRAVENOUS PRN
Status: DISCONTINUED | OUTPATIENT
Start: 2024-01-10 | End: 2024-01-15 | Stop reason: HOSPADM

## 2024-01-10 RX ORDER — PROPOFOL 10 MG/ML
INJECTION, EMULSION INTRAVENOUS PRN
Status: DISCONTINUED | OUTPATIENT
Start: 2024-01-10 | End: 2024-01-10 | Stop reason: SDUPTHER

## 2024-01-10 RX ORDER — FLUOXETINE HYDROCHLORIDE 20 MG/1
40 CAPSULE ORAL DAILY
Status: DISCONTINUED | OUTPATIENT
Start: 2024-01-11 | End: 2024-01-15 | Stop reason: HOSPADM

## 2024-01-10 RX ORDER — OXYCODONE HYDROCHLORIDE 5 MG/1
10 TABLET ORAL ONCE
Status: COMPLETED | OUTPATIENT
Start: 2024-01-10 | End: 2024-01-10

## 2024-01-10 RX ORDER — OXYCODONE AND ACETAMINOPHEN 10; 325 MG/1; MG/1
1 TABLET ORAL EVERY 6 HOURS PRN
Status: DISPENSED | OUTPATIENT
Start: 2024-01-10 | End: 2024-01-13

## 2024-01-10 RX ORDER — INSULIN LISPRO 100 [IU]/ML
7 INJECTION, SOLUTION INTRAVENOUS; SUBCUTANEOUS
Status: DISCONTINUED | OUTPATIENT
Start: 2024-01-10 | End: 2024-01-12

## 2024-01-10 RX ORDER — FAMOTIDINE 20 MG/1
20 TABLET, FILM COATED ORAL ONCE
Status: COMPLETED | OUTPATIENT
Start: 2024-01-10 | End: 2024-01-10

## 2024-01-10 RX ORDER — ENOXAPARIN SODIUM 100 MG/ML
30 INJECTION SUBCUTANEOUS EVERY 12 HOURS
Status: DISCONTINUED | OUTPATIENT
Start: 2024-01-11 | End: 2024-01-15 | Stop reason: HOSPADM

## 2024-01-10 RX ORDER — LIDOCAINE HYDROCHLORIDE 20 MG/ML
INJECTION, SOLUTION EPIDURAL; INFILTRATION; INTRACAUDAL; PERINEURAL
Status: COMPLETED | OUTPATIENT
Start: 2024-01-10 | End: 2024-01-10

## 2024-01-10 RX ORDER — SODIUM CHLORIDE, SODIUM LACTATE, POTASSIUM CHLORIDE, CALCIUM CHLORIDE 600; 310; 30; 20 MG/100ML; MG/100ML; MG/100ML; MG/100ML
INJECTION, SOLUTION INTRAVENOUS CONTINUOUS
Status: DISCONTINUED | OUTPATIENT
Start: 2024-01-10 | End: 2024-01-10 | Stop reason: HOSPADM

## 2024-01-10 RX ORDER — OXYCODONE HYDROCHLORIDE 5 MG/1
5 TABLET ORAL PRN
Status: DISCONTINUED | OUTPATIENT
Start: 2024-01-10 | End: 2024-01-10 | Stop reason: HOSPADM

## 2024-01-10 RX ORDER — POTASSIUM CHLORIDE 20 MEQ/1
40 TABLET, EXTENDED RELEASE ORAL PRN
Status: DISCONTINUED | OUTPATIENT
Start: 2024-01-10 | End: 2024-01-15 | Stop reason: HOSPADM

## 2024-01-10 RX ORDER — SODIUM CHLORIDE 0.9 % (FLUSH) 0.9 %
5-40 SYRINGE (ML) INJECTION EVERY 12 HOURS SCHEDULED
Status: DISCONTINUED | OUTPATIENT
Start: 2024-01-10 | End: 2024-01-15 | Stop reason: HOSPADM

## 2024-01-10 RX ORDER — LIDOCAINE HYDROCHLORIDE 20 MG/ML
INJECTION, SOLUTION INFILTRATION; PERINEURAL
Status: COMPLETED | OUTPATIENT
Start: 2024-01-10 | End: 2024-01-10

## 2024-01-10 RX ORDER — HYDROMORPHONE HYDROCHLORIDE 2 MG/ML
0.25 INJECTION, SOLUTION INTRAMUSCULAR; INTRAVENOUS; SUBCUTANEOUS EVERY 5 MIN PRN
Status: DISCONTINUED | OUTPATIENT
Start: 2024-01-10 | End: 2024-01-10 | Stop reason: HOSPADM

## 2024-01-10 RX ORDER — ONDANSETRON 4 MG/1
4 TABLET, ORALLY DISINTEGRATING ORAL EVERY 8 HOURS PRN
Status: DISCONTINUED | OUTPATIENT
Start: 2024-01-10 | End: 2024-01-15 | Stop reason: HOSPADM

## 2024-01-10 RX ORDER — HYDROMORPHONE HYDROCHLORIDE 2 MG/ML
0.5 INJECTION, SOLUTION INTRAMUSCULAR; INTRAVENOUS; SUBCUTANEOUS EVERY 10 MIN PRN
Status: DISCONTINUED | OUTPATIENT
Start: 2024-01-10 | End: 2024-01-10 | Stop reason: HOSPADM

## 2024-01-10 RX ORDER — SODIUM CHLORIDE 0.9 % (FLUSH) 0.9 %
5-40 SYRINGE (ML) INJECTION PRN
Status: DISCONTINUED | OUTPATIENT
Start: 2024-01-10 | End: 2024-01-10 | Stop reason: HOSPADM

## 2024-01-10 RX ORDER — INSULIN LISPRO 100 [IU]/ML
0-4 INJECTION, SOLUTION INTRAVENOUS; SUBCUTANEOUS NIGHTLY
Status: DISCONTINUED | OUTPATIENT
Start: 2024-01-10 | End: 2024-01-15 | Stop reason: HOSPADM

## 2024-01-10 RX ORDER — POTASSIUM CHLORIDE 20 MEQ/1
40 TABLET, EXTENDED RELEASE ORAL ONCE
Status: DISCONTINUED | OUTPATIENT
Start: 2024-01-10 | End: 2024-01-10 | Stop reason: HOSPADM

## 2024-01-10 RX ORDER — METRONIDAZOLE 500 MG/100ML
500 INJECTION, SOLUTION INTRAVENOUS EVERY 8 HOURS
Status: DISCONTINUED | OUTPATIENT
Start: 2024-01-10 | End: 2024-01-13

## 2024-01-10 RX ORDER — LIDOCAINE HYDROCHLORIDE 10 MG/ML
1 INJECTION, SOLUTION INFILTRATION; PERINEURAL
Status: DISCONTINUED | OUTPATIENT
Start: 2024-01-10 | End: 2024-01-10 | Stop reason: HOSPADM

## 2024-01-10 RX ORDER — SODIUM CHLORIDE 9 MG/ML
INJECTION, SOLUTION INTRAVENOUS PRN
Status: DISCONTINUED | OUTPATIENT
Start: 2024-01-10 | End: 2024-01-15 | Stop reason: HOSPADM

## 2024-01-10 RX ORDER — ACETAMINOPHEN 500 MG
1000 TABLET ORAL ONCE
Status: COMPLETED | OUTPATIENT
Start: 2024-01-10 | End: 2024-01-10

## 2024-01-10 RX ORDER — ACYCLOVIR 200 MG/1
200 CAPSULE ORAL 2 TIMES DAILY
Status: DISCONTINUED | OUTPATIENT
Start: 2024-01-10 | End: 2024-01-15 | Stop reason: HOSPADM

## 2024-01-10 RX ORDER — ACETAMINOPHEN 325 MG/1
650 TABLET ORAL EVERY 6 HOURS PRN
Status: DISCONTINUED | OUTPATIENT
Start: 2024-01-10 | End: 2024-01-15 | Stop reason: HOSPADM

## 2024-01-10 RX ORDER — DEXTROSE MONOHYDRATE 100 MG/ML
INJECTION, SOLUTION INTRAVENOUS CONTINUOUS PRN
Status: DISCONTINUED | OUTPATIENT
Start: 2024-01-10 | End: 2024-01-15 | Stop reason: HOSPADM

## 2024-01-10 RX ORDER — POTASSIUM CHLORIDE 7.45 MG/ML
10 INJECTION INTRAVENOUS PRN
Status: DISCONTINUED | OUTPATIENT
Start: 2024-01-10 | End: 2024-01-15 | Stop reason: HOSPADM

## 2024-01-10 RX ORDER — PROCHLORPERAZINE EDISYLATE 5 MG/ML
10 INJECTION INTRAMUSCULAR; INTRAVENOUS EVERY 6 HOURS PRN
Status: DISCONTINUED | OUTPATIENT
Start: 2024-01-10 | End: 2024-01-15 | Stop reason: HOSPADM

## 2024-01-10 RX ORDER — IPRATROPIUM BROMIDE AND ALBUTEROL SULFATE 2.5; .5 MG/3ML; MG/3ML
1 SOLUTION RESPIRATORY (INHALATION) EVERY 4 HOURS PRN
Status: DISCONTINUED | OUTPATIENT
Start: 2024-01-10 | End: 2024-01-15 | Stop reason: HOSPADM

## 2024-01-10 RX ORDER — POLYETHYLENE GLYCOL 3350 17 G/17G
17 POWDER, FOR SOLUTION ORAL DAILY PRN
Status: DISCONTINUED | OUTPATIENT
Start: 2024-01-10 | End: 2024-01-15 | Stop reason: HOSPADM

## 2024-01-10 RX ORDER — SODIUM CHLORIDE 0.9 % (FLUSH) 0.9 %
5-40 SYRINGE (ML) INJECTION PRN
Status: DISCONTINUED | OUTPATIENT
Start: 2024-01-10 | End: 2024-01-15 | Stop reason: HOSPADM

## 2024-01-10 RX ORDER — ONDANSETRON 2 MG/ML
4 INJECTION INTRAMUSCULAR; INTRAVENOUS
Status: DISCONTINUED | OUTPATIENT
Start: 2024-01-10 | End: 2024-01-10 | Stop reason: HOSPADM

## 2024-01-10 RX ORDER — SODIUM CHLORIDE 9 MG/ML
INJECTION, SOLUTION INTRAVENOUS CONTINUOUS
Status: DISCONTINUED | OUTPATIENT
Start: 2024-01-10 | End: 2024-01-10 | Stop reason: HOSPADM

## 2024-01-10 RX ORDER — SODIUM CHLORIDE 0.9 % (FLUSH) 0.9 %
5-40 SYRINGE (ML) INJECTION EVERY 12 HOURS SCHEDULED
Status: DISCONTINUED | OUTPATIENT
Start: 2024-01-10 | End: 2024-01-10 | Stop reason: HOSPADM

## 2024-01-10 RX ORDER — OXYCODONE HYDROCHLORIDE 5 MG/1
10 TABLET ORAL PRN
Status: DISCONTINUED | OUTPATIENT
Start: 2024-01-10 | End: 2024-01-10 | Stop reason: HOSPADM

## 2024-01-10 RX ORDER — BUSPIRONE HYDROCHLORIDE 5 MG/1
30 TABLET ORAL 2 TIMES DAILY
Status: DISCONTINUED | OUTPATIENT
Start: 2024-01-10 | End: 2024-01-15 | Stop reason: HOSPADM

## 2024-01-10 RX ORDER — CLINDAMYCIN PHOSPHATE 600 MG/50ML
600 INJECTION, SOLUTION INTRAVENOUS EVERY 8 HOURS
Status: DISCONTINUED | OUTPATIENT
Start: 2024-01-10 | End: 2024-01-11

## 2024-01-10 RX ORDER — FENTANYL CITRATE 50 UG/ML
100 INJECTION, SOLUTION INTRAMUSCULAR; INTRAVENOUS
Status: COMPLETED | OUTPATIENT
Start: 2024-01-10 | End: 2024-01-10

## 2024-01-10 RX ADMIN — HYDROMORPHONE HYDROCHLORIDE 1 MG: 1 INJECTION, SOLUTION INTRAMUSCULAR; INTRAVENOUS; SUBCUTANEOUS at 04:26

## 2024-01-10 RX ADMIN — HYDROMORPHONE HYDROCHLORIDE 1 MG: 1 INJECTION, SOLUTION INTRAMUSCULAR; INTRAVENOUS; SUBCUTANEOUS at 18:28

## 2024-01-10 RX ADMIN — CLINDAMYCIN PHOSPHATE 600 MG: 600 INJECTION, SOLUTION INTRAVENOUS at 22:14

## 2024-01-10 RX ADMIN — VANCOMYCIN HYDROCHLORIDE 1250 MG: 10 INJECTION, POWDER, LYOPHILIZED, FOR SOLUTION INTRAVENOUS at 06:15

## 2024-01-10 RX ADMIN — LIDOCAINE HYDROCHLORIDE 10 ML: 20 INJECTION, SOLUTION INFILTRATION; PERINEURAL at 12:06

## 2024-01-10 RX ADMIN — CLINDAMYCIN PHOSPHATE 600 MG: 600 INJECTION, SOLUTION INTRAVENOUS at 14:00

## 2024-01-10 RX ADMIN — OXYCODONE 10 MG: 5 TABLET ORAL at 21:29

## 2024-01-10 RX ADMIN — PROPOFOL 50 MG: 10 INJECTION, EMULSION INTRAVENOUS at 13:10

## 2024-01-10 RX ADMIN — BUPIVACAINE HYDROCHLORIDE AND EPINEPHRINE BITARTRATE 10 ML: 2.5; .005 INJECTION, SOLUTION EPIDURAL; INFILTRATION; INTRACAUDAL; PERINEURAL at 12:06

## 2024-01-10 RX ADMIN — ONDANSETRON 4 MG: 2 INJECTION INTRAMUSCULAR; INTRAVENOUS at 06:06

## 2024-01-10 RX ADMIN — FENTANYL CITRATE 25 MCG: 50 INJECTION, SOLUTION INTRAMUSCULAR; INTRAVENOUS at 12:16

## 2024-01-10 RX ADMIN — METRONIDAZOLE 500 MG: 500 INJECTION, SOLUTION INTRAVENOUS at 18:25

## 2024-01-10 RX ADMIN — MIDAZOLAM 2 MG: 1 INJECTION INTRAMUSCULAR; INTRAVENOUS at 12:10

## 2024-01-10 RX ADMIN — FLUOXETINE 40 MG: 20 CAPSULE ORAL at 08:51

## 2024-01-10 RX ADMIN — ONDANSETRON 4 MG: 2 INJECTION INTRAMUSCULAR; INTRAVENOUS at 22:22

## 2024-01-10 RX ADMIN — SODIUM CHLORIDE, PRESERVATIVE FREE 10 ML: 5 INJECTION INTRAVENOUS at 08:54

## 2024-01-10 RX ADMIN — Medication 2000 MG: at 13:12

## 2024-01-10 RX ADMIN — SODIUM CHLORIDE, PRESERVATIVE FREE 10 ML: 5 INJECTION INTRAVENOUS at 21:30

## 2024-01-10 RX ADMIN — BUSPIRONE HYDROCHLORIDE 30 MG: 5 TABLET ORAL at 08:51

## 2024-01-10 RX ADMIN — BUSPIRONE HYDROCHLORIDE 30 MG: 5 TABLET ORAL at 22:13

## 2024-01-10 RX ADMIN — FAMOTIDINE 20 MG: 20 TABLET, FILM COATED ORAL at 12:17

## 2024-01-10 RX ADMIN — LIDOCAINE HYDROCHLORIDE 5 ML: 20 INJECTION, SOLUTION EPIDURAL; INFILTRATION; INTRACAUDAL; PERINEURAL at 12:11

## 2024-01-10 RX ADMIN — ACETAMINOPHEN 1000 MG: 500 TABLET ORAL at 12:17

## 2024-01-10 RX ADMIN — BUPIVACAINE HYDROCHLORIDE AND EPINEPHRINE BITARTRATE 10 ML: 5; .005 INJECTION, SOLUTION EPIDURAL; INTRACAUDAL; PERINEURAL at 12:06

## 2024-01-10 RX ADMIN — POTASSIUM CHLORIDE 40 MEQ: 1500 TABLET, EXTENDED RELEASE ORAL at 08:51

## 2024-01-10 RX ADMIN — HYDROMORPHONE HYDROCHLORIDE 1 MG: 1 INJECTION, SOLUTION INTRAMUSCULAR; INTRAVENOUS; SUBCUTANEOUS at 08:51

## 2024-01-10 RX ADMIN — BUPIVACAINE HYDROCHLORIDE AND EPINEPHRINE 5 ML: 2.5; 5 INJECTION, SOLUTION EPIDURAL; INFILTRATION; INTRACAUDAL; PERINEURAL at 12:11

## 2024-01-10 RX ADMIN — INSULIN HUMAN 17 UNITS: 100 INJECTION, SUSPENSION SUBCUTANEOUS at 18:26

## 2024-01-10 RX ADMIN — CLINDAMYCIN PHOSPHATE 600 MG: 600 INJECTION, SOLUTION INTRAVENOUS at 06:13

## 2024-01-10 RX ADMIN — ACYCLOVIR 200 MG: 200 CAPSULE ORAL at 22:14

## 2024-01-10 RX ADMIN — SODIUM CHLORIDE, POTASSIUM CHLORIDE, SODIUM LACTATE AND CALCIUM CHLORIDE: 600; 310; 30; 20 INJECTION, SOLUTION INTRAVENOUS at 12:00

## 2024-01-10 RX ADMIN — TRAZODONE HYDROCHLORIDE 100 MG: 50 TABLET ORAL at 22:22

## 2024-01-10 RX ADMIN — BUPIVACAINE HYDROCHLORIDE AND EPINEPHRINE 5 ML: 5; 5 INJECTION, SOLUTION EPIDURAL; INTRACAUDAL; PERINEURAL at 12:11

## 2024-01-10 RX ADMIN — PROPOFOL 140 MCG/KG/MIN: 10 INJECTION, EMULSION INTRAVENOUS at 13:11

## 2024-01-10 RX ADMIN — PROPOFOL 20 MG: 10 INJECTION, EMULSION INTRAVENOUS at 12:06

## 2024-01-10 ASSESSMENT — PAIN DESCRIPTION - ORIENTATION
ORIENTATION: RIGHT

## 2024-01-10 ASSESSMENT — PAIN SCALES - GENERAL
PAINLEVEL_OUTOF10: 7
PAINLEVEL_OUTOF10: 0
PAINLEVEL_OUTOF10: 0
PAINLEVEL_OUTOF10: 9
PAINLEVEL_OUTOF10: 5
PAINLEVEL_OUTOF10: 0
PAINLEVEL_OUTOF10: 0
PAINLEVEL_OUTOF10: 7
PAINLEVEL_OUTOF10: 7

## 2024-01-10 ASSESSMENT — PAIN DESCRIPTION - LOCATION
LOCATION: FOOT
LOCATION: LEG;FOOT
LOCATION: FOOT
LOCATION: FOOT

## 2024-01-10 ASSESSMENT — PAIN DESCRIPTION - DESCRIPTORS
DESCRIPTORS: ACHING

## 2024-01-10 ASSESSMENT — PAIN - FUNCTIONAL ASSESSMENT: PAIN_FUNCTIONAL_ASSESSMENT: 0-10

## 2024-01-10 NOTE — CARE COORDINATION
Chart reviewed and patient discussed in IDT rounds this AM. Patient has surgery on foot today. Discharge plan is to return home. CM following for wound care needs.    Adrienne LONDON, ACM  [place]

## 2024-01-10 NOTE — ANESTHESIA PRE PROCEDURE
Department of Anesthesiology  Preprocedure Note       Name:  Deepa Cruz   Age:  38 y.o.  :  1985                                          MRN:  [id number]         Date:  1/10/2024      Surgeon: Surgeon(s):  James Nicole MD    Procedure: Procedure(s):  RIGHT FOOT I & D pt @ [facility]  need podiatry tray ans Vanco powder    Medications prior to admission:   Prior to Admission medications    Medication Sig Start Date End Date Taking? Authorizing Provider   traZODone (DESYREL) 100 MG tablet Take 1 tablet by mouth nightly as needed 23   Jace Del Rio MD   busPIRone (BUSPAR) 15 MG tablet Take 30 mg by mouth 2 times daily 10/31/23   Jace Del Rio MD   doxycycline hyclate (VIBRA-TABS) 100 MG tablet Take 1 tablet by mouth 2 times daily 23  Cristian Da Silva DO   OZEMPIC, 2 MG/DOSE, 8 MG/3ML SOPN  23   Jace Del Rio MD   acyclovir (ZOVIRAX) 200 MG capsule Take 1 capsule by mouth 2 times daily 23   Jace Del Rio MD   Blood Glucose Monitoring Suppl (FREESTYLE FREEDOM LITE) w/Device KIT  23   Jace Del Rio MD   naloxone 4 MG/0.1ML LIQD nasal spray 1 spray once as needed 3/16/22   Jace Del Rio MD   insulin lispro (HUMALOG KWIKPEN) 200 UNIT/ML SOPN pen Inject 15 Units into the skin 3 times daily (with meals) 23   Jace Del Rio MD   TRESIBA FLEXTOUCH 100 UNIT/ML SOPN Inject 80 Units into the skin at bedtime 23   Jace Del Rio MD   ibuprofen (ADVIL;MOTRIN) 800 MG tablet TAKE 1 TABLET BY MOUTH EVERY 8 HOURS AS NEEDED FOR MODERATE PAIN 23   Jace Del Rio MD   FLUoxetine (PROZAC) 40 MG capsule Take 1 capsule by mouth daily 23   Jace Del Rio MD   Continuous Blood Gluc Transmit (DEXCOM G6 TRANSMITTER) MISC USE TO CHECK BLOOD GLUCOSE AS DIRECTED 22   Jace Del Rio MD   cetirizine (ZYRTEC) 10 MG tablet Take 1 tablet by mouth daily  Patient not taking: Reported on 2024

## 2024-01-10 NOTE — PROGRESS NOTES
VANCO DAILY FOLLOW UP NOTE  [facility]   Pharmacy Pharmacokinetic Monitoring Service - Vancomycin    Consulting Provider: Esther   Indication: SSTI  Target Concentration: Goal AUC/ELIZABETH 400-600 mg*hr/L  Day of Therapy: 3  Additional Antimicrobials: clindamycin    Patient eligible for piperacillin-tazobactam to cefepime auto-substitution per P&T approved protocol? N/A    Pertinent Laboratory Values:   Wt Readings from Last 1 Encounters:   01/08/24 104.3 kg (230 lb)     Temp Readings from Last 1 Encounters:   01/10/24 97.7 °F (36.5 °C) (Oral)     Recent Labs     01/08/24  1611 01/08/24  1849 01/10/24  0411   BUN 17  --  15   CREATININE 0.97  --  0.70   WBC 10.4  --  4.9   PROCAL 0.96*  --  2.51*   LACTSEPSIS 2.6* 1.2  --      Estimated Creatinine Clearance: 135 mL/min (based on SCr of 0.7 mg/dL).    Lab Results   Component Value Date/Time    VANCORANDOM 20.7 01/09/2024 02:16 PM       MRSA Nasal Swab: N/A. Non-respiratory infection    Assessment:  Date/Time Dose Concentration AUC   1/9 1416 1250mg IV q18h 20.7 425   Note: Serum concentrations collected for AUC dosing may appear elevated if collected in close proximity to the dose administered, this is not necessarily an indication of toxicity    Plan:  Current dosing regimen is therapeutic  Continue current dose  Repeat vancomycin concentrations will be ordered as clinically appropriate, repeat level tomorrow due to improvement in CrCl  Pharmacy will continue to monitor patient and adjust therapy as indicated    Thank you for the consult,  Tyshawn Dunn, MengD, BCPS  1/10/2024

## 2024-01-10 NOTE — ANESTHESIA POSTPROCEDURE EVALUATION
Department of Anesthesiology  Postprocedure Note    Patient: Deepa Cruz  MRN: [id number]  YOB: 1985  Date of evaluation: 1/10/2024    Procedure Summary       Date: 01/10/24 Room / Location: [facility] OP OR 02 / [facility]    Anesthesia Start: 1301 Anesthesia Stop: 1414    Procedure: RIGHT FOOT I & D, DEBRIDEMENT AND METATARSAL  BONE EXCISION (Right: Foot) Diagnosis:       Right foot infection      (Right foot infection [L08.9])    Surgeons: James Nicole MD Responsible Provider: Emelyn Porras MD    Anesthesia Type: TIVA ASA Status: 2            Anesthesia Type: No value filed.    Marla Phase I: Marla Score: 7    Marla Phase II:      Anesthesia Post Evaluation    Patient location during evaluation: PACU  Patient participation: complete - patient participated  Level of consciousness: awake and alert  Pain score: 0  Airway patency: patent  Nausea & Vomiting: no nausea  Cardiovascular status: hemodynamically stable  Respiratory status: acceptable  Hydration status: euvolemic  Pain management: adequate and satisfactory to patient        No notable events documented.

## 2024-01-10 NOTE — PROGRESS NOTES
I came today and spoke with the patient. I personally evaluated them. I did discuss with the patient and decided to proceed with surgery. Patient will be NPO tonight for surgery tomorrow. Surgery will be winding debridement, excision of metatarsal heads and metatarsal bone along with drain placement. And of culture will be taken. Afterwards the patient will be returning to [facility].    At long discussion with the patient  with the pros and cons for surgery. The patient has elected to proceed of surgery. Period.     NPO at midnight  
Patient s/p right foot I&D debridement, 3rd metatarsal excision, 2nd metatarsal osteotomy, 4th metatarsal osteotomy with ortho at [facility]. Appreciate surg/ wound care recs. Wound culture from 1/8 with positive GPCs. Will start on flagyl. Awaiting cultures from OR today for final abx recommendations.   
Post Op plan:  1-heel weightbearing for transfers only  2-await cultures for final antibiotic recommendations  3-Will need packing removed and changed every 2 to 3 days.  Will most likely need home health wound discharge    No further surgeries are planned.    At this point she was will need antibiotics along with wound care with wound care packing.  She can be discharged per orthopedic surgery once antibiotics are finalized and home health wound care has been set up  
    Comment:    The specimen is NEGATIVE for SARS-CoV-2, the novel coronavirus associated with COVID-19.  A negative result does not rule out COVID-19.     This test has been authorized by the FDA under an Emergency Use Authorization (EUA) for use by authorized laboratories.      Fact sheet for Healthcare Providers: https://www.fda.gov/media/216125/download  Fact sheet for Patients: https://www.fda.gov/media/992939/download     Methodology: Isothermal Nucleic Acid Amplification               Lab Results   Component Value Date    WBC 4.9 01/10/2024    HGB 9.9 (L) 01/10/2024    HCT 31.4 (L) 01/10/2024    MCV 84.9 01/10/2024     01/10/2024     Lab Results   Component Value Date    CRP 9.3 (H) 01/10/2024     No results found for: \"SEDRATE\"      Assessment:     Principal Problem:    Right foot infection  Resolved Problems:    * No resolved hospital problems. *      Plan:     I had a thoroughly informed the patient of the plan for treatment.    We discussed continuing nonoperative management but after discussion this wound is not getting better and she is elected to proceed with surgery    We discussed multiple surgical options.    In my medical recommendation she needs a transmetatarsal amputation.  I think this is the safest, best, most successful surgery with lowest risk of recurrence.  I explained this to her in great detail.  After discussion with her she adamantly refused transmetatarsal amputation.    However her hole in the foot is a problem, she is currently sick without elevated CRP, and although her white count is normal she obviously has an infection if not a chronic wound to her foot.  She does need some sort of debridement to prevent sepsis and worsening of wound.    We discussed surgery to be wound debridement, irrigation, multiple incisions, and excision of the metatarsal heads.  I explained to her in great detail I would not amputate anything, although I think amputation is the right decision.  We

## 2024-01-10 NOTE — ANESTHESIA PROCEDURE NOTES
Peripheral Block    Patient location during procedure: pre-op  Reason for block: post-op pain management and at surgeon's request  Start time: 1/10/2024 12:11 PM  End time: 1/10/2024 12:12 PM  Staffing  Performed: anesthesiologist   Anesthesiologist: Emelyn Porras MD  Performed by: Emelyn Porras MD  Authorized by: Emelyn Porras MD    Preanesthetic Checklist  Completed: patient identified, IV checked, site marked, risks and benefits discussed, surgical/procedural consents, equipment checked, pre-op evaluation, timeout performed, anesthesia consent given, oxygen available and monitors applied/VS acknowledged  Peripheral Block   Patient position: supine  Prep: ChloraPrep  Provider prep: mask  Patient monitoring: cardiac monitor, continuous pulse ox, frequent blood pressure checks, IV access, oxygen and responsive to questions  Block type: Femoral  Adductor canal  Laterality: right  Injection technique: single-shot  Guidance: ultrasound guided  Local infiltration: decadron  Infiltration strength: 1 %  Local infiltration: decadron  Dose: 0.5 mL    Needle   Needle type: insulated echogenic nerve stimulator needle   Needle gauge: 20 G  Needle localization: ultrasound guidance  Assessment   Injection assessment: negative aspiration for heme, no paresthesia on injection, local visualized surrounding nerve on ultrasound and no intravascular symptoms  Slow fractionated injection: yes  Hemodynamics: stable  Real-time US image taken/store: yes  Outcomes: uncomplicated    Medications Administered  BUPivacaine 0.25%-EPINEPHrine injection 1:[id number] - Perineural   5 mL - 1/10/2024 12:11:00 PM  BUPivacaine 0.5%-EPINEPHrine injection 1:[id number] - Perineural   5 mL - 1/10/2024 12:11:00 PM  lidocaine 2 % (PF) injection - Perineural   5 mL - 1/10/2024 12:11:00 PM

## 2024-01-10 NOTE — PERIOP NOTE
TRANSFER - OUT REPORT:    Verbal report given to Megan BARTON on Deepa Cruz  being transferred to [facility] for routine progression of patient care       Report consisted of patient's Situation, Background, Assessment and   Recommendations(SBAR).     Information from the following report(s) Nurse Handoff Report, Surgery Report, MAR, Recent Results, Med Rec Status, and Cardiac Rhythm SR  was reviewed with the receiving nurse.           Lines:   Peripheral IV 01/08/24 Right Antecubital (Active)   Site Assessment Clean, dry & intact 01/10/24 1417   Line Status Flushed 01/09/24 2010   Line Care Connections checked and tightened 01/09/24 2010   Phlebitis Assessment No symptoms 01/09/24 2010   Infiltration Assessment 0 01/09/24 2010   Alcohol Cap Used Yes 01/09/24 2010   Dressing Status Clean, dry & intact 01/09/24 2010   Dressing Type Transparent 01/09/24 2010        Opportunity for questions and clarification was provided.      Patient transported with:  [facility] ambulance to [facility]

## 2024-01-10 NOTE — DIABETES MGMT
Patient admitted with sepsis. Blood glucose ranged 123-323 yesterday with patient receiving NPH 17 units and Humalog 27 units. Blood glucose this morning was 155. Creatinine 0.70. GFR > 60. Reviewed patient current regimen: NPH 17 units BID, Humalog 7 units with meals, and Humalog correctional insulin.  Patient NPO for scheduled procedure today.  Glycemic control improving on current regimen.      Patient seen for assessment regarding diabetes management. Patient has a past medical history of DM. Patient states they have been living with diabetes for since in 20s and voices positive family history of diabetes. Patient states they do not have a working glucometer with supplies at home. Per patient they typically check blood glucose levels 1-2 times a day using a glucometer at work. Per patient their blood glucose levels have been running 150-300 at home. Patient states was using Dexcom G6 to monitor blood glucose at home, but has had some issues with obtaining.  Patient states they are currently taking Humalog 15 units TID, Ozempic 1 mg every Saturday, and Tresiba 80 units HS at home for management of diabetes. Patient voices that they have experienced hypoglycemia in the past. Educated regarding hypoglycemia signs, symptoms, and treatment. Patient has attended formal diabetes education in the past. Patient reports no difficulty with affording their diabetic supplies.      Patient given educational material, \"Diabetes Self-Management: A Patient Teaching Guide\", which was reviewed with patient. Explained basic physiology of diabetes, as well as causes, signs and symptoms, and treatments for hypoglycemia and hyperglycemia. Described the effects of poor glycemic control and the development of long-term complications such as renal, eye, nerve, and cardiovascular disease. Patient denies numbness and tingling in feet. Described proper diabetic foot care and the importance of checking feet daily. Per patient they typically  drink water, soda, sweet tea, coffee with creamer. Reviewed effects of sweetened beverages on glycemic control and discussed alternative beverages to help improve glycemic control. Discussed artificial sweeteners and Diet or Zero soda.  Per patient they typically eat 2-3 meals a day, eggs and toast for breakfast, sandwich and fruit for lunch, and protein with vegetables for dinner. Educated re: effects of carbohydrates on blood glucose, the \"plate method\" of healthy meal planning, basics of healthy meal plan, Consistent Carbohydrate Diet, discussed the basics of carb counting and how to read a nutrition label. Educated patient regarding the benefits of physical activity (as cleared by provider) on glycemic control. Also explained the relationship between hyperglycemia and infection and delayed healing. Discussed target goals for blood glucose and A1C. Discussed current A1C 8.4% and significance.  Patient pleased with progress as patient states last A1C was 11.  Applauded success and encouraged continued progress.  Educated patient regarding diabetic medications including mechanism of action, timing, and possible side effects. Patient verbalizes understanding of teaching.    Explained the importance of blood glucose monitoring. Recommended frequency of AC&HS blood glucose checks and to record in log book to take to PCP appointment. Educated patient that they have to get the glucometer covered by their insurance formulary to keep their costs down. Educated patient to seek out  affordable glucometer options that exist at some stores or drug stores if they are ever uninsured. Pt will need prescription for glucometer and glucometer supplies at discharge so that the patient may obtain the meter covered by their insurance.    Patient uses insulin pens at home.  Reviewed insulin administration sites, the importance of site rotation, proper storage of insulin, and proper sharps disposal.     Educated patient regarding

## 2024-01-10 NOTE — ANESTHESIA PROCEDURE NOTES
Peripheral Block    Patient location during procedure: pre-op  Reason for block: post-op pain management and at surgeon's request  Start time: 1/10/2024 12:06 PM  End time: 1/10/2024 12:09 PM  Staffing  Performed: anesthesiologist   Anesthesiologist: Emelyn Porras MD  Performed by: Emelyn Porras MD  Authorized by: Emelyn Porras MD    Preanesthetic Checklist  Completed: patient identified, IV checked, site marked and risks and benefits discussed  Peripheral Block   Prep: ChloraPrep  Provider prep: mask  Patient monitoring: cardiac monitor, continuous pulse ox, frequent blood pressure checks, IV access, oxygen and responsive to questions  Block type: Sciatic  Popliteal  Laterality: right  Injection technique: single-shot  Guidance: nerve stimulator and ultrasound guided    Needle   Needle type: insulated echogenic nerve stimulator needle   Needle gauge: 21 G  Needle localization: nerve stimulator, ultrasound guidance and anatomical landmarks  Test dose: negative  Needle length: 4.  Assessment   Injection assessment: negative aspiration for heme, no paresthesia on injection, local visualized surrounding nerve on ultrasound and no intravascular symptoms  Slow fractionated injection: yes  Hemodynamics: stable  Real-time US image taken/store: yes  Outcomes: uncomplicated    Medications Administered  BUPivacaine 0.25%-EPINEPHrine injection 1:103210 - Perineural   10 mL - 1/10/2024 12:06:00 PM  BUPivacaine 0.5%-EPINEPHrine injection 1:603776 - Perineural   10 mL - 1/10/2024 12:06:00 PM  lidocaine injection 2% - Perineural   10 mL - 1/10/2024 12:06:00 PM

## 2024-01-10 NOTE — PROGRESS NOTES
Hospitalist Progress Note   Admit Date:  2024  4:36 PM   Name:  Deepa Cruz   Age:  38 y.o.  Sex:  female  :  1985   MRN:  [id number]   Room:  [facility] [place] 2]    Presenting/Chief Complaint: Fever, Emesis, Abdominal Pain, and Wound Infection     Reason(s) for Admission: Septicemia (HCC) [A41.9]  Right foot infection [L08.9]  Sepsis (HCC) [A41.9]     Hospital Course:   Deepa Cruz is a 38 y.o. female with medical history of Charcots, T2DM, chronic OM, DM wound, anxiety, HTN who presented with a DM wound of the plantar surface of the right foot, that over the past few days has been draining more than usual and w a foul odor. She was already planned for an out pt surgical procedure w Dr Nicole on 24.   Last xray rev'd from 24 showing Interval increase in diffuse soft tissue swelling. There is no evidence of fracture or other acute bony abnormality in the foot. No bony lesions are seen.  On arrival to the ED pt had a fever at 101, tachy HR of 138, WBC ct 10.4, PCT 0.96, and a LA level 2.6. Bld cx drawn. Dx sepsis 2/2 foot wound. Started on antibx and hospitalist to admit.     Subjective & 24hr Events:   Patient seen sitting up in bed after shower. Having bowel movements. No other complaints. Still with ongoing pain of the right foot and some nausea. [place] debridement, irrigation, multiple incisions, and excision of the metatarsal heads.     Assessment & Plan:     Principal Problem:    Sepsis (HCC)  - suspected due to diabetic ulcer of right foot. Ortho planning for wound debridement, irrigation, excision of the metatarsal heads today  - c/w IV vanc and clinda   - follow up wound culture    Active Problems:    Diabetic ulcer of right midfoot associated with type 2 diabetes mellitus, with necrosis of muscle (HCC).   Charcot's arthropathy of forefoot. Right foot pain. Right foot infection  - Xray yesterday with persistent and increased subcutaneous air plantar base of

## 2024-01-11 PROBLEM — E11.628 TYPE 2 DIABETES MELLITUS WITH SKIN COMPLICATION, WITH LONG-TERM CURRENT USE OF INSULIN (HCC): Chronic | Status: ACTIVE | Noted: 2024-01-11

## 2024-01-11 PROBLEM — Z79.4 TYPE 2 DIABETES MELLITUS WITH SKIN COMPLICATION, WITH LONG-TERM CURRENT USE OF INSULIN (HCC): Chronic | Status: ACTIVE | Noted: 2024-01-11

## 2024-01-11 LAB
ANION GAP SERPL CALC-SCNC: 5 MMOL/L (ref 2–11)
BASOPHILS # BLD: 0 K/UL (ref 0–0.2)
BASOPHILS NFR BLD: 0 % (ref 0–2)
BUN SERPL-MCNC: 14 MG/DL (ref 6–23)
CALCIUM SERPL-MCNC: 8.1 MG/DL (ref 8.3–10.4)
CHLORIDE SERPL-SCNC: 109 MMOL/L (ref 103–113)
CO2 SERPL-SCNC: 25 MMOL/L (ref 21–32)
CREAT SERPL-MCNC: 0.62 MG/DL (ref 0.6–1)
CREAT SERPL-MCNC: 0.64 MG/DL (ref 0.6–1)
DIFFERENTIAL METHOD BLD: ABNORMAL
EOSINOPHIL # BLD: 0.1 K/UL (ref 0–0.8)
EOSINOPHIL NFR BLD: 2 % (ref 0.5–7.8)
ERYTHROCYTE [DISTWIDTH] IN BLOOD BY AUTOMATED COUNT: 13.5 % (ref 11.9–14.6)
GLUCOSE BLD STRIP.AUTO-MCNC: 100 MG/DL (ref 65–100)
GLUCOSE BLD STRIP.AUTO-MCNC: 102 MG/DL (ref 65–100)
GLUCOSE BLD STRIP.AUTO-MCNC: 115 MG/DL (ref 65–100)
GLUCOSE BLD STRIP.AUTO-MCNC: 125 MG/DL (ref 65–100)
GLUCOSE SERPL-MCNC: 133 MG/DL (ref 65–100)
HCT VFR BLD AUTO: 29.3 % (ref 35.8–46.3)
HGB BLD-MCNC: 9 G/DL (ref 11.7–15.4)
IMM GRANULOCYTES # BLD AUTO: 0 K/UL (ref 0–0.5)
IMM GRANULOCYTES NFR BLD AUTO: 0 % (ref 0–5)
LYMPHOCYTES # BLD: 1.2 K/UL (ref 0.5–4.6)
LYMPHOCYTES NFR BLD: 21 % (ref 13–44)
MCH RBC QN AUTO: 26.5 PG (ref 26.1–32.9)
MCHC RBC AUTO-ENTMCNC: 30.7 G/DL (ref 31.4–35)
MCV RBC AUTO: 86.4 FL (ref 82–102)
MONOCYTES # BLD: 0.5 K/UL (ref 0.1–1.3)
MONOCYTES NFR BLD: 9 % (ref 4–12)
NEUTS SEG # BLD: 3.7 K/UL (ref 1.7–8.2)
NEUTS SEG NFR BLD: 67 % (ref 43–78)
NRBC # BLD: 0 K/UL (ref 0–0.2)
PLATELET # BLD AUTO: 286 K/UL (ref 150–450)
PMV BLD AUTO: 9.2 FL (ref 9.4–12.3)
POTASSIUM SERPL-SCNC: 4.6 MMOL/L (ref 3.5–5.1)
RBC # BLD AUTO: 3.39 M/UL (ref 4.05–5.2)
SERVICE CMNT-IMP: ABNORMAL
SERVICE CMNT-IMP: NORMAL
SODIUM SERPL-SCNC: 139 MMOL/L (ref 136–146)
VANCOMYCIN SERPL-MCNC: 11 UG/ML
WBC # BLD AUTO: 5.5 K/UL (ref 4.3–11.1)

## 2024-01-11 PROCEDURE — 2580000003 HC RX 258: Performed by: STUDENT IN AN ORGANIZED HEALTH CARE EDUCATION/TRAINING PROGRAM

## 2024-01-11 PROCEDURE — 2580000003 HC RX 258: Performed by: NURSE PRACTITIONER

## 2024-01-11 PROCEDURE — 6360000002 HC RX W HCPCS: Performed by: STUDENT IN AN ORGANIZED HEALTH CARE EDUCATION/TRAINING PROGRAM

## 2024-01-11 PROCEDURE — 6360000002 HC RX W HCPCS: Performed by: NURSE PRACTITIONER

## 2024-01-11 PROCEDURE — 82962 GLUCOSE BLOOD TEST: CPT

## 2024-01-11 PROCEDURE — 1100000000 HC RM PRIVATE

## 2024-01-11 PROCEDURE — 36415 COLL VENOUS BLD VENIPUNCTURE: CPT

## 2024-01-11 PROCEDURE — 85025 COMPLETE CBC W/AUTO DIFF WBC: CPT

## 2024-01-11 PROCEDURE — 6370000000 HC RX 637 (ALT 250 FOR IP): Performed by: FAMILY MEDICINE

## 2024-01-11 PROCEDURE — 80048 BASIC METABOLIC PNL TOTAL CA: CPT

## 2024-01-11 PROCEDURE — 6370000000 HC RX 637 (ALT 250 FOR IP): Performed by: NURSE PRACTITIONER

## 2024-01-11 PROCEDURE — 80202 ASSAY OF VANCOMYCIN: CPT

## 2024-01-11 RX ADMIN — BUSPIRONE HYDROCHLORIDE 30 MG: 5 TABLET ORAL at 21:32

## 2024-01-11 RX ADMIN — INSULIN LISPRO 7 UNITS: 100 INJECTION, SOLUTION INTRAVENOUS; SUBCUTANEOUS at 07:59

## 2024-01-11 RX ADMIN — TRAZODONE HYDROCHLORIDE 100 MG: 50 TABLET ORAL at 22:27

## 2024-01-11 RX ADMIN — METRONIDAZOLE 500 MG: 500 INJECTION, SOLUTION INTRAVENOUS at 10:04

## 2024-01-11 RX ADMIN — HYDROMORPHONE HYDROCHLORIDE 1 MG: 1 INJECTION, SOLUTION INTRAMUSCULAR; INTRAVENOUS; SUBCUTANEOUS at 05:39

## 2024-01-11 RX ADMIN — OXYCODONE AND ACETAMINOPHEN 1 TABLET: 10; 325 TABLET ORAL at 01:54

## 2024-01-11 RX ADMIN — BUSPIRONE HYDROCHLORIDE 30 MG: 5 TABLET ORAL at 07:57

## 2024-01-11 RX ADMIN — CLINDAMYCIN PHOSPHATE 600 MG: 600 INJECTION, SOLUTION INTRAVENOUS at 14:11

## 2024-01-11 RX ADMIN — VANCOMYCIN HYDROCHLORIDE 1250 MG: 10 INJECTION, POWDER, LYOPHILIZED, FOR SOLUTION INTRAVENOUS at 08:13

## 2024-01-11 RX ADMIN — OXYCODONE AND ACETAMINOPHEN 1 TABLET: 10; 325 TABLET ORAL at 15:02

## 2024-01-11 RX ADMIN — CLINDAMYCIN PHOSPHATE 600 MG: 600 INJECTION, SOLUTION INTRAVENOUS at 05:43

## 2024-01-11 RX ADMIN — SODIUM CHLORIDE: 9 INJECTION, SOLUTION INTRAVENOUS at 08:11

## 2024-01-11 RX ADMIN — VANCOMYCIN HYDROCHLORIDE 1250 MG: 10 INJECTION, POWDER, LYOPHILIZED, FOR SOLUTION INTRAVENOUS at 00:19

## 2024-01-11 RX ADMIN — OXYCODONE AND ACETAMINOPHEN 1 TABLET: 10; 325 TABLET ORAL at 21:33

## 2024-01-11 RX ADMIN — OXYCODONE AND ACETAMINOPHEN 1 TABLET: 10; 325 TABLET ORAL at 08:21

## 2024-01-11 RX ADMIN — FLUOXETINE HYDROCHLORIDE 40 MG: 20 CAPSULE ORAL at 07:58

## 2024-01-11 RX ADMIN — METRONIDAZOLE 500 MG: 500 INJECTION, SOLUTION INTRAVENOUS at 20:02

## 2024-01-11 RX ADMIN — INSULIN LISPRO 7 UNITS: 100 INJECTION, SOLUTION INTRAVENOUS; SUBCUTANEOUS at 12:02

## 2024-01-11 RX ADMIN — INSULIN LISPRO 7 UNITS: 100 INJECTION, SOLUTION INTRAVENOUS; SUBCUTANEOUS at 17:51

## 2024-01-11 RX ADMIN — METRONIDAZOLE 500 MG: 500 INJECTION, SOLUTION INTRAVENOUS at 01:57

## 2024-01-11 RX ADMIN — INSULIN HUMAN 17 UNITS: 100 INJECTION, SUSPENSION SUBCUTANEOUS at 17:51

## 2024-01-11 RX ADMIN — ACYCLOVIR 200 MG: 200 CAPSULE ORAL at 07:58

## 2024-01-11 RX ADMIN — INSULIN HUMAN 17 UNITS: 100 INJECTION, SUSPENSION SUBCUTANEOUS at 07:58

## 2024-01-11 RX ADMIN — ACYCLOVIR 200 MG: 200 CAPSULE ORAL at 19:55

## 2024-01-11 ASSESSMENT — PAIN DESCRIPTION - DESCRIPTORS
DESCRIPTORS: STABBING
DESCRIPTORS: STABBING
DESCRIPTORS: SHOOTING
DESCRIPTORS: SHOOTING;ACHING
DESCRIPTORS: SHOOTING

## 2024-01-11 ASSESSMENT — PAIN SCALES - GENERAL
PAINLEVEL_OUTOF10: 6
PAINLEVEL_OUTOF10: 7
PAINLEVEL_OUTOF10: 7
PAINLEVEL_OUTOF10: 6
PAINLEVEL_OUTOF10: 6

## 2024-01-11 ASSESSMENT — PAIN DESCRIPTION - LOCATION
LOCATION: FOOT;LEG
LOCATION: FOOT;LEG
LOCATION: FOOT;HEAD
LOCATION: FOOT
LOCATION: FOOT

## 2024-01-11 ASSESSMENT — PAIN DESCRIPTION - ORIENTATION
ORIENTATION: RIGHT

## 2024-01-11 NOTE — PROGRESS NOTES
Hospitalist Progress Note   Admit Date:  2024  4:36 PM   Name:  Deepa Cruz   Age:  38 y.o.  Sex:  female  :  1985   MRN:  [id number]   Room:  [facility]    Presenting/Chief Complaint: Fever, Emesis, Abdominal Pain, and Wound Infection     Reason(s) for Admission: Septicemia (HCC) [A41.9]  Right foot infection [L08.9]  Sepsis (HCC) [A41.9]     Hospital Course:   Deepa Cruz is a 38 y.o. female with medical history of Charcots, T2DM, chronic OM, DM wound, anxiety, HTN who presented with a DM wound of the plantar surface of the right foot, that over the past few days has been draining more than usual and w a foul odor. She was already planned for an out pt surgical procedure w Dr Nicole on 24.   Last xray rev'd from 24 showing Interval increase in diffuse soft tissue swelling. There is no evidence of fracture or other acute bony abnormality in the foot. No bony lesions are seen.  On arrival to the ED pt had a fever at 101, tachy HR of 138, WBC ct 10.4, PCT 0.96, and a LA level 2.6. Bld cx drawn. Dx sepsis 2/2 foot wound. Started on antibx and hospitalist to admit.     Subjective & 24hr Events:   S/p right foot I&D debridement, 3rd metatarsal excision, 2nd metatarsal osteotomy, 4th metatarsal osteotomy with ortho on 1/10. Wound culture from  with positive GPCs -staph. Started on flagyl. Awaiting cultures from OR today for final abx recommendations. NABILEON. Is on percocet which patient states works better for her. Zofran helping with nausea. Wound chare nurse to see tomorrow for 1st packing removal and dressing change.      Assessment & Plan:     Principal Problem:    Sepsis (HCC) suspected due to diabetic ulcer of right foot.   - s/p right foot I&D debridement, 3rd metatarsal excision, 2nd metatarsal osteotomy, 4th metatarsal osteotomy with ortho on 1/10.   - c/w IV vanc and clinda, started on IV flagyl until cultures comes back   - pending OR wound culture for final abx recommendation,

## 2024-01-11 NOTE — PROGRESS NOTES
PT was in bed awake with Family asleep at bedside. CH introduced self. PT shared briefly about health. CH checked for unmet needs and offered support.    Rev. Beatriz Cavanaugh M.Div.

## 2024-01-11 NOTE — DIABETES MGMT
Patient admitted with sepsis. Blood glucose ranged 155-178 yesterday with patient receiving NPH 17 units. Blood glucose this morning was 115 and now 102. Creatinine 0.62. GFR > 60. Reviewed patient current regimen: NPH 17 units BID, Humalog 7 units with meals, and Humalog correctional insulin.  Patient would likely benefit from a decrease in basal insulin to reduce the risk of morning hypoglycemia.  Patient would also likely benefit from a decrease in prandial insulin to reduce the risk of hypoglycemia during the day.  Provider updated via Crew regarding recommendations and patient glycemic control.

## 2024-01-11 NOTE — PROGRESS NOTES
Pt IV infiltrated, allowed one attempt to replace, was unsuccessful.  Pt stated Dr Grider indicated she would be getting PICC line and refused further PIV attempts.  Sent message to Dr Grider, he gave approval for order to go ahead with PICC line insertion.  Order placed.

## 2024-01-11 NOTE — PLAN OF CARE
Problem: Discharge Planning  Goal: Discharge to home or other facility with appropriate resources  1/11/2024 0314 by Nava Martin RN  Outcome: Progressing     Problem: Pain  Goal: Verbalizes/displays adequate comfort level or baseline comfort level  1/11/2024 0314 by Nava Martin RN  Outcome: Progressing    Problem: Skin/Tissue Integrity  Goal: Absence of new skin breakdown  Outcome: Progressing     Problem: ABCDS Injury Assessment  Goal: Absence of physical injury  1/11/2024 0314 by Nava Martin RN  Outcome: Progressing     Problem: Chronic Conditions and Co-morbidities  Goal: Patient's chronic conditions and co-morbidity symptoms are monitored and maintained or improved  1/11/2024 0314 by Nava Martin RN  Outcome: Progressing    Problem: Safety - Adult  Goal: Free from fall injury  1/11/2024 0314 by Nava Martin RN  Outcome: Progressing     Problem: Skin/Tissue Integrity - Adult  Goal: Incisions, wounds, or drain sites healing without S/S of infection  Outcome: Progressing

## 2024-01-11 NOTE — CONSULTS
Swab Updated: 01/10/24 1038     MRSA by PCR Detected        Comment: A positive test result does not necessarily indicate the presence of a viable organism. A positive result is indicative of the presence of SA or MRSA DNA.  The BD Max StaphSR assay is intended to aid in the prevention and control of MRSA and SA infections in healthcare settings.  It is not intended to diagnose MRSA or SA infections nor guide or monitor treatment for MRSA/SA infections. A negative result does not preclude nasal colonization.          SA by PCR Detected       Culture, Wound [[id number]]  (Abnormal) Collected: 01/08/24 2337    Order Status: Completed Specimen: Foot Updated: 01/11/24 1012     Special Requests NO SPECIAL REQUESTS        Gram stain FEW WBCS SEEN PER OIF               OCCASIONAL Gram positive cocci           Culture       MODERATE Staphylococcus aureus SENSITIVITY TO FOLLOW                  MODERATE STREPTOCOCCI, BETA HEMOLYTIC GROUP B Group B Streptococci remain universally susceptible to  Penicillin, Ampicillin, and Cefazolin. Resistance to Clindamycin and Erythromycin can occur. Please contact laboratory within 7 days of collection if susceptibility testing is needed.      Blood Culture 2 [[id number]] Collected: 01/08/24 1731    Order Status: Completed Specimen: Blood Updated: 01/11/24 0841     Special Requests --        LEFT  FOREARM       Culture NO GROWTH 3 DAYS       Blood Culture 1 [[id number]] Collected: 01/08/24 1611    Order Status: Completed Specimen: Blood Updated: 01/10/24 1351     Special Requests --        RIGHT  FOREARM       Gram stain Gram positive cocci         ANAEROBIC BOTTLE POSITIVE               RESULTS VERIFIED, PHONED TO AND READ BACK BY MICK MITTAL @5032 1.10.24 SC           Culture       CULTURE IN PROGRESS,FURTHER UPDATES TO FOLLOW                  Refer to Blood Culture ID Panel Accession [id number]          COVID-19, Rapid [[id number]] Collected: 01/08/24 1611    Order Status: Completed  Specimen: Nasopharyngeal Updated: 01/08/24 1707     Source NASAL SWAB        SARS-CoV-2, Rapid Not detected        Comment:    The specimen is NEGATIVE for SARS-CoV-2, the novel coronavirus associated with COVID-19.  A negative result does not rule out COVID-19.     This test has been authorized by the FDA under an Emergency Use Authorization (EUA) for use by authorized laboratories.      Fact sheet for Healthcare Providers: https://www.fda.gov/media/147734/download  Fact sheet for Patients: https://www.fda.gov/media/013018/download     Methodology: Isothermal Nucleic Acid Amplification         Influenza A/B, Molecular [[id number]] Collected: 01/08/24 1611    Order Status: Completed Specimen: Nasopharyngeal Updated: 01/08/24 1707     Influenza A, ROBERTO Not detected        Comment: Negative results do not preclude infection with influenza virus and should not be the sole basis of a patient treatment decision.        Influenza B, ROBERTO Not detected       Culture, Blood, PCR ID Panel [[id number]] Collected: 01/08/24 1611    Order Status: Completed Specimen: Blood Updated: 01/10/24 1351     Accession Number [id number]     Enterococcus faecalis by PCR NOT DETECTED        Enterococcus faecium by PCR NOT DETECTED        Listeria monocytogenes by PCR NOT DETECTED        STAPHYLOCOCCUS NOT DETECTED        Staphylococcus Aureus NOT DETECTED        Staphylococcus epidermidis by PCR NOT DETECTED        Staphylococcus lugdunensis by PCR NOT DETECTED        STREPTOCOCCUS NOT DETECTED        Streptococcus agalactiae (Group B) NOT DETECTED        Strep pneumoniae NOT DETECTED        Strep pyogenes,(Grp. A) NOT DETECTED        Acinetobacter calcoac baumannii complex by PCR NOT DETECTED        Bacteroides fragilis by PCR NOT DETECTED        Enterobacteriaceae by PCR NOT DETECTED        Enterobacter cloacae complex by PCR NOT DETECTED        Escherichia Coli NOT DETECTED        Klebsiella aerogenes by PCR NOT DETECTED        Klebsiella

## 2024-01-11 NOTE — PROGRESS NOTES
VANCO DAILY FOLLOW UP NOTE  [facility]   Pharmacy Pharmacokinetic Monitoring Service - Vancomycin    Consulting Provider: Esther   Indication: SSTI  Target Concentration: Goal AUC/ELIZABETH 400-600 mg*hr/L  Day of Therapy: 4  Additional Antimicrobials: clindamycin + Flagyl    Patient eligible for piperacillin-tazobactam to cefepime auto-substitution per P&T approved protocol? N/A    Pertinent Laboratory Values:   Wt Readings from Last 1 Encounters:   01/08/24 104.3 kg (230 lb)     Temp Readings from Last 1 Encounters:   01/11/24 97.7 °F (36.5 °C) (Oral)     Recent Labs     01/08/24  1611 01/08/24  1849 01/10/24  0411 01/11/24  0531   BUN 17  --  15  --    CREATININE 0.97  --  0.70 0.62   WBC 10.4  --  4.9  --    PROCAL 0.96*  --  2.51*  --    LACTSEPSIS 2.6* 1.2  --   --      Estimated Creatinine Clearance: 153 mL/min (based on SCr of 0.62 mg/dL).    Lab Results   Component Value Date/Time    VANCORANDOM 11.0 01/11/2024 05:31 AM       MRSA Nasal Swab: N/A. Non-respiratory infection    Assessment:  Date/Time Dose Concentration AUC   1/9 1416 1250mg IV q18h 20.7 425   1/11 0531 1250mg IV q18 11.0 193   Note: Serum concentrations collected for AUC dosing may appear elevated if collected in close proximity to the dose administered, this is not necessarily an indication of toxicity    Plan:  Current dosing regimen is sub-therapeutic  Increase dose to 1250mg IV q8h for predicted AUC/Tr of 434/11.1  Repeat vancomycin concentrations will be ordered as clinically appropriate   Pharmacy will continue to monitor patient and adjust therapy as indicated    Thank you for the consult,  Tyshawn Dunn, PharmD, BCPS  1/11/2024

## 2024-01-11 NOTE — ICUWATCH
RRT Clinical Rounding Nurse Assistance    Called to assist primary RN with IV start. 20g Right outer upper arm.  Pt tolerated well.  IV team to place PICC tomorrow.  Primary RN notified.    Genet Erickson RN  [facility]: [phone]  [facility]: [phone]

## 2024-01-11 NOTE — WOUND CARE
Noted request for ongoing wound care, plan to see tomorrow am for 1st packing removal and dressing change. Will need dressing changes 3 times per week with Iodoform gauze, dry dressing and ace. Will need follow up with Dr. Nicole.

## 2024-01-11 NOTE — CARE COORDINATION
Referral sent to [facility] for home antibiotics. Final antibiotics pending cultures.     Adrienne LONDON, ACM  [place]

## 2024-01-12 LAB
ANION GAP SERPL CALC-SCNC: 2 MMOL/L (ref 2–11)
BACTERIA SPEC CULT: ABNORMAL
BACTERIA SPEC CULT: NORMAL
BASOPHILS # BLD: 0 K/UL (ref 0–0.2)
BASOPHILS NFR BLD: 0 % (ref 0–2)
BUN SERPL-MCNC: 15 MG/DL (ref 6–23)
CALCIUM SERPL-MCNC: 8.4 MG/DL (ref 8.3–10.4)
CHLORIDE SERPL-SCNC: 110 MMOL/L (ref 103–113)
CO2 SERPL-SCNC: 28 MMOL/L (ref 21–32)
CREAT SERPL-MCNC: 0.65 MG/DL (ref 0.6–1)
DIFFERENTIAL METHOD BLD: ABNORMAL
EOSINOPHIL # BLD: 0.2 K/UL (ref 0–0.8)
EOSINOPHIL NFR BLD: 3 % (ref 0.5–7.8)
ERYTHROCYTE [DISTWIDTH] IN BLOOD BY AUTOMATED COUNT: 13.6 % (ref 11.9–14.6)
GLUCOSE BLD STRIP.AUTO-MCNC: 110 MG/DL (ref 65–100)
GLUCOSE BLD STRIP.AUTO-MCNC: 111 MG/DL (ref 65–100)
GLUCOSE BLD STRIP.AUTO-MCNC: 140 MG/DL (ref 65–100)
GLUCOSE BLD STRIP.AUTO-MCNC: 153 MG/DL (ref 65–100)
GLUCOSE SERPL-MCNC: 119 MG/DL (ref 65–100)
GRAM STN SPEC: ABNORMAL
HCT VFR BLD AUTO: 32 % (ref 35.8–46.3)
HGB BLD-MCNC: 9.9 G/DL (ref 11.7–15.4)
IMM GRANULOCYTES # BLD AUTO: 0 K/UL (ref 0–0.5)
IMM GRANULOCYTES NFR BLD AUTO: 1 % (ref 0–5)
LYMPHOCYTES # BLD: 1.6 K/UL (ref 0.5–4.6)
LYMPHOCYTES NFR BLD: 30 % (ref 13–44)
MCH RBC QN AUTO: 26.5 PG (ref 26.1–32.9)
MCHC RBC AUTO-ENTMCNC: 30.9 G/DL (ref 31.4–35)
MCV RBC AUTO: 85.8 FL (ref 82–102)
MONOCYTES # BLD: 0.4 K/UL (ref 0.1–1.3)
MONOCYTES NFR BLD: 8 % (ref 4–12)
NEUTS SEG # BLD: 3.2 K/UL (ref 1.7–8.2)
NEUTS SEG NFR BLD: 59 % (ref 43–78)
NRBC # BLD: 0 K/UL (ref 0–0.2)
PLATELET # BLD AUTO: 284 K/UL (ref 150–450)
PMV BLD AUTO: 8.8 FL (ref 9.4–12.3)
POTASSIUM SERPL-SCNC: 3.7 MMOL/L (ref 3.5–5.1)
RBC # BLD AUTO: 3.73 M/UL (ref 4.05–5.2)
SERVICE CMNT-IMP: ABNORMAL
SERVICE CMNT-IMP: NORMAL
SODIUM SERPL-SCNC: 140 MMOL/L (ref 136–146)
WBC # BLD AUTO: 5.4 K/UL (ref 4.3–11.1)

## 2024-01-12 PROCEDURE — 6360000002 HC RX W HCPCS: Performed by: NURSE PRACTITIONER

## 2024-01-12 PROCEDURE — 6370000000 HC RX 637 (ALT 250 FOR IP): Performed by: NURSE PRACTITIONER

## 2024-01-12 PROCEDURE — 2580000003 HC RX 258: Performed by: STUDENT IN AN ORGANIZED HEALTH CARE EDUCATION/TRAINING PROGRAM

## 2024-01-12 PROCEDURE — 6370000000 HC RX 637 (ALT 250 FOR IP): Performed by: FAMILY MEDICINE

## 2024-01-12 PROCEDURE — 6370000000 HC RX 637 (ALT 250 FOR IP): Performed by: STUDENT IN AN ORGANIZED HEALTH CARE EDUCATION/TRAINING PROGRAM

## 2024-01-12 PROCEDURE — 80048 BASIC METABOLIC PNL TOTAL CA: CPT

## 2024-01-12 PROCEDURE — 1100000000 HC RM PRIVATE

## 2024-01-12 PROCEDURE — 85025 COMPLETE CBC W/AUTO DIFF WBC: CPT

## 2024-01-12 PROCEDURE — 05HB33Z INSERTION OF INFUSION DEVICE INTO RIGHT BASILIC VEIN, PERCUTANEOUS APPROACH: ICD-10-PCS | Performed by: INTERNAL MEDICINE

## 2024-01-12 PROCEDURE — 82962 GLUCOSE BLOOD TEST: CPT

## 2024-01-12 PROCEDURE — C1751 CATH, INF, PER/CENT/MIDLINE: HCPCS

## 2024-01-12 PROCEDURE — 6360000002 HC RX W HCPCS: Performed by: STUDENT IN AN ORGANIZED HEALTH CARE EDUCATION/TRAINING PROGRAM

## 2024-01-12 PROCEDURE — 36573 INSJ PICC RS&I 5 YR+: CPT

## 2024-01-12 PROCEDURE — 2580000003 HC RX 258: Performed by: NURSE PRACTITIONER

## 2024-01-12 PROCEDURE — 36415 COLL VENOUS BLD VENIPUNCTURE: CPT

## 2024-01-12 RX ORDER — SODIUM CHLORIDE 0.9 % (FLUSH) 0.9 %
5-40 SYRINGE (ML) INJECTION EVERY 12 HOURS SCHEDULED
Status: DISCONTINUED | OUTPATIENT
Start: 2024-01-12 | End: 2024-01-13

## 2024-01-12 RX ORDER — SODIUM CHLORIDE 9 MG/ML
25 INJECTION, SOLUTION INTRAVENOUS PRN
Status: DISCONTINUED | OUTPATIENT
Start: 2024-01-12 | End: 2024-01-15 | Stop reason: HOSPADM

## 2024-01-12 RX ORDER — INSULIN LISPRO 100 [IU]/ML
6 INJECTION, SOLUTION INTRAVENOUS; SUBCUTANEOUS
Status: DISCONTINUED | OUTPATIENT
Start: 2024-01-12 | End: 2024-01-15 | Stop reason: HOSPADM

## 2024-01-12 RX ORDER — SODIUM CHLORIDE 0.9 % (FLUSH) 0.9 %
5-40 SYRINGE (ML) INJECTION PRN
Status: DISCONTINUED | OUTPATIENT
Start: 2024-01-12 | End: 2024-01-15 | Stop reason: HOSPADM

## 2024-01-12 RX ADMIN — ONDANSETRON 4 MG: 4 TABLET, ORALLY DISINTEGRATING ORAL at 10:49

## 2024-01-12 RX ADMIN — METRONIDAZOLE 500 MG: 500 INJECTION, SOLUTION INTRAVENOUS at 02:07

## 2024-01-12 RX ADMIN — BUSPIRONE HYDROCHLORIDE 30 MG: 5 TABLET ORAL at 08:11

## 2024-01-12 RX ADMIN — INSULIN LISPRO 6 UNITS: 100 INJECTION, SOLUTION INTRAVENOUS; SUBCUTANEOUS at 12:17

## 2024-01-12 RX ADMIN — SODIUM CHLORIDE: 9 INJECTION, SOLUTION INTRAVENOUS at 00:34

## 2024-01-12 RX ADMIN — OXYCODONE AND ACETAMINOPHEN 1 TABLET: 10; 325 TABLET ORAL at 16:41

## 2024-01-12 RX ADMIN — VANCOMYCIN HYDROCHLORIDE 1250 MG: 10 INJECTION, POWDER, LYOPHILIZED, FOR SOLUTION INTRAVENOUS at 12:16

## 2024-01-12 RX ADMIN — ACYCLOVIR 200 MG: 200 CAPSULE ORAL at 21:34

## 2024-01-12 RX ADMIN — ACYCLOVIR 200 MG: 200 CAPSULE ORAL at 08:11

## 2024-01-12 RX ADMIN — METRONIDAZOLE 500 MG: 500 INJECTION, SOLUTION INTRAVENOUS at 18:08

## 2024-01-12 RX ADMIN — METRONIDAZOLE 500 MG: 500 INJECTION, SOLUTION INTRAVENOUS at 10:34

## 2024-01-12 RX ADMIN — TRAZODONE HYDROCHLORIDE 100 MG: 50 TABLET ORAL at 21:34

## 2024-01-12 RX ADMIN — SODIUM CHLORIDE, PRESERVATIVE FREE 10 ML: 5 INJECTION INTRAVENOUS at 21:30

## 2024-01-12 RX ADMIN — SODIUM CHLORIDE 75 ML: 9 INJECTION, SOLUTION INTRAVENOUS at 21:32

## 2024-01-12 RX ADMIN — FLUOXETINE HYDROCHLORIDE 40 MG: 20 CAPSULE ORAL at 08:11

## 2024-01-12 RX ADMIN — INSULIN HUMAN 17 UNITS: 100 INJECTION, SUSPENSION SUBCUTANEOUS at 08:13

## 2024-01-12 RX ADMIN — ACETAMINOPHEN 650 MG: 325 TABLET, FILM COATED ORAL at 16:41

## 2024-01-12 RX ADMIN — VANCOMYCIN HYDROCHLORIDE 1250 MG: 10 INJECTION, POWDER, LYOPHILIZED, FOR SOLUTION INTRAVENOUS at 00:35

## 2024-01-12 RX ADMIN — INSULIN HUMAN 15 UNITS: 100 INJECTION, SUSPENSION SUBCUTANEOUS at 16:42

## 2024-01-12 RX ADMIN — OXYCODONE AND ACETAMINOPHEN 1 TABLET: 10; 325 TABLET ORAL at 08:11

## 2024-01-12 RX ADMIN — INSULIN LISPRO 6 UNITS: 100 INJECTION, SOLUTION INTRAVENOUS; SUBCUTANEOUS at 16:42

## 2024-01-12 RX ADMIN — OXYCODONE AND ACETAMINOPHEN 1 TABLET: 10; 325 TABLET ORAL at 23:47

## 2024-01-12 RX ADMIN — VANCOMYCIN HYDROCHLORIDE 1250 MG: 10 INJECTION, POWDER, LYOPHILIZED, FOR SOLUTION INTRAVENOUS at 21:32

## 2024-01-12 RX ADMIN — INSULIN LISPRO 7 UNITS: 100 INJECTION, SOLUTION INTRAVENOUS; SUBCUTANEOUS at 08:13

## 2024-01-12 RX ADMIN — BUSPIRONE HYDROCHLORIDE 30 MG: 5 TABLET ORAL at 21:34

## 2024-01-12 ASSESSMENT — PAIN DESCRIPTION - DESCRIPTORS: DESCRIPTORS: SHARP

## 2024-01-12 ASSESSMENT — PAIN DESCRIPTION - LOCATION: LOCATION: FOOT

## 2024-01-12 ASSESSMENT — PAIN SCALES - GENERAL
PAINLEVEL_OUTOF10: 6
PAINLEVEL_OUTOF10: 6

## 2024-01-12 ASSESSMENT — PAIN DESCRIPTION - ORIENTATION: ORIENTATION: RIGHT

## 2024-01-12 ASSESSMENT — PAIN - FUNCTIONAL ASSESSMENT: PAIN_FUNCTIONAL_ASSESSMENT: ACTIVITIES ARE NOT PREVENTED

## 2024-01-12 NOTE — PROGRESS NOTES
Hospitalist Progress Note   Admit Date:  2024  4:36 PM   Name:  Deepa Cruz   Age:  38 y.o.  Sex:  female  :  1985   MRN:  [id number]   Room:  [facility]    Presenting/Chief Complaint: Fever, Emesis, Abdominal Pain, and Wound Infection     Reason(s) for Admission: Septicemia (HCC) [A41.9]  Right foot infection [L08.9]  Sepsis (HCC) [A41.9]     Hospital Course:   Deepa Cruz is a 38 y.o. female with medical history of Charcots, T2DM, chronic OM, chronic DFU, anxiety, HTN who presented with a DM wound of the plantar surface of the right foot that over the past few days has been draining more than usual associated with foul odor. Was scheduled for o/p surgical procedure w/Dr Nicole on 24. Last xray from 24 showed interval increase in diffuse soft tissue swelling. no evidence of fracture or other acute bony abnormality in the foot. No bony lesions.  On arrival to the ED pt had a fever at 101, tachy HR of 138, WBC ct 10.4, PCT 0.96, and a LA level 2.6. Bld cx drawn. Dx sepsis 2/2 foot wound. Started on antibx and hospitalist to admit.     Orthopedics was consulted and patient underwent right foot I&D debridement, 3rd metatarsal excision, 2nd metatarsal osteotomy, 4th metatarsal osteotomy with ortho on 1/10. Wound culture polymicrobial. Blood cultures  also returned positive but awaiting ID (not identified on PCR). ID was consulted for assistance. Final plan TBD but would anticipate 6 weeks of IV therapy. Remains on Flagyl and Vancomycin. PICC has been ordered, not yet placed. Awaiting finalization of ID plan for discharge. Sepsis has resolved.    Subjective & 24hr Events:   Patient alert, oriented conversational. She has gone through this before and had numerous IV antibiotics rounds at infusion center. Overall, she feels stable now. Just awaiting PICC and finalized plan. She has no acute complaints besides pain in the foot. Tolerating oral diet.    Assessment & Plan:     Sepsis secondary to  recurrent R diabetic foot infection, possible bacteremia  - s/p right foot I&D debridement, 3rd metatarsal excision, 2nd metatarsal osteotomy, 4th metatarsal osteotomy with ortho on 1/10, noted ulceration down to exposed bone  - Noted that amputation has been recommended but she continues to decline this, no further surgical plans currently   - Wound culture with beta hemolytic strep and MRSA  - ID consulted and awaiting final plan, stopped Clindamycin, currently on Vancomycin and Flagyl  - 1/4 blood cultures bottles also positive for GPC not identified on PCR, awaiting identification  - Wound care following, will need HH and continued care with [facility] upon discharge      DM type 2, insulin dependent  - last A1c 11.3, repeat this admission 8.4  - Pt states lantus causes diarrhea, spoke w DM educator, changed to NPH  - Adjust NPH to 15 units BID  - Mealtime lispro 7U tid  - SSI coverage as needed, DM diet, finger sticks AC/HS, hypoglycemic protocol   - DM educator following    Nausea  - zofran prn      Hypokalemia  - resolved      Anemia   - H/H stable at this time    Discharge planning: home when IV antibiotic plan is finalized    Diet:  ADULT DIET; Regular; 4 carb choices (60 gm/meal)  VTE prophylaxis: Lovenox  Code status: Full Code      Non-peripheral Lines and Tubes (if present):          Telemetry (if present):  Cardiac/Telemetry Monitor On: No        Hospital Problems:  Principal Problem:    Sepsis (HCC)  Active Problems:    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 *      Objective:   Patient Vitals for the past 24 hrs:   Temp Pulse Resp BP SpO2   01/12/24 0751 97.5 °F (36.4 °C) 80 16

## 2024-01-12 NOTE — WOUND CARE
Right foot wound, packing removed and replaced wrapped covered with dry 4x4's.  Will need dressing change Monday. Plan is discharge with home health in the next day or so. Will monitor in acute care, nursing will need to change majority of dressings.

## 2024-01-12 NOTE — PLAN OF CARE
Problem: Discharge Planning  Goal: Discharge to home or other facility with appropriate resources  Outcome: Progressing     Problem: Pain  Goal: Verbalizes/displays adequate comfort level or baseline comfort level  Outcome: Progressing     Problem: Skin/Tissue Integrity  Goal: Absence of new skin breakdown  Description: 1.  Monitor for areas of redness and/or skin breakdown  2.  Assess vascular access sites hourly  3.  Every 4-6 hours minimum:  Change oxygen saturation probe site  4.  Every 4-6 hours:  If on nasal continuous positive airway pressure, respiratory therapy assess nares and determine need for appliance change or resting period.  Outcome: Progressing     Problem: ABCDS Injury Assessment  Goal: Absence of physical injury  Outcome: Progressing     Problem: Chronic Conditions and Co-morbidities  Goal: Patient's chronic conditions and co-morbidity symptoms are monitored and maintained or improved  Outcome: Progressing     Problem: Safety - Adult  Goal: Free from fall injury  Outcome: Progressing     Problem: Skin/Tissue Integrity - Adult  Goal: Incisions, wounds, or drain sites healing without S/S of infection  Outcome: Progressing

## 2024-01-12 NOTE — PROGRESS NOTES
ID Chart Review Note    Afebrile. No leukocytosis. Wound cx growing MRSA and GBS.    Recommendations:  - Will need discharge on IV abx.   - Vancomycin 1250mg q8hr for trough goal 15-20.   - EOT: Feb 21, 2024.   - Routine PICC care.   - Labs:    - Mondays: CBC with diff, BMP, CRP, Vanc trough    - Thursdays: Cr, Vanc trough  - Case management orders placed.  - Placed on ID OPAT list and will have IDC discharge f/u appt scheduled.    Lb Grider MD

## 2024-01-12 NOTE — DIABETES MGMT
Patient admitted with Sepsis. Blood glucose ranged 100-133 yesterday with patient receiving NPH 34 units and Humalog 21 units. Blood glucose this morning was 110. Creatinine 0.65. GFR > 60. Reviewed patient current regimen: NPH 17 units BID, Humalog 7 units with meals, and Humalog correctional insulin.  Patient would likely benefit from a decrease in basal insulin to reduce the risk of morning hypoglycemia.  Patient would also likely benefit from a decrease in prandial insulin to reduce the risk of hypoglycemia during the day.  Provider updated via [email or web address] regarding recommendations and patient glycemic control.

## 2024-01-12 NOTE — PROGRESS NOTES
PICC Placement Note    PRE-PROCEDURE VERIFICATION    Correct Procedure: yes  Time out completed with assistant Marko Cuello RN and all persons present in agreement with time out.  Risks and benefits reviewed with patient (via telephone) and informed consent obtained prior to assessment and procedure.     Correct Site: yes  Temperature: Temp: 97.5 °F (36.4 °C), Temperature Source:    Recent Labs     01/12/24  0538   BUN 15      WBC 5.4     Allergies: Morphine, Metformin, Amoxicillin, Gabapentin, Mirtazapine, Sulfamethoxazole-trimethoprim, Insulin glargine-lixisenatide, Metformin and related, and Penicillins  Education materials for PICC Care given to patient or family.    PROCEDURE DETAIL  A single lumen PICC line was started for Long term IV/antibiotic administration. The following documentation is in addition to the PICC properties in the lines/airways flowsheet:    Lot #: [id number]  Xylocaine used: Yes  Mid-Arm Circumference: 44 (cm)  Internal Catheter Length: 36 (cm)  External Catheter Length: 3 (cm)  Total Catheter Length: 39 (cm)  Vein Selection for PICC: right basilic  Central Line Insertion Bundle followed: Yes  Complication Related to Insertion: none    Both the insertion guidewire and ECG guidewire were removed intact all ports have positive blood return and were flush well with normal saline.    The location of the tip of the PICC is verified using ECG technology.  The tip is in the SVC per ECG reading.  See image below.     Line is okay to use: yes          Anabel Gaitan RN, PCCN, [facility]-BC

## 2024-01-12 NOTE — CARE COORDINATION
Chart reviewed and patient discussed in IDT rounds this AM. Patient request going to the cancer center for infusions- awaiting ID recommendations to schedule patient. Patient active at the wound center. Discharge plan is to return home.    Adrienne LONDON, ACM  [place]

## 2024-01-12 NOTE — PROGRESS NOTES
VANCO DAILY FOLLOW UP NOTE  [facility]   Pharmacy Pharmacokinetic Monitoring Service - Vancomycin    Consulting Provider: Esther   Indication: diabetic foot infection  Target Concentration: Goal AUC/ELIZABETH 400-600 mg*hr/L  Day of Therapy: 5  Additional Antimicrobials: Flagyl    Patient eligible for piperacillin-tazobactam to cefepime auto-substitution per P&T approved protocol? N/A    Pertinent Laboratory Values:   Wt Readings from Last 1 Encounters:   01/08/24 104.3 kg (230 lb)     Temp Readings from Last 1 Encounters:   01/12/24 97.5 °F (36.4 °C) (Oral)     Recent Labs     01/10/24  0411 01/11/24  0531 01/12/24  0538   BUN 15 14 15   CREATININE 0.70 0.64  0.62 0.65   WBC 4.9 5.5 5.4   PROCAL 2.51*  --   --      Estimated Creatinine Clearance: 145 mL/min (based on SCr of 0.65 mg/dL).    Lab Results   Component Value Date/Time    VANCORANDOM 11.0 01/11/2024 05:31 AM       MRSA Nasal Swab: N/A. Non-respiratory infection    Assessment:  Date/Time Dose Concentration AUC   1/9 1416 1250mg IV q18h 20.7 425   1/11 0531 1250mg IV q18 11.0 193   Note: Serum concentrations collected for AUC dosing may appear elevated if collected in close proximity to the dose administered, this is not necessarily an indication of toxicity    Plan:  Current dosing regimen is therapeutic  Continue current dose  Repeat vancomycin concentration ordered for 1/13 @ 1000    Pharmacy will continue to monitor patient and adjust therapy as indicated    Thank you for the consult,  Ramandeep Manuel RPH

## 2024-01-13 LAB
ANION GAP SERPL CALC-SCNC: 5 MMOL/L (ref 2–11)
BACTERIA SPEC CULT: NORMAL
BASOPHILS # BLD: 0 K/UL (ref 0–0.2)
BASOPHILS NFR BLD: 1 % (ref 0–2)
BUN SERPL-MCNC: 12 MG/DL (ref 6–23)
CALCIUM SERPL-MCNC: 8.9 MG/DL (ref 8.3–10.4)
CHLORIDE SERPL-SCNC: 106 MMOL/L (ref 103–113)
CK SERPL-CCNC: 23 U/L (ref 21–215)
CO2 SERPL-SCNC: 26 MMOL/L (ref 21–32)
CREAT SERPL-MCNC: 0.76 MG/DL (ref 0.6–1)
DIFFERENTIAL METHOD BLD: ABNORMAL
EOSINOPHIL # BLD: 0.1 K/UL (ref 0–0.8)
EOSINOPHIL NFR BLD: 2 % (ref 0.5–7.8)
ERYTHROCYTE [DISTWIDTH] IN BLOOD BY AUTOMATED COUNT: 13.6 % (ref 11.9–14.6)
GLUCOSE BLD STRIP.AUTO-MCNC: 131 MG/DL (ref 65–100)
GLUCOSE BLD STRIP.AUTO-MCNC: 152 MG/DL (ref 65–100)
GLUCOSE BLD STRIP.AUTO-MCNC: 177 MG/DL (ref 65–100)
GLUCOSE BLD STRIP.AUTO-MCNC: 181 MG/DL (ref 65–100)
GLUCOSE SERPL-MCNC: 134 MG/DL (ref 65–100)
HCT VFR BLD AUTO: 35.3 % (ref 35.8–46.3)
HGB BLD-MCNC: 10.9 G/DL (ref 11.7–15.4)
IMM GRANULOCYTES # BLD AUTO: 0 K/UL (ref 0–0.5)
IMM GRANULOCYTES NFR BLD AUTO: 1 % (ref 0–5)
LYMPHOCYTES # BLD: 1.6 K/UL (ref 0.5–4.6)
LYMPHOCYTES NFR BLD: 20 % (ref 13–44)
MCH RBC QN AUTO: 26.3 PG (ref 26.1–32.9)
MCHC RBC AUTO-ENTMCNC: 30.9 G/DL (ref 31.4–35)
MCV RBC AUTO: 85.3 FL (ref 82–102)
MONOCYTES # BLD: 0.5 K/UL (ref 0.1–1.3)
MONOCYTES NFR BLD: 6 % (ref 4–12)
NEUTS SEG # BLD: 5.7 K/UL (ref 1.7–8.2)
NEUTS SEG NFR BLD: 72 % (ref 43–78)
NRBC # BLD: 0 K/UL (ref 0–0.2)
PLATELET # BLD AUTO: 386 K/UL (ref 150–450)
PMV BLD AUTO: 8.8 FL (ref 9.4–12.3)
POTASSIUM SERPL-SCNC: 4.1 MMOL/L (ref 3.5–5.1)
RBC # BLD AUTO: 4.14 M/UL (ref 4.05–5.2)
SERVICE CMNT-IMP: ABNORMAL
SERVICE CMNT-IMP: NORMAL
SODIUM SERPL-SCNC: 137 MMOL/L (ref 136–146)
VANCOMYCIN SERPL-MCNC: 14.6 UG/ML
WBC # BLD AUTO: 8 K/UL (ref 4.3–11.1)

## 2024-01-13 PROCEDURE — 6370000000 HC RX 637 (ALT 250 FOR IP): Performed by: NURSE PRACTITIONER

## 2024-01-13 PROCEDURE — 82550 ASSAY OF CK (CPK): CPT

## 2024-01-13 PROCEDURE — 2580000003 HC RX 258: Performed by: HOSPITALIST

## 2024-01-13 PROCEDURE — 6360000002 HC RX W HCPCS: Performed by: HOSPITALIST

## 2024-01-13 PROCEDURE — 6360000002 HC RX W HCPCS: Performed by: STUDENT IN AN ORGANIZED HEALTH CARE EDUCATION/TRAINING PROGRAM

## 2024-01-13 PROCEDURE — 36415 COLL VENOUS BLD VENIPUNCTURE: CPT

## 2024-01-13 PROCEDURE — 80048 BASIC METABOLIC PNL TOTAL CA: CPT

## 2024-01-13 PROCEDURE — 6360000002 HC RX W HCPCS: Performed by: NURSE PRACTITIONER

## 2024-01-13 PROCEDURE — 2580000003 HC RX 258: Performed by: STUDENT IN AN ORGANIZED HEALTH CARE EDUCATION/TRAINING PROGRAM

## 2024-01-13 PROCEDURE — 2580000003 HC RX 258: Performed by: NURSE PRACTITIONER

## 2024-01-13 PROCEDURE — 6370000000 HC RX 637 (ALT 250 FOR IP): Performed by: STUDENT IN AN ORGANIZED HEALTH CARE EDUCATION/TRAINING PROGRAM

## 2024-01-13 PROCEDURE — 82962 GLUCOSE BLOOD TEST: CPT

## 2024-01-13 PROCEDURE — 80202 ASSAY OF VANCOMYCIN: CPT

## 2024-01-13 PROCEDURE — 87040 BLOOD CULTURE FOR BACTERIA: CPT

## 2024-01-13 PROCEDURE — 6370000000 HC RX 637 (ALT 250 FOR IP): Performed by: FAMILY MEDICINE

## 2024-01-13 PROCEDURE — 85025 COMPLETE CBC W/AUTO DIFF WBC: CPT

## 2024-01-13 PROCEDURE — A4216 STERILE WATER/SALINE, 10 ML: HCPCS | Performed by: HOSPITALIST

## 2024-01-13 PROCEDURE — 1100000000 HC RM PRIVATE

## 2024-01-13 RX ORDER — BUTALBITAL, ACETAMINOPHEN AND CAFFEINE 50; 325; 40 MG/1; MG/1; MG/1
1 TABLET ORAL ONCE
Status: COMPLETED | OUTPATIENT
Start: 2024-01-13 | End: 2024-01-13

## 2024-01-13 RX ORDER — OXYCODONE AND ACETAMINOPHEN 10; 325 MG/1; MG/1
1 TABLET ORAL EVERY 6 HOURS PRN
Status: DISCONTINUED | OUTPATIENT
Start: 2024-01-13 | End: 2024-01-15 | Stop reason: HOSPADM

## 2024-01-13 RX ADMIN — SODIUM CHLORIDE, PRESERVATIVE FREE 10 ML: 5 INJECTION INTRAVENOUS at 20:06

## 2024-01-13 RX ADMIN — INSULIN HUMAN 15 UNITS: 100 INJECTION, SUSPENSION SUBCUTANEOUS at 08:23

## 2024-01-13 RX ADMIN — ONDANSETRON 4 MG: 4 TABLET, ORALLY DISINTEGRATING ORAL at 17:24

## 2024-01-13 RX ADMIN — DAPTOMYCIN 850 MG: 500 INJECTION, POWDER, LYOPHILIZED, FOR SOLUTION INTRAVENOUS at 14:43

## 2024-01-13 RX ADMIN — INSULIN LISPRO 6 UNITS: 100 INJECTION, SOLUTION INTRAVENOUS; SUBCUTANEOUS at 17:21

## 2024-01-13 RX ADMIN — ACETAMINOPHEN 650 MG: 325 TABLET, FILM COATED ORAL at 14:45

## 2024-01-13 RX ADMIN — BUTALBITAL, ACETAMINOPHEN, AND CAFFEINE 1 TABLET: 50; 325; 40 TABLET ORAL at 17:39

## 2024-01-13 RX ADMIN — METRONIDAZOLE 500 MG: 500 INJECTION, SOLUTION INTRAVENOUS at 02:12

## 2024-01-13 RX ADMIN — INSULIN HUMAN 15 UNITS: 100 INJECTION, SUSPENSION SUBCUTANEOUS at 17:20

## 2024-01-13 RX ADMIN — OXYCODONE AND ACETAMINOPHEN 1 TABLET: 10; 325 TABLET ORAL at 12:17

## 2024-01-13 RX ADMIN — ACYCLOVIR 200 MG: 200 CAPSULE ORAL at 20:07

## 2024-01-13 RX ADMIN — BUSPIRONE HYDROCHLORIDE 30 MG: 5 TABLET ORAL at 08:22

## 2024-01-13 RX ADMIN — TRAZODONE HYDROCHLORIDE 100 MG: 50 TABLET ORAL at 20:10

## 2024-01-13 RX ADMIN — ONDANSETRON 4 MG: 2 INJECTION INTRAMUSCULAR; INTRAVENOUS at 05:48

## 2024-01-13 RX ADMIN — INSULIN LISPRO 6 UNITS: 100 INJECTION, SOLUTION INTRAVENOUS; SUBCUTANEOUS at 08:23

## 2024-01-13 RX ADMIN — FLUOXETINE HYDROCHLORIDE 40 MG: 20 CAPSULE ORAL at 08:22

## 2024-01-13 RX ADMIN — INSULIN LISPRO 6 UNITS: 100 INJECTION, SOLUTION INTRAVENOUS; SUBCUTANEOUS at 12:18

## 2024-01-13 RX ADMIN — VANCOMYCIN HYDROCHLORIDE 1250 MG: 10 INJECTION, POWDER, LYOPHILIZED, FOR SOLUTION INTRAVENOUS at 05:27

## 2024-01-13 RX ADMIN — ACYCLOVIR 200 MG: 200 CAPSULE ORAL at 08:22

## 2024-01-13 RX ADMIN — SODIUM CHLORIDE, PRESERVATIVE FREE 10 ML: 5 INJECTION INTRAVENOUS at 05:51

## 2024-01-13 RX ADMIN — BUSPIRONE HYDROCHLORIDE 30 MG: 5 TABLET ORAL at 20:07

## 2024-01-13 RX ADMIN — SODIUM CHLORIDE 50 ML: 9 INJECTION, SOLUTION INTRAVENOUS at 02:11

## 2024-01-13 NOTE — PROGRESS NOTES
ID Chart Review Note    Afebrile. No leukocytosis. Wound cx growing MRSA and GBS.  Bcx on 1/8/24 positive fpr GPCs  No ID as of yet  and BCID has not identified organism which has been a trend  GPC in anaerobic bottle  Primary team has reached out because patient cannot do vancomycin as dosed 3x/daily because of work and would prefer a BID regimen or once daily regimen   MICs reviewed  We can switch to daptomycin if tolerated  I think we repeat bcx     Recommendations:  - Switch to daptomycin 8mg/kg  - Repeat bcx   - If Bcx positive for SA recommend TTE  - Can discharge if repeat bcx are negative and ID of positive cx is suggestive of a contaminant

## 2024-01-13 NOTE — PLAN OF CARE
Problem: Discharge Planning  Goal: Discharge to home or other facility with appropriate resources  1/13/2024 0027 by Wendi Moura RN  Outcome: Progressing  Flowsheets (Taken 1/12/2024 2138)  Discharge to home or other facility with appropriate resources: Identify discharge learning needs (meds, wound care, etc)  1/12/2024 1135 by Padmini Frank RN  Outcome: Progressing     Problem: Pain  Goal: Verbalizes/displays adequate comfort level or baseline comfort level  1/13/2024 0027 by Wendi Moura RN  Outcome: Progressing  1/12/2024 1135 by Padmini Frank RN  Outcome: Progressing     Problem: Skin/Tissue Integrity  Goal: Absence of new skin breakdown  Description: 1.  Monitor for areas of redness and/or skin breakdown  2.  Assess vascular access sites hourly  3.  Every 4-6 hours minimum:  Change oxygen saturation probe site  4.  Every 4-6 hours:  If on nasal continuous positive airway pressure, respiratory therapy assess nares and determine need for appliance change or resting period.  1/13/2024 0027 by Wendi Moura RN  Outcome: Progressing  1/12/2024 1135 by Padmini Frank RN  Outcome: Progressing     Problem: ABCDS Injury Assessment  Goal: Absence of physical injury  1/13/2024 0027 by Wendi Moura RN  Outcome: Progressing  1/12/2024 1135 by Padmini Frank RN  Outcome: Progressing     Problem: Chronic Conditions and Co-morbidities  Goal: Patient's chronic conditions and co-morbidity symptoms are monitored and maintained or improved  1/12/2024 1135 by Padmini Frank RN  Outcome: Progressing     Problem: Safety - Adult  Goal: Free from fall injury  1/13/2024 0027 by Wendi Moura RN  Outcome: Progressing  1/12/2024 1135 by Padmini Frank RN  Outcome: Progressing     Problem: Skin/Tissue Integrity - Adult  Goal: Incisions, wounds, or drain sites healing without S/S of infection  1/13/2024 0027 by Wendi Moura RN  Outcome: Progressing  1/12/2024 1135 by Padmini Frank  RN  Outcome: Progressing

## 2024-01-13 NOTE — PROGRESS NOTES
Hospitalist Progress Note   Admit Date:  2024  4:36 PM   Name:  Deepa Cruz   Age:  38 y.o.  Sex:  female  :  1985   MRN:  [id number]   Room:  [facility]    Presenting/Chief Complaint: Fever, Emesis, Abdominal Pain, and Wound Infection     Reason(s) for Admission: Septicemia (HCC) [A41.9]  Right foot infection [L08.9]  Sepsis (HCC) [A41.9]     Hospital Course:   Deepa Cruz is a 38 y.o. female with medical history of Charcots, T2DM, chronic OM, chronic DFU, anxiety, HTN who presented with a DM wound of the plantar surface of the right foot that over the past few days has been draining more than usual associated with foul odor. Was scheduled for o/p surgical procedure w/Dr Nicole on 24. Last xray from 24 showed interval increase in diffuse soft tissue swelling. no evidence of fracture or other acute bony abnormality in the foot. No bony lesions.  On arrival to the ED pt had a fever at 101, tachy HR of 138, WBC ct 10.4, PCT 0.96, and a LA level 2.6. Bld cx drawn. Dx sepsis 2/2 foot wound. Started on antibx and hospitalist to admit.     Orthopedics was consulted and patient underwent right foot I&D debridement, 3rd metatarsal excision, 2nd metatarsal osteotomy, 4th metatarsal osteotomy with ortho on 1/10. Wound culture polymicrobial. Blood cultures  also returned positive for GPC (not identified on PCR) - identification pending. ID was consulted for assistance. Originally with plan for Vancomycin Q8H however this has been canceled now due to pending GPC on blood cultures. Will adjust to Daptomycin Q24H, repeat blood cultures, and await updated final plan from Infectious Disease. PICC was placed . Sepsis has resolved.     Subjective & 24hr Events:   Patient alert, oriented conversational. She can only do daily infusions at the infusion center and will not do home infusions or increased dosing rather than daily. She is okay with waiting for ID plan if this means she can do daily dosing.  tablet Oral Q6H PRN    insulin NPH (HumuLIN N;NovoLIN N) injection vial 15 Units  15 Units SubCUTAneous BID AC    insulin lispro (HUMALOG) injection vial 6 Units  6 Units SubCUTAneous TID WC    sodium chloride flush 0.9 % injection 5-40 mL  5-40 mL IntraVENous PRN    0.9 % sodium chloride infusion  25 mL IntraVENous PRN    busPIRone (BUSPAR) tablet 30 mg  30 mg Oral BID    0.9 % sodium chloride infusion   IntraVENous PRN    acetaminophen (TYLENOL) tablet 650 mg  650 mg Oral Q6H PRN    Or    acetaminophen (TYLENOL) suppository 650 mg  650 mg Rectal Q6H PRN    cloNIDine (CATAPRES) tablet 0.2 mg  0.2 mg Oral BID PRN    dextrose 10 % infusion   IntraVENous Continuous PRN    dextrose bolus 10% 125 mL  125 mL IntraVENous PRN    Or    dextrose bolus 10% 250 mL  250 mL IntraVENous PRN    glucagon (rDNA) injection 1 mg  1 mg SubCUTAneous PRN    glucose chewable tablet 16 g  4 tablet Oral PRN    HYDROmorphone (DILAUDID) injection 1 mg  1 mg IntraVENous Q4H PRN    ipratropium 0.5 mg-albuterol 2.5 mg (DUONEB) nebulizer solution 1 Dose  1 Dose Inhalation Q4H PRN    magnesium sulfate 2000 mg in 50 mL IVPB premix  2,000 mg IntraVENous PRN    ondansetron (ZOFRAN) injection 4 mg  4 mg IntraVENous Q6H PRN    ondansetron (ZOFRAN-ODT) disintegrating tablet 4 mg  4 mg Oral Q8H PRN    polyethylene glycol (GLYCOLAX) packet 17 g  17 g Oral Daily PRN    potassium chloride (KLOR-CON M) extended release tablet 40 mEq  40 mEq Oral PRN    Or    potassium bicarb-citric acid (EFFER-K) effervescent tablet 40 mEq  40 mEq Oral PRN    Or    potassium chloride 10 mEq/100 mL IVPB (Peripheral Line)  10 mEq IntraVENous PRN    prochlorperazine (COMPAZINE) injection 10 mg  10 mg IntraVENous Q6H PRN    sodium chloride flush 0.9 % injection 5-40 mL  5-40 mL IntraVENous PRN    traZODone (DESYREL) tablet 100 mg  100 mg Oral Nightly PRN    FLUoxetine (PROZAC) capsule 40 mg  40 mg Oral Daily    insulin lispro (HUMALOG) injection vial 0-4 Units  0-4 Units

## 2024-01-13 NOTE — PLAN OF CARE
Problem: Discharge Planning  Goal: Discharge to home or other facility with appropriate resources  1/13/2024 1044 by Padmini Frank RN  Outcome: Progressing  1/13/2024 0027 by Wendi Moura RN  Outcome: Progressing  Flowsheets (Taken 1/12/2024 2138)  Discharge to home or other facility with appropriate resources: Identify discharge learning needs (meds, wound care, etc)     Problem: Pain  Goal: Verbalizes/displays adequate comfort level or baseline comfort level  1/13/2024 1044 by Padmini Frank RN  Outcome: Progressing  1/13/2024 0027 by Wendi Moura RN  Outcome: Progressing     Problem: Skin/Tissue Integrity  Goal: Absence of new skin breakdown  Description: 1.  Monitor for areas of redness and/or skin breakdown  2.  Assess vascular access sites hourly  3.  Every 4-6 hours minimum:  Change oxygen saturation probe site  4.  Every 4-6 hours:  If on nasal continuous positive airway pressure, respiratory therapy assess nares and determine need for appliance change or resting period.  1/13/2024 1044 by Padmini Frank RN  Outcome: Progressing  1/13/2024 0027 by Wendi Moura RN  Outcome: Progressing     Problem: ABCDS Injury Assessment  Goal: Absence of physical injury  1/13/2024 1044 by Padmini Frank RN  Outcome: Progressing  1/13/2024 0027 by Wendi Moura RN  Outcome: Progressing     Problem: Chronic Conditions and Co-morbidities  Goal: Patient's chronic conditions and co-morbidity symptoms are monitored and maintained or improved  Outcome: Progressing     Problem: Safety - Adult  Goal: Free from fall injury  1/13/2024 1044 by Padmini Frank RN  Outcome: Progressing  1/13/2024 0027 by Wendi Moura RN  Outcome: Progressing     Problem: Skin/Tissue Integrity - Adult  Goal: Incisions, wounds, or drain sites healing without S/S of infection  1/13/2024 1044 by Padmini Frank RN  Outcome: Progressing  1/13/2024 0027 by Wendi Moura RN  Outcome: Progressing

## 2024-01-14 LAB
BACTERIA SPEC CULT: ABNORMAL
BACTERIA SPEC CULT: ABNORMAL
GLUCOSE BLD STRIP.AUTO-MCNC: 116 MG/DL (ref 65–100)
GLUCOSE BLD STRIP.AUTO-MCNC: 122 MG/DL (ref 65–100)
GLUCOSE BLD STRIP.AUTO-MCNC: 133 MG/DL (ref 65–100)
GLUCOSE BLD STRIP.AUTO-MCNC: 147 MG/DL (ref 65–100)
GRAM STN SPEC: ABNORMAL
GRAM STN SPEC: ABNORMAL
SERVICE CMNT-IMP: ABNORMAL

## 2024-01-14 PROCEDURE — 6360000002 HC RX W HCPCS: Performed by: HOSPITALIST

## 2024-01-14 PROCEDURE — 82962 GLUCOSE BLOOD TEST: CPT

## 2024-01-14 PROCEDURE — 2580000003 HC RX 258: Performed by: NURSE PRACTITIONER

## 2024-01-14 PROCEDURE — 6370000000 HC RX 637 (ALT 250 FOR IP): Performed by: FAMILY MEDICINE

## 2024-01-14 PROCEDURE — 6370000000 HC RX 637 (ALT 250 FOR IP): Performed by: STUDENT IN AN ORGANIZED HEALTH CARE EDUCATION/TRAINING PROGRAM

## 2024-01-14 PROCEDURE — 2580000003 HC RX 258: Performed by: HOSPITALIST

## 2024-01-14 PROCEDURE — 6370000000 HC RX 637 (ALT 250 FOR IP): Performed by: NURSE PRACTITIONER

## 2024-01-14 PROCEDURE — A4216 STERILE WATER/SALINE, 10 ML: HCPCS | Performed by: HOSPITALIST

## 2024-01-14 PROCEDURE — 1100000000 HC RM PRIVATE

## 2024-01-14 RX ORDER — BUTALBITAL, ACETAMINOPHEN AND CAFFEINE 50; 325; 40 MG/1; MG/1; MG/1
1 TABLET ORAL ONCE
Status: COMPLETED | OUTPATIENT
Start: 2024-01-14 | End: 2024-01-14

## 2024-01-14 RX ADMIN — TRAZODONE HYDROCHLORIDE 100 MG: 50 TABLET ORAL at 19:43

## 2024-01-14 RX ADMIN — ACYCLOVIR 200 MG: 200 CAPSULE ORAL at 19:43

## 2024-01-14 RX ADMIN — ACYCLOVIR 200 MG: 200 CAPSULE ORAL at 08:42

## 2024-01-14 RX ADMIN — BUTALBITAL, ACETAMINOPHEN, AND CAFFEINE 1 TABLET: 50; 325; 40 TABLET ORAL at 19:43

## 2024-01-14 RX ADMIN — OXYCODONE AND ACETAMINOPHEN 1 TABLET: 10; 325 TABLET ORAL at 00:00

## 2024-01-14 RX ADMIN — INSULIN LISPRO 6 UNITS: 100 INJECTION, SOLUTION INTRAVENOUS; SUBCUTANEOUS at 17:53

## 2024-01-14 RX ADMIN — INSULIN HUMAN 15 UNITS: 100 INJECTION, SUSPENSION SUBCUTANEOUS at 17:54

## 2024-01-14 RX ADMIN — INSULIN LISPRO 6 UNITS: 100 INJECTION, SOLUTION INTRAVENOUS; SUBCUTANEOUS at 08:43

## 2024-01-14 RX ADMIN — DAPTOMYCIN 850 MG: 500 INJECTION, POWDER, LYOPHILIZED, FOR SOLUTION INTRAVENOUS at 14:23

## 2024-01-14 RX ADMIN — INSULIN LISPRO 6 UNITS: 100 INJECTION, SOLUTION INTRAVENOUS; SUBCUTANEOUS at 12:38

## 2024-01-14 RX ADMIN — INSULIN HUMAN 15 UNITS: 100 INJECTION, SUSPENSION SUBCUTANEOUS at 08:42

## 2024-01-14 RX ADMIN — BUSPIRONE HYDROCHLORIDE 30 MG: 5 TABLET ORAL at 08:42

## 2024-01-14 RX ADMIN — ONDANSETRON 4 MG: 4 TABLET, ORALLY DISINTEGRATING ORAL at 11:06

## 2024-01-14 RX ADMIN — SODIUM CHLORIDE, PRESERVATIVE FREE 10 ML: 5 INJECTION INTRAVENOUS at 19:45

## 2024-01-14 RX ADMIN — OXYCODONE AND ACETAMINOPHEN 1 TABLET: 10; 325 TABLET ORAL at 22:26

## 2024-01-14 RX ADMIN — OXYCODONE AND ACETAMINOPHEN 1 TABLET: 10; 325 TABLET ORAL at 09:30

## 2024-01-14 RX ADMIN — OXYCODONE AND ACETAMINOPHEN 1 TABLET: 10; 325 TABLET ORAL at 16:09

## 2024-01-14 RX ADMIN — BUSPIRONE HYDROCHLORIDE 30 MG: 5 TABLET ORAL at 19:43

## 2024-01-14 RX ADMIN — FLUOXETINE HYDROCHLORIDE 40 MG: 20 CAPSULE ORAL at 08:42

## 2024-01-14 ASSESSMENT — PAIN SCALES - GENERAL
PAINLEVEL_OUTOF10: 8
PAINLEVEL_OUTOF10: 7
PAINLEVEL_OUTOF10: 7
PAINLEVEL_OUTOF10: 5
PAINLEVEL_OUTOF10: 7

## 2024-01-14 ASSESSMENT — PAIN - FUNCTIONAL ASSESSMENT
PAIN_FUNCTIONAL_ASSESSMENT: PREVENTS OR INTERFERES SOME ACTIVE ACTIVITIES AND ADLS
PAIN_FUNCTIONAL_ASSESSMENT: PREVENTS OR INTERFERES SOME ACTIVE ACTIVITIES AND ADLS

## 2024-01-14 ASSESSMENT — PAIN DESCRIPTION - LOCATION
LOCATION: FOOT
LOCATION: FOOT

## 2024-01-14 ASSESSMENT — PAIN DESCRIPTION - ORIENTATION
ORIENTATION: RIGHT
ORIENTATION: RIGHT

## 2024-01-14 ASSESSMENT — PAIN DESCRIPTION - DESCRIPTORS
DESCRIPTORS: ACHING
DESCRIPTORS: BURNING;ACHING

## 2024-01-14 NOTE — PROGRESS NOTES
Hospitalist Progress Note   Admit Date:  2024  4:36 PM   Name:  Deepa Cruz   Age:  38 y.o.  Sex:  female  :  1985   MRN:  [id number]   Room:  [facility]    Presenting/Chief Complaint: Fever, Emesis, Abdominal Pain, and Wound Infection     Reason(s) for Admission: Septicemia (HCC) [A41.9]  Right foot infection [L08.9]  Sepsis (HCC) [A41.9]     Hospital Course:   Deepa Cruz is a 38 y.o. female with medical history of Charcots, T2DM, chronic OM, chronic DFU, anxiety, HTN who presented with a DM wound of the plantar surface of the right foot that over the past few days has been draining more than usual associated with foul odor. Was scheduled for o/p surgical procedure w/Dr Nicole on 24. Last xray from 24 showed interval increase in diffuse soft tissue swelling. no evidence of fracture or other acute bony abnormality in the foot. No bony lesions. On arrival to the ED pt had a fever at 101, tachy HR of 138, WBC ct 10.4, PCT 0.96, and a LA level 2.6. Bld cx drawn. Dx sepsis 2/2 foot wound. Started on antibx and hospitalist to admit.     Orthopedics was consulted and patient underwent right foot I&D debridement, 3rd metatarsal excision, 2nd metatarsal osteotomy, 4th metatarsal osteotomy with ortho on 1/10. Wound culture polymicrobial. Blood cultures  also returned positive for GPC (not identified on PCR) - identification pending. ID was consulted for assistance. Originally with plan for Vancomycin Q8H however this has been canceled now due to pending GPC on blood cultures and request for daily outpatient antibiotic dosing. Adjust to Daptomycin Q24H, repeat blood cultures, and await updated final plan from Infectious Disease. PICC was placed . Sepsis has resolved.     ID would like to ensure repeat blood cultures are negative and identification of original culture hopefully contaminant. If repeat Bcx positive for SA would then need TTE. Currently negative x 1 day.    Subjective & 24hr  Events:   Patient alert, ambulating independently in room. She plans to leave A tomorrow if not cleared for discharge. I reviewed plan of care in depth with patient - still awaiting finalized ID plan for blood cultures and infusion center arrangement with antibiotic orders.     Maintain inpatient status while awaiting finalized ID plan. Awaiting repeat blood cultures and ID on original blood cultures.    Will reach back out to  when ID has finalized plan.    Assessment & Plan:     Sepsis secondary to recurrent R diabetic foot infection, possible bacteremia  - s/p right foot I&D debridement, 3rd metatarsal excision, 2nd metatarsal osteotomy, 4th metatarsal osteotomy with ortho on 1/10, noted ulceration down to exposed bone  - Noted that amputation has been recommended but she continues to decline this, no further surgical plans currently   - Wound culture with beta hemolytic strep and MRSA  - ID following, now on once daily Daptomycin with plan for 6 weeks of therapy, PICC placed 1/12  - 1/4 blood cultures bottles also positive for GPC not identified on PCR, awaiting identification  - Repeat blood cultures negative x 1 day  - Wound care following, will need HH and continued care with  wound clinic upon discharge      DM type 2, insulin dependent  - last A1c 11.3, repeat this admission 8.4  - NPH 15 units BID  - Mealtime lispro 7U tid  - SSI coverage as needed, DM diet, finger sticks AC/HS, hypoglycemic protocol   - DM educator following    Nausea  - zofran prn      Hypokalemia  - resolved      Anemia   - H/H stable at this time    Discharge planning: home when IV antibiotic plan is finalized, will need to arrange with infusion center as well    Diet:  ADULT DIET; Regular; 4 carb choices (60 gm/meal)  VTE prophylaxis: Lovenox  Code status: Full Code      Non-peripheral Lines and Tubes (if present):       PICC Single Lumen 01/12/24 Right Basilic (Active)       Telemetry (if present):  Cardiac/Telemetry Monitor  Dose Inhalation Q4H PRN    magnesium sulfate 2000 mg in 50 mL IVPB premix  2,000 mg IntraVENous PRN    ondansetron (ZOFRAN) injection 4 mg  4 mg IntraVENous Q6H PRN    ondansetron (ZOFRAN-ODT) disintegrating tablet 4 mg  4 mg Oral Q8H PRN    polyethylene glycol (GLYCOLAX) packet 17 g  17 g Oral Daily PRN    potassium chloride (KLOR-CON M) extended release tablet 40 mEq  40 mEq Oral PRN    Or    potassium bicarb-citric acid (EFFER-K) effervescent tablet 40 mEq  40 mEq Oral PRN    Or    potassium chloride 10 mEq/100 mL IVPB (Peripheral Line)  10 mEq IntraVENous PRN    prochlorperazine (COMPAZINE) injection 10 mg  10 mg IntraVENous Q6H PRN    sodium chloride flush 0.9 % injection 5-40 mL  5-40 mL IntraVENous PRN    traZODone (DESYREL) tablet 100 mg  100 mg Oral Nightly PRN    FLUoxetine (PROZAC) capsule 40 mg  40 mg Oral Daily    insulin lispro (HUMALOG) injection vial 0-4 Units  0-4 Units SubCUTAneous Nightly    insulin lispro (HUMALOG) injection vial 0-8 Units  0-8 Units SubCUTAneous TID WC    sodium chloride flush 0.9 % injection 5-40 mL  5-40 mL IntraVENous 2 times per day    enoxaparin Sodium (LOVENOX) injection 30 mg  30 mg SubCUTAneous Q12H    acyclovir (ZOVIRAX) capsule 200 mg  200 mg Oral BID     Signed: Armida Goff AGACNP-BC

## 2024-01-14 NOTE — PLAN OF CARE
Problem: Discharge Planning  Goal: Discharge to home or other facility with appropriate resources  1/13/2024 2153 by Wendi Moura RN  Outcome: Progressing  Flowsheets (Taken 1/13/2024 2012)  Discharge to home or other facility with appropriate resources: Identify discharge learning needs (meds, wound care, etc)  1/13/2024 1044 by Padmini Frank RN  Outcome: Progressing     Problem: Pain  Goal: Verbalizes/displays adequate comfort level or baseline comfort level  1/13/2024 2153 by Wendi Moura RN  Outcome: Progressing  1/13/2024 1044 by Padmini Frank RN  Outcome: Progressing     Problem: Skin/Tissue Integrity  Goal: Absence of new skin breakdown  Description: 1.  Monitor for areas of redness and/or skin breakdown  2.  Assess vascular access sites hourly  3.  Every 4-6 hours minimum:  Change oxygen saturation probe site  4.  Every 4-6 hours:  If on nasal continuous positive airway pressure, respiratory therapy assess nares and determine need for appliance change or resting period.  1/13/2024 2153 by Wendi Moura RN  Outcome: Progressing  1/13/2024 1044 by Padmini Frank RN  Outcome: Progressing     Problem: ABCDS Injury Assessment  Goal: Absence of physical injury  1/13/2024 2153 by Wendi Moura RN  Outcome: Progressing  1/13/2024 1044 by Padmini Frank RN  Outcome: Progressing     Problem: Chronic Conditions and Co-morbidities  Goal: Patient's chronic conditions and co-morbidity symptoms are monitored and maintained or improved  1/13/2024 2153 by Wendi Moura RN  Outcome: Progressing  1/13/2024 1044 by Padmini Frank RN  Outcome: Progressing     Problem: Safety - Adult  Goal: Free from fall injury  1/13/2024 2153 by Wendi Moura RN  Outcome: Progressing  1/13/2024 1044 by Padmini Frank RN  Outcome: Progressing     Problem: Skin/Tissue Integrity - Adult  Goal: Incisions, wounds, or drain sites healing without S/S of infection  1/13/2024 2153 by Wendi Moura  RN  Outcome: Progressing  1/13/2024 1044 by Padmini Frank, RN  Outcome: Progressing

## 2024-01-14 NOTE — PLAN OF CARE
Problem: Discharge Planning  Goal: Discharge to home or other facility with appropriate resources  1/14/2024 0941 by Padmini Frank RN  Outcome: Progressing  1/13/2024 2153 by Wendi Moura RN  Outcome: Progressing  Flowsheets (Taken 1/13/2024 2012)  Discharge to home or other facility with appropriate resources: Identify discharge learning needs (meds, wound care, etc)     Problem: Pain  Goal: Verbalizes/displays adequate comfort level or baseline comfort level  1/14/2024 0941 by Padmini Frank RN  Outcome: Progressing  1/13/2024 2153 by Wendi Moura RN  Outcome: Progressing     Problem: Skin/Tissue Integrity  Goal: Absence of new skin breakdown  Description: 1.  Monitor for areas of redness and/or skin breakdown  2.  Assess vascular access sites hourly  3.  Every 4-6 hours minimum:  Change oxygen saturation probe site  4.  Every 4-6 hours:  If on nasal continuous positive airway pressure, respiratory therapy assess nares and determine need for appliance change or resting period.  1/14/2024 0941 by Padmini Frank RN  Outcome: Progressing  1/13/2024 2153 by Wendi Moura RN  Outcome: Progressing     Problem: ABCDS Injury Assessment  Goal: Absence of physical injury  1/14/2024 0941 by Padmini Frank RN  Outcome: Progressing  1/13/2024 2153 by Wendi Moura RN  Outcome: Progressing     Problem: Chronic Conditions and Co-morbidities  Goal: Patient's chronic conditions and co-morbidity symptoms are monitored and maintained or improved  1/14/2024 0941 by Padmini Frank RN  Outcome: Progressing  1/13/2024 2153 by Wendi Moura RN  Outcome: Progressing     Problem: Safety - Adult  Goal: Free from fall injury  1/14/2024 0941 by Padmini Frank RN  Outcome: Progressing  1/13/2024 2153 by Wendi Moura RN  Outcome: Progressing     Problem: Skin/Tissue Integrity - Adult  Goal: Incisions, wounds, or drain sites healing without S/S of infection  1/14/2024 0941 by Padmini Frank  RN  Outcome: Progressing  1/13/2024 2153 by Wendi Moura, RN  Outcome: Progressing

## 2024-01-14 NOTE — PROGRESS NOTES
Ortho Plan  Heel Weight Bearing  Aware of + blood cultures - defer abx to ID recs  Wound Care - packing removal and repacking q2-3 days until wound healed - expect months  Will most like need Home Health wound care  No further surgery  F/u with Dr. Nicole in office week of 1/29 - 2/2 for wound check

## 2024-01-15 VITALS
SYSTOLIC BLOOD PRESSURE: 129 MMHG | RESPIRATION RATE: 16 BRPM | HEIGHT: 68 IN | WEIGHT: 230 LBS | TEMPERATURE: 97.6 F | BODY MASS INDEX: 34.86 KG/M2 | OXYGEN SATURATION: 98 % | HEART RATE: 87 BPM | DIASTOLIC BLOOD PRESSURE: 70 MMHG

## 2024-01-15 PROBLEM — A41.9 SEPSIS (HCC): Status: RESOLVED | Noted: 2024-01-08 | Resolved: 2024-01-15

## 2024-01-15 PROBLEM — R11.0 NAUSEA: Status: RESOLVED | Noted: 2024-01-09 | Resolved: 2024-01-15

## 2024-01-15 PROBLEM — E87.6 HYPOKALEMIA: Status: RESOLVED | Noted: 2024-01-10 | Resolved: 2024-01-15

## 2024-01-15 PROBLEM — L08.9 RIGHT FOOT INFECTION: Status: RESOLVED | Noted: 2024-01-09 | Resolved: 2024-01-15

## 2024-01-15 LAB
BACTERIA SPEC CULT: NORMAL
GLUCOSE BLD STRIP.AUTO-MCNC: 158 MG/DL (ref 65–100)
GLUCOSE BLD STRIP.AUTO-MCNC: 159 MG/DL (ref 65–100)
SERVICE CMNT-IMP: ABNORMAL
SERVICE CMNT-IMP: ABNORMAL
SERVICE CMNT-IMP: NORMAL

## 2024-01-15 PROCEDURE — 6370000000 HC RX 637 (ALT 250 FOR IP): Performed by: FAMILY MEDICINE

## 2024-01-15 PROCEDURE — 6360000002 HC RX W HCPCS: Performed by: HOSPITALIST

## 2024-01-15 PROCEDURE — 82962 GLUCOSE BLOOD TEST: CPT

## 2024-01-15 PROCEDURE — 6370000000 HC RX 637 (ALT 250 FOR IP): Performed by: NURSE PRACTITIONER

## 2024-01-15 PROCEDURE — 6370000000 HC RX 637 (ALT 250 FOR IP): Performed by: STUDENT IN AN ORGANIZED HEALTH CARE EDUCATION/TRAINING PROGRAM

## 2024-01-15 PROCEDURE — 2580000003 HC RX 258: Performed by: HOSPITALIST

## 2024-01-15 PROCEDURE — 2580000003 HC RX 258: Performed by: NURSE PRACTITIONER

## 2024-01-15 PROCEDURE — A4216 STERILE WATER/SALINE, 10 ML: HCPCS | Performed by: HOSPITALIST

## 2024-01-15 RX ORDER — DIPHENHYDRAMINE HYDROCHLORIDE 25 MG/1
CAPSULE, LIQUID FILLED ORAL
Qty: 1 KIT | Refills: 0 | Status: SHIPPED | OUTPATIENT
Start: 2024-01-15

## 2024-01-15 RX ORDER — GLUCOSAMINE HCL/CHONDROITIN SU 500-400 MG
CAPSULE ORAL
Qty: 200 STRIP | Refills: 1 | Status: SHIPPED | OUTPATIENT
Start: 2024-01-15 | End: 2024-02-14

## 2024-01-15 RX ORDER — BUTALBITAL, ACETAMINOPHEN AND CAFFEINE 50; 325; 40 MG/1; MG/1; MG/1
1 TABLET ORAL ONCE
Status: COMPLETED | OUTPATIENT
Start: 2024-01-15 | End: 2024-01-15

## 2024-01-15 RX ORDER — OXYCODONE AND ACETAMINOPHEN 10; 325 MG/1; MG/1
1 TABLET ORAL EVERY 6 HOURS PRN
Qty: 20 TABLET | Refills: 0 | Status: SHIPPED | OUTPATIENT
Start: 2024-01-15 | End: 2024-01-20

## 2024-01-15 RX ORDER — NALOXONE HYDROCHLORIDE 4 MG/.1ML
1 SPRAY NASAL PRN
Qty: 1 EACH | Refills: 0 | Status: CANCELLED | OUTPATIENT
Start: 2024-01-15

## 2024-01-15 RX ADMIN — SODIUM CHLORIDE, PRESERVATIVE FREE 10 ML: 5 INJECTION INTRAVENOUS at 11:41

## 2024-01-15 RX ADMIN — INSULIN HUMAN 15 UNITS: 100 INJECTION, SUSPENSION SUBCUTANEOUS at 08:42

## 2024-01-15 RX ADMIN — DAPTOMYCIN 850 MG: 500 INJECTION, POWDER, LYOPHILIZED, FOR SOLUTION INTRAVENOUS at 14:44

## 2024-01-15 RX ADMIN — INSULIN LISPRO 6 UNITS: 100 INJECTION, SOLUTION INTRAVENOUS; SUBCUTANEOUS at 08:42

## 2024-01-15 RX ADMIN — BUTALBITAL, ACETAMINOPHEN, AND CAFFEINE 1 TABLET: 50; 325; 40 TABLET ORAL at 15:41

## 2024-01-15 RX ADMIN — INSULIN LISPRO 6 UNITS: 100 INJECTION, SOLUTION INTRAVENOUS; SUBCUTANEOUS at 11:36

## 2024-01-15 RX ADMIN — OXYCODONE AND ACETAMINOPHEN 1 TABLET: 10; 325 TABLET ORAL at 08:38

## 2024-01-15 RX ADMIN — BUSPIRONE HYDROCHLORIDE 30 MG: 5 TABLET ORAL at 08:38

## 2024-01-15 RX ADMIN — ACYCLOVIR 200 MG: 200 CAPSULE ORAL at 08:38

## 2024-01-15 RX ADMIN — FLUOXETINE HYDROCHLORIDE 40 MG: 20 CAPSULE ORAL at 08:38

## 2024-01-15 RX ADMIN — OXYCODONE AND ACETAMINOPHEN 1 TABLET: 10; 325 TABLET ORAL at 14:45

## 2024-01-15 ASSESSMENT — PAIN DESCRIPTION - LOCATION
LOCATION: FOOT
LOCATION: FOOT
LOCATION: HEAD

## 2024-01-15 ASSESSMENT — PAIN DESCRIPTION - DESCRIPTORS
DESCRIPTORS: ACHING

## 2024-01-15 ASSESSMENT — PAIN SCALES - GENERAL
PAINLEVEL_OUTOF10: 7
PAINLEVEL_OUTOF10: 8
PAINLEVEL_OUTOF10: 7

## 2024-01-15 ASSESSMENT — PAIN DESCRIPTION - ORIENTATION
ORIENTATION: RIGHT
ORIENTATION: RIGHT

## 2024-01-15 NOTE — CASE COMMUNICATION
[facility], [facility].    [employer]    January 15, 2024       RE: Deepa Cruz  : 1985      To Whom It May Concern,    This is to certify that Deepa Cruz was admitted into the hospital on 2024 and discharged 2024. Dr. Nicole, Orthopedic Surgeon, has cleared patient to return to work on 2024.      Please feel free to contact my office at the hospital ([phone]) if you have any questions or concerns.  Thank you for your assistance in this matter.    Signed: Armida Goff AGACNP-BC

## 2024-01-15 NOTE — CASE COMMUNICATION
Case management was able to reach infusion center. Staff limited due to holiday.  Will not be able to guarantee infusion tomorrow but will evaluate first thing in the morning. Patient declines to stay inpatient while awaiting this tomorrow. May require presentation to ED tomorrow for Daptomycin infusion if unable to receive at infusion center.  Will follow up with case management and patient tomorrow morning when infusion center staff returns.    Signed: Armida Goff AGACNP-BC

## 2024-01-15 NOTE — PROGRESS NOTES
Discharge instructions were reviewed with the patient. Opportunity for questions given. Patient verbalized understanding of discharge and follow up instructions, as well as S/S to report to MD or return to ER for. PIV was removed. Prescriptions provided. Patient will D/C to home with home health.

## 2024-01-15 NOTE — CARE COORDINATION
Pt is for discharge home today with family, [facility] RN for wound care, and infusion therapy at [facility] on [address]     CM faxed order to [phone] and received confirmation. CM attempted to contact with no answer due to holiday. CM to follow-up with [facility] tomorrow.        01/15/24 3309   Services At/After Discharge   Transition of Care Consult (CM Consult) Discharge Planning;Infusion Center;Home Health   Internal Home Health Yes   Services At/After Discharge Home Health;IV Therapy;Nursing services  (Wound care)    Resource Information Provided? No   Mode of Transport at Discharge Other (see comment)  (family)   Confirm Follow Up Transport Family   Condition of Participation: Discharge Planning   The Plan for Transition of Care is related to the following treatment goals: Pt to return home with  RN services for wound care and OP infusion therapy.   The Patient and/or Patient Representative was provided with a Choice of Provider? Patient   The Patient and/Or Patient Representative agree with the Discharge Plan? Yes   Freedom of Choice list was provided with basic dialogue that supports the patient's individualized plan of care/goals, treatment preferences, and shares the quality data associated with the providers?  Yes

## 2024-01-15 NOTE — PLAN OF CARE
Problem: Discharge Planning  Goal: Discharge to home or other facility with appropriate resources  1/14/2024 2324 by Wendi Moura RN  Outcome: Progressing  Flowsheets (Taken 1/14/2024 1947)  Discharge to home or other facility with appropriate resources: Identify discharge learning needs (meds, wound care, etc)  1/14/2024 0941 by Padmini Frank RN  Outcome: Progressing     Problem: Pain  Goal: Verbalizes/displays adequate comfort level or baseline comfort level  1/14/2024 2324 by Wendi Moura RN  Outcome: Progressing  1/14/2024 0941 by Padmini Frank RN  Outcome: Progressing     Problem: Skin/Tissue Integrity  Goal: Absence of new skin breakdown  Description: 1.  Monitor for areas of redness and/or skin breakdown  2.  Assess vascular access sites hourly  3.  Every 4-6 hours minimum:  Change oxygen saturation probe site  4.  Every 4-6 hours:  If on nasal continuous positive airway pressure, respiratory therapy assess nares and determine need for appliance change or resting period.  1/14/2024 2324 by Wendi Moura RN  Outcome: Progressing  1/14/2024 0941 by Padmini Frank RN  Outcome: Progressing     Problem: ABCDS Injury Assessment  Goal: Absence of physical injury  1/14/2024 2324 by Wendi Moura RN  Outcome: Progressing  1/14/2024 0941 by Padmini Frank RN  Outcome: Progressing     Problem: Chronic Conditions and Co-morbidities  Goal: Patient's chronic conditions and co-morbidity symptoms are monitored and maintained or improved  1/14/2024 2324 by Wendi Moura RN  Outcome: Progressing  1/14/2024 0941 by Padmini Frank RN  Outcome: Progressing     Problem: Safety - Adult  Goal: Free from fall injury  1/14/2024 2324 by Wendi Moura RN  Outcome: Progressing  1/14/2024 0941 by Padmini Frank RN  Outcome: Progressing     Problem: Skin/Tissue Integrity - Adult  Goal: Incisions, wounds, or drain sites healing without S/S of infection  1/14/2024 2324 by Wendi Moura

## 2024-01-15 NOTE — DIABETES MGMT
Patient admitted with sepsis. Blood glucose ranged 116-147 yesterday with patient receiving Humalog 18 units and NPH 30 units. Blood glucose this morning was 159. Reviewed patient current regimen: NPH 15 units BID, Humalog 6 units with meals, and Humalog correctional insulin. Patient to discharge today. Pt will need prescription for glucometer and glucometer supplies at discharge so that the patient may obtain the meter covered by their insurance. Provider updated via [employer].

## 2024-01-15 NOTE — PROGRESS NOTES
ID chart review note.  1/13 BCX no growth to date.    Okay to discharge today from ID standpoint.  Updated orders sent to case management.    OPAT plan is as follows:    1. Daptomycin 850mg IV q24hrs with EOT 2/21/24  2. Routine PICC care  3. Monitoring labs: Every Monday: CBC w/ diff, creatinine, CPK, LFTS.  4. Please fax results to ID office at [phone]    We will set up ID f/up at EOT and contact the patient tomorrow with that appointment.

## 2024-01-15 NOTE — DISCHARGE SUMMARY
Hospitalist Discharge Summary   Admit Date:  2024  4:36 PM   DC Note date: 1/15/2024  Name:  Deepa Cruz   Age:  38 y.o.  Sex:  female  :  1985   MRN:  [id number]   Room:  [facility]  PCP:  Zeyad Salinas MD    Presenting Complaint: Fever, Emesis, Abdominal Pain, and Wound Infection     Initial Admission Diagnosis: Septicemia (HCC) [A41.9]  Right foot infection [L08.9]  Sepsis (HCC) [A41.9]     Problem List for this Hospitalization (present on admission):    Principal Problem (Resolved):    Sepsis (HCC)  Active Problems:    Diabetic ulcer of right midfoot associated with type 2 diabetes mellitus, with necrosis of muscle (HCC)    Charcot's arthropathy of forefoot    Right foot pain    Type 2 diabetes mellitus with skin complication, with long-term current use of insulin (HCC)  Resolved Problems:    Right foot infection    Nausea    Hypokalemia      Hospital Course:  Deepa Cruz is a 38 y.o. female with medical history of Charcots, T2DM, chronic OM, chronic DFU, anxiety, HTN who presented with a DM wound of the plantar surface of the right foot that over the past few days has been draining more than usual associated with foul odor. Was scheduled for o/p surgical procedure w/Dr Nicole on 24. Last xray from 24 showed interval increase in diffuse soft tissue swelling. no evidence of fracture or other acute bony abnormality in the foot. No bony lesions. On arrival to the ED pt had a fever at 101, tachy HR of 138, WBC ct 10.4, PCT 0.96, and a LA level 2.6. Admitted for sepsis secondary to recurrent R diabetic foot infection.      Orthopedics was consulted and patient underwent right foot I&D debridement, 3rd metatarsal excision, 2nd metatarsal osteotomy, 4th metatarsal osteotomy with ortho on 1/10. Wound culture polymicrobial. Blood cultures  also returned positive for GPC (not identified on PCR) - identification pending. ID was consulted for assistance. Repeat blood cultures were obtained  and remain negative. PICC was placed 1/12. Sepsis has resolved.     Final ID recommendations:  -Okay to discharge today from ID standpoint.    -Updated orders sent to case management.  -OPAT plan is as follows:  1. Daptomycin 850mg IV q24hrs with EOT 2/21/24  2. Routine PICC care  3. Monitoring labs: Every Monday: CBC w/ diff, creatinine, CPK, LFTS.  4. Please fax results to ID office at [phone]  -We will set up ID f/up at EOT and contact the patient with that appointment.     Orthopedics re-evaluations 1/14.   Heel Weight Bearing  Antibiotics per ID as above  Wound Care - packing removal and repacking q2-3 days until wound healed - expect months.   Home Health wound care  No further surgery  F/u with Dr. Nicole 1/25/24 at 3:00pm  Okay to return to work 1/22/24 per Dr. Nicole. Note provided    Wound care changed dressing day of discharge.  Referral also sent to  Wound Center and .  Glycemic control imperative for wound healing. Continue home insulin regimen as ordered. Monitor glucose ACHS and PRN. New Rx for supplies provided as requested.    Case management has sent all necessary paperwork for Daptomycin infusion to begin 1/16 at the infusion center. Unable to confirm as infusion center is closed this afternoon (holiday) but will reach out first thing in the morning and call the patient with time to present. Patient agrees with this plan and does not want to remain inpatient overnight while awaiting this. She will await the phone call tomorrow from case management.    Analgesia on discharge with prn Percocet. I have reviewed the patient’s controlled substance prescription history as maintained in the [place] prescription drug monitoring program (PDMP) so that prescription(s) for controlled substance, could be given.  Patient has Narcan available in home for use if needed. Patient verbalized an understanding of indications for use and proper administration of Narcan if needed.    Patient is medically  04/19/2021    TDaP, ADACEL (age 10y-64y), BOOSTRIX (age 10y+), IM, 0.5mL 03/16/2022       Recent Vital Data:  Patient Vitals for the past 24 hrs:   Temp Pulse Resp BP SpO2   01/15/24 0821 97.6 °F (36.4 °C) 87 16 129/70 98 %   01/14/24 2226 -- -- 16 -- --   01/14/24 1959 98.1 °F (36.7 °C) 80 18 132/69 96 %       Oxygen Therapy  SpO2: 98 %  Pulse via Oximetry: 109 beats per minute  O2 Device: None (Room air)    Estimated body mass index is 34.97 kg/m² as calculated from the following:    Height as of this encounter: 1.727 m (5' 8\").    Weight as of this encounter: 104.3 kg (230 lb).  No intake or output data in the 24 hours ending 01/15/24 1234      Physical Exam:  General:          Well nourished.  Alert. NAD.  Neck:               Trachea midline            Lungs:             Respirations even and unlabored on RA  Abdomen:        Soft, nondistended.  Extremities:     R foot dressing intact  Skin:                Warm and dry.    Neuro:             A&O x 4   Psych:             Normal mood and affect.      Signed: Armida TREVIÑOCNP-BC

## 2024-01-15 NOTE — WOUND CARE
Right foot dressing change, packing exchanged, wound base plantar foot granular. Stiches right dorsal foot approximated. Will monitor.

## 2024-01-15 NOTE — PLAN OF CARE
Problem: Discharge Planning  Goal: Discharge to home or other facility with appropriate resources  1/15/2024 1051 by Tarsha Vann RN  Outcome: Adequate for Discharge  1/14/2024 2324 by Wendi Moura RN  Outcome: Progressing  Flowsheets (Taken 1/14/2024 1947)  Discharge to home or other facility with appropriate resources: Identify discharge learning needs (meds, wound care, etc)     Problem: Pain  Goal: Verbalizes/displays adequate comfort level or baseline comfort level  1/15/2024 1051 by Tarsha Vann RN  Outcome: Adequate for Discharge  1/14/2024 2324 by Wendi Moura RN  Outcome: Progressing     Problem: Skin/Tissue Integrity  Goal: Absence of new skin breakdown  Description: 1.  Monitor for areas of redness and/or skin breakdown  2.  Assess vascular access sites hourly  3.  Every 4-6 hours minimum:  Change oxygen saturation probe site  4.  Every 4-6 hours:  If on nasal continuous positive airway pressure, respiratory therapy assess nares and determine need for appliance change or resting period.  1/15/2024 1051 by Tarsha Vann RN  Outcome: Adequate for Discharge  1/14/2024 2324 by Wendi Moura RN  Outcome: Progressing     Problem: ABCDS Injury Assessment  Goal: Absence of physical injury  1/15/2024 1051 by Tarsha Vann RN  Outcome: Adequate for Discharge  1/14/2024 2324 by Wendi Moura RN  Outcome: Progressing     Problem: Chronic Conditions and Co-morbidities  Goal: Patient's chronic conditions and co-morbidity symptoms are monitored and maintained or improved  1/15/2024 1051 by Tarsha Vann RN  Outcome: Adequate for Discharge  1/14/2024 2324 by Wendi Moura RN  Outcome: Progressing     Problem: Safety - Adult  Goal: Free from fall injury  1/15/2024 1051 by Tarsha Vann RN  Outcome: Adequate for Discharge  1/14/2024 2324 by Wendi Moura RN  Outcome: Progressing     Problem: Skin/Tissue Integrity - Adult  Goal: Incisions, wounds, or drain

## 2024-01-16 ENCOUNTER — HOME HEALTH ADMISSION (OUTPATIENT)
Dept: HOME HEALTH SERVICES | Facility: HOME HEALTH | Age: 39
End: 2024-01-16

## 2024-01-16 NOTE — CASE COMMUNICATION
Case management confirmed that infusion center has infusion available for patient today and has scheduled patient for 3:30 pm infusion today.    Case management and myself have called the patient numerous times at the cell number provided without an answer. Mailbox is full and unable to leave a message. We have also tried to contact the emergency contact listed but no answer, no voicemail. I have also emailed the patient requesting an immediate answer so that I can update her on the appointment time today.    Will continue to try to reach by phone. Confirmed phone number with patient yesterday prior to discharge as the correct cellular number and to anticipate a call today with infusion time.    Signed: Armida Goff AGACNP-BC

## 2024-01-18 ENCOUNTER — CLINICAL DOCUMENTATION (OUTPATIENT)
Dept: ORTHOPEDIC SURGERY | Age: 39
End: 2024-01-18

## 2024-01-18 ENCOUNTER — HOME CARE VISIT (OUTPATIENT)
Dept: SCHEDULING | Facility: HOME HEALTH | Age: 39
End: 2024-01-18

## 2024-01-25 ENCOUNTER — HOSPITAL ENCOUNTER (OUTPATIENT)
Dept: WOUND CARE | Age: 39
Discharge: HOME | End: 2024-01-25
Payer: COMMERCIAL

## 2024-01-25 VITALS
WEIGHT: 233 LBS | SYSTOLIC BLOOD PRESSURE: 119 MMHG | HEART RATE: 104 BPM | RESPIRATION RATE: 18 BRPM | BODY MASS INDEX: 35.31 KG/M2 | TEMPERATURE: 97.5 F | HEIGHT: 68 IN | DIASTOLIC BLOOD PRESSURE: 77 MMHG

## 2024-01-25 DIAGNOSIS — L97.413 DIABETIC ULCER OF RIGHT MIDFOOT ASSOCIATED WITH TYPE 2 DIABETES MELLITUS, WITH NECROSIS OF MUSCLE (HCC): Primary | ICD-10-CM

## 2024-01-25 DIAGNOSIS — M14.679 CHARCOT'S ARTHROPATHY OF FOREFOOT: ICD-10-CM

## 2024-01-25 DIAGNOSIS — M86.671 CHRONIC REFRACTORY OSTEOMYELITIS OF FOOT, RIGHT (HCC): ICD-10-CM

## 2024-01-25 DIAGNOSIS — E11.621 DIABETIC ULCER OF RIGHT MIDFOOT ASSOCIATED WITH TYPE 2 DIABETES MELLITUS, WITH NECROSIS OF MUSCLE (HCC): Primary | ICD-10-CM

## 2024-01-25 PROCEDURE — 11042 DBRDMT SUBQ TIS 1ST 20SQCM/<: CPT | Performed by: FAMILY MEDICINE

## 2024-01-25 PROCEDURE — 11042 DBRDMT SUBQ TIS 1ST 20SQCM/<: CPT

## 2024-01-25 RX ORDER — LIDOCAINE HYDROCHLORIDE 20 MG/ML
JELLY TOPICAL ONCE
OUTPATIENT
Start: 2024-01-25 | End: 2024-01-25

## 2024-01-25 RX ORDER — BACITRACIN ZINC AND POLYMYXIN B SULFATE 500; 1000 [USP'U]/G; [USP'U]/G
OINTMENT TOPICAL ONCE
OUTPATIENT
Start: 2024-01-25 | End: 2024-01-25

## 2024-01-25 RX ORDER — GINSENG 100 MG
CAPSULE ORAL ONCE
OUTPATIENT
Start: 2024-01-25 | End: 2024-01-25

## 2024-01-25 RX ORDER — GENTAMICIN SULFATE 1 MG/G
OINTMENT TOPICAL ONCE
OUTPATIENT
Start: 2024-01-25 | End: 2024-01-25

## 2024-01-25 RX ORDER — BETAMETHASONE DIPROPIONATE 0.05 %
OINTMENT (GRAM) TOPICAL ONCE
OUTPATIENT
Start: 2024-01-25 | End: 2024-01-25

## 2024-01-25 RX ORDER — LIDOCAINE HYDROCHLORIDE 20 MG/ML
JELLY TOPICAL ONCE
Status: DISCONTINUED | OUTPATIENT
Start: 2024-01-25 | End: 2024-01-26 | Stop reason: HOSPADM

## 2024-01-25 RX ORDER — LIDOCAINE 50 MG/G
OINTMENT TOPICAL ONCE
OUTPATIENT
Start: 2024-01-25 | End: 2024-01-25

## 2024-01-25 RX ORDER — LIDOCAINE 40 MG/G
CREAM TOPICAL ONCE
OUTPATIENT
Start: 2024-01-25 | End: 2024-01-25

## 2024-01-25 RX ORDER — LIDOCAINE HYDROCHLORIDE 40 MG/ML
SOLUTION TOPICAL ONCE
OUTPATIENT
Start: 2024-01-25 | End: 2024-01-25

## 2024-01-25 RX ORDER — TRIAMCINOLONE ACETONIDE 1 MG/G
OINTMENT TOPICAL ONCE
OUTPATIENT
Start: 2024-01-25 | End: 2024-01-25

## 2024-01-25 RX ORDER — CLOBETASOL PROPIONATE 0.5 MG/G
OINTMENT TOPICAL ONCE
OUTPATIENT
Start: 2024-01-25 | End: 2024-01-25

## 2024-01-25 RX ORDER — IBUPROFEN 200 MG
TABLET ORAL ONCE
OUTPATIENT
Start: 2024-01-25 | End: 2024-01-25

## 2024-01-25 NOTE — WOUND CARE
Discharge Instructions for  [facility]  [address] [place] [address]  [address] [place] [address]  Phone [phone]   Fax [phone]      NAME:  Deepa Cruz  YOB: 1985  MEDICAL RECORD NUMBER:  [id number]  DATE:  1/25/2024    Return Appointment:   1 week with Cristian Da Silva DO      Instructions: Right foot plantar:  Clean with normal saline or wound cleanser  Vashe Wound Solution (hypochlorous acid) soak placed on wound bed for a minimum of 60 seconds prior to dressing application. This solution removes pathogens, bioburden, and biofilms from wounds, but is not cytotoxic.     Gently pack alginate with silver rope into wound bed filling wound space. Do not pack tightly. Cover with ABD, roll gauze  Change 3 times a week.  Apply Tubigrip, wear at all times    Please increase dietary protein to at least 100 grams per day to support cell rejuvenation.   May use supplements such as Ensure Max, Austin, & Glucerna (samples & coupons provided at first visit).   Be sure to spread intake throughout the day for improved absorption.         Should you experience increased redness, swelling, pain, foul odor, size of wound(s), or have a temperature over 101 degrees please contact the Wound Healing Center at [phone] or if after hours contact your primary care physician or go to the hospital emergency department.    PLEASE NOTE: IF YOU ARE UNABLE TO OBTAIN WOUND SUPPLIES, CONTINUE TO USE THE SUPPLIES YOU HAVE AVAILABLE UNTIL YOU ARE ABLE TO REACH US. IT IS MOST IMPORTANT TO KEEP THE WOUND COVERED AT ALL TIMES.    Electronically signed Ruthie Perez RN on 1/25/2024 at 2:43 PM

## 2024-01-25 NOTE — FLOWSHEET NOTE
01/25/24 1419   Wound 01/25/24 Right;Plantar #1   Date First Assessed/Time First Assessed: 01/25/24 1418   Present on Original Admission: Yes  Primary Wound Type: Diabetic Ulcer  Wound Location Orientation: Right;Plantar  Wound Description (Comments): #1   Wound Image     Wound Etiology Diabetic Elizabeth 3   Dressing Status Old drainage noted   Wound Cleansed Cleansed with saline   Dressing/Treatment Iodoform gauze   Offloading for Diabetic Foot Ulcers Forefoot offloading shoe   Wound Length (cm) 1 cm   Wound Width (cm) 1.1 cm   Wound Depth (cm) 2 cm   Wound Surface Area (cm^2) 1.1 cm^2   Wound Volume (cm^3) 2.2 cm^3   Post-Procedure Length (cm) 1 cm   Post-Procedure Width (cm) 1.1 cm   Post-Procedure Depth (cm) 2 cm   Post-Procedure Surface Area (cm^2) 1.1 cm^2   Post-Procedure Volume (cm^3) 2.2 cm^3   Wound Assessment Pink/red   Drainage Amount Small (< 25%)   Drainage Description Serosanguinous   Odor None   Emily-wound Assessment Hyperkeratosis (callous);Fragile   Wound Thickness Description not for Pressure Injury Full thickness

## 2024-01-26 NOTE — PROGRESS NOTES
Procedure Note  Indications: Based on my examination of this patient's wound(s)/ulcer(s) today, debridement is required to promote healing and evaluate the wound base.  Patient developed foot infection and was admitted to the hospital with sepsis.  Surgical intervention removal of areas of infection in the foot were done.  Patient feels much better now.  Offloading with Darco shoe with wedge.  We discussed placing the hex insert in the Darco shoe she is using.  Patient is on antibiotics per infectious diseases.    Return Appointment:   1 week with Cristian Da Silva DO        Instructions: Right foot plantar:  Clean with normal saline or wound cleanser  Vashe Wound Solution (hypochlorous acid) soak placed on wound bed for a minimum of 60 seconds prior to dressing application. This solution removes pathogens, bioburden, and biofilms from wounds, but is not cytotoxic.      Gently pack alginate with silver rope into wound bed filling wound space. Do not pack tightly. Cover with ABD, roll gauze  Change 3 times a week.  Apply Tubigrip, wear at all times     Please increase dietary protein to at least 100 grams per day to support cell rejuvenation.   May use supplements such as Ensure Max, Austin, & Glucerna (samples & coupons provided at first visit).   Be sure to spread intake throughout the day for improved absorption.       Debridement: Excisional Debridement    Using: curette the wound(s)/ulcer(s) was/were debrided down through and including the removal of epidermis, dermis, and subcutaneous tissue.  Performed by: Cristian Da Silva DO  Consent obtained: Yes  Time out taken: Yes  Pain Control:         Devitalized Tissue Debrided: fibrin, biofilm, slough, and necrotic/eschar    Pre Debridement Measurements:  Are located in the Wound/Ulcer Documentation Flow Sheet    Diabetic/Pressure/Non Pressure Ulcers only:  Ulcer: Diabetic ulcer, fat layer exposed     Wound/Ulcer #: 1  Post Debridement Measurements:  Wound/Ulcer

## 2024-01-30 ENCOUNTER — HOSPITAL ENCOUNTER (OUTPATIENT)
Dept: WOUND CARE | Age: 39
Discharge: HOME | End: 2024-01-30
Payer: COMMERCIAL

## 2024-01-30 ENCOUNTER — OFFICE VISIT (OUTPATIENT)
Dept: ORTHOPEDIC SURGERY | Age: 39
End: 2024-01-30
Payer: COMMERCIAL

## 2024-01-30 VITALS
BODY MASS INDEX: 35.92 KG/M2 | HEART RATE: 103 BPM | WEIGHT: 237 LBS | SYSTOLIC BLOOD PRESSURE: 144 MMHG | RESPIRATION RATE: 18 BRPM | DIASTOLIC BLOOD PRESSURE: 91 MMHG | OXYGEN SATURATION: 100 % | HEIGHT: 68 IN | TEMPERATURE: 97.9 F

## 2024-01-30 DIAGNOSIS — M86.671 CHRONIC REFRACTORY OSTEOMYELITIS OF FOOT, RIGHT (HCC): ICD-10-CM

## 2024-01-30 DIAGNOSIS — L97.413 DIABETIC ULCER OF RIGHT MIDFOOT ASSOCIATED WITH TYPE 2 DIABETES MELLITUS, WITH NECROSIS OF MUSCLE (HCC): Primary | ICD-10-CM

## 2024-01-30 DIAGNOSIS — E11.621 DIABETIC ULCER OF RIGHT MIDFOOT ASSOCIATED WITH TYPE 2 DIABETES MELLITUS, WITH NECROSIS OF MUSCLE (HCC): Primary | ICD-10-CM

## 2024-01-30 DIAGNOSIS — M14.679 CHARCOT'S ARTHROPATHY OF FOREFOOT: ICD-10-CM

## 2024-01-30 PROCEDURE — 3052F HG A1C>EQUAL 8.0%<EQUAL 9.0%: CPT | Performed by: ORTHOPAEDIC SURGERY

## 2024-01-30 PROCEDURE — 11042 DBRDMT SUBQ TIS 1ST 20SQCM/<: CPT

## 2024-01-30 PROCEDURE — 99213 OFFICE O/P EST LOW 20 MIN: CPT | Performed by: ORTHOPAEDIC SURGERY

## 2024-01-30 RX ORDER — GINSENG 100 MG
CAPSULE ORAL ONCE
OUTPATIENT
Start: 2024-01-30 | End: 2024-01-30

## 2024-01-30 RX ORDER — BUTALBITAL, ACETAMINOPHEN AND CAFFEINE 50; 325; 40 MG/1; MG/1; MG/1
TABLET ORAL
COMMUNITY
Start: 2024-01-25

## 2024-01-30 RX ORDER — CLOBETASOL PROPIONATE 0.5 MG/G
OINTMENT TOPICAL ONCE
OUTPATIENT
Start: 2024-01-30 | End: 2024-01-30

## 2024-01-30 RX ORDER — BETAMETHASONE DIPROPIONATE 0.05 %
OINTMENT (GRAM) TOPICAL ONCE
OUTPATIENT
Start: 2024-01-30 | End: 2024-01-30

## 2024-01-30 RX ORDER — GENTAMICIN SULFATE 1 MG/G
OINTMENT TOPICAL ONCE
OUTPATIENT
Start: 2024-01-30 | End: 2024-01-30

## 2024-01-30 RX ORDER — LIDOCAINE HYDROCHLORIDE 20 MG/ML
JELLY TOPICAL ONCE
OUTPATIENT
Start: 2024-01-30 | End: 2024-01-30

## 2024-01-30 RX ORDER — LIDOCAINE 50 MG/G
OINTMENT TOPICAL ONCE
OUTPATIENT
Start: 2024-01-30 | End: 2024-01-30

## 2024-01-30 RX ORDER — LIDOCAINE HYDROCHLORIDE 20 MG/ML
JELLY TOPICAL ONCE
Status: COMPLETED | OUTPATIENT
Start: 2024-01-30 | End: 2024-01-30

## 2024-01-30 RX ORDER — IBUPROFEN 200 MG
TABLET ORAL ONCE
OUTPATIENT
Start: 2024-01-30 | End: 2024-01-30

## 2024-01-30 RX ORDER — LIDOCAINE HYDROCHLORIDE 40 MG/ML
SOLUTION TOPICAL ONCE
OUTPATIENT
Start: 2024-01-30 | End: 2024-01-30

## 2024-01-30 RX ORDER — LIDOCAINE 40 MG/G
CREAM TOPICAL ONCE
OUTPATIENT
Start: 2024-01-30 | End: 2024-01-30

## 2024-01-30 RX ORDER — BACITRACIN ZINC AND POLYMYXIN B SULFATE 500; 1000 [USP'U]/G; [USP'U]/G
OINTMENT TOPICAL ONCE
OUTPATIENT
Start: 2024-01-30 | End: 2024-01-30

## 2024-01-30 RX ORDER — TRIAMCINOLONE ACETONIDE 1 MG/G
OINTMENT TOPICAL ONCE
OUTPATIENT
Start: 2024-01-30 | End: 2024-01-30

## 2024-01-30 RX ADMIN — LIDOCAINE HYDROCHLORIDE: 20 JELLY TOPICAL at 14:20

## 2024-01-30 ASSESSMENT — PAIN SCALES - GENERAL: PAINLEVEL_OUTOF10: 6

## 2024-01-30 ASSESSMENT — PAIN DESCRIPTION - DESCRIPTORS: DESCRIPTORS: ACHING;BURNING

## 2024-01-30 ASSESSMENT — PAIN DESCRIPTION - ORIENTATION: ORIENTATION: RIGHT

## 2024-01-30 NOTE — DISCHARGE INSTRUCTIONS
Discharge Instructions for  [facility]  [address] [place] [address]  [address] [place], [place] [address]  Phone [phone]   Fax [phone]        NAME:  Deepa Cruz  YOB: 1985  MEDICAL RECORD NUMBER:  [id number]  DATE:  1/30/2024     Return Appointment:   1 week with rCistian Da Silva DO        Instructions: Right foot plantar, Right lateral foot;  Clean with normal saline or wound cleanser  Vashe Wound Solution (hypochlorous acid) soak placed on wound bed for a minimum of 60 seconds prior to dressing application. This solution removes pathogens, bioburden, and biofilms from wounds, but is not cytotoxic.      Gently pack alginate with silver rope into wound bed filling wound space. Do not pack tightly. Cover with ABD, roll gauze  Change 3 times a week.  Apply Tubigrip, wear at all times     Continue to offload with your forefoot shoe when working or the knee scooter when not working.      Right achilles  Border foam applied for protection.     Please increase dietary protein to at least 100 grams per day to support cell rejuvenation.   May use supplements such as Ensure Max, Austin, & Glucerna (samples & coupons provided at first visit).   Be sure to spread intake throughout the day for improved absorption.

## 2024-01-30 NOTE — WOUND CARE
Discharge Instructions for  [facility]  [address] [place] [address]  [address]  [place] [address]  Phone [phone]   Fax [phone]      NAME:  Deepa Cruz  YOB: 1985  MEDICAL RECORD NUMBER:  [id number]  DATE:  1/30/2024    Return Appointment:   1 week with Cristian Da Silva DO      Instructions: Right foot plantar, Right lateral foot;  Clean with normal saline or wound cleanser  Vashe Wound Solution (hypochlorous acid) soak placed on wound bed for a minimum of 60 seconds prior to dressing application. This solution removes pathogens, bioburden, and biofilms from wounds, but is not cytotoxic.     Gently pack alginate with silver rope into wound bed filling wound space. Do not pack tightly. Cover with ABD, roll gauze  Change 3 times a week.  Apply Tubigrip, wear at all times    Continue to offload with your forefoot shoe when working or the knee scooter when not working.     Right achilles  Border foam applied for protection.    Please increase dietary protein to at least 100 grams per day to support cell rejuvenation.   May use supplements such as Ensure Max, Austin, & Glucerna (samples & coupons provided at first visit).   Be sure to spread intake throughout the day for improved absorption.         Should you experience increased redness, swelling, pain, foul odor, size of wound(s), or have a temperature over 101 degrees please contact the Wound Healing Center at [phone] or if after hours contact your primary care physician or go to the hospital emergency department.    PLEASE NOTE: IF YOU ARE UNABLE TO OBTAIN WOUND SUPPLIES, CONTINUE TO USE THE SUPPLIES YOU HAVE AVAILABLE UNTIL YOU ARE ABLE TO REACH US. IT IS MOST IMPORTANT TO KEEP THE WOUND COVERED AT ALL TIMES.    Electronically signed Debbie Gooden RN on 1/30/2024 at 2:15 PM

## 2024-01-30 NOTE — PROGRESS NOTES
Name: Deepa Cruz  YOB: 1985  Gender: female  MRN: [id number]    Plan:    Continue wound care with Dr. Da Silva    If she does not heal these wounds she will needed amputation-either transmetatarsal or Syme amputation.  I explained this to her in great detail.  At this point I am unsure if she will heal this.  I am unsure if she can eradicate this infection.  I have no more debridements other than an amputation to offer her.         Procedure: Right Foot I & D, Debridement And Metatarsal  Bone Excision - Right    Surgery Date: 1/10/2024    She is in wound care.  She is taking her antibiotics.  She has a PICC line.  Subjective: Denies fevers chills or infection.  Denies any signs of spreading erythema.  She states the wound is getting more shallow.    ROS: Patient Denies fever/chills, headache, visual changes, chest pain, shortness of breath, and nausea/vomiting/diarrhea     Exam:     Wound underneath the metatarsal heads is getting more shallow.  I did not remove all the packing.  She has a new abrasion over the lateral fifth metatarsal.  Dorsal foot wounds of healed.  No signs of ascending or spreading infection.  She is frankly neuropathic.

## 2024-01-30 NOTE — FLOWSHEET NOTE
(25-50%)   Drainage Description Serosanguinous   Odor None   Wound Thickness Description not for Pressure Injury Full thickness

## 2024-01-31 LAB
BACTERIA SPEC CULT: ABNORMAL
GRAM STN SPEC: ABNORMAL
ORGANISM ID: NORMAL
SERVICE CMNT-IMP: ABNORMAL

## 2024-02-13 ENCOUNTER — HOSPITAL ENCOUNTER (OUTPATIENT)
Dept: WOUND CARE | Age: 39
Discharge: HOME | End: 2024-02-13
Payer: COMMERCIAL

## 2024-02-13 VITALS
BODY MASS INDEX: 36.07 KG/M2 | SYSTOLIC BLOOD PRESSURE: 119 MMHG | HEART RATE: 108 BPM | WEIGHT: 238 LBS | TEMPERATURE: 98.2 F | DIASTOLIC BLOOD PRESSURE: 70 MMHG | HEIGHT: 68 IN

## 2024-02-13 DIAGNOSIS — M86.671 CHRONIC REFRACTORY OSTEOMYELITIS OF FOOT, RIGHT (HCC): ICD-10-CM

## 2024-02-13 DIAGNOSIS — L97.413 DIABETIC ULCER OF RIGHT MIDFOOT ASSOCIATED WITH TYPE 2 DIABETES MELLITUS, WITH NECROSIS OF MUSCLE (HCC): Primary | ICD-10-CM

## 2024-02-13 DIAGNOSIS — E11.621 DIABETIC ULCER OF RIGHT MIDFOOT ASSOCIATED WITH TYPE 2 DIABETES MELLITUS, WITH NECROSIS OF MUSCLE (HCC): Primary | ICD-10-CM

## 2024-02-13 DIAGNOSIS — M14.679 CHARCOT'S ARTHROPATHY OF FOREFOOT: ICD-10-CM

## 2024-02-13 PROCEDURE — 11042 DBRDMT SUBQ TIS 1ST 20SQCM/<: CPT

## 2024-02-13 RX ORDER — GENTAMICIN SULFATE 1 MG/G
OINTMENT TOPICAL ONCE
OUTPATIENT
Start: 2024-02-13 | End: 2024-02-13

## 2024-02-13 RX ORDER — CLOBETASOL PROPIONATE 0.5 MG/G
OINTMENT TOPICAL ONCE
OUTPATIENT
Start: 2024-02-13 | End: 2024-02-13

## 2024-02-13 RX ORDER — IBUPROFEN 200 MG
TABLET ORAL ONCE
OUTPATIENT
Start: 2024-02-13 | End: 2024-02-13

## 2024-02-13 RX ORDER — LIDOCAINE 50 MG/G
OINTMENT TOPICAL ONCE
OUTPATIENT
Start: 2024-02-13 | End: 2024-02-13

## 2024-02-13 RX ORDER — BETAMETHASONE DIPROPIONATE 0.05 %
OINTMENT (GRAM) TOPICAL ONCE
OUTPATIENT
Start: 2024-02-13 | End: 2024-02-13

## 2024-02-13 RX ORDER — GINSENG 100 MG
CAPSULE ORAL ONCE
OUTPATIENT
Start: 2024-02-13 | End: 2024-02-13

## 2024-02-13 RX ORDER — LIDOCAINE HYDROCHLORIDE 20 MG/ML
JELLY TOPICAL ONCE
Status: COMPLETED | OUTPATIENT
Start: 2024-02-13 | End: 2024-02-13

## 2024-02-13 RX ORDER — BACITRACIN ZINC AND POLYMYXIN B SULFATE 500; 1000 [USP'U]/G; [USP'U]/G
OINTMENT TOPICAL ONCE
OUTPATIENT
Start: 2024-02-13 | End: 2024-02-13

## 2024-02-13 RX ORDER — LIDOCAINE 40 MG/G
CREAM TOPICAL ONCE
OUTPATIENT
Start: 2024-02-13 | End: 2024-02-13

## 2024-02-13 RX ORDER — TRIAMCINOLONE ACETONIDE 1 MG/G
OINTMENT TOPICAL ONCE
OUTPATIENT
Start: 2024-02-13 | End: 2024-02-13

## 2024-02-13 RX ORDER — LIDOCAINE HYDROCHLORIDE 20 MG/ML
JELLY TOPICAL ONCE
OUTPATIENT
Start: 2024-02-13 | End: 2024-02-13

## 2024-02-13 RX ORDER — LIDOCAINE HYDROCHLORIDE 40 MG/ML
SOLUTION TOPICAL ONCE
OUTPATIENT
Start: 2024-02-13 | End: 2024-02-13

## 2024-02-13 RX ADMIN — LIDOCAINE HYDROCHLORIDE: 20 JELLY TOPICAL at 15:10

## 2024-02-13 NOTE — WOUND CARE
Discharge Instructions for  [facility]  [address] [place] [address]  [address]  [place] [address]  Phone [phone]   Fax [phone]      NAME:  Deepa Cruz  YOB: 1985  MEDICAL RECORD NUMBER:  [id number]  DATE:  2/13/2024    Return Appointment:   1 week with Cristian Da Silva DO      Instructions: Right foot plantar, Right lateral foot;  Clean with normal saline or wound cleanser  Vashe Wound Solution (hypochlorous acid) soak placed on wound bed for a minimum of 60 seconds prior to dressing application. This solution removes pathogens, bioburden, and biofilms from wounds, but is not cytotoxic.     Gently pack alginate with silver rope into wound bed filling wound space. Do not pack tightly. Cover with ABD, roll gauze  Change 3 times a week.  Apply Tubigrip, wear at all times    Continue to offload with your forefoot shoe when working or the knee scooter when not working.     Right achilles  Border foam applied for protection.    Please increase dietary protein to at least 100 grams per day to support cell rejuvenation.   May use supplements such as Ensure Max, Austin, & Glucerna (samples & coupons provided at first visit).   Be sure to spread intake throughout the day for improved absorption.         Should you experience increased redness, swelling, pain, foul odor, size of wound(s), or have a temperature over 101 degrees please contact the Wound Healing Center at [phone] or if after hours contact your primary care physician or go to the hospital emergency department.    PLEASE NOTE: IF YOU ARE UNABLE TO OBTAIN WOUND SUPPLIES, CONTINUE TO USE THE SUPPLIES YOU HAVE AVAILABLE UNTIL YOU ARE ABLE TO REACH US. IT IS MOST IMPORTANT TO KEEP THE WOUND COVERED AT ALL TIMES.    Electronically signed Radha Hoover RN on 2/13/2024 at 3:11 PM

## 2024-02-13 NOTE — DISCHARGE INSTRUCTIONS
Discharge Instructions for  [facility]  [address] [place] [address]  [address]  [place] [address]  Phone [phone]   Fax [phone]      NAME:  Deepa Cruz  YOB: 1985  MEDICAL RECORD NUMBER:  [id number]  DATE:  @ED@    Return Appointment:   1 week with Cristian Da Silva DO      Instructions: Right foot plantar, Right lateral foot;  Clean with normal saline or wound cleanser  Vashe Wound Solution (hypochlorous acid) soak placed on wound bed for a minimum of 60 seconds prior to dressing application. This solution removes pathogens, bioburden, and biofilms from wounds, but is not cytotoxic.      Gently pack alginate with silver rope into wound bed filling wound space. Do not pack tightly. Cover with ABD, roll gauze  Change 3 times a week.  Apply Tubigrip, wear at all times     Continue to offload with your forefoot shoe when working or the knee scooter when not working.      Right achilles  Border foam applied for protection.     Please increase dietary protein to at least 100 grams per day to support cell rejuvenation.   May use supplements such as Ensure Max, Austin, & Glucerna (samples & coupons provided at first visit).   Be sure to spread intake throughout the day for improved absorption.             Should you experience increased redness, swelling, pain, foul odor, size of wound(s), or have a temperature over 101 degrees please contact the [facility]aling Center at [phone] or if after hours contact your primary care physician or go to the hospital emergency department.    PLEASE NOTE: IF YOU ARE UNABLE TO OBTAIN WOUND SUPPLIES, CONTINUE TO USE THE SUPPLIES YOU HAVE AVAILABLE UNTIL YOU ARE ABLE TO REACH US. IT IS MOST IMPORTANT TO KEEP THE WOUND COVERED AT ALL TIMES.    Electronically signed Radha Hoover RN on 2/13/2024 at 3:23 PM

## 2024-02-13 NOTE — FLOWSHEET NOTE
02/13/24 1442   Wound 01/30/24 Foot Right;Lateral #2 Right lateral foot   Date First Assessed/Time First Assessed: 01/30/24 1340   Present on Original Admission: Yes  Wound Approximate Age at First Assessment (Weeks): 2 weeks  Primary Wound Type: Diabetic Ulcer  Location: Foot  Wound Location Orientation: Right;Lateral  Wou...   Wound Image    Wound Etiology Diabetic Elizabeth 2   Dressing Status Clean;Dry;Intact   Wound Cleansed Cleansed with saline   Dressing/Treatment Alginate with Ag   Offloading for Diabetic Foot Ulcers Forefoot offloading shoe   Wound Length (cm) 1.6 cm   Wound Width (cm) 1.8 cm   Wound Depth (cm) 0.1 cm   Wound Surface Area (cm^2) 2.88 cm^2   Change in Wound Size % (l*w) 50.34   Wound Volume (cm^3) 0.288 cm^3   Wound Healing % 50   Wound Assessment Pink/red   Drainage Amount Small (< 25%)   Drainage Description Serosanguinous   Odor None   Emily-wound Assessment Edematous   Wound Thickness Description not for Pressure Injury Full thickness   Wound 01/25/24 Right;Plantar #1   Date First Assessed/Time First Assessed: 01/25/24 1418   Present on Original Admission: Yes  Primary Wound Type: Diabetic Ulcer  Wound Location Orientation: Right;Plantar  Wound Description (Comments): #1   Wound Image    Wound Etiology Diabetic Elizabeth 3   Dressing Status Clean;Dry;Intact   Wound Cleansed Cleansed with saline   Dressing/Treatment Alginate   Offloading for Diabetic Foot Ulcers Forefoot offloading shoe   Wound Length (cm) 0.5 cm   Wound Width (cm) 0.9 cm   Wound Depth (cm) 0.4 cm   Wound Surface Area (cm^2) 0.45 cm^2   Change in Wound Size % (l*w) 59.09   Wound Volume (cm^3) 0.18 cm^3   Wound Healing % 92   Wound Assessment [place]/red;Slough   Drainage Amount Small (< 25%)   Drainage Description Serosanguinous   Odor None   Emily-wound Assessment Edematous   Wound Thickness Description not for Pressure Injury Full thickness

## 2024-02-13 NOTE — FLOWSHEET NOTE
02/13/24 1442   Wound 01/30/24 Foot Right;Lateral #2 Right lateral foot   Date First Assessed/Time First Assessed: 01/30/24 1340   Present on Original Admission: Yes  Wound Approximate Age at First Assessment (Weeks): 2 weeks  Primary Wound Type: Diabetic Ulcer  Location: Foot  Wound Location Orientation: Right;Lateral  Wou...   Wound Image     Wound Etiology Diabetic Elizabeth 2   Dressing Status Clean;Dry;Intact   Wound Cleansed Cleansed with saline   Dressing/Treatment Alginate with Ag   Offloading for Diabetic Foot Ulcers Forefoot offloading shoe   Wound Length (cm) 1.6 cm   Wound Width (cm) 1.8 cm   Wound Depth (cm) 0.1 cm   Wound Surface Area (cm^2) 2.88 cm^2   Change in Wound Size % (l*w) 50.34   Wound Volume (cm^3) 0.288 cm^3   Wound Healing % 50   Post-Procedure Length (cm) 1.6 cm   Post-Procedure Width (cm) 1.8 cm   Post-Procedure Depth (cm) 0.1 cm   Post-Procedure Surface Area (cm^2) 2.88 cm^2   Post-Procedure Volume (cm^3) 0.288 cm^3   Wound Assessment Pink/red   Drainage Amount Small (< 25%)   Drainage Description Serosanguinous   Odor None   Emily-wound Assessment Edematous   Wound Thickness Description not for Pressure Injury Full thickness   Wound 01/25/24 Right;Plantar #1   Date First Assessed/Time First Assessed: 01/25/24 1418   Present on Original Admission: Yes  Primary Wound Type: Diabetic Ulcer  Wound Location Orientation: Right;Plantar  Wound Description (Comments): #1   Wound Image     Wound Etiology Diabetic Elizabeth 3   Dressing Status Clean;Dry;Intact   Wound Cleansed Cleansed with saline   Dressing/Treatment Alginate   Offloading for Diabetic Foot Ulcers Forefoot offloading shoe   Wound Length (cm) 0.5 cm   Wound Width (cm) 0.9 cm   Wound Depth (cm) 0.4 cm   Wound Surface Area (cm^2) 0.45 cm^2   Change in Wound Size % (l*w) 59.09   Wound Volume (cm^3) 0.18 cm^3   Wound Healing % 92   Post-Procedure Length (cm) 0.6 cm   Post-Procedure Width (cm) 0.9 cm   Post-Procedure Depth (cm) 0.3 cm  jazmyn all pertinent systems negative

## 2024-02-26 ENCOUNTER — HOSPITAL ENCOUNTER (OUTPATIENT)
Dept: WOUND CARE | Age: 39
Discharge: HOME | End: 2024-02-26
Payer: COMMERCIAL

## 2024-02-26 VITALS
SYSTOLIC BLOOD PRESSURE: 109 MMHG | DIASTOLIC BLOOD PRESSURE: 74 MMHG | HEART RATE: 100 BPM | BODY MASS INDEX: 36.68 KG/M2 | RESPIRATION RATE: 18 BRPM | TEMPERATURE: 98.1 F | OXYGEN SATURATION: 99 % | HEIGHT: 68 IN | WEIGHT: 242 LBS

## 2024-02-26 DIAGNOSIS — E11.621 DIABETIC ULCER OF RIGHT MIDFOOT ASSOCIATED WITH TYPE 2 DIABETES MELLITUS, WITH NECROSIS OF MUSCLE (HCC): Primary | ICD-10-CM

## 2024-02-26 DIAGNOSIS — L97.413 DIABETIC ULCER OF RIGHT MIDFOOT ASSOCIATED WITH TYPE 2 DIABETES MELLITUS, WITH NECROSIS OF MUSCLE (HCC): Primary | ICD-10-CM

## 2024-02-26 DIAGNOSIS — M14.679 CHARCOT'S ARTHROPATHY OF FOREFOOT: ICD-10-CM

## 2024-02-26 DIAGNOSIS — M86.671 CHRONIC REFRACTORY OSTEOMYELITIS OF FOOT, RIGHT (HCC): ICD-10-CM

## 2024-02-26 PROCEDURE — 11042 DBRDMT SUBQ TIS 1ST 20SQCM/<: CPT | Performed by: FAMILY MEDICINE

## 2024-02-26 PROCEDURE — 11042 DBRDMT SUBQ TIS 1ST 20SQCM/<: CPT

## 2024-02-26 RX ORDER — TRIAMCINOLONE ACETONIDE 1 MG/G
OINTMENT TOPICAL ONCE
OUTPATIENT
Start: 2024-02-26 | End: 2024-02-26

## 2024-02-26 RX ORDER — LIDOCAINE HYDROCHLORIDE 40 MG/ML
SOLUTION TOPICAL ONCE
OUTPATIENT
Start: 2024-02-26 | End: 2024-02-26

## 2024-02-26 RX ORDER — IBUPROFEN 200 MG
TABLET ORAL ONCE
OUTPATIENT
Start: 2024-02-26 | End: 2024-02-26

## 2024-02-26 RX ORDER — LIDOCAINE HYDROCHLORIDE 20 MG/ML
JELLY TOPICAL ONCE
OUTPATIENT
Start: 2024-02-26 | End: 2024-02-26

## 2024-02-26 RX ORDER — LIDOCAINE HYDROCHLORIDE 20 MG/ML
JELLY TOPICAL ONCE
Status: COMPLETED | OUTPATIENT
Start: 2024-02-26 | End: 2024-02-26

## 2024-02-26 RX ORDER — LIDOCAINE 40 MG/G
CREAM TOPICAL ONCE
OUTPATIENT
Start: 2024-02-26 | End: 2024-02-26

## 2024-02-26 RX ORDER — BETAMETHASONE DIPROPIONATE 0.05 %
OINTMENT (GRAM) TOPICAL ONCE
OUTPATIENT
Start: 2024-02-26 | End: 2024-02-26

## 2024-02-26 RX ORDER — GINSENG 100 MG
CAPSULE ORAL ONCE
OUTPATIENT
Start: 2024-02-26 | End: 2024-02-26

## 2024-02-26 RX ORDER — GENTAMICIN SULFATE 1 MG/G
OINTMENT TOPICAL ONCE
OUTPATIENT
Start: 2024-02-26 | End: 2024-02-26

## 2024-02-26 RX ORDER — BACITRACIN ZINC AND POLYMYXIN B SULFATE 500; 1000 [USP'U]/G; [USP'U]/G
OINTMENT TOPICAL ONCE
OUTPATIENT
Start: 2024-02-26 | End: 2024-02-26

## 2024-02-26 RX ORDER — LIDOCAINE 50 MG/G
OINTMENT TOPICAL ONCE
OUTPATIENT
Start: 2024-02-26 | End: 2024-02-26

## 2024-02-26 RX ORDER — CLOBETASOL PROPIONATE 0.5 MG/G
OINTMENT TOPICAL ONCE
OUTPATIENT
Start: 2024-02-26 | End: 2024-02-26

## 2024-02-26 RX ADMIN — LIDOCAINE HYDROCHLORIDE: 20 JELLY TOPICAL at 15:31

## 2024-02-26 ASSESSMENT — PAIN SCALES - GENERAL: PAINLEVEL_OUTOF10: 4

## 2024-02-26 NOTE — FLOWSHEET NOTE
02/26/24 1502   Wound 01/25/24 Right;Plantar #1   Date First Assessed/Time First Assessed: 01/25/24 1418   Present on Original Admission: Yes  Primary Wound Type: Diabetic Ulcer  Wound Location Orientation: Right;Plantar  Wound Description (Comments): #1   Wound Image     Wound Etiology Diabetic Elizabeth 3   Dressing Status Clean;Dry;Intact   Wound Cleansed Vashe   Dressing/Treatment Alginate   Offloading for Diabetic Foot Ulcers Forefoot offloading shoe   Wound Length (cm) 0.2 cm   Wound Width (cm) 0.5 cm   Wound Depth (cm) 0.5 cm   Wound Surface Area (cm^2) 0.1 cm^2   Change in Wound Size % (l*w) 90.91   Wound Volume (cm^3) 0.05 cm^3   Wound Healing % 98   Post-Procedure Length (cm) 0.5 cm   Post-Procedure Width (cm) 1 cm   Post-Procedure Depth (cm) 0.8 cm   Post-Procedure Surface Area (cm^2) 0.5 cm^2   Post-Procedure Volume (cm^3) 0.4 cm^3   Wound Assessment Dry   Drainage Amount None (dry)   Odor None   Emily-wound Assessment Hyperkeratosis (callous)   Wound Thickness Description not for Pressure Injury Full thickness   Wound 01/30/24 Foot Right;Lateral #2 Right lateral foot   Date First Assessed/Time First Assessed: 01/30/24 1340   Present on Original Admission: Yes  Wound Approximate Age at First Assessment (Weeks): 2 weeks  Primary Wound Type: Diabetic Ulcer  Location: Foot  Wound Location Orientation: Right;Lateral  Wou...   Wound Image     Wound Etiology Diabetic Elizabeth 2   Dressing Status Clean;Dry;Intact   Wound Cleansed Vashe   Dressing/Treatment Alginate with Ag   Offloading for Diabetic Foot Ulcers Forefoot offloading shoe   Wound Length (cm) 1.5 cm   Wound Width (cm) 1.5 cm   Wound Depth (cm) 0.1 cm   Wound Surface Area (cm^2) 2.25 cm^2   Change in Wound Size % (l*w) 61.21   Wound Volume (cm^3) 0.225 cm^3   Wound Healing % 61   Post-Procedure Length (cm) 1.5 cm   Post-Procedure Width (cm) 1.7 cm   Post-Procedure Depth (cm) 0.1 cm   Post-Procedure Surface Area (cm^2) 2.55 cm^2   Post-Procedure Volume

## 2024-02-26 NOTE — DISCHARGE INSTRUCTIONS
Instructions: Right foot plantar, Right lateral foot;  Clean with normal saline or wound cleanser  Vashe Wound Solution (hypochlorous acid) soak placed on wound bed for a minimum of 60 seconds prior to dressing application. This solution removes pathogens, bioburden, and biofilms from wounds, but is not cytotoxic.      Gently pack alginate with silver rope into wound bed filling wound space. Do not pack tightly. Cover with ABD, roll gauze  Change 3 times a week.  Apply Tubigrip, wear at all times     Continue to offload with your forefoot shoe when working or the knee scooter when not working.      Right achilles  Border foam applied for protection.

## 2024-02-26 NOTE — WOUND CARE
Discharge Instructions for  [facility]  [address] [place] [address]  Phone [phone]   Fax [phone]      NAME:  Deepa Cruz  YOB: 1985  DATE:  2/26/2024    Please excuse Deepa Cruz for treatment for her foot. She has been going to the wound center through August of 2023 and continuing treatment at this time.    Electronically signed Camron Cross RN on 2/26/2024 at 3:55 PM

## 2024-02-26 NOTE — PROGRESS NOTES
Procedure Note  Indications: Based on my examination of this patient's wound(s)/ulcer(s) today, debridement is required to promote healing and evaluate the wound base.    Return Appointment:   1 week with Cristian Da Silva DO        Instructions: Right foot plantar, Right lateral foot;  Clean with normal saline or wound cleanser  Vashe Wound Solution (hypochlorous acid) soak placed on wound bed for a minimum of 60 seconds prior to dressing application. This solution removes pathogens, bioburden, and biofilms from wounds, but is not cytotoxic.      Gently pack alginate with silver rope into wound bed filling wound space. Do not pack tightly. Cover with ABD, roll gauze  Change 3 times a week.  Apply Tubigrip, wear at all times     Continue to offload with your forefoot shoe when working or the knee scooter when not working.      Right achilles  Border foam applied for protection.     Please increase dietary protein to at least 100 grams per day to support cell rejuvenation.   May use supplements such as Ensure Max, Austin, & Glucerna (samples & coupons provided at first visit).   Be sure to spread intake throughout the day for improved absorption.          Debridement: Excisional Debridement    Using: curette the wound(s)/ulcer(s) was/were debrided down through and including the removal of epidermis, dermis, and subcutaneous tissue.  Performed by: Cristian Da Silva DO  Consent obtained: Yes  Time out taken: Yes  Pain Control:         Devitalized Tissue Debrided: fibrin, biofilm, slough, and necrotic/eschar    Pre Debridement Measurements:  Are located in the Wound/Ulcer Documentation Flow Sheet    Diabetic/Pressure/Non Pressure Ulcers only:  Ulcer: Diabetic ulcer, fat layer exposed     Wound/Ulcer #: 1 and 2  Post Debridement Measurements:  Wound/Ulcer Descriptions are Pre Debridement except measurements:  Wound 01/25/24 Right;Plantar #1 (Active)   Wound Image   02/26/24 1502   Wound Etiology Diabetic Elizabeth 3

## 2024-02-26 NOTE — WOUND CARE
Discharge Instructions for  [facility]  [address] [place] [address]  [address]  [place] [address]  Phone [phone]   Fax [phone]      NAME:  Deepa Cruz  YOB: 1985  MEDICAL RECORD NUMBER:  [id number]  DATE:  2/26/2024    Return Appointment:   1 week with Cristian Da Silva DO      Instructions: Right foot plantar, Right lateral foot;  Clean with normal saline or wound cleanser  Vashe Wound Solution (hypochlorous acid) soak placed on wound bed for a minimum of 60 seconds prior to dressing application. This solution removes pathogens, bioburden, and biofilms from wounds, but is not cytotoxic.     Gently pack alginate with silver rope into wound bed filling wound space. Do not pack tightly. Cover with ABD, roll gauze  Change 3 times a week.  Apply Tubigrip, wear at all times    Continue to offload with your forefoot shoe when working or the knee scooter when not working.     Right achilles  Border foam applied for protection.    Please increase dietary protein to at least 100 grams per day to support cell rejuvenation.   May use supplements such as Ensure Max, Austin, & Glucerna (samples & coupons provided at first visit).   Be sure to spread intake throughout the day for improved absorption.         Should you experience increased redness, swelling, pain, foul odor, size of wound(s), or have a temperature over 101 degrees please contact the Wound Healing Center at [phone] or if after hours contact your primary care physician or go to the hospital emergency department.    PLEASE NOTE: IF YOU ARE UNABLE TO OBTAIN WOUND SUPPLIES, CONTINUE TO USE THE SUPPLIES YOU HAVE AVAILABLE UNTIL YOU ARE ABLE TO REACH US. IT IS MOST IMPORTANT TO KEEP THE WOUND COVERED AT ALL TIMES.    Electronically signed Camron Cross RN on 2/26/2024 at 3:40 PM

## 2024-03-05 ENCOUNTER — OFFICE VISIT (OUTPATIENT)
Dept: ORTHOPEDIC SURGERY | Age: 39
End: 2024-03-05

## 2024-03-05 DIAGNOSIS — L97.413 DIABETIC ULCER OF RIGHT MIDFOOT ASSOCIATED WITH TYPE 2 DIABETES MELLITUS, WITH NECROSIS OF MUSCLE (HCC): Primary | ICD-10-CM

## 2024-03-05 DIAGNOSIS — E11.621 DIABETIC ULCER OF RIGHT MIDFOOT ASSOCIATED WITH TYPE 2 DIABETES MELLITUS, WITH NECROSIS OF MUSCLE (HCC): Primary | ICD-10-CM

## 2024-03-05 PROCEDURE — 99024 POSTOP FOLLOW-UP VISIT: CPT | Performed by: ORTHOPAEDIC SURGERY

## 2024-03-05 NOTE — PROGRESS NOTES
Name: Deepa Cruz  YOB: 1985  Gender: female  MRN: [id number]    Plan:    Continue wound care with Dr. Da Silva    If she does not heal these wounds she will needed amputation-either transmetatarsal or Syme amputation.  I explained this to her in great detail.  At this point I am unsure if she will heal this.  I am unsure if she can eradicate this infection.  I have no more debridements other than an amputation to offer her.    Follow-up with me as needed for a transmetatarsal amputation         Procedure: Right Foot I & D, Debridement And Metatarsal  Bone Excision - Right    Surgery Date: 1/10/2024    She is in wound care.  She is taking her antibiotics.  She has a PICC line.  Subjective: Denies fevers chills or infection.  Denies any signs of spreading erythema.  She states the wound is getting more shallow.    3/5/2024-patient states her plantar wound is almost completely healed.  She continues to have an open lateral foot wound.  She has her last antibiotic infusion later this week.  She continues to see  for wound care.  Patient is still very adamant that she does not want any amputation and would like to continue continue with wound care.    ROS: Patient Denies fever/chills, headache, visual changes, chest pain, shortness of breath, and nausea/vomiting/diarrhea     Exam:     Wound underneath the metatarsal heads is getting more shallow.  I did not remove all the packing.  She has a 2 x 5 mm wound over the lateral fifth metatarsal.  Dorsal foot wounds of healed.  No signs of ascending or spreading infection.  She is frankly neuropathic.    Assessment and plan: I explained her offloading devices.  We discussed peg inserts along with offloading for ulcerations.  She states she cannot be put into a total contact cast due to her work.  At this point she is unwilling to be off of her foot, she has to work and she is unwilling to do an amputation.    My official recommendation is an

## 2024-03-06 ENCOUNTER — HOSPITAL ENCOUNTER (OUTPATIENT)
Dept: WOUND CARE | Age: 39
Discharge: HOME | End: 2024-03-06
Payer: COMMERCIAL

## 2024-03-06 VITALS
TEMPERATURE: 98.1 F | DIASTOLIC BLOOD PRESSURE: 65 MMHG | HEIGHT: 68 IN | WEIGHT: 241 LBS | HEART RATE: 104 BPM | BODY MASS INDEX: 36.53 KG/M2 | SYSTOLIC BLOOD PRESSURE: 110 MMHG | OXYGEN SATURATION: 96 % | RESPIRATION RATE: 18 BRPM

## 2024-03-06 DIAGNOSIS — L97.413 DIABETIC ULCER OF RIGHT MIDFOOT ASSOCIATED WITH TYPE 2 DIABETES MELLITUS, WITH NECROSIS OF MUSCLE (HCC): ICD-10-CM

## 2024-03-06 DIAGNOSIS — E11.621 DIABETIC ULCER OF RIGHT MIDFOOT ASSOCIATED WITH TYPE 2 DIABETES MELLITUS, WITH NECROSIS OF MUSCLE (HCC): ICD-10-CM

## 2024-03-06 DIAGNOSIS — M86.671 CHRONIC REFRACTORY OSTEOMYELITIS OF FOOT, RIGHT (HCC): Primary | ICD-10-CM

## 2024-03-06 DIAGNOSIS — M14.679 CHARCOT'S ARTHROPATHY OF FOREFOOT: ICD-10-CM

## 2024-03-06 PROCEDURE — 11042 DBRDMT SUBQ TIS 1ST 20SQCM/<: CPT

## 2024-03-06 RX ORDER — LIDOCAINE HYDROCHLORIDE 20 MG/ML
JELLY TOPICAL ONCE
Status: COMPLETED | OUTPATIENT
Start: 2024-03-06 | End: 2024-03-06

## 2024-03-06 RX ADMIN — LIDOCAINE HYDROCHLORIDE: 20 JELLY TOPICAL at 15:29

## 2024-03-06 ASSESSMENT — PAIN SCALES - GENERAL: PAINLEVEL_OUTOF10: 5

## 2024-03-06 NOTE — FLOWSHEET NOTE
03/06/24 1513   Right Leg Edema Point of Measurement   Leg circumference 43 cm   Ankle circumference 24.5 cm   Foot circumference 25.5 cm   Compression Therapy Tubular elastic support bandage   Wound 01/30/24 Foot Right;Lateral #2 Right lateral foot   Date First Assessed/Time First Assessed: 01/30/24 1340   Present on Original Admission: Yes  Wound Approximate Age at First Assessment (Weeks): 2 weeks  Primary Wound Type: Diabetic Ulcer  Location: Foot  Wound Location Orientation: Right;Lateral  Wou...   Wound Image     Wound Etiology Diabetic Elizabeth 2   Dressing Status Intact   Wound Cleansed Vashe   Dressing/Treatment Alginate with Ag   Offloading for Diabetic Foot Ulcers Forefoot offloading shoe   Wound Length (cm) 1.5 cm   Wound Width (cm) 1.7 cm   Wound Depth (cm) 0.1 cm   Wound Surface Area (cm^2) 2.55 cm^2   Change in Wound Size % (l*w) 56.03   Wound Volume (cm^3) 0.255 cm^3   Wound Healing % 56   Post-Procedure Length (cm) 1.5 cm   Post-Procedure Width (cm) 1.7 cm   Post-Procedure Depth (cm) 0.1 cm   Post-Procedure Surface Area (cm^2) 2.55 cm^2   Post-Procedure Volume (cm^3) 0.255 cm^3   Wound Assessment [place]/red;Slough   Drainage Amount Small (< 25%)   Drainage Description Serosanguinous   Odor None   Emily-wound Assessment Intact   Wound Thickness Description not for Pressure Injury Full thickness   Wound 01/25/24 Right;Plantar #1   Date First Assessed/Time First Assessed: 01/25/24 1418   Present on Original Admission: Yes  Primary Wound Type: Diabetic Ulcer  Wound Location Orientation: Right;Plantar  Wound Description (Comments): #1   Wound Image     Wound Etiology Diabetic Elizabeth 3   Dressing Status Intact   Wound Cleansed Vashe   Dressing/Treatment Alginate   Offloading for Diabetic Foot Ulcers Forefoot offloading shoe   Wound Length (cm) 0.1 cm   Wound Width (cm) 0.1 cm   Wound Depth (cm) 0.3 cm   Wound Surface Area (cm^2) 0.01 cm^2   Change in Wound Size % (l*w) 99.09   Wound Volume (cm^3) 0.003 cm^3

## 2024-03-06 NOTE — DISCHARGE INSTRUCTIONS
Instructions: Right foot plantar, Right lateral foot;  Clean with normal saline or wound cleanser  Vashe Wound Solution (hypochlorous acid) soak placed on wound bed for a minimum of 60 seconds prior to dressing application. This solution removes pathogens, bioburden, and biofilms from wounds, but is not cytotoxic.   Gently pack alginate with silver rope into wound bed filling wound space. Do not pack tightly. Cover with ABD, roll gauze  Change 3 times a week.  Apply Tubigrip, wear at all times     Continue to offload with your forefoot shoe when working or the knee scooter when not working.      Silver nitrate used at today's visit.     Please increase dietary protein to at least 100 grams per day to support cell rejuvenation.   May use supplements such as Ensure Max, Austin, & Glucerna (samples & coupons provided at first visit).   Be sure to spread intake throughout the day for improved absorption.

## 2024-03-06 NOTE — WOUND CARE
Discharge Instructions for  [facility]  [address] [place] [address]  [address]  [place] [address]  Phone [phone]   Fax [phone]      NAME:  Deepa Cruz  YOB: 1985  MEDICAL RECORD NUMBER:  [id number]  DATE:  3/6/2024    Return Appointment:   1 week with Cristian Da Silva DO      Instructions: Right foot plantar, Right lateral foot;  Clean with normal saline or wound cleanser  Vashe Wound Solution (hypochlorous acid) soak placed on wound bed for a minimum of 60 seconds prior to dressing application. This solution removes pathogens, bioburden, and biofilms from wounds, but is not cytotoxic.   Gently pack alginate with silver rope into wound bed filling wound space. Do not pack tightly. Cover with ABD, roll gauze  Change 3 times a week.  Apply Tubigrip, wear at all times    Continue to offload with your forefoot shoe when working or the knee scooter when not working.     Silver nitrate used at today's visit.    Please increase dietary protein to at least 100 grams per day to support cell rejuvenation.   May use supplements such as Ensure Max, Austin, & Glucerna (samples & coupons provided at first visit).   Be sure to spread intake throughout the day for improved absorption.         Should you experience increased redness, swelling, pain, foul odor, size of wound(s), or have a temperature over 101 degrees please contact the Wound Healing Center at [phone] or if after hours contact your primary care physician or go to the hospital emergency department.    PLEASE NOTE: IF YOU ARE UNABLE TO OBTAIN WOUND SUPPLIES, CONTINUE TO USE THE SUPPLIES YOU HAVE AVAILABLE UNTIL YOU ARE ABLE TO REACH US. IT IS MOST IMPORTANT TO KEEP THE WOUND COVERED AT ALL TIMES.    Electronically signed Camron Cross RN on 3/6/2024 at 4:15 PM

## 2024-03-15 ENCOUNTER — HOSPITAL ENCOUNTER (OUTPATIENT)
Dept: WOUND CARE | Age: 39
Discharge: HOME | End: 2024-03-15
Payer: COMMERCIAL

## 2024-03-15 VITALS
OXYGEN SATURATION: 99 % | DIASTOLIC BLOOD PRESSURE: 73 MMHG | HEART RATE: 90 BPM | SYSTOLIC BLOOD PRESSURE: 112 MMHG | TEMPERATURE: 98.4 F | BODY MASS INDEX: 37.28 KG/M2 | WEIGHT: 246 LBS | HEIGHT: 68 IN | RESPIRATION RATE: 12 BRPM

## 2024-03-15 DIAGNOSIS — M86.671 CHRONIC REFRACTORY OSTEOMYELITIS OF FOOT, RIGHT (HCC): Primary | ICD-10-CM

## 2024-03-15 DIAGNOSIS — E11.621 DIABETIC ULCER OF RIGHT MIDFOOT ASSOCIATED WITH TYPE 2 DIABETES MELLITUS, WITH NECROSIS OF MUSCLE (HCC): ICD-10-CM

## 2024-03-15 DIAGNOSIS — L97.413 DIABETIC ULCER OF RIGHT MIDFOOT ASSOCIATED WITH TYPE 2 DIABETES MELLITUS, WITH NECROSIS OF MUSCLE (HCC): ICD-10-CM

## 2024-03-15 DIAGNOSIS — M14.679 CHARCOT'S ARTHROPATHY OF FOREFOOT: ICD-10-CM

## 2024-03-15 PROCEDURE — 11042 DBRDMT SUBQ TIS 1ST 20SQCM/<: CPT

## 2024-03-15 RX ORDER — LIDOCAINE HYDROCHLORIDE 20 MG/ML
JELLY TOPICAL ONCE
Status: COMPLETED | OUTPATIENT
Start: 2024-03-15 | End: 2024-03-15

## 2024-03-15 RX ADMIN — LIDOCAINE HYDROCHLORIDE: 20 JELLY TOPICAL at 14:17

## 2024-03-15 NOTE — WOUND CARE
Total Contact Cast    NAME:  Deepa Cruz  YOB: 1985  MEDICAL RECORD NUMBER:  [id number]  DATE:  3/15/2024    Goal:  Patient will maintain integrity of cast, avoid mobility hazards, and report complications that may occur (foul odor, pain, numbness, cracked cast).    Removed old contact cast if indicated and wash extremity with soap and water  Applied ordered dressing over wound(s)   Applied cast padding  Applied foam padding  Applied per Dr. Cotto  Total Contact Cast was applied in the Wound Care Center to right lower leg  Applied cast material fiberglass  Applied cast material plaster   Allow cast to dry   Instructed patient to report to health care provider, including wound care center, any back pain, hip pain, or leg pain, numbness of toes, or any odor coming from the cast.   Instructed patient not to stick any foreign objects down into cast.  Instructed patient to utilize assistive devices(crutches, cane or walker) as ordered.  Instructed patient to continue offloading as directed.     Applied cast per  Guidelines    Electronically signed by Winifred Regalado RN on 3/15/2024 at 2:26 PM

## 2024-03-15 NOTE — WOUND CARE
Discharge Instructions for  [facility]  [address] [place] [address]  [address] [place] [address]  Phone [phone]   Fax [phone]      NAME:  Deepa Cruz  YOB: 1985  MEDICAL RECORD NUMBER:  [id number]  DATE:  3/15/2024    Return Appointment:   1 week with Cristian Da Silva DO      Instructions:  Right foot plantar, Right lateral foot;  Clean with normal saline or wound cleanser  Vashe Wound Solution (hypochlorous acid) soak placed on wound bed for a minimum of 60 seconds prior to dressing application. This solution removes pathogens, bioburden, and biofilms from wounds, but is not cytotoxic.   Hydrofera Blue to wound beds.  Cover with ABD, roll gauze  TCC EZ 3\" with Offloading Boot.  Dressing change 1x weekly.     Silver nitrate used at today's visit.     Please increase dietary protein to at least 100 grams per day to support cell rejuvenation.   May use supplements such as Ensure Max, Austin, & Glucerna (samples & coupons provided at first visit).   Be sure to spread intake throughout the day for improved absorption.          Should you experience increased redness, swelling, pain, foul odor, size of wound(s), or have a temperature over 101 degrees please contact the Wound Healing Center at [phone] or if after hours contact your primary care physician or go to the hospital emergency department.    PLEASE NOTE: IF YOU ARE UNABLE TO OBTAIN WOUND SUPPLIES, CONTINUE TO USE THE SUPPLIES YOU HAVE AVAILABLE UNTIL YOU ARE ABLE TO REACH US. IT IS MOST IMPORTANT TO KEEP THE WOUND COVERED AT ALL TIMES.    Electronically signed Winifred Regalado RN on 3/15/2024 at 2:18 PM

## 2024-03-15 NOTE — FLOWSHEET NOTE
Wound Surface Area (cm^2) 0.12 cm^2   Change in Wound Size % (l*w) 89.09   Wound Volume (cm^3) 0.024 cm^3   Wound Healing % 99   Post-Procedure Length (cm) 0.5 cm   Post-Procedure Width (cm) 0.4 cm   Post-Procedure Depth (cm) 0.3 cm   Post-Procedure Surface Area (cm^2) 0.2 cm^2   Post-Procedure Volume (cm^3) 0.06 cm^3   Wound Assessment Pink/red   Drainage Amount Scant (moist but unmeasurable)   Drainage Description Serosanguinous   Odor None   Emily-wound Assessment Hyperkeratosis (callous)   Margins Attached edges   Wound Thickness Description not for Pressure Injury Full thickness   Pain Assessment   Pain Assessment None - Denies Pain

## 2024-03-15 NOTE — DISCHARGE INSTRUCTIONS
Right foot plantar, Right lateral foot;  Clean with normal saline or wound cleanser  Vashe Wound Solution (hypochlorous acid) soak placed on wound bed for a minimum of 60 seconds prior to dressing application. This solution removes pathogens, bioburden, and biofilms from wounds, but is not cytotoxic.   Hydrofera Blue to wound beds.  Cover with ABD, roll gauze  TCC EZ 3\" with Offloading Boot.  Dressing change 1x weekly.     Silver nitrate used at today's visit.    Elevate lower leg when at rest.     Please increase dietary protein to at least 100 grams per day to support cell rejuvenation.   May use supplements such as Ensure Max, Austin, & Glucerna (samples & coupons provided at first visit).   Be sure to spread intake throughout the day for improved absorption.

## 2024-03-20 ENCOUNTER — HOSPITAL ENCOUNTER (OUTPATIENT)
Dept: WOUND CARE | Age: 39
Discharge: HOME | End: 2024-03-20
Payer: COMMERCIAL

## 2024-03-20 VITALS
DIASTOLIC BLOOD PRESSURE: 62 MMHG | SYSTOLIC BLOOD PRESSURE: 96 MMHG | HEIGHT: 68 IN | HEART RATE: 94 BPM | TEMPERATURE: 97.3 F | WEIGHT: 242 LBS | RESPIRATION RATE: 16 BRPM | BODY MASS INDEX: 36.68 KG/M2

## 2024-03-20 DIAGNOSIS — M14.679 CHARCOT'S ARTHROPATHY OF FOREFOOT: ICD-10-CM

## 2024-03-20 DIAGNOSIS — E11.621 DIABETIC ULCER OF RIGHT MIDFOOT ASSOCIATED WITH TYPE 2 DIABETES MELLITUS, WITH NECROSIS OF MUSCLE (HCC): ICD-10-CM

## 2024-03-20 DIAGNOSIS — L97.413 DIABETIC ULCER OF RIGHT MIDFOOT ASSOCIATED WITH TYPE 2 DIABETES MELLITUS, WITH NECROSIS OF MUSCLE (HCC): ICD-10-CM

## 2024-03-20 DIAGNOSIS — M86.671 CHRONIC REFRACTORY OSTEOMYELITIS OF FOOT, RIGHT (HCC): Primary | ICD-10-CM

## 2024-03-20 PROCEDURE — 11042 DBRDMT SUBQ TIS 1ST 20SQCM/<: CPT

## 2024-03-20 RX ORDER — LIDOCAINE HYDROCHLORIDE 20 MG/ML
JELLY TOPICAL ONCE
Status: COMPLETED | OUTPATIENT
Start: 2024-03-20 | End: 2024-03-20

## 2024-03-20 RX ORDER — LINEZOLID 600 MG/1
600 TABLET, FILM COATED ORAL 2 TIMES DAILY
Qty: 60 TABLET | Refills: 0 | Status: SHIPPED | OUTPATIENT
Start: 2024-03-20 | End: 2024-04-19

## 2024-03-20 RX ADMIN — LIDOCAINE HYDROCHLORIDE: 20 JELLY TOPICAL at 08:39

## 2024-03-20 NOTE — DISCHARGE INSTRUCTIONS
Instructions:  Right foot plantar, Right lateral foot;  Clean with normal saline or wound cleanser  Vashe Wound Solution (hypochlorous acid) soak placed on wound bed for a minimum of 60 seconds prior to dressing application. This solution removes pathogens, bioburden, and biofilms from wounds, but is not cytotoxic.   Gently pack alginate with silver rope into wound bed filling wound space. Do not pack tightly.   Secure with abd, roll gauze and tape or gauze and covrsite.  Change every other day.   Offload with your defender boot or a knee scooter.   Silver nitrate used at today's visit.     Please increase dietary protein to at least 100 grams per day to support cell rejuvenation.   May use supplements such as Ensure Max, Austin, & Glucerna (samples & coupons provided at first visit).   Be sure to spread intake throughout the day for improved absorption.       Prescription sent for zyvox to your pharmacy.   While on zyvox, take Prozac every other day per instructions from Dr Da Silva.

## 2024-03-20 NOTE — FLOWSHEET NOTE
03/20/24 0818   Wound 01/30/24 Foot Right;Lateral #2 Right lateral foot   Date First Assessed/Time First Assessed: 01/30/24 1340   Present on Original Admission: Yes  Wound Approximate Age at First Assessment (Weeks): 2 weeks  Primary Wound Type: Diabetic Ulcer  Location: Foot  Wound Location Orientation: Right;Lateral  Wou...   Wound Image     Wound Etiology Diabetic Elizabeth 2   Dressing Status Old drainage noted   Wound Cleansed Vashe   Dressing/Treatment Hydrofera blue   Offloading for Diabetic Foot Ulcers Offloading ordered;Total contact cast   Wound Length (cm) 2 cm   Wound Width (cm) 1.9 cm   Wound Depth (cm) 0.2 cm   Wound Surface Area (cm^2) 3.8 cm^2   Change in Wound Size % (l*w) 34.48   Wound Volume (cm^3) 0.76 cm^3   Wound Healing % -31   Post-Procedure Length (cm) 2 cm   Post-Procedure Width (cm) 1.9 cm   Post-Procedure Depth (cm) 0.3 cm   Post-Procedure Surface Area (cm^2) 3.8 cm^2   Post-Procedure Volume (cm^3) 1.14 cm^3   Wound Assessment Slough;Pink/red   Drainage Amount Moderate (25-50%)   Drainage Description Serosanguinous   Odor None   Emily-wound Assessment Blanchable erythema   Wound Thickness Description not for Pressure Injury Full thickness   Wound 01/25/24 Right;Plantar #1   Date First Assessed/Time First Assessed: 01/25/24 1418   Present on Original Admission: Yes  Primary Wound Type: Diabetic Ulcer  Wound Location Orientation: Right;Plantar  Wound Description (Comments): #1   Wound Image     Wound Etiology Diabetic Elizabeth 3   Dressing Status Old drainage noted   Wound Cleansed Vashe   Dressing/Treatment Hydrofera blue   Offloading for Diabetic Foot Ulcers Total contact cast   Wound Length (cm) 0.5 cm   Wound Width (cm) 0.5 cm   Wound Depth (cm) 0.3 cm   Wound Surface Area (cm^2) 0.25 cm^2   Change in Wound Size % (l*w) 77.27   Wound Volume (cm^3) 0.075 cm^3   Wound Healing % 97   Post-Procedure Length (cm) 0.5 cm   Post-Procedure Width (cm) 0.5 cm   Post-Procedure Depth (cm) 0.4 cm

## 2024-03-20 NOTE — WOUND CARE
Discharge Instructions for  [facility]  [address] [place] [address]  [address]  [place] [address]  Phone [phone]   Fax [phone]      NAME:  Deepa Cruz  YOB: 1985  MEDICAL RECORD NUMBER:  [id number]  DATE:  3/20/2024    Return Appointment:   1 week with Cristian Da Silva DO      Instructions:  Right foot plantar, Right lateral foot;  Clean with normal saline or wound cleanser  Vashe Wound Solution (hypochlorous acid) soak placed on wound bed for a minimum of 60 seconds prior to dressing application. This solution removes pathogens, bioburden, and biofilms from wounds, but is not cytotoxic.   Gently pack alginate with silver rope into wound bed filling wound space. Do not pack tightly.   Secure with abd, roll gauze and tape or gauze and covrsite.  Change every other day.   Offload with your defender boot or a knee scooter.   Silver nitrate used at today's visit.     Please increase dietary protein to at least 100 grams per day to support cell rejuvenation.   May use supplements such as Ensure Max, Austin, & Glucerna (samples & coupons provided at first visit).   Be sure to spread intake throughout the day for improved absorption.      Prescription sent for zyvox to your pharmacy.   While on zyvox, take Prozac every other day per instructions from Dr Da Silva.       Should you experience increased redness, swelling, pain, foul odor, size of wound(s), or have a temperature over 101 degrees please contact the [facility] at [phone] or if after hours contact your primary care physician or go to the hospital emergency department.    PLEASE NOTE: IF YOU ARE UNABLE TO OBTAIN WOUND SUPPLIES, CONTINUE TO USE THE SUPPLIES YOU HAVE AVAILABLE UNTIL YOU ARE ABLE TO REACH US. IT IS MOST IMPORTANT TO KEEP THE WOUND COVERED AT ALL TIMES.    Electronically signed Debbie Gooden RN on 3/20/2024 at 8:40 AM

## 2024-03-26 ENCOUNTER — HOSPITAL ENCOUNTER (OUTPATIENT)
Dept: WOUND CARE | Age: 39
Discharge: HOME | End: 2024-03-26
Payer: COMMERCIAL

## 2024-03-26 VITALS
HEIGHT: 68 IN | OXYGEN SATURATION: 96 % | BODY MASS INDEX: 37.28 KG/M2 | RESPIRATION RATE: 18 BRPM | WEIGHT: 246 LBS | DIASTOLIC BLOOD PRESSURE: 90 MMHG | HEART RATE: 105 BPM | TEMPERATURE: 98.4 F | SYSTOLIC BLOOD PRESSURE: 142 MMHG

## 2024-03-26 DIAGNOSIS — M86.671 CHRONIC REFRACTORY OSTEOMYELITIS OF FOOT, RIGHT (HCC): Primary | ICD-10-CM

## 2024-03-26 DIAGNOSIS — E11.621 DIABETIC ULCER OF RIGHT MIDFOOT ASSOCIATED WITH TYPE 2 DIABETES MELLITUS, WITH NECROSIS OF MUSCLE (HCC): ICD-10-CM

## 2024-03-26 DIAGNOSIS — M14.679 CHARCOT'S ARTHROPATHY OF FOREFOOT: ICD-10-CM

## 2024-03-26 DIAGNOSIS — L97.413 DIABETIC ULCER OF RIGHT MIDFOOT ASSOCIATED WITH TYPE 2 DIABETES MELLITUS, WITH NECROSIS OF MUSCLE (HCC): ICD-10-CM

## 2024-03-26 PROCEDURE — 11042 DBRDMT SUBQ TIS 1ST 20SQCM/<: CPT

## 2024-03-26 ASSESSMENT — PAIN SCALES - GENERAL: PAINLEVEL_OUTOF10: 5

## 2024-03-26 NOTE — DISCHARGE INSTRUCTIONS
Instructions:  Right foot plantar, Right lateral foot;  Clean with normal saline or wound cleanser  Vashe Wound Solution (hypochlorous acid) soak placed on wound bed for a minimum of 60 seconds prior to dressing application. This solution removes pathogens, bioburden, and biofilms from wounds, but is not cytotoxic.   Apply hydrofera blue to wound bed.    Secure with abd, roll gauze and tape or gauze and covrsite.  Change every other day.   Offload with your defender boot or a knee scooter.     Right heel:  Cleanse with normal saline.  Vashe Wound Solution (hypochlorous acid) soak placed on wound bed for a minimum of 60 seconds prior to dressing application. This solution removes pathogens, bioburden, and biofilms from wounds, but is not cytotoxic.  Apply xeroform to wound bed.  Cover with adhesive border foam.  Change 3 times weekly.     Silver nitrate used at today's visit.     Please increase dietary protein to at least 100 grams per day to support cell rejuvenation.   May use supplements such as Ensure Max, Austin, & Glucerna (samples & coupons provided at first visit).   Be sure to spread intake throughout the day for improved absorption.

## 2024-03-26 NOTE — WOUND CARE
Discharge Instructions for  [facility]  [address] [place] [address]  [address] [place] [address]  Phone [phone]   Fax [phone]      NAME:  Deepa Cruz  YOB: 1985  MEDICAL RECORD NUMBER:  [id number]  DATE:  3/26/2024    Return Appointment:   1 week with Cristian Da Silva DO      Instructions:  Right foot plantar, Right lateral foot;  Clean with normal saline or wound cleanser  Vashe Wound Solution (hypochlorous acid) soak placed on wound bed for a minimum of 60 seconds prior to dressing application. This solution removes pathogens, bioburden, and biofilms from wounds, but is not cytotoxic.   Apply hydrofera blue to wound bed.    Secure with abd, roll gauze and tape or gauze and covrsite.  Change every other day.   Offload with your defender boot or a knee scooter.    Right heel:  Cleanse with normal saline.  Vashe Wound Solution (hypochlorous acid) soak placed on wound bed for a minimum of 60 seconds prior to dressing application. This solution removes pathogens, bioburden, and biofilms from wounds, but is not cytotoxic.  Apply xeroform to wound bed.  Cover with adhesive border foam.  Change 3 times weekly.     Silver nitrate used at today's visit.     Please increase dietary protein to at least 100 grams per day to support cell rejuvenation.   May use supplements such as Ensure Max, Austin, & Glucerna (samples & coupons provided at first visit).   Be sure to spread intake throughout the day for improved absorption.      Prescription sent for zyvox to your pharmacy.   While on zyvox, take Prozac every other day per instructions from Dr Da Silva.       Should you experience increased redness, swelling, pain, foul odor, size of wound(s), or have a temperature over 101 degrees please contact the [facility] at [phone] or if after hours contact your primary care physician or go to the hospital emergency department.    PLEASE NOTE: IF YOU ARE UNABLE TO OBTAIN

## 2024-03-26 NOTE — FLOWSHEET NOTE
03/26/24 1425   Right Leg Edema Point of Measurement   Leg circumference 43 cm   Ankle circumference 24 cm   Foot circumference 25 cm   Compression Therapy Tubular elastic support bandage   Wound 01/30/24 Foot Right;Lateral #2 Right lateral foot   Date First Assessed/Time First Assessed: 01/30/24 1340   Present on Original Admission: Yes  Wound Approximate Age at First Assessment (Weeks): 2 weeks  Primary Wound Type: Diabetic Ulcer  Location: Foot  Wound Location Orientation: Right;Lateral  Wou...   Wound Image     Wound Etiology Diabetic Elizabeth 2   Dressing Status Old drainage noted   Wound Cleansed Vashe   Dressing/Treatment Alginate with Ag   Offloading for Diabetic Foot Ulcers Offloading ordered   Wound Length (cm) 2.3 cm   Wound Width (cm) 2 cm   Wound Depth (cm) 0.1 cm   Wound Surface Area (cm^2) 4.6 cm^2   Change in Wound Size % (l*w) 20.69   Wound Volume (cm^3) 0.46 cm^3   Wound Healing % 21   Post-Procedure Length (cm) 2.2 cm   Post-Procedure Width (cm) 2 cm   Post-Procedure Depth (cm) 0.1 cm   Post-Procedure Surface Area (cm^2) 4.4 cm^2   Post-Procedure Volume (cm^3) 0.44 cm^3   Wound Assessment Slough;Pink/red   Drainage Amount Moderate (25-50%)   Drainage Description Serosanguinous   Odor None   Emily-wound Assessment Blanchable erythema   Wound Thickness Description not for Pressure Injury Full thickness   Wound 01/25/24 Right;Plantar #1   Date First Assessed/Time First Assessed: 01/25/24 1418   Present on Original Admission: Yes  Primary Wound Type: Diabetic Ulcer  Wound Location Orientation: Right;Plantar  Wound Description (Comments): #1   Wound Image     Wound Etiology Diabetic Elizabeth 3   Dressing Status Old drainage noted   Wound Cleansed Vashe   Dressing/Treatment Alginate with Ag   Offloading for Diabetic Foot Ulcers Offloading ordered   Wound Length (cm) 0 cm   Wound Width (cm) 0 cm   Wound Depth (cm) 0 cm   Wound Surface Area (cm^2) 0 cm^2   Change in Wound Size % (l*w) 100   Wound Volume

## 2024-04-03 ENCOUNTER — HOSPITAL ENCOUNTER (OUTPATIENT)
Dept: WOUND CARE | Age: 39
Discharge: HOME | End: 2024-04-03
Payer: COMMERCIAL

## 2024-04-03 VITALS
DIASTOLIC BLOOD PRESSURE: 66 MMHG | HEIGHT: 68 IN | HEART RATE: 94 BPM | BODY MASS INDEX: 36.37 KG/M2 | WEIGHT: 240 LBS | TEMPERATURE: 97.5 F | RESPIRATION RATE: 18 BRPM | SYSTOLIC BLOOD PRESSURE: 109 MMHG

## 2024-04-03 PROCEDURE — 11042 DBRDMT SUBQ TIS 1ST 20SQCM/<: CPT

## 2024-04-03 RX ORDER — ACYCLOVIR 400 MG/1
TABLET ORAL
COMMUNITY
Start: 2024-01-26

## 2024-04-03 RX ORDER — CARIPRAZINE 1.5 MG/1
1.5 CAPSULE, GELATIN COATED ORAL DAILY
COMMUNITY

## 2024-04-03 RX ORDER — OXYCODONE AND ACETAMINOPHEN 10; 325 MG/1; MG/1
TABLET ORAL
COMMUNITY
Start: 2024-03-26

## 2024-04-03 RX ORDER — ZOLPIDEM TARTRATE 5 MG/1
TABLET ORAL
COMMUNITY
Start: 2024-03-26

## 2024-04-03 RX ORDER — CLONAZEPAM 0.5 MG/1
TABLET ORAL
COMMUNITY

## 2024-04-03 ASSESSMENT — PAIN DESCRIPTION - LOCATION: LOCATION: FOOT

## 2024-04-03 ASSESSMENT — PAIN DESCRIPTION - DESCRIPTORS: DESCRIPTORS: ACHING;BURNING

## 2024-04-03 ASSESSMENT — PAIN SCALES - GENERAL: PAINLEVEL_OUTOF10: 4

## 2024-04-03 ASSESSMENT — PAIN DESCRIPTION - ORIENTATION: ORIENTATION: RIGHT

## 2024-04-03 NOTE — DISCHARGE INSTRUCTIONS
Return Appointment:   1 week with Cristian Da Silva DO        Instructions:  Right foot plantar, Right lateral foot;  Clean with normal saline or wound cleanser  Vashe Wound Solution (hypochlorous acid) soak placed on wound bed for a minimum of 60 seconds prior to dressing application. This solution removes pathogens, bioburden, and biofilms from wounds, but is not cytotoxic.   Apply Mesalt to wound bed.   Secure with abd, roll gauze and tape or gauze and coversite.  Change every other day.   Offload with your defender boot or a knee scooter.     Right heel:  Cleanse with normal saline.  Vashe Wound Solution (hypochlorous acid) soak placed on wound bed for a minimum of 60 seconds prior to dressing application. This solution removes pathogens, bioburden, and biofilms from wounds, but is not cytotoxic.  Apply xeroform to wound bed.  Cover with adhesive border foam.  Change 3 times weekly.     Silver nitrate used at today's visit.     Please increase dietary protein to at least 100 grams per day to support cell rejuvenation.   May use supplements such as Ensure Max, Austin, & Glucerna (samples & coupons provided at first visit).   Be sure to spread intake throughout the day for improved absorption.       Continue with Zyvox         Should you experience increased redness, swelling, pain, foul odor, size of wound(s), or have a temperature over 101 degrees please contact the [facility] Center at [phone] or if after hours contact your primary care physician or go to the hospital emergency department.     PLEASE NOTE: IF YOU ARE UNABLE TO OBTAIN WOUND SUPPLIES, CONTINUE TO USE THE SUPPLIES YOU HAVE AVAILABLE UNTIL YOU ARE ABLE TO REACH US. IT IS MOST IMPORTANT TO KEEP THE WOUND COVERED AT ALL TIMES.

## 2024-04-03 NOTE — WOUND CARE
Discharge Instructions for  [facility]  [address] [place] [address]  [address] [place] [address]  Phone [phone]   Fax [phone]      NAME:  Deepa Cruz  YOB: 1985  MEDICAL RECORD NUMBER:  [id number]  DATE:  4/3/2024    Return Appointment:   1 week with Cristian Da Silva DO      Instructions:  Right foot plantar, Right lateral foot;  Clean with normal saline or wound cleanser  Vashe Wound Solution (hypochlorous acid) soak placed on wound bed for a minimum of 60 seconds prior to dressing application. This solution removes pathogens, bioburden, and biofilms from wounds, but is not cytotoxic.   Apply Mesalt to wound bed.   Secure with abd, roll gauze and tape or gauze and coversite.  Change every other day.   Offload with your defender boot or a knee scooter.    Right heel:  Cleanse with normal saline.  Vashe Wound Solution (hypochlorous acid) soak placed on wound bed for a minimum of 60 seconds prior to dressing application. This solution removes pathogens, bioburden, and biofilms from wounds, but is not cytotoxic.  Apply xeroform to wound bed.  Cover with adhesive border foam.  Change 3 times weekly.     Silver nitrate used at today's visit.     Please increase dietary protein to at least 100 grams per day to support cell rejuvenation.   May use supplements such as Ensure Max, Austin, & Glucerna (samples & coupons provided at first visit).   Be sure to spread intake throughout the day for improved absorption.      Continue with Zyvox       Should you experience increased redness, swelling, pain, foul odor, size of wound(s), or have a temperature over 101 degrees please contact the [facility] at [phone] or if after hours contact your primary care physician or go to the hospital emergency department.    PLEASE NOTE: IF YOU ARE UNABLE TO OBTAIN WOUND SUPPLIES, CONTINUE TO USE THE SUPPLIES YOU HAVE AVAILABLE UNTIL YOU ARE ABLE TO REACH US. IT IS MOST IMPORTANT TO

## 2024-04-03 NOTE — FLOWSHEET NOTE
0.1 cm   Post-Procedure Surface Area (cm^2) 6 cm^2   Post-Procedure Volume (cm^3) 0.6 cm^3   Wound Assessment Slough;Pink/red   Drainage Amount Moderate (25-50%)   Drainage Description Serosanguinous   Odor None   Emily-wound Assessment Blanchable erythema   Wound Thickness Description not for Pressure Injury Full thickness   Wound 01/25/24 Right;Plantar #1   Date First Assessed/Time First Assessed: 01/25/24 1418   Present on Original Admission: Yes  Primary Wound Type: Diabetic Ulcer  Wound Location Orientation: Right;Plantar  Wound Description (Comments): #1   Wound Image     Wound Etiology Diabetic Elizabeth 3   Dressing Status Dry   Wound Cleansed Cleansed with saline   Dressing/Treatment Xeroform   Offloading for Diabetic Foot Ulcers Offloading boot  (defender boot)   Wound Length (cm) 0.1 cm   Wound Width (cm) 0.1 cm   Wound Depth (cm) 0.1 cm   Wound Surface Area (cm^2) 0.01 cm^2   Change in Wound Size % (l*w) 99.09   Wound Volume (cm^3) 0.001 cm^3   Wound Healing % 100   Post-Procedure Length (cm) 0.2 cm   Post-Procedure Width (cm) 0.2 cm   Post-Procedure Depth (cm) 0.2 cm   Post-Procedure Surface Area (cm^2) 0.04 cm^2   Post-Procedure Volume (cm^3) 0.008 cm^3   Wound Assessment Eschar dry   Drainage Amount None (dry)   Odor None   Wound Thickness Description not for Pressure Injury Full thickness   Pain Assessment   Pain Assessment 0-10   Pain Level 4   Pain Location Foot   Pain Orientation Right   Pain Descriptors Aching;Burning      (0) Absent (0) Absent

## 2024-04-04 RX ORDER — LIDOCAINE 40 MG/G
CREAM TOPICAL ONCE
OUTPATIENT
Start: 2024-04-04 | End: 2024-04-04

## 2024-04-04 RX ORDER — BETAMETHASONE DIPROPIONATE 0.5 MG/G
CREAM TOPICAL ONCE
OUTPATIENT
Start: 2024-04-04 | End: 2024-04-04

## 2024-04-04 RX ORDER — SODIUM CHLOR/HYPOCHLOROUS ACID 0.033 %
SOLUTION, IRRIGATION IRRIGATION ONCE
OUTPATIENT
Start: 2024-04-04 | End: 2024-04-04

## 2024-04-04 RX ORDER — LIDOCAINE 50 MG/G
OINTMENT TOPICAL ONCE
OUTPATIENT
Start: 2024-04-04 | End: 2024-04-04

## 2024-04-04 RX ORDER — GENTAMICIN SULFATE 1 MG/G
OINTMENT TOPICAL ONCE
OUTPATIENT
Start: 2024-04-04 | End: 2024-04-04

## 2024-04-04 RX ORDER — GINSENG 100 MG
CAPSULE ORAL ONCE
OUTPATIENT
Start: 2024-04-04 | End: 2024-04-04

## 2024-04-04 RX ORDER — CLOBETASOL PROPIONATE 0.5 MG/G
OINTMENT TOPICAL ONCE
OUTPATIENT
Start: 2024-04-04 | End: 2024-04-04

## 2024-04-04 RX ORDER — LIDOCAINE HYDROCHLORIDE 20 MG/ML
JELLY TOPICAL ONCE
OUTPATIENT
Start: 2024-04-04 | End: 2024-04-04

## 2024-04-04 RX ORDER — LIDOCAINE HYDROCHLORIDE 40 MG/ML
SOLUTION TOPICAL ONCE
OUTPATIENT
Start: 2024-04-04 | End: 2024-04-04

## 2024-04-04 RX ORDER — BACITRACIN ZINC AND POLYMYXIN B SULFATE 500; 1000 [USP'U]/G; [USP'U]/G
OINTMENT TOPICAL ONCE
OUTPATIENT
Start: 2024-04-04 | End: 2024-04-04

## 2024-04-04 RX ORDER — IBUPROFEN 200 MG
TABLET ORAL ONCE
OUTPATIENT
Start: 2024-04-04 | End: 2024-04-04

## 2024-04-04 RX ORDER — TRIAMCINOLONE ACETONIDE 1 MG/G
OINTMENT TOPICAL ONCE
OUTPATIENT
Start: 2024-04-04 | End: 2024-04-04

## 2024-04-09 ENCOUNTER — HOSPITAL ENCOUNTER (OUTPATIENT)
Dept: WOUND CARE | Age: 39
Discharge: HOME | End: 2024-04-09
Payer: COMMERCIAL

## 2024-04-09 VITALS
RESPIRATION RATE: 18 BRPM | WEIGHT: 244 LBS | TEMPERATURE: 98.1 F | HEART RATE: 105 BPM | BODY MASS INDEX: 36.98 KG/M2 | OXYGEN SATURATION: 97 % | SYSTOLIC BLOOD PRESSURE: 89 MMHG | HEIGHT: 68 IN | DIASTOLIC BLOOD PRESSURE: 66 MMHG

## 2024-04-09 DIAGNOSIS — M86.671 CHRONIC REFRACTORY OSTEOMYELITIS OF FOOT, RIGHT (HCC): ICD-10-CM

## 2024-04-09 DIAGNOSIS — L97.413 DIABETIC ULCER OF RIGHT MIDFOOT ASSOCIATED WITH TYPE 2 DIABETES MELLITUS, WITH NECROSIS OF MUSCLE (HCC): ICD-10-CM

## 2024-04-09 DIAGNOSIS — E11.621 DIABETIC ULCER OF RIGHT MIDFOOT ASSOCIATED WITH TYPE 2 DIABETES MELLITUS, WITH NECROSIS OF MUSCLE (HCC): ICD-10-CM

## 2024-04-09 DIAGNOSIS — M14.679 CHARCOT'S ARTHROPATHY OF FOREFOOT: Primary | ICD-10-CM

## 2024-04-09 PROCEDURE — 11042 DBRDMT SUBQ TIS 1ST 20SQCM/<: CPT

## 2024-04-09 RX ORDER — IBUPROFEN 200 MG
TABLET ORAL ONCE
OUTPATIENT
Start: 2024-04-09 | End: 2024-04-09

## 2024-04-09 RX ORDER — GINSENG 100 MG
CAPSULE ORAL ONCE
OUTPATIENT
Start: 2024-04-09 | End: 2024-04-09

## 2024-04-09 RX ORDER — LIDOCAINE 50 MG/G
OINTMENT TOPICAL ONCE
OUTPATIENT
Start: 2024-04-09 | End: 2024-04-09

## 2024-04-09 RX ORDER — TRIAMCINOLONE ACETONIDE 1 MG/G
OINTMENT TOPICAL ONCE
OUTPATIENT
Start: 2024-04-09 | End: 2024-04-09

## 2024-04-09 RX ORDER — LIDOCAINE HYDROCHLORIDE 20 MG/ML
JELLY TOPICAL ONCE
Status: COMPLETED | OUTPATIENT
Start: 2024-04-09 | End: 2024-04-09

## 2024-04-09 RX ORDER — LIDOCAINE HYDROCHLORIDE 40 MG/ML
SOLUTION TOPICAL ONCE
OUTPATIENT
Start: 2024-04-09 | End: 2024-04-09

## 2024-04-09 RX ORDER — LIDOCAINE HYDROCHLORIDE 20 MG/ML
JELLY TOPICAL ONCE
OUTPATIENT
Start: 2024-04-09 | End: 2024-04-09

## 2024-04-09 RX ORDER — CLOBETASOL PROPIONATE 0.5 MG/G
OINTMENT TOPICAL ONCE
OUTPATIENT
Start: 2024-04-09 | End: 2024-04-09

## 2024-04-09 RX ORDER — LIDOCAINE 40 MG/G
CREAM TOPICAL ONCE
OUTPATIENT
Start: 2024-04-09 | End: 2024-04-09

## 2024-04-09 RX ORDER — BETAMETHASONE DIPROPIONATE 0.5 MG/G
CREAM TOPICAL ONCE
OUTPATIENT
Start: 2024-04-09 | End: 2024-04-09

## 2024-04-09 RX ORDER — SODIUM CHLOR/HYPOCHLOROUS ACID 0.033 %
SOLUTION, IRRIGATION IRRIGATION ONCE
OUTPATIENT
Start: 2024-04-09 | End: 2024-04-09

## 2024-04-09 RX ORDER — GENTAMICIN SULFATE 1 MG/G
OINTMENT TOPICAL ONCE
OUTPATIENT
Start: 2024-04-09 | End: 2024-04-09

## 2024-04-09 RX ORDER — BACITRACIN ZINC AND POLYMYXIN B SULFATE 500; 1000 [USP'U]/G; [USP'U]/G
OINTMENT TOPICAL ONCE
OUTPATIENT
Start: 2024-04-09 | End: 2024-04-09

## 2024-04-09 RX ADMIN — LIDOCAINE HYDROCHLORIDE: 20 JELLY TOPICAL at 15:24

## 2024-04-09 NOTE — FLOWSHEET NOTE
04/09/24 1447   Right Leg Edema Point of Measurement   Leg circumference 43 cm   Ankle circumference 24 cm   Foot circumference 25 cm   Compression Therapy Compression ordered   Wound 01/30/24 Foot Right;Lateral #2 Right lateral foot   Date First Assessed/Time First Assessed: 01/30/24 1340   Present on Original Admission: Yes  Wound Approximate Age at First Assessment (Weeks): 2 weeks  Primary Wound Type: Diabetic Ulcer  Location: Foot  Wound Location Orientation: Right;Lateral  Wou...   Wound Image     Wound Etiology Diabetic Elizabeth 2   Dressing Status Old drainage noted   Wound Cleansed Cleansed with saline   Dressing/Treatment Other (comment)  (Mesalt)   Wound Length (cm) 1.7 cm   Wound Width (cm) 2.2 cm   Wound Depth (cm) 0.1 cm   Wound Surface Area (cm^2) 3.74 cm^2   Change in Wound Size % (l*w) 35.52   Wound Volume (cm^3) 0.374 cm^3   Wound Healing % 36   Post-Procedure Length (cm) 1.7 cm   Post-Procedure Width (cm) 2.2 cm   Post-Procedure Depth (cm) 0.1 cm   Post-Procedure Surface Area (cm^2) 3.74 cm^2   Post-Procedure Volume (cm^3) 0.374 cm^3   Wound Assessment Pink/red   Drainage Amount Moderate (25-50%)   Drainage Description Serosanguinous   Odor None   Emily-wound Assessment Edematous   Wound Thickness Description not for Pressure Injury Full thickness   Wound 01/25/24 Right;Plantar #1   Date First Assessed/Time First Assessed: 01/25/24 1418   Present on Original Admission: Yes  Primary Wound Type: Diabetic Ulcer  Wound Location Orientation: Right;Plantar  Wound Description (Comments): #1   Wound Image     Wound Etiology Diabetic Elizabeth 3   Dressing Status Dry   Wound Cleansed Cleansed with saline   Dressing/Treatment Other (comment)  (Mesalt)   Offloading for Diabetic Foot Ulcers Offloading boot   Wound Length (cm) 0.2 cm   Wound Width (cm) 0.3 cm   Wound Depth (cm) 0.1 cm   Wound Surface Area (cm^2) 0.06 cm^2   Change in Wound Size % (l*w) 94.55   Wound Volume (cm^3) 0.006 cm^3   Wound Healing % 100

## 2024-04-09 NOTE — WOUND CARE
Discharge Instructions for  [facility]  [address] [place] [address]  [address] [place] [id number]  Phone [phone]   Fax [phone]      NAME:  Deepa Cruz  YOB: 1985  MEDICAL RECORD NUMBER:  [id number]  DATE:  4/9/2024    Return Appointment:   1 week with Cristian Da Silva DO      Instructions:  Right foot plantar, Right lateral foot;  Clean with normal saline or wound cleanser  Vashe Wound Solution (hypochlorous acid) soak placed on wound bed for a minimum of 60 seconds prior to dressing application. .   Apply Mesalt to wound bed.   Secure with abd, roll gauze and tape or gauze and coversite.  Change every other day.   Offload with your defender boot or a knee scooter.    Silver nitrate used at today's visit.    Right heel:  Wound is healed!  Apply Vaseline to healed area daily     Please increase dietary protein to at least 100 grams per day to support cell rejuvenation.   May use supplements such as Ensure Max, Austin, & Glucerna (samples & coupons provided at first visit).   Be sure to spread intake throughout the day for improved absorption.      Continue with Zyvox       Should you experience increased redness, swelling, pain, foul odor, size of wound(s), or have a temperature over 101 degrees please contact the [facility] Center at [phone] or if after hours contact your primary care physician or go to the hospital emergency department.    PLEASE NOTE: IF YOU ARE UNABLE TO OBTAIN WOUND SUPPLIES, CONTINUE TO USE THE SUPPLIES YOU HAVE AVAILABLE UNTIL YOU ARE ABLE TO REACH US. IT IS MOST IMPORTANT TO KEEP THE WOUND COVERED AT ALL TIMES.    Electronically signed Pretty Darling PT, WCC on 4/9/2024 at 3:11 PM

## 2024-04-09 NOTE — DISCHARGE INSTRUCTIONS
Return Appointment:   1 week with Cristian Da Silva DO        Instructions:  Right foot plantar, Right lateral foot;  Clean with normal saline or wound cleanser  Vashe Wound Solution (hypochlorous acid) soak placed on wound bed for a minimum of 60 seconds prior to dressing application. .   Apply Mesalt to wound bed.   Secure with abd, roll gauze and tape or gauze and coversite.  Change every other day.   Offload with your defender boot or a knee scooter.     Silver nitrate used at today's visit.     Right heel:  Wound is healed!  Apply Vaseline to healed area daily     Please increase dietary protein to at least 100 grams per day to support cell rejuvenation.   May use supplements such as Ensure Max, Austin, & Glucerna (samples & coupons provided at first visit).   Be sure to spread intake throughout the day for improved absorption.       Continue with Zyvox

## 2024-04-17 ENCOUNTER — HOSPITAL ENCOUNTER (OUTPATIENT)
Dept: WOUND CARE | Age: 39
Discharge: HOME | End: 2024-04-17
Payer: COMMERCIAL

## 2024-04-17 VITALS
SYSTOLIC BLOOD PRESSURE: 102 MMHG | HEART RATE: 90 BPM | OXYGEN SATURATION: 100 % | HEIGHT: 68 IN | DIASTOLIC BLOOD PRESSURE: 79 MMHG | TEMPERATURE: 98.2 F | BODY MASS INDEX: 36.68 KG/M2 | RESPIRATION RATE: 18 BRPM | WEIGHT: 242 LBS

## 2024-04-17 DIAGNOSIS — L97.413 DIABETIC ULCER OF RIGHT MIDFOOT ASSOCIATED WITH TYPE 2 DIABETES MELLITUS, WITH NECROSIS OF MUSCLE (HCC): ICD-10-CM

## 2024-04-17 DIAGNOSIS — E11.621 DIABETIC ULCER OF RIGHT MIDFOOT ASSOCIATED WITH TYPE 2 DIABETES MELLITUS, WITH NECROSIS OF MUSCLE (HCC): ICD-10-CM

## 2024-04-17 DIAGNOSIS — M86.671 CHRONIC REFRACTORY OSTEOMYELITIS OF FOOT, RIGHT (HCC): ICD-10-CM

## 2024-04-17 DIAGNOSIS — M14.679 CHARCOT'S ARTHROPATHY OF FOREFOOT: Primary | ICD-10-CM

## 2024-04-17 PROCEDURE — 11042 DBRDMT SUBQ TIS 1ST 20SQCM/<: CPT

## 2024-04-17 RX ORDER — BETAMETHASONE DIPROPIONATE 0.5 MG/G
CREAM TOPICAL ONCE
OUTPATIENT
Start: 2024-04-17 | End: 2024-04-17

## 2024-04-17 RX ORDER — LIDOCAINE HYDROCHLORIDE 20 MG/ML
JELLY TOPICAL ONCE
OUTPATIENT
Start: 2024-04-17 | End: 2024-04-17

## 2024-04-17 RX ORDER — LIDOCAINE 50 MG/G
OINTMENT TOPICAL ONCE
OUTPATIENT
Start: 2024-04-17 | End: 2024-04-17

## 2024-04-17 RX ORDER — LIDOCAINE HYDROCHLORIDE 20 MG/ML
JELLY TOPICAL ONCE
Status: COMPLETED | OUTPATIENT
Start: 2024-04-17 | End: 2024-04-17

## 2024-04-17 RX ORDER — BACITRACIN ZINC AND POLYMYXIN B SULFATE 500; 1000 [USP'U]/G; [USP'U]/G
OINTMENT TOPICAL ONCE
OUTPATIENT
Start: 2024-04-17 | End: 2024-04-17

## 2024-04-17 RX ORDER — SODIUM CHLOR/HYPOCHLOROUS ACID 0.033 %
SOLUTION, IRRIGATION IRRIGATION ONCE
OUTPATIENT
Start: 2024-04-17 | End: 2024-04-17

## 2024-04-17 RX ORDER — GENTAMICIN SULFATE 1 MG/G
OINTMENT TOPICAL ONCE
OUTPATIENT
Start: 2024-04-17 | End: 2024-04-17

## 2024-04-17 RX ORDER — IBUPROFEN 200 MG
TABLET ORAL ONCE
OUTPATIENT
Start: 2024-04-17 | End: 2024-04-17

## 2024-04-17 RX ORDER — LIDOCAINE 40 MG/G
CREAM TOPICAL ONCE
OUTPATIENT
Start: 2024-04-17 | End: 2024-04-17

## 2024-04-17 RX ORDER — TRIAMCINOLONE ACETONIDE 1 MG/G
OINTMENT TOPICAL ONCE
OUTPATIENT
Start: 2024-04-17 | End: 2024-04-17

## 2024-04-17 RX ORDER — LIDOCAINE HYDROCHLORIDE 40 MG/ML
SOLUTION TOPICAL ONCE
OUTPATIENT
Start: 2024-04-17 | End: 2024-04-17

## 2024-04-17 RX ORDER — GINSENG 100 MG
CAPSULE ORAL ONCE
OUTPATIENT
Start: 2024-04-17 | End: 2024-04-17

## 2024-04-17 RX ORDER — CLOBETASOL PROPIONATE 0.5 MG/G
OINTMENT TOPICAL ONCE
OUTPATIENT
Start: 2024-04-17 | End: 2024-04-17

## 2024-04-17 RX ADMIN — LIDOCAINE HYDROCHLORIDE: 20 JELLY TOPICAL at 11:26

## 2024-04-17 ASSESSMENT — PAIN SCALES - GENERAL: PAINLEVEL_OUTOF10: 6

## 2024-04-17 NOTE — FLOWSHEET NOTE
04/17/24 1112   Right Leg Edema Point of Measurement   Leg circumference 43 cm   Ankle circumference 24 cm   Foot circumference 26.5 cm   Wound 03/26/24 Ankle Posterior;Right # 3   Date First Assessed/Time First Assessed: 03/26/24 1441   Present on Original Admission: No  Wound Approximate Age at First Assessment (Weeks): 1 weeks  Primary Wound Type: Traumatic  Location: Ankle  Wound Location Orientation: Posterior;Right  Wound ...   Wound Image     Wound Etiology Traumatic   Dressing Status Other (Comment)  (PELON)   Wound Cleansed Vashe   Dressing/Treatment Xeroform   Wound Length (cm) 0 cm   Wound Width (cm) 0 cm   Wound Depth (cm) 0 cm   Wound Surface Area (cm^2) 0 cm^2   Change in Wound Size % (l*w) 100   Wound Volume (cm^3) 0 cm^3   Wound Healing % 100   Post-Procedure Length (cm) 0.1 cm   Post-Procedure Width (cm) 0.1 cm   Post-Procedure Depth (cm) 0.1 cm   Post-Procedure Surface Area (cm^2) 0.01 cm^2   Post-Procedure Volume (cm^3) 0.001 cm^3   Wound Assessment Dry   Drainage Amount None (dry)   Odor None   Emily-wound Assessment Intact   Wound Thickness Description not for Pressure Injury Partial thickness   Wound 01/30/24 Foot Right;Lateral #2 Right lateral foot   Date First Assessed/Time First Assessed: 01/30/24 1340   Present on Original Admission: Yes  Wound Approximate Age at First Assessment (Weeks): 2 weeks  Primary Wound Type: Diabetic Ulcer  Location: Foot  Wound Location Orientation: Right;Lateral  Wou...   Wound Image     Wound Etiology Diabetic Elizabeth 2   Dressing Status Old drainage noted   Wound Cleansed Vashe   Dressing/Treatment Other (comment)  (mesaly, ABD, roll gauze)   Offloading for Diabetic Foot Ulcers Offloading boot;Other (comment)   Wound Length (cm) 2.3 cm   Wound Width (cm) 2.6 cm   Wound Depth (cm) 0.1 cm   Wound Surface Area (cm^2) 5.98 cm^2   Change in Wound Size % (l*w) -3.1   Wound Volume (cm^3) 0.598 cm^3   Wound Healing % -3   Post-Procedure Length (cm) 2.2 cm

## 2024-04-17 NOTE — DISCHARGE INSTRUCTIONS
Instructions:  Right lateral foot;  Clean with normal saline or wound cleanser  Vashe Wound Solution (hypochlorous acid) soak placed on wound bed for a minimum of 60 seconds prior to dressing application. .   Apply Mesalt to wound bed.   Secure with abd, roll gauze and tape or gauze and coversite.  Change every other day.   Offload with your defender boot or a knee scooter.     Right heel & right plantar foot:  Cleanse with normal saline.  Vashe Wound Solution soak placed on wound bed for a minimum of 60 seconds prior to dressing application. This solution removes pathogens, bioburden, and biofilms from wounds, but is not cytotoxic.  Apply xeroform to wound area.  Cover with gauze.  Secure with roll gauze.     Silver nitrate used at today's visit.     Please increase dietary protein to at least 100 grams per day to support cell rejuvenation.   May use supplements such as Ensure Max, Austin, & Glucerna (samples & coupons provided at first visit).   Be sure to spread intake throughout the day for improved absorption.       Continue with Zyvox

## 2024-04-17 NOTE — WOUND CARE
Discharge Instructions for  [facility]  [address] [place] [address]  [address] [place] [address]  Phone [phone]   Fax [phone]      NAME:  Deepa Cruz  YOB: 1985  MEDICAL RECORD NUMBER:  [id number]  DATE:  4/17/2024    Return Appointment:   2 weeks with Cristian Da Silva DO      Instructions:  Right lateral foot;  Clean with normal saline or wound cleanser  Vashe Wound Solution (hypochlorous acid) soak placed on wound bed for a minimum of 60 seconds prior to dressing application. .   Apply Mesalt to wound bed.   Secure with abd, roll gauze and tape or gauze and coversite.  Change every other day.   Offload with your defender boot or a knee scooter.    Right heel & right plantar foot:  Cleanse with normal saline.  Vashe Wound Solution soak placed on wound bed for a minimum of 60 seconds prior to dressing application. This solution removes pathogens, bioburden, and biofilms from wounds, but is not cytotoxic.  Apply xeroform to wound area.  Cover with gauze.  Secure with roll gauze.    Silver nitrate used at today's visit.    Please increase dietary protein to at least 100 grams per day to support cell rejuvenation.   May use supplements such as Ensure Max, Austin, & Glucerna (samples & coupons provided at first visit).   Be sure to spread intake throughout the day for improved absorption.      Continue with Zyvox     Should you experience increased redness, swelling, pain, foul odor, size of wound(s), or have a temperature over 101 degrees please contact the W[facility] Healing Center at [phone] or if after hours contact your primary care physician or go to the hospital emergency department.    PLEASE NOTE: IF YOU ARE UNABLE TO OBTAIN WOUND SUPPLIES, CONTINUE TO USE THE SUPPLIES YOU HAVE AVAILABLE UNTIL YOU ARE ABLE TO REACH US. IT IS MOST IMPORTANT TO KEEP THE WOUND COVERED AT ALL TIMES.    Electronically signed Camron Cross RN, [facility] on 4/17/2024 at 11:44 AM

## 2024-05-01 ENCOUNTER — HOSPITAL ENCOUNTER (OUTPATIENT)
Dept: WOUND CARE | Age: 39
Discharge: HOME | End: 2024-05-01
Payer: COMMERCIAL

## 2024-05-01 VITALS
BODY MASS INDEX: 36.68 KG/M2 | HEIGHT: 68 IN | DIASTOLIC BLOOD PRESSURE: 76 MMHG | HEART RATE: 106 BPM | WEIGHT: 242 LBS | SYSTOLIC BLOOD PRESSURE: 106 MMHG

## 2024-05-01 PROCEDURE — 11042 DBRDMT SUBQ TIS 1ST 20SQCM/<: CPT

## 2024-05-01 RX ORDER — LINEZOLID 600 MG/1
600 TABLET, FILM COATED ORAL 2 TIMES DAILY
Qty: 60 TABLET | Refills: 0 | Status: SHIPPED | OUTPATIENT
Start: 2024-05-01 | End: 2024-05-31

## 2024-05-01 ASSESSMENT — PAIN SCALES - GENERAL: PAINLEVEL_OUTOF10: 4

## 2024-05-01 NOTE — FLOWSHEET NOTE
05/01/24 1137   Wound 03/26/24 Ankle Posterior;Right # 3   Date First Assessed/Time First Assessed: 03/26/24 1441   Present on Original Admission: No  Wound Approximate Age at First Assessment (Weeks): 1 weeks  Primary Wound Type: Traumatic  Location: Ankle  Wound Location Orientation: Posterior;Right  Wound ...   Wound Image    Wound Etiology Traumatic   Dressing Status Other (Comment)   Wound Cleansed Cleansed with saline   Dressing/Treatment Xeroform   Wound Length (cm) 0 cm   Wound Width (cm) 0 cm   Wound Depth (cm) 0 cm   Wound Surface Area (cm^2) 0 cm^2   Change in Wound Size % (l*w) 100   Wound Volume (cm^3) 0 cm^3   Wound Healing % 100   Wound Assessment Dry   Drainage Amount None (dry)   Odor None   Emily-wound Assessment Intact   Wound 01/30/24 Foot Right;Lateral #2 Right lateral foot   Date First Assessed/Time First Assessed: 01/30/24 1340   Present on Original Admission: Yes  Wound Approximate Age at First Assessment (Weeks): 2 weeks  Primary Wound Type: Diabetic Ulcer  Location: Foot  Wound Location Orientation: Right;Lateral  Wou...   Wound Image     Wound Etiology Diabetic Elizabeth 2   Dressing Status Old drainage noted   Wound Cleansed Cleansed with saline   Dressing/Treatment Other (comment)   Offloading for Diabetic Foot Ulcers Offloading boot;Other (comment)   Wound Length (cm) 2 cm   Wound Width (cm) 2.5 cm   Wound Depth (cm) 0.1 cm   Wound Surface Area (cm^2) 5 cm^2   Change in Wound Size % (l*w) 13.79   Wound Volume (cm^3) 0.5 cm^3   Wound Healing % 14   Post-Procedure Length (cm) 2 cm   Post-Procedure Width (cm) 2.5 cm   Post-Procedure Depth (cm) 0.1 cm   Post-Procedure Surface Area (cm^2) 5 cm^2   Post-Procedure Volume (cm^3) 0.5 cm^3   Wound Assessment Granulation tissue   Drainage Amount Moderate (25-50%)   Drainage Description Serosanguinous   Odor None   Emily-wound Assessment Edematous   Wound Thickness Description not for Pressure Injury Full thickness   [REMOVED] Wound 01/25/24

## 2024-05-01 NOTE — FLOWSHEET NOTE
05/01/24 1137   Wound 03/26/24 Ankle Posterior;Right # 3   Date First Assessed/Time First Assessed: 03/26/24 1441   Present on Original Admission: No  Wound Approximate Age at First Assessment (Weeks): 1 weeks  Primary Wound Type: Traumatic  Location: Ankle  Wound Location Orientation: Posterior;Right  Wound ...   Wound Image    Wound Etiology Traumatic   Dressing Status Other (Comment)   Wound Cleansed Cleansed with saline   Dressing/Treatment Xeroform   Wound Length (cm) 0 cm   Wound Width (cm) 0 cm   Wound Depth (cm) 0 cm   Wound Surface Area (cm^2) 0 cm^2   Change in Wound Size % (l*w) 100   Wound Volume (cm^3) 0 cm^3   Wound Healing % 100   Wound Assessment Dry   Drainage Amount None (dry)   Odor None   Emily-wound Assessment Intact   Wound 01/30/24 Foot Right;Lateral #2 Right lateral foot   Date First Assessed/Time First Assessed: 01/30/24 1340   Present on Original Admission: Yes  Wound Approximate Age at First Assessment (Weeks): 2 weeks  Primary Wound Type: Diabetic Ulcer  Location: Foot  Wound Location Orientation: Right;Lateral  Wou...   Wound Image    Wound Etiology Diabetic Elizabeth 2   Dressing Status Old drainage noted   Wound Cleansed Cleansed with saline   Dressing/Treatment Other (comment)   Offloading for Diabetic Foot Ulcers Offloading boot;Other (comment)   Wound Length (cm) 2 cm   Wound Width (cm) 2.5 cm   Wound Depth (cm) 0.1 cm   Wound Surface Area (cm^2) 5 cm^2   Change in Wound Size % (l*w) 13.79   Wound Volume (cm^3) 0.5 cm^3   Wound Healing % 14   Wound Assessment Granulation tissue   Drainage Amount Moderate (25-50%)   Drainage Description Serosanguinous   Odor None   Emily-wound Assessment Edematous   Wound Thickness Description not for Pressure Injury Full thickness   Wound 01/25/24 Right;Plantar #1   Date First Assessed/Time First Assessed: 01/25/24 1418   Present on Original Admission: Yes  Primary Wound Type: Diabetic Ulcer  Wound Location Orientation: Right;Plantar  Wound

## 2024-05-01 NOTE — WOUND CARE
Discharge Instructions for  [facility]  [address] [place] [address]  [address]  [place] [address]  Phone [phone]   Fax [phone]      NAME:  Deepa Cruz  YOB: 1985  MEDICAL RECORD NUMBER:  [id number]  DATE:  5/1/2024    Return Appointment:   1 week with Cristian Da Silva DO      Instructions:  Right lateral foot;  Clean with normal saline or wound cleanser  Vashe Wound Solution (hypochlorous acid) soak placed on wound bed for a minimum of 60 seconds prior to dressing application. .   Silver collagen and then cover with silver alginate  Secure with abd, roll gauze and tape or gauze and coversite.  Change every other day.   Offload with your defender boot or a knee scooter.    Right heel:  Cleanse with normal saline.  Vashe Wound Solution soak placed on wound bed for a minimum of 60 seconds prior to dressing application. This solution removes pathogens, bioburden, and biofilms from wounds, but is not cytotoxic.  Apply xeroform to wound area.  Cover with gauze.  Secure with roll gauze.    Right plantar foot wound is healed    Please increase dietary protein to at least 100 grams per day to support cell rejuvenation.   May use supplements such as Ensure Max, Austin, & Glucerna (samples & coupons provided at first visit).   Be sure to spread intake throughout the day for improved absorption.      Continue with Zyvox refill sent to your pharmacy today    Should you experience increased redness, swelling, pain, foul odor, size of wound(s), or have a temperature over 101 degrees please contact the Wound Healing Center at [phone] or if after hours contact your primary care physician or go to the hospital emergency department.    PLEASE NOTE: IF YOU ARE UNABLE TO OBTAIN WOUND SUPPLIES, CONTINUE TO USE THE SUPPLIES YOU HAVE AVAILABLE UNTIL YOU ARE ABLE TO REACH US. IT IS MOST IMPORTANT TO KEEP THE WOUND COVERED AT ALL TIMES.    Electronically signed Radha Hoover RN, [facility] on

## 2024-05-10 ENCOUNTER — HOSPITAL ENCOUNTER (OUTPATIENT)
Dept: WOUND CARE | Age: 39
Discharge: HOME | End: 2024-05-10
Payer: COMMERCIAL

## 2024-05-10 VITALS
DIASTOLIC BLOOD PRESSURE: 76 MMHG | OXYGEN SATURATION: 97 % | WEIGHT: 221 LBS | RESPIRATION RATE: 16 BRPM | SYSTOLIC BLOOD PRESSURE: 111 MMHG | BODY MASS INDEX: 33.49 KG/M2 | HEIGHT: 68 IN | TEMPERATURE: 98.4 F | HEART RATE: 99 BPM

## 2024-05-10 DIAGNOSIS — M14.679 CHARCOT'S ARTHROPATHY OF FOREFOOT: Primary | ICD-10-CM

## 2024-05-10 DIAGNOSIS — M86.671 CHRONIC REFRACTORY OSTEOMYELITIS OF FOOT, RIGHT (HCC): ICD-10-CM

## 2024-05-10 DIAGNOSIS — L97.413 DIABETIC ULCER OF RIGHT MIDFOOT ASSOCIATED WITH TYPE 2 DIABETES MELLITUS, WITH NECROSIS OF MUSCLE (HCC): ICD-10-CM

## 2024-05-10 DIAGNOSIS — E11.621 DIABETIC ULCER OF RIGHT MIDFOOT ASSOCIATED WITH TYPE 2 DIABETES MELLITUS, WITH NECROSIS OF MUSCLE (HCC): ICD-10-CM

## 2024-05-10 PROCEDURE — 11042 DBRDMT SUBQ TIS 1ST 20SQCM/<: CPT

## 2024-05-10 RX ORDER — LIDOCAINE HYDROCHLORIDE 40 MG/ML
SOLUTION TOPICAL ONCE
OUTPATIENT
Start: 2024-05-10 | End: 2024-05-10

## 2024-05-10 RX ORDER — LIDOCAINE HYDROCHLORIDE 20 MG/ML
JELLY TOPICAL ONCE
OUTPATIENT
Start: 2024-05-10 | End: 2024-05-10

## 2024-05-10 RX ORDER — SODIUM CHLOR/HYPOCHLOROUS ACID 0.033 %
SOLUTION, IRRIGATION IRRIGATION ONCE
OUTPATIENT
Start: 2024-05-10 | End: 2024-05-10

## 2024-05-10 RX ORDER — GENTAMICIN SULFATE 1 MG/G
OINTMENT TOPICAL ONCE
OUTPATIENT
Start: 2024-05-10 | End: 2024-05-10

## 2024-05-10 RX ORDER — LIDOCAINE 50 MG/G
OINTMENT TOPICAL ONCE
OUTPATIENT
Start: 2024-05-10 | End: 2024-05-10

## 2024-05-10 RX ORDER — CLOBETASOL PROPIONATE 0.5 MG/G
OINTMENT TOPICAL ONCE
OUTPATIENT
Start: 2024-05-10 | End: 2024-05-10

## 2024-05-10 RX ORDER — IBUPROFEN 200 MG
TABLET ORAL ONCE
OUTPATIENT
Start: 2024-05-10 | End: 2024-05-10

## 2024-05-10 RX ORDER — TRIAMCINOLONE ACETONIDE 1 MG/G
OINTMENT TOPICAL ONCE
OUTPATIENT
Start: 2024-05-10 | End: 2024-05-10

## 2024-05-10 RX ORDER — BETAMETHASONE DIPROPIONATE 0.5 MG/G
CREAM TOPICAL ONCE
OUTPATIENT
Start: 2024-05-10 | End: 2024-05-10

## 2024-05-10 RX ORDER — BACITRACIN ZINC AND POLYMYXIN B SULFATE 500; 1000 [USP'U]/G; [USP'U]/G
OINTMENT TOPICAL ONCE
OUTPATIENT
Start: 2024-05-10 | End: 2024-05-10

## 2024-05-10 RX ORDER — GINSENG 100 MG
CAPSULE ORAL ONCE
OUTPATIENT
Start: 2024-05-10 | End: 2024-05-10

## 2024-05-10 RX ORDER — LIDOCAINE 40 MG/G
CREAM TOPICAL ONCE
OUTPATIENT
Start: 2024-05-10 | End: 2024-05-10

## 2024-05-10 RX ORDER — LIDOCAINE HYDROCHLORIDE 20 MG/ML
JELLY TOPICAL ONCE
Status: COMPLETED | OUTPATIENT
Start: 2024-05-10 | End: 2024-05-10

## 2024-05-10 RX ADMIN — LIDOCAINE HYDROCHLORIDE: 20 JELLY TOPICAL at 14:13

## 2024-05-10 ASSESSMENT — PAIN DESCRIPTION - ORIENTATION: ORIENTATION: RIGHT

## 2024-05-10 ASSESSMENT — PAIN SCALES - GENERAL: PAINLEVEL_OUTOF10: 4

## 2024-05-10 ASSESSMENT — PAIN DESCRIPTION - LOCATION: LOCATION: FOOT

## 2024-05-10 ASSESSMENT — PAIN DESCRIPTION - DESCRIPTORS: DESCRIPTORS: ACHING

## 2024-05-10 NOTE — PROGRESS NOTES
Procedure Note  Indications: Based on my examination of this patient's wound(s)/ulcer(s) today, debridement is required to promote healing and evaluate the wound base.    Return Appointment:   1 week with Cristian Da Silva DO        Instructions:  Right lateral foot;  Clean with normal saline or wound cleanser  Vashe Wound Solution (hypochlorous acid) soak placed on wound bed for a minimum of 60 seconds prior to dressing application. .   Silver collagen and then cover with silver alginate  Secure with abd, roll gauze and tape or gauze and coversite.  Change every other day.   Offload with your defender boot or a knee scooter.     Right heel:  Wound is essentially healed!  Apply Vaseline daily        Please increase dietary protein to at least 100 grams per day to support cell rejuvenation.   May use supplements such as Ensure Max, Austin, & Glucerna (samples & coupons provided at first visit).   Be sure to spread intake throughout the day for improved absorption.       MRI of right foot ordered. Please call [phone] to speak directly with a  to schedule your appointment if you have not received an automated call within 24 hours.   Debridement: Excisional Debridement    Using: curette the wound(s)/ulcer(s) was/were debrided down through and including the removal of epidermis, dermis, and subcutaneous tissue.  Performed by: Critsian Da Silva DO  Consent obtained: Yes  Time out taken: Yes  Pain Control:         Devitalized Tissue Debrided: fibrin, biofilm, slough, and necrotic/eschar    Pre Debridement Measurements:  Are located in the Wound/Ulcer Documentation Flow Sheet    Diabetic/Pressure/Non Pressure Ulcers only:  Ulcer: Diabetic ulcer, fat layer exposed     Wound/Ulcer #: 1  Post Debridement Measurements:  Wound/Ulcer Descriptions are Pre Debridement except measurements:  Wound 01/30/24 Foot Right;Lateral #2 Right lateral foot (Active)   Wound Image    05/10/24 1318   Wound Etiology Diabetic Elizabeth

## 2024-05-10 NOTE — WOUND CARE
Discharge Instructions for  [facility]  [address] [place] [address]  [address] [place] [address]  Phone [phone]   Fax [phone]      NAME:  Deepa Cruz  YOB: 1985  MEDICAL RECORD NUMBER:  [id number]  DATE:  5/10/2024    Return Appointment:   1 week with Cristian Da Silva DO      Instructions:  Right lateral foot;  Clean with normal saline or wound cleanser  Vashe Wound Solution (hypochlorous acid) soak placed on wound bed for a minimum of 60 seconds prior to dressing application. .   Silver collagen and then cover with silver alginate  Secure with abd, roll gauze and tape or gauze and coversite.  Change every other day.   Offload with your defender boot or a knee scooter.    Right heel:  Wound is essentially healed!  Apply Vaseline daily      Please increase dietary protein to at least 100 grams per day to support cell rejuvenation.   May use supplements such as Ensure Max, Austin, & Glucerna (samples & coupons provided at first visit).   Be sure to spread intake throughout the day for improved absorption.      MRI of right foot ordered. Please call [phone] to speak directly with a  to schedule your appointment if you have not received an automated call within 24 hours.         Should you experience increased redness, swelling, pain, foul odor, size of wound(s), or have a temperature over 101 degrees please contact the Wound Healing Center at [phone] or if after hours contact your primary care physician or go to the hospital emergency department.    PLEASE NOTE: IF YOU ARE UNABLE TO OBTAIN WOUND SUPPLIES, CONTINUE TO USE THE SUPPLIES YOU HAVE AVAILABLE UNTIL YOU ARE ABLE TO REACH US. IT IS MOST IMPORTANT TO KEEP THE WOUND COVERED AT ALL TIMES.    Electronically signed Pretty Darling PT, [facility] on 5/10/2024 at 2:00 PM

## 2024-05-10 NOTE — FLOWSHEET NOTE
05/10/24 1318   Right Leg Edema Point of Measurement   Leg circumference 42.5 cm   Ankle circumference 25 cm   Foot circumference 24.5 cm   Compression Therapy Tubular elastic support bandage   Wound 01/30/24 Foot Right;Lateral #2 Right lateral foot   Date First Assessed/Time First Assessed: 01/30/24 1340   Present on Original Admission: Yes  Wound Approximate Age at First Assessment (Weeks): 2 weeks  Primary Wound Type: Diabetic Ulcer  Location: Foot  Wound Location Orientation: Right;Lateral  Wou...   Wound Image     Wound Etiology Diabetic Elizabeth 2   Dressing Status Old drainage noted   Wound Cleansed Cleansed with saline   Dressing/Treatment ABD;Roll gauze   Offloading for Diabetic Foot Ulcers Offloading boot   Wound Length (cm) 2.5 cm   Wound Width (cm) 2.3 cm   Wound Depth (cm) 0.2 cm   Wound Surface Area (cm^2) 5.75 cm^2   Change in Wound Size % (l*w) 0.86   Wound Volume (cm^3) 1.15 cm^3   Wound Healing % -98   Post-Procedure Length (cm) 2.5 cm   Post-Procedure Width (cm) 2.3 cm   Post-Procedure Depth (cm) 0.3 cm   Post-Procedure Surface Area (cm^2) 5.75 cm^2   Post-Procedure Volume (cm^3) 1.725 cm^3   Wound Assessment Granulation tissue;Slough   Drainage Amount Moderate (25-50%)   Drainage Description Serosanguinous   Odor None   Emily-wound Assessment Edematous   Wound Thickness Description not for Pressure Injury Full thickness   Pain Assessment   Pain Assessment 0-10   Pain Level 4   Patient's Stated Pain Goal 0 - No pain   Pain Location Foot   Pain Orientation Right   Pain Descriptors Aching     Pre- and post-debridement

## 2024-05-10 NOTE — DISCHARGE INSTRUCTIONS
Return Appointment:   1 week with Cristian Da Silva DO        Instructions:  Right lateral foot;  Clean with normal saline or wound cleanser  Vashe Wound Solution (hypochlorous acid) soak placed on wound bed for a minimum of 60 seconds prior to dressing application. .   Silver collagen and then cover with silver alginate  Secure with abd, roll gauze and tape or gauze and coversite.  Change every other day.   Offload with your defender boot or a knee scooter.     Right heel:  Wound is essentially healed!  Apply Vaseline daily        Please increase dietary protein to at least 100 grams per day to support cell rejuvenation.   May use supplements such as Ensure Max, Austin, & Glucerna (samples & coupons provided at first visit).   Be sure to spread intake throughout the day for improved absorption.       MRI of right foot ordered. Please call [phone] to speak directly with a  to schedule your appointment if you have not received an automated call within 24 hours.

## 2024-05-20 ENCOUNTER — HOSPITAL ENCOUNTER (OUTPATIENT)
Dept: WOUND CARE | Age: 39
Discharge: HOME | End: 2024-05-20
Payer: COMMERCIAL

## 2024-05-20 VITALS
WEIGHT: 232 LBS | BODY MASS INDEX: 35.28 KG/M2 | DIASTOLIC BLOOD PRESSURE: 83 MMHG | RESPIRATION RATE: 16 BRPM | TEMPERATURE: 98.2 F | SYSTOLIC BLOOD PRESSURE: 132 MMHG | HEART RATE: 108 BPM

## 2024-05-20 DIAGNOSIS — L97.413 DIABETIC ULCER OF RIGHT MIDFOOT ASSOCIATED WITH TYPE 2 DIABETES MELLITUS, WITH NECROSIS OF MUSCLE (HCC): ICD-10-CM

## 2024-05-20 DIAGNOSIS — M14.679 CHARCOT'S ARTHROPATHY OF FOREFOOT: Primary | ICD-10-CM

## 2024-05-20 DIAGNOSIS — E11.621 DIABETIC ULCER OF RIGHT MIDFOOT ASSOCIATED WITH TYPE 2 DIABETES MELLITUS, WITH NECROSIS OF MUSCLE (HCC): ICD-10-CM

## 2024-05-20 DIAGNOSIS — M86.671 CHRONIC REFRACTORY OSTEOMYELITIS OF FOOT, RIGHT (HCC): ICD-10-CM

## 2024-05-20 PROCEDURE — 87205 SMEAR GRAM STAIN: CPT

## 2024-05-20 PROCEDURE — 87176 TISSUE HOMOGENIZATION CULTR: CPT

## 2024-05-20 PROCEDURE — 87075 CULTR BACTERIA EXCEPT BLOOD: CPT

## 2024-05-20 PROCEDURE — 87077 CULTURE AEROBIC IDENTIFY: CPT

## 2024-05-20 PROCEDURE — 87070 CULTURE OTHR SPECIMN AEROBIC: CPT

## 2024-05-20 PROCEDURE — 87186 SC STD MICRODIL/AGAR DIL: CPT

## 2024-05-20 PROCEDURE — 11042 DBRDMT SUBQ TIS 1ST 20SQCM/<: CPT

## 2024-05-20 RX ORDER — GENTAMICIN SULFATE 1 MG/G
OINTMENT TOPICAL ONCE
OUTPATIENT
Start: 2024-05-20 | End: 2024-05-20

## 2024-05-20 RX ORDER — SODIUM CHLOR/HYPOCHLOROUS ACID 0.033 %
SOLUTION, IRRIGATION IRRIGATION ONCE
OUTPATIENT
Start: 2024-05-20 | End: 2024-05-20

## 2024-05-20 RX ORDER — LIDOCAINE 40 MG/G
CREAM TOPICAL ONCE
OUTPATIENT
Start: 2024-05-20 | End: 2024-05-20

## 2024-05-20 RX ORDER — IBUPROFEN 200 MG
TABLET ORAL ONCE
OUTPATIENT
Start: 2024-05-20 | End: 2024-05-20

## 2024-05-20 RX ORDER — LIDOCAINE 50 MG/G
OINTMENT TOPICAL ONCE
Status: DISCONTINUED | OUTPATIENT
Start: 2024-05-20 | End: 2024-05-21 | Stop reason: HOSPADM

## 2024-05-20 RX ORDER — GINSENG 100 MG
CAPSULE ORAL ONCE
OUTPATIENT
Start: 2024-05-20 | End: 2024-05-20

## 2024-05-20 RX ORDER — BACITRACIN ZINC AND POLYMYXIN B SULFATE 500; 1000 [USP'U]/G; [USP'U]/G
OINTMENT TOPICAL ONCE
OUTPATIENT
Start: 2024-05-20 | End: 2024-05-20

## 2024-05-20 RX ORDER — LIDOCAINE 50 MG/G
OINTMENT TOPICAL ONCE
OUTPATIENT
Start: 2024-05-20 | End: 2024-05-20

## 2024-05-20 RX ORDER — TRIAMCINOLONE ACETONIDE 1 MG/G
OINTMENT TOPICAL ONCE
OUTPATIENT
Start: 2024-05-20 | End: 2024-05-20

## 2024-05-20 RX ORDER — LIDOCAINE HYDROCHLORIDE 20 MG/ML
JELLY TOPICAL ONCE
OUTPATIENT
Start: 2024-05-20 | End: 2024-05-20

## 2024-05-20 RX ORDER — LIDOCAINE HYDROCHLORIDE 40 MG/ML
SOLUTION TOPICAL ONCE
OUTPATIENT
Start: 2024-05-20 | End: 2024-05-20

## 2024-05-20 RX ORDER — CLOBETASOL PROPIONATE 0.5 MG/G
OINTMENT TOPICAL ONCE
OUTPATIENT
Start: 2024-05-20 | End: 2024-05-20

## 2024-05-20 RX ORDER — BETAMETHASONE DIPROPIONATE 0.5 MG/G
CREAM TOPICAL ONCE
OUTPATIENT
Start: 2024-05-20 | End: 2024-05-20

## 2024-05-20 NOTE — FLOWSHEET NOTE
05/20/24 1443   Wound 01/30/24 Foot Right;Lateral #2 Right lateral foot   Date First Assessed/Time First Assessed: 01/30/24 1340   Present on Original Admission: Yes  Wound Approximate Age at First Assessment (Weeks): 2 weeks  Primary Wound Type: Diabetic Ulcer  Location: Foot  Wound Location Orientation: Right;Lateral  Wou...   Wound Image    Wound Etiology Diabetic Elizabeth 2   Dressing Status Intact   Wound Cleansed Cleansed with saline   Dressing/Treatment ABD   Offloading for Diabetic Foot Ulcers Offloading boot   Wound Length (cm) 2.8 cm   Wound Width (cm) 2.4 cm   Wound Depth (cm) 0.2 cm   Wound Surface Area (cm^2) 6.72 cm^2   Change in Wound Size % (l*w) -15.86   Wound Volume (cm^3) 1.344 cm^3   Wound Healing % -132   Post-Procedure Length (cm) 2.9 cm   Post-Procedure Width (cm) 2.5 cm   Post-Procedure Depth (cm) 0.2 cm   Post-Procedure Surface Area (cm^2) 7.25 cm^2   Post-Procedure Volume (cm^3) 1.45 cm^3   Wound Assessment Granulation tissue;Hyper granulation tissue   Drainage Amount Moderate (25-50%)   Drainage Description Serosanguinous   Odor None   Emily-wound Assessment Intact   Wound Thickness Description not for Pressure Injury Full thickness   Pain Assessment   Pain Assessment None - Denies Pain

## 2024-05-20 NOTE — WOUND CARE
Discharge Instructions for  [facility]  [address] [place] [address]  [address] [place] [address]  Phone [phone]   Fax [phone]      NAME:  Deepa Cruz  YOB: 1985  MEDICAL RECORD NUMBER:  [id number]  DATE:  5/20/2024    Return Appointment:   1-2 week with Cristian Da Silva DO      Instructions:    Right lateral foot;  Clean with normal saline or Vashe.  Silver collagen and then cover with silver alginate  Secure with abd, roll gauze and tape or gauze and coversite.  Change every other day.   Offload with your defender boot or a knee scooter.    Please increase dietary protein to at least 100 grams per day to support cell rejuvenation.   May use supplements such as Ensure Max, Austin, & Glucerna (samples & coupons provided at first visit).   Be sure to spread intake throughout the day for improved absorption.      MRI of right foot ordered. Please call [phone] to speak directly with a  to schedule your appointment if you have not received an automated call within 24 hours.       Should you experience increased redness, swelling, pain, foul odor, size of wound(s), or have a temperature over 101 degrees please contact the Wound Healing Center at [phone] or if after hours contact your primary care physician or go to the hospital emergency department.    PLEASE NOTE: IF YOU ARE UNABLE TO OBTAIN WOUND SUPPLIES, CONTINUE TO USE THE SUPPLIES YOU HAVE AVAILABLE UNTIL YOU ARE ABLE TO REACH US. IT IS MOST IMPORTANT TO KEEP THE WOUND COVERED AT ALL TIMES.    Electronically signed Ruthie Ramírez RN, [facility] on 5/20/2024 at 3:14 PM

## 2024-05-24 LAB
BACTERIA SPEC CULT: ABNORMAL
BACTERIA SPEC CULT: NORMAL
GRAM STN SPEC: ABNORMAL
GRAM STN SPEC: ABNORMAL
SERVICE CMNT-IMP: ABNORMAL
SERVICE CMNT-IMP: NORMAL

## 2024-05-24 RX ORDER — CIPROFLOXACIN 500 MG/1
500 TABLET, FILM COATED ORAL 2 TIMES DAILY
Qty: 28 TABLET | Refills: 0 | Status: SHIPPED | OUTPATIENT
Start: 2024-05-24 | End: 2024-06-07

## 2024-05-24 RX ORDER — DOXYCYCLINE HYCLATE 100 MG
100 TABLET ORAL 2 TIMES DAILY
Qty: 28 TABLET | Refills: 0 | Status: SHIPPED | OUTPATIENT
Start: 2024-05-24 | End: 2024-06-07

## 2024-05-27 ENCOUNTER — HOSPITAL ENCOUNTER (EMERGENCY)
Age: 39
Discharge: HOME | End: 2024-05-27
Attending: EMERGENCY MEDICINE
Payer: COMMERCIAL

## 2024-05-27 ENCOUNTER — APPOINTMENT (OUTPATIENT)
Dept: GENERAL RADIOLOGY | Age: 39
End: 2024-05-27
Payer: COMMERCIAL

## 2024-05-27 VITALS
SYSTOLIC BLOOD PRESSURE: 105 MMHG | HEIGHT: 68 IN | OXYGEN SATURATION: 98 % | RESPIRATION RATE: 16 BRPM | TEMPERATURE: 98 F | WEIGHT: 232 LBS | DIASTOLIC BLOOD PRESSURE: 75 MMHG | HEART RATE: 98 BPM | BODY MASS INDEX: 35.16 KG/M2

## 2024-05-27 DIAGNOSIS — L97.519 ULCER OF FOOT, CHRONIC, RIGHT, WITH UNSPECIFIED SEVERITY (HCC): Primary | ICD-10-CM

## 2024-05-27 LAB
ANION GAP SERPL CALC-SCNC: 15 MMOL/L (ref 9–18)
BASOPHILS # BLD: 0.1 K/UL (ref 0–0.2)
BASOPHILS NFR BLD: 1 % (ref 0–2)
BUN SERPL-MCNC: 19 MG/DL (ref 6–23)
CALCIUM SERPL-MCNC: 9.7 MG/DL (ref 8.8–10.2)
CHLORIDE SERPL-SCNC: 100 MMOL/L (ref 98–107)
CO2 SERPL-SCNC: 21 MMOL/L (ref 20–28)
CREAT SERPL-MCNC: 0.71 MG/DL (ref 0.6–1.1)
DIFFERENTIAL METHOD BLD: ABNORMAL
EOSINOPHIL # BLD: 0.2 K/UL (ref 0–0.8)
EOSINOPHIL NFR BLD: 2 % (ref 0.5–7.8)
ERYTHROCYTE [DISTWIDTH] IN BLOOD BY AUTOMATED COUNT: 14.6 % (ref 11.9–14.6)
GLUCOSE SERPL-MCNC: 122 MG/DL (ref 70–99)
HCT VFR BLD AUTO: 38.1 % (ref 35.8–46.3)
HGB BLD-MCNC: 11.9 G/DL (ref 11.7–15.4)
IMM GRANULOCYTES # BLD AUTO: 0 K/UL (ref 0–0.5)
IMM GRANULOCYTES NFR BLD AUTO: 0 % (ref 0–5)
LACTATE SERPL-SCNC: 0.9 MMOL/L (ref 0.5–2)
LYMPHOCYTES # BLD: 2.1 K/UL (ref 0.5–4.6)
LYMPHOCYTES NFR BLD: 16 % (ref 13–44)
MCH RBC QN AUTO: 25.6 PG (ref 26.1–32.9)
MCHC RBC AUTO-ENTMCNC: 31.2 G/DL (ref 31.4–35)
MCV RBC AUTO: 82.1 FL (ref 82–102)
MONOCYTES # BLD: 0.7 K/UL (ref 0.1–1.3)
MONOCYTES NFR BLD: 6 % (ref 4–12)
NEUTS SEG # BLD: 9.7 K/UL (ref 1.7–8.2)
NEUTS SEG NFR BLD: 75 % (ref 43–78)
NRBC # BLD: 0 K/UL (ref 0–0.2)
PLATELET # BLD AUTO: 445 K/UL (ref 150–450)
PMV BLD AUTO: 9.2 FL (ref 9.4–12.3)
POTASSIUM SERPL-SCNC: 5.6 MMOL/L (ref 3.5–5.1)
PROCALCITONIN SERPL-MCNC: 0.06 NG/ML (ref 0–0.1)
RBC # BLD AUTO: 4.64 M/UL (ref 4.05–5.2)
SODIUM SERPL-SCNC: 136 MMOL/L (ref 136–145)
WBC # BLD AUTO: 12.8 K/UL (ref 4.3–11.1)

## 2024-05-27 PROCEDURE — 6370000000 HC RX 637 (ALT 250 FOR IP): Performed by: EMERGENCY MEDICINE

## 2024-05-27 PROCEDURE — 85025 COMPLETE CBC W/AUTO DIFF WBC: CPT

## 2024-05-27 PROCEDURE — 84145 PROCALCITONIN (PCT): CPT

## 2024-05-27 PROCEDURE — 83605 ASSAY OF LACTIC ACID: CPT

## 2024-05-27 PROCEDURE — 99284 EMERGENCY DEPT VISIT MOD MDM: CPT

## 2024-05-27 PROCEDURE — 80048 BASIC METABOLIC PNL TOTAL CA: CPT

## 2024-05-27 PROCEDURE — 73630 X-RAY EXAM OF FOOT: CPT

## 2024-05-27 RX ORDER — CEPHALEXIN 500 MG/1
500 CAPSULE ORAL 3 TIMES DAILY
Qty: 21 CAPSULE | Refills: 0 | Status: SHIPPED | OUTPATIENT
Start: 2024-05-27 | End: 2024-06-03

## 2024-05-27 RX ORDER — HYDROCODONE BITARTRATE AND ACETAMINOPHEN 7.5; 325 MG/1; MG/1
1 TABLET ORAL
Status: COMPLETED | OUTPATIENT
Start: 2024-05-27 | End: 2024-05-27

## 2024-05-27 RX ADMIN — HYDROCODONE BITARTRATE AND ACETAMINOPHEN 1 TABLET: 7.5; 325 TABLET ORAL at 06:39

## 2024-05-27 ASSESSMENT — PAIN DESCRIPTION - ORIENTATION
ORIENTATION: RIGHT
ORIENTATION: RIGHT

## 2024-05-27 ASSESSMENT — PAIN SCALES - GENERAL
PAINLEVEL_OUTOF10: 8
PAINLEVEL_OUTOF10: 6

## 2024-05-27 ASSESSMENT — PAIN DESCRIPTION - LOCATION
LOCATION: FOOT
LOCATION: FOOT

## 2024-05-27 NOTE — ED PROVIDER NOTES
Emergency Department Provider Signout / Continuation of Care Note         DISPOSITION Decision To Discharge 05/27/2024 09:29:23 AM       ICD-10-CM    1. Ulcer of foot, chronic, right, with unspecified severity ([facility])  L97.519           The patient's care was signed out to me at shift change.      Final Plan      I saw and examined the patient pt with elevated WBC slightly but neg lactic and procal.  She has a chronically.  Since she is having pain we will treat with Keflex.  She has a follow-up she has a postop shoe.                                 Francine Sharma MD  05/27/24 0479    
       Physical Exam     Vitals signs and nursing note reviewed:  Vitals:    05/27/24 0600 05/27/24 0607 05/27/24 0630 05/27/24 0901   BP: 110/66  107/75 105/75   Pulse:       Resp:       Temp:       TempSrc:       SpO2: 97%  98% 98%   Weight:  105.2 kg (232 lb)     Height:  1.727 m (5' 8\")        Physical Exam  Vitals and nursing note reviewed.   Constitutional:       General: She is not in acute distress.  HENT:      Head: Normocephalic and atraumatic.      Nose: Nose normal.      Mouth/Throat:      Mouth: Mucous membranes are moist.   Eyes:      Extraocular Movements: Extraocular movements intact.      Conjunctiva/sclera: Conjunctivae normal.      Pupils: Pupils are equal, round, and reactive to light.   Cardiovascular:      Rate and Rhythm: Normal rate and regular rhythm.      Heart sounds: No murmur heard.  Pulmonary:      Effort: Pulmonary effort is normal.      Breath sounds: Normal breath sounds.   Abdominal:      General: Abdomen is flat.      Palpations: There is no mass.      Tenderness: There is no abdominal tenderness. There is no right CVA tenderness or left CVA tenderness.   Musculoskeletal:         General: Normal range of motion.      Cervical back: Normal range of motion and neck supple.      Right foot: Normal capillary refill. Swelling (Mild right foot) present.        Feet:       Comments: Foot mildly swollen, minimal erythema around the ulceration.  No evidence of streaking up the leg.   Skin:     General: Skin is warm and dry.   Neurological:      General: No focal deficit present.      Mental Status: She is alert and oriented to person, place, and time.   Psychiatric:         Mood and Affect: Mood and affect normal.         Speech: Speech normal.        Procedures     Procedures    Orders Placed This Encounter   Procedures    XR FOOT RIGHT (MIN 3 VIEWS)    CBC with Auto Differential    Basic Metabolic Panel    Lactic Acid    Procalcitonin    Insert peripheral IV        Medications given

## 2024-05-28 LAB
BACTERIA SPEC CULT: NORMAL
SERVICE CMNT-IMP: NORMAL

## 2024-05-30 ENCOUNTER — HOSPITAL ENCOUNTER (OUTPATIENT)
Dept: WOUND CARE | Age: 39
Discharge: HOME | End: 2024-05-30
Payer: COMMERCIAL

## 2024-05-30 ENCOUNTER — TELEPHONE (OUTPATIENT)
Dept: ORTHOPEDIC SURGERY | Age: 39
End: 2024-05-30

## 2024-05-30 VITALS
OXYGEN SATURATION: 97 % | HEART RATE: 105 BPM | DIASTOLIC BLOOD PRESSURE: 75 MMHG | SYSTOLIC BLOOD PRESSURE: 102 MMHG | TEMPERATURE: 98.1 F | BODY MASS INDEX: 36.53 KG/M2 | WEIGHT: 241 LBS | RESPIRATION RATE: 18 BRPM | HEIGHT: 68 IN

## 2024-05-30 DIAGNOSIS — E11.621 DIABETIC ULCER OF RIGHT MIDFOOT ASSOCIATED WITH TYPE 2 DIABETES MELLITUS, WITH NECROSIS OF MUSCLE (HCC): ICD-10-CM

## 2024-05-30 DIAGNOSIS — M86.671 CHRONIC REFRACTORY OSTEOMYELITIS OF FOOT, RIGHT (HCC): ICD-10-CM

## 2024-05-30 DIAGNOSIS — M14.679 CHARCOT'S ARTHROPATHY OF FOREFOOT: Primary | ICD-10-CM

## 2024-05-30 DIAGNOSIS — L97.413 DIABETIC ULCER OF RIGHT MIDFOOT ASSOCIATED WITH TYPE 2 DIABETES MELLITUS, WITH NECROSIS OF MUSCLE (HCC): ICD-10-CM

## 2024-05-30 PROCEDURE — 99213 OFFICE O/P EST LOW 20 MIN: CPT | Performed by: FAMILY MEDICINE

## 2024-05-30 PROCEDURE — 11043 DBRDMT MUSC&/FSCA 1ST 20/<: CPT

## 2024-05-30 PROCEDURE — 11043 DBRDMT MUSC&/FSCA 1ST 20/<: CPT | Performed by: FAMILY MEDICINE

## 2024-05-30 PROCEDURE — 6370000000 HC RX 637 (ALT 250 FOR IP): Performed by: FAMILY MEDICINE

## 2024-05-30 RX ORDER — SODIUM CHLOR/HYPOCHLOROUS ACID 0.033 %
SOLUTION, IRRIGATION IRRIGATION ONCE
OUTPATIENT
Start: 2024-05-30 | End: 2024-05-30

## 2024-05-30 RX ORDER — LIDOCAINE HYDROCHLORIDE 20 MG/ML
JELLY TOPICAL ONCE
OUTPATIENT
Start: 2024-05-30 | End: 2024-05-30

## 2024-05-30 RX ORDER — LIDOCAINE HYDROCHLORIDE 40 MG/ML
SOLUTION TOPICAL ONCE
OUTPATIENT
Start: 2024-05-30 | End: 2024-05-30

## 2024-05-30 RX ORDER — CIPROFLOXACIN 500 MG/1
500 TABLET, FILM COATED ORAL 2 TIMES DAILY
Qty: 60 TABLET | Refills: 0 | Status: SHIPPED | OUTPATIENT
Start: 2024-05-30 | End: 2024-06-29

## 2024-05-30 RX ORDER — LIDOCAINE 40 MG/G
CREAM TOPICAL ONCE
OUTPATIENT
Start: 2024-05-30 | End: 2024-05-30

## 2024-05-30 RX ORDER — LIDOCAINE 50 MG/G
OINTMENT TOPICAL ONCE
OUTPATIENT
Start: 2024-05-30 | End: 2024-05-30

## 2024-05-30 RX ORDER — GENTAMICIN SULFATE 1 MG/G
OINTMENT TOPICAL ONCE
OUTPATIENT
Start: 2024-05-30 | End: 2024-05-30

## 2024-05-30 RX ORDER — LIDOCAINE 50 MG/G
OINTMENT TOPICAL ONCE
Status: COMPLETED | OUTPATIENT
Start: 2024-05-30 | End: 2024-05-30

## 2024-05-30 RX ORDER — GINSENG 100 MG
CAPSULE ORAL ONCE
OUTPATIENT
Start: 2024-05-30 | End: 2024-05-30

## 2024-05-30 RX ORDER — BACITRACIN ZINC AND POLYMYXIN B SULFATE 500; 1000 [USP'U]/G; [USP'U]/G
OINTMENT TOPICAL ONCE
OUTPATIENT
Start: 2024-05-30 | End: 2024-05-30

## 2024-05-30 RX ORDER — TRIAMCINOLONE ACETONIDE 1 MG/G
OINTMENT TOPICAL ONCE
OUTPATIENT
Start: 2024-05-30 | End: 2024-05-30

## 2024-05-30 RX ORDER — BETAMETHASONE DIPROPIONATE 0.5 MG/G
CREAM TOPICAL ONCE
OUTPATIENT
Start: 2024-05-30 | End: 2024-05-30

## 2024-05-30 RX ORDER — IBUPROFEN 200 MG
TABLET ORAL ONCE
OUTPATIENT
Start: 2024-05-30 | End: 2024-05-30

## 2024-05-30 RX ORDER — CLOBETASOL PROPIONATE 0.5 MG/G
OINTMENT TOPICAL ONCE
OUTPATIENT
Start: 2024-05-30 | End: 2024-05-30

## 2024-05-30 RX ORDER — CLINDAMYCIN HYDROCHLORIDE 150 MG/1
450 CAPSULE ORAL 4 TIMES DAILY
Qty: 360 CAPSULE | Refills: 0 | Status: SHIPPED | OUTPATIENT
Start: 2024-05-30 | End: 2024-06-29

## 2024-05-30 RX ADMIN — LIDOCAINE: 50 OINTMENT TOPICAL at 16:15

## 2024-05-30 ASSESSMENT — PAIN SCALES - GENERAL: PAINLEVEL_OUTOF10: 6

## 2024-05-30 NOTE — PROGRESS NOTES
[facility]   Wound Care and [facility] Therapy Center   Medical Staff Progress Note     Deepa Cruz  MEDICAL RECORD NUMBER:  [id number]  AGE: 38 y.o.   GENDER: female  : 1985  EPISODE DATE:  2024    Chief complaint and reason for visit:     Chief Complaint   Patient presents with    Wound Check     Right foot      Patient presenting for follow up evaluation of wound(s) per chief complaint.  Patient's foot wound is significantly worse.  There is exposed bone now.  MRI is still 2 weeks away    Subjective and ROS: Symptoms, wound related issues, or other pertinent wound history since last visit: no new wound care issues. Tolerating current treatment. No systemic complaints above baseline.    History of Wound Context: Per original history and physical on this patient. Changes in history since last evaluation: none    Medical Decision Making:     Problem List Items Addressed This Visit          Endocrine    Diabetic ulcer of right midfoot associated with type 2 diabetes mellitus, with necrosis of muscle (HCC) (Chronic)    Relevant Medications    lidocaine (XYLOCAINE) 5 % ointment    Other Relevant Orders    Initiate Outpatient Wound Care Protocol       Other    Chronic refractory osteomyelitis of foot, right (HCC) (Chronic)    Relevant Medications    lidocaine (XYLOCAINE) 5 % ointment    Other Relevant Orders    Initiate Outpatient Wound Care Protocol    Charcot's arthropathy of forefoot - Primary    Relevant Medications    lidocaine (XYLOCAINE) 5 % ointment    Other Relevant Orders    Initiate Outpatient Wound Care Protocol       Wounds and Treatment Plan:  Patient's wound has significantly deteriorated.  There is exposed bone.  Still waiting on MRI.  Based on culture data we will initiate osteo dose of clindamycin along with Cipro.  May increase Cipro once MRI is obtained depending on results.  Still recommend patient has forefoot amputation.  She still states she is not able

## 2024-05-30 NOTE — DISCHARGE INSTRUCTIONS
Instructions:     Right lateral foot;  Clean with normal saline or Vashe.  Hydrofera blue classic.  Secure with opti-lock, roll gauze.  Change every day.   Offload with your defender boot or a knee scooter.     Antibiotics prescribed clindamycin and ciproflaxin. Please  at your pharmacy and start taking.     Please increase dietary protein to at least 100 grams per day to support cell rejuvenation.   May use supplements such as Ensure Max, Austin, & Glucerna (samples & coupons provided at first visit).   Be sure to spread intake throughout the day for improved absorption.       MRI of right foot ordered. Please call [phone] to speak directly with a  to schedule your appointment if you have not received an automated call within 24 hours.

## 2024-05-30 NOTE — FLOWSHEET NOTE
05/30/24 1525   Right Leg Edema Point of Measurement   Leg circumference 42.5 cm   Ankle circumference 24 cm   Foot circumference 26 cm   Compression Therapy Tubular elastic support bandage   Wound 03/26/24 Ankle Posterior;Right # 3   Date First Assessed/Time First Assessed: 03/26/24 1441   Present on Original Admission: No  Wound Approximate Age at First Assessment (Weeks): 1 weeks  Primary Wound Type: Traumatic  Location: Ankle  Wound Location Orientation: Posterior;Right  Wound ...   Dressing/Treatment   (opti-lock, roll gauze)   Wound 01/30/24 Foot Right;Lateral #2 Right lateral foot   Date First Assessed/Time First Assessed: 01/30/24 1340   Present on Original Admission: Yes  Wound Approximate Age at First Assessment (Weeks): 2 weeks  Primary Wound Type: Diabetic Ulcer  Location: Foot  Wound Location Orientation: Right;Lateral  Wou...   Wound Image     Wound Etiology Diabetic Elizabeth 3   Dressing Status Other (Comment)  (removed by patient)   Wound Cleansed Vashe   Dressing/Treatment Hydrofera blue;Roll gauze;Tape/Soft cloth adhesive tape  (opti-lock)   Offloading for Diabetic Foot Ulcers Offloading boot   Wound Length (cm) 4 cm   Wound Width (cm) 3 cm   Wound Depth (cm) 1 cm   Wound Surface Area (cm^2) 12 cm^2   Change in Wound Size % (l*w) -106.9   Wound Volume (cm^3) 12 cm^3   Wound Healing % -1969   Post-Procedure Length (cm) 4 cm   Post-Procedure Width (cm) 4 cm   Post-Procedure Depth (cm) 1 cm   Post-Procedure Surface Area (cm^2) 16 cm^2   Post-Procedure Volume (cm^3) 16 cm^3   Wound Assessment Exposed structure bone   Drainage Amount Large (50-75% saturated)   Drainage Description Serosanguinous   Odor Mild   Emily-wound Assessment Hyperkeratosis (callous)   Margins Undefined edges   Wound Thickness Description not for Pressure Injury Full thickness   Pain Assessment   Pain Assessment 0-10   Pain Level 6

## 2024-05-31 ENCOUNTER — CLINICAL DOCUMENTATION (OUTPATIENT)
Dept: ORTHOPEDIC SURGERY | Age: 39
End: 2024-05-31

## 2024-06-01 ENCOUNTER — HOSPITAL ENCOUNTER (EMERGENCY)
Age: 39
Discharge: HOME | End: 2024-06-01
Payer: COMMERCIAL

## 2024-06-01 ENCOUNTER — APPOINTMENT (OUTPATIENT)
Dept: GENERAL RADIOLOGY | Age: 39
End: 2024-06-01
Payer: COMMERCIAL

## 2024-06-01 VITALS
DIASTOLIC BLOOD PRESSURE: 78 MMHG | HEART RATE: 100 BPM | RESPIRATION RATE: 20 BRPM | OXYGEN SATURATION: 96 % | SYSTOLIC BLOOD PRESSURE: 123 MMHG | TEMPERATURE: 97.8 F

## 2024-06-01 DIAGNOSIS — S91.301A OPEN WOUND OF RIGHT FOOT, INITIAL ENCOUNTER: ICD-10-CM

## 2024-06-01 DIAGNOSIS — S93.104A DISLOCATED TOE, RIGHT, INITIAL ENCOUNTER: ICD-10-CM

## 2024-06-01 DIAGNOSIS — Z53.29 LEFT AGAINST MEDICAL ADVICE: Primary | ICD-10-CM

## 2024-06-01 LAB
ALBUMIN SERPL-MCNC: 3.1 G/DL (ref 3.5–5)
ALBUMIN/GLOB SERPL: 0.9 (ref 1–1.9)
ALP SERPL-CCNC: 137 U/L (ref 35–104)
ALT SERPL-CCNC: 20 U/L (ref 12–65)
ANION GAP SERPL CALC-SCNC: 12 MMOL/L (ref 9–18)
AST SERPL-CCNC: 15 U/L (ref 15–37)
BASOPHILS # BLD: 0 K/UL (ref 0–0.2)
BASOPHILS NFR BLD: 0 % (ref 0–2)
BILIRUB SERPL-MCNC: <0.2 MG/DL (ref 0–1.2)
BUN SERPL-MCNC: 26 MG/DL (ref 6–23)
CALCIUM SERPL-MCNC: 9.2 MG/DL (ref 8.8–10.2)
CHLORIDE SERPL-SCNC: 101 MMOL/L (ref 98–107)
CO2 SERPL-SCNC: 25 MMOL/L (ref 20–28)
CREAT SERPL-MCNC: 0.82 MG/DL (ref 0.6–1.1)
DIFFERENTIAL METHOD BLD: ABNORMAL
EOSINOPHIL # BLD: 0.2 K/UL (ref 0–0.8)
EOSINOPHIL NFR BLD: 2 % (ref 0.5–7.8)
ERYTHROCYTE [DISTWIDTH] IN BLOOD BY AUTOMATED COUNT: 14.3 % (ref 11.9–14.6)
GLOBULIN SER CALC-MCNC: 3.5 G/DL (ref 2.3–3.5)
GLUCOSE SERPL-MCNC: 103 MG/DL (ref 70–99)
HCT VFR BLD AUTO: 33.7 % (ref 35.8–46.3)
HGB BLD-MCNC: 10.6 G/DL (ref 11.7–15.4)
IMM GRANULOCYTES # BLD AUTO: 0 K/UL (ref 0–0.5)
IMM GRANULOCYTES NFR BLD AUTO: 0 % (ref 0–5)
LACTATE SERPL-SCNC: 0.7 MMOL/L (ref 0.5–2)
LYMPHOCYTES # BLD: 1.6 K/UL (ref 0.5–4.6)
LYMPHOCYTES NFR BLD: 14 % (ref 13–44)
MCH RBC QN AUTO: 26.1 PG (ref 26.1–32.9)
MCHC RBC AUTO-ENTMCNC: 31.5 G/DL (ref 31.4–35)
MCV RBC AUTO: 83 FL (ref 82–102)
MONOCYTES # BLD: 0.7 K/UL (ref 0.1–1.3)
MONOCYTES NFR BLD: 6 % (ref 4–12)
NEUTS SEG # BLD: 8.9 K/UL (ref 1.7–8.2)
NEUTS SEG NFR BLD: 77 % (ref 43–78)
NRBC # BLD: 0 K/UL (ref 0–0.2)
PLATELET # BLD AUTO: 350 K/UL (ref 150–450)
PMV BLD AUTO: 9.2 FL (ref 9.4–12.3)
POTASSIUM SERPL-SCNC: 4.8 MMOL/L (ref 3.5–5.1)
PROCALCITONIN SERPL-MCNC: 0.08 NG/ML (ref 0–0.1)
PROT SERPL-MCNC: 6.5 G/DL (ref 6.3–8.2)
RBC # BLD AUTO: 4.06 M/UL (ref 4.05–5.2)
SODIUM SERPL-SCNC: 138 MMOL/L (ref 136–145)
WBC # BLD AUTO: 11.5 K/UL (ref 4.3–11.1)

## 2024-06-01 PROCEDURE — 84145 PROCALCITONIN (PCT): CPT

## 2024-06-01 PROCEDURE — 6370000000 HC RX 637 (ALT 250 FOR IP)

## 2024-06-01 PROCEDURE — 2500000003 HC RX 250 WO HCPCS

## 2024-06-01 PROCEDURE — 73630 X-RAY EXAM OF FOOT: CPT

## 2024-06-01 PROCEDURE — 83605 ASSAY OF LACTIC ACID: CPT

## 2024-06-01 PROCEDURE — 87040 BLOOD CULTURE FOR BACTERIA: CPT

## 2024-06-01 PROCEDURE — 99284 EMERGENCY DEPT VISIT MOD MDM: CPT

## 2024-06-01 PROCEDURE — 85025 COMPLETE CBC W/AUTO DIFF WBC: CPT

## 2024-06-01 PROCEDURE — 80053 COMPREHEN METABOLIC PANEL: CPT

## 2024-06-01 RX ORDER — HYDROCODONE BITARTRATE AND ACETAMINOPHEN 7.5; 325 MG/1; MG/1
1 TABLET ORAL
Status: COMPLETED | OUTPATIENT
Start: 2024-06-01 | End: 2024-06-01

## 2024-06-01 RX ORDER — KETOROLAC TROMETHAMINE 15 MG/ML
15 INJECTION, SOLUTION INTRAMUSCULAR; INTRAVENOUS ONCE
Status: DISCONTINUED | OUTPATIENT
Start: 2024-06-01 | End: 2024-06-01

## 2024-06-01 RX ADMIN — HYDROCODONE BITARTRATE AND ACETAMINOPHEN 1 TABLET: 7.5; 325 TABLET ORAL at 18:40

## 2024-06-01 RX ADMIN — LIDOCAINE HYDROCHLORIDE 5 ML: 10 INJECTION, SOLUTION INFILTRATION; PERINEURAL at 20:02

## 2024-06-01 ASSESSMENT — PAIN SCALES - GENERAL: PAINLEVEL_OUTOF10: 7

## 2024-06-01 NOTE — ED TRIAGE NOTES
Pt c/o R foot pain that has suddenly worsened over the past 48 hrs. Pt states she had foot surgery in January and has an exposed bone and is followed by wound care but suddenly started having fever and tachycardia yesterday. Pt states she took percocet at noon and ibuprofen earlier this AM.

## 2024-06-01 NOTE — ED PROVIDER NOTES
well as tried offering alternative options in hopes that the patient might be amenable to partial evaluation and treatment which would be medically beneficial to the patient. However, the patient declined my options and insisted on leaving. Because I have been unable to convince the patient to stay, I answered all of their questions about their condition and asked them to return to the ED as soon as possible to complete their evaluation, especially if their symptoms worsen or do not improve. I emphasized that leaving against medical advice does not preclude returning here for further evaluation. I asked the patient to return if they change their mind about the further evaluation and treatment. I strongly encouraged the patient to return to this Emergency Department or any Emergency Department at any time, particularly with worsening symptoms.    TAMARA Modi 10:10 PM 2024         {Complexity:40556}  {Risk:07329}    I independently ordered and reviewed each unique test.  {external source:12920}   {Historian (state who, why needed, what they said):08292}  {test reviewed:47059}  {EK}  {Admitted or Consultants involved.:42428}  {MIPS URI - Strep - Sinusitis - Pregnant - Head Trauma - Overdose - Agitation:55372}  {SEP1 yes/no:80007}  {Critical Care:69369}    History     38-year-old female with known history of chronic right foot ulcer status post toe amputation secondary to osteomyelitis/DM2 presenting to emergency department with concerns of acute on chronic exacerbation of right foot pain.  Reports that she felt chills today had Tmax of 102F at work.  She is afebrile in ED course upon presentation.  She is concerned because she had a fever and her pain is worse than usual.      Physical Exam     Vitals signs and nursing note reviewed:  Vitals:    24 1655   BP: 112/78   Pulse: 100   Resp: 20   Temp: 98.3 °F (36.8 °C)   TempSrc: Oral   SpO2: 100%      Physical Exam   Procedures     Procedures    No  foot with exposed bone/synovial capsule of fifth metatarsal   Neurological:      Mental Status: She is alert.        Procedures     Procedures    Orders Placed This Encounter   Procedures    Blood Culture 1    XR FOOT RIGHT (MIN 3 VIEWS)    CBC with Auto Differential    CMP    Lactate, Sepsis    Procalcitonin        Medications given during this emergency department visit:  Medications   HYDROcodone-acetaminophen (NORCO) 7.5-325 MG per tablet 1 tablet (1 tablet Oral Given 6/1/24 1840)   lidocaine 1 % injection 5 mL (5 mLs IntraDERmal Given 6/1/24 2002)       Discharge Medication List as of 6/1/2024 10:21 PM           Past Medical History:   Diagnosis Date    Asthma     Diabetes mellitus (HCC)     Hypertension         Past Surgical History:   Procedure Laterality Date    FOOT DEBRIDEMENT Right 1/10/2024    RIGHT FOOT I & D, DEBRIDEMENT AND METATARSAL  BONE EXCISION performed by James Nicole MD at [facility]        Social History     Socioeconomic History    Marital status: Single     Spouse name: None    Number of children: None    Years of education: None    Highest education level: None   Tobacco Use    Smoking status: Never    Smokeless tobacco: Never   Vaping Use    Vaping Use: Never used   Substance and Sexual Activity    Alcohol use: Not Currently    Drug use: Not Currently     Social Determinants of Health     Food Insecurity: No Food Insecurity (1/8/2024)    Hunger Vital Sign     Worried About Running Out of Food in the Last Year: Never true     Ran Out of Food in the Last Year: Never true   Transportation Needs: No Transportation Needs (1/8/2024)    PRAPARE - Transportation     Lack of Transportation (Medical): No     Lack of Transportation (Non-Medical): No   Housing Stability: Low Risk  (1/8/2024)    Housing Stability Vital Sign     Unable to Pay for Housing in the Last Year: No     Number of Places Lived in the Last Year: 2     Unstable Housing in the Last Year: No        Discharge Medication List as of

## 2024-06-02 LAB
BACTERIA SPEC CULT: NORMAL
SERVICE CMNT-IMP: NORMAL

## 2024-06-02 NOTE — DISCHARGE INSTRUCTIONS
You are choosing to leave the hospital AGAINST MEDICAL ADVICE.  As we have discussed there are numerous risks associated with leaving without hospital admission.  Return if you have worsening symptoms.  Continue antibiotics and follow-up with wound care.

## 2024-06-02 NOTE — PROGRESS NOTES
Informed of patient via emergency department. Request transfer to [facility] with hospitalist admission, IV abx, wound care, wet to dry betadine dressings per attending trauma surgeon. Will see in AM. Keep NPO at midnight to make further definitive decision.

## 2024-06-02 NOTE — ED NOTES
Patient mobility status  with no difficulty. Provider aware     I have reviewed discharge instructions with the patient.  The patient verbalized understanding.    Patient left ED via Discharge Method: ambulatory to Home with Child.    Opportunity for questions and clarification provided.     Patient given 0 scripts.     Pt left AMA,

## 2024-06-07 LAB
BACTERIA SPEC CULT: NORMAL
SERVICE CMNT-IMP: NORMAL

## 2024-06-12 ENCOUNTER — HOSPITAL ENCOUNTER (OUTPATIENT)
Dept: MRI IMAGING | Age: 39
Discharge: HOME | End: 2024-06-15
Attending: FAMILY MEDICINE
Payer: COMMERCIAL

## 2024-06-12 ENCOUNTER — HOSPITAL ENCOUNTER (OUTPATIENT)
Dept: WOUND CARE | Age: 39
Discharge: HOME | End: 2024-06-12
Payer: COMMERCIAL

## 2024-06-12 VITALS
BODY MASS INDEX: 37.04 KG/M2 | SYSTOLIC BLOOD PRESSURE: 101 MMHG | HEIGHT: 68 IN | HEART RATE: 95 BPM | DIASTOLIC BLOOD PRESSURE: 74 MMHG | TEMPERATURE: 97.6 F | RESPIRATION RATE: 18 BRPM | WEIGHT: 244.4 LBS

## 2024-06-12 DIAGNOSIS — E11.621 DIABETIC ULCER OF RIGHT MIDFOOT ASSOCIATED WITH TYPE 2 DIABETES MELLITUS, WITH NECROSIS OF MUSCLE (HCC): ICD-10-CM

## 2024-06-12 DIAGNOSIS — L97.413 DIABETIC ULCER OF RIGHT MIDFOOT ASSOCIATED WITH TYPE 2 DIABETES MELLITUS, WITH NECROSIS OF MUSCLE (HCC): ICD-10-CM

## 2024-06-12 DIAGNOSIS — M14.679 CHARCOT'S ARTHROPATHY OF FOREFOOT: Primary | ICD-10-CM

## 2024-06-12 DIAGNOSIS — M14.679 CHARCOT'S ARTHROPATHY OF FOREFOOT: ICD-10-CM

## 2024-06-12 DIAGNOSIS — M86.671 CHRONIC REFRACTORY OSTEOMYELITIS OF FOOT, RIGHT (HCC): ICD-10-CM

## 2024-06-12 PROCEDURE — A9579 GAD-BASE MR CONTRAST NOS,1ML: HCPCS | Performed by: FAMILY MEDICINE

## 2024-06-12 PROCEDURE — 6370000000 HC RX 637 (ALT 250 FOR IP): Performed by: FAMILY MEDICINE

## 2024-06-12 PROCEDURE — 11042 DBRDMT SUBQ TIS 1ST 20SQCM/<: CPT

## 2024-06-12 PROCEDURE — 6360000004 HC RX CONTRAST MEDICATION: Performed by: FAMILY MEDICINE

## 2024-06-12 PROCEDURE — 73720 MRI LWR EXTREMITY W/O&W/DYE: CPT

## 2024-06-12 RX ORDER — IBUPROFEN 200 MG
TABLET ORAL ONCE
OUTPATIENT
Start: 2024-06-12 | End: 2024-06-12

## 2024-06-12 RX ORDER — LIDOCAINE HYDROCHLORIDE 40 MG/ML
SOLUTION TOPICAL ONCE
OUTPATIENT
Start: 2024-06-12 | End: 2024-06-12

## 2024-06-12 RX ORDER — GENTAMICIN SULFATE 1 MG/G
OINTMENT TOPICAL ONCE
OUTPATIENT
Start: 2024-06-12 | End: 2024-06-12

## 2024-06-12 RX ORDER — LIDOCAINE 50 MG/G
OINTMENT TOPICAL ONCE
Status: COMPLETED | OUTPATIENT
Start: 2024-06-12 | End: 2024-06-12

## 2024-06-12 RX ORDER — TRIAMCINOLONE ACETONIDE 1 MG/G
OINTMENT TOPICAL ONCE
OUTPATIENT
Start: 2024-06-12 | End: 2024-06-12

## 2024-06-12 RX ORDER — BACITRACIN ZINC AND POLYMYXIN B SULFATE 500; 1000 [USP'U]/G; [USP'U]/G
OINTMENT TOPICAL ONCE
OUTPATIENT
Start: 2024-06-12 | End: 2024-06-12

## 2024-06-12 RX ORDER — LIDOCAINE HYDROCHLORIDE 20 MG/ML
JELLY TOPICAL ONCE
OUTPATIENT
Start: 2024-06-12 | End: 2024-06-12

## 2024-06-12 RX ORDER — LIDOCAINE 50 MG/G
OINTMENT TOPICAL ONCE
OUTPATIENT
Start: 2024-06-12 | End: 2024-06-12

## 2024-06-12 RX ORDER — BETAMETHASONE DIPROPIONATE 0.5 MG/G
CREAM TOPICAL ONCE
OUTPATIENT
Start: 2024-06-12 | End: 2024-06-12

## 2024-06-12 RX ORDER — SODIUM CHLOR/HYPOCHLOROUS ACID 0.033 %
SOLUTION, IRRIGATION IRRIGATION ONCE
Status: COMPLETED | OUTPATIENT
Start: 2024-06-12 | End: 2024-06-12

## 2024-06-12 RX ORDER — CLOBETASOL PROPIONATE 0.5 MG/G
OINTMENT TOPICAL ONCE
OUTPATIENT
Start: 2024-06-12 | End: 2024-06-12

## 2024-06-12 RX ORDER — LIDOCAINE 40 MG/G
CREAM TOPICAL ONCE
OUTPATIENT
Start: 2024-06-12 | End: 2024-06-12

## 2024-06-12 RX ORDER — GINSENG 100 MG
CAPSULE ORAL ONCE
OUTPATIENT
Start: 2024-06-12 | End: 2024-06-12

## 2024-06-12 RX ORDER — SODIUM CHLOR/HYPOCHLOROUS ACID 0.033 %
SOLUTION, IRRIGATION IRRIGATION ONCE
OUTPATIENT
Start: 2024-06-12 | End: 2024-06-12

## 2024-06-12 RX ADMIN — GADOTERIDOL 22 ML: 279.3 INJECTION, SOLUTION INTRAVENOUS at 20:23

## 2024-06-12 RX ADMIN — Medication: at 10:09

## 2024-06-12 RX ADMIN — LIDOCAINE: 50 OINTMENT TOPICAL at 10:09

## 2024-06-12 ASSESSMENT — PAIN DESCRIPTION - ORIENTATION: ORIENTATION: RIGHT

## 2024-06-12 ASSESSMENT — PAIN DESCRIPTION - DESCRIPTORS: DESCRIPTORS: ACHING;BURNING;THROBBING

## 2024-06-12 ASSESSMENT — PAIN DESCRIPTION - LOCATION: LOCATION: FOOT

## 2024-06-12 ASSESSMENT — PAIN SCALES - GENERAL: PAINLEVEL_OUTOF10: 4

## 2024-06-12 NOTE — FLOWSHEET NOTE
06/12/24 0929   Wound 01/30/24 Foot Right;Lateral #2 Right lateral foot   Date First Assessed/Time First Assessed: 01/30/24 1340   Present on Original Admission: Yes  Wound Approximate Age at First Assessment (Weeks): 2 weeks  Primary Wound Type: Diabetic Ulcer  Location: Foot  Wound Location Orientation: Right;Lateral  Wou...   Wound Image     Wound Etiology Diabetic Elizabeth 3   Dressing Status Old drainage noted   Wound Cleansed Vashe   Dressing/Treatment Hydrofera blue;Roll gauze;Tape/Soft cloth adhesive tape   Wound Length (cm) 3.5 cm   Wound Width (cm) 3 cm   Wound Depth (cm) 1 cm   Wound Surface Area (cm^2) 10.5 cm^2   Change in Wound Size % (l*w) -81.03   Wound Volume (cm^3) 10.5 cm^3   Wound Healing % -1710   Post-Procedure Length (cm) 3.5 cm   Post-Procedure Width (cm) 3.2 cm   Post-Procedure Depth (cm) 1 cm   Post-Procedure Surface Area (cm^2) 11.2 cm^2   Post-Procedure Volume (cm^3) 11.2 cm^3   Wound Assessment Exposed structure bone   Drainage Amount Large (50-75% saturated)   Drainage Description Serosanguinous   Odor None   Emily-wound Assessment Intact   Margins Defined edges   Wound Thickness Description not for Pressure Injury Full thickness   Pain Assessment   Pain Assessment 0-10   Pain Level 4   Pain Location Foot   Pain Orientation Right   Pain Descriptors Aching;Burning;Throbbing

## 2024-06-12 NOTE — DISCHARGE INSTRUCTIONS
Return Appointment:  2        week with Cristian Da Silva DO     Instructions:     Right lateral foot;  Clean with normal saline or Vashe.  Hydrofera blue classic.  Secure with opti-lock, roll gauze.  Change every day.   Offload with your defender boot or a knee scooter.     Continue Antibiotics : clindamycin and ciproflaxin.      Please increase dietary protein to at least 100 grams per day to support cell rejuvenation.   May use supplements such as Ensure Max, Austin, & Glucerna (samples & coupons provided at first visit).   Be sure to spread intake throughout the day for improved absorption.       COMPLETE MRI of right foot as ordered tonight .  Please call [phone] to speak directly with a  to schedule your appointment if you have not received an automated call within 24 hours.         Should you experience increased redness, swelling, pain, foul odor, size of wound(s), or have a temperature over 101 degrees please contact the [facility] at [phone] or if after hours contact your primary care physician or go to the hospital emergency department.     PLEASE NOTE: IF YOU ARE UNABLE TO OBTAIN WOUND SUPPLIES, CONTINUE TO USE THE SUPPLIES YOU HAVE AVAILABLE UNTIL YOU ARE ABLE TO REACH US. IT IS MOST IMPORTANT TO KEEP THE WOUND COVERED AT ALL TIMES.

## 2024-06-12 NOTE — WOUND CARE
Discharge Instructions for  [facility]  [address] [place] [address]  [address] [place], [place] [address]  Phone [phone]   Fax [phone]      NAME:  Deepa Cruz  YOB: 1985  MEDICAL RECORD NUMBER:  [id number]  DATE:  6/12/2024    Return Appointment:  2 week with Cristian Da Silva DO    Instructions:    Right lateral foot;  Clean with normal saline or Vashe.  Hydrofera blue classic.  Secure with opti-lock, roll gauze.  Change every day.   Offload with your defender boot or a knee scooter.    Continue Antibiotics : clindamycin and ciproflaxin.     Please increase dietary protein to at least 100 grams per day to support cell rejuvenation.   May use supplements such as Ensure Max, Austin, & Glucerna (samples & coupons provided at first visit).   Be sure to spread intake throughout the day for improved absorption.      COMPLETE MRI of right foot as ordered tonight .  Please call [phone] to speak directly with a  to schedule your appointment if you have not received an automated call within 24 hours.       Should you experience increased redness, swelling, pain, foul odor, size of wound(s), or have a temperature over 101 degrees please contact the [facility] Healing Center at [phone] or if after hours contact your primary care physician or go to the hospital emergency department.    PLEASE NOTE: IF YOU ARE UNABLE TO OBTAIN WOUND SUPPLIES, CONTINUE TO USE THE SUPPLIES YOU HAVE AVAILABLE UNTIL YOU ARE ABLE TO REACH US. IT IS MOST IMPORTANT TO KEEP THE WOUND COVERED AT ALL TIMES.    Electronically signed RAIZA HERNANDEZ RN, [facility] on 6/12/2024 at 9:49 AM

## 2024-06-12 NOTE — FLOWSHEET NOTE
06/12/24 0929   Wound 01/30/24 Foot Right;Lateral #2 Right lateral foot   Date First Assessed/Time First Assessed: 01/30/24 1340   Present on Original Admission: Yes  Wound Approximate Age at First Assessment (Weeks): 2 weeks  Primary Wound Type: Diabetic Ulcer  Location: Foot  Wound Location Orientation: Right;Lateral  Wou...   Wound Image     Wound Etiology Diabetic Elizabeth 3   Dressing Status Old drainage noted   Wound Cleansed Vashe   Dressing/Treatment Hydrofera blue;Roll gauze;Tape/Soft cloth adhesive tape   Offloading for Diabetic Foot Ulcers Offloading boot   Wound Length (cm) 3.5 cm   Wound Width (cm) 3 cm   Wound Depth (cm) 1 cm   Wound Surface Area (cm^2) 10.5 cm^2   Change in Wound Size % (l*w) -81.03   Wound Volume (cm^3) 10.5 cm^3   Wound Healing % -1710   Post-Procedure Length (cm) 3.5 cm   Post-Procedure Width (cm) 3.2 cm   Post-Procedure Depth (cm) 1 cm   Post-Procedure Surface Area (cm^2) 11.2 cm^2   Post-Procedure Volume (cm^3) 11.2 cm^3   Wound Assessment Exposed structure bone   Drainage Amount Large (50-75% saturated)   Drainage Description Serosanguinous   Odor None   Emily-wound Assessment Intact   Margins Defined edges   Wound Thickness Description not for Pressure Injury Full thickness   Pain Assessment   Pain Assessment 0-10   Pain Level 4   Pain Location Foot   Pain Orientation Right   Pain Descriptors Aching;Burning;Throbbing

## 2024-06-26 ENCOUNTER — HOSPITAL ENCOUNTER (OUTPATIENT)
Dept: WOUND CARE | Age: 39
Discharge: HOME | End: 2024-06-26
Payer: COMMERCIAL

## 2024-06-26 VITALS
RESPIRATION RATE: 18 BRPM | HEART RATE: 89 BPM | BODY MASS INDEX: 36.46 KG/M2 | TEMPERATURE: 97.3 F | HEIGHT: 68 IN | DIASTOLIC BLOOD PRESSURE: 74 MMHG | SYSTOLIC BLOOD PRESSURE: 104 MMHG | WEIGHT: 240.6 LBS

## 2024-06-26 DIAGNOSIS — E11.621 DIABETIC ULCER OF RIGHT MIDFOOT ASSOCIATED WITH TYPE 2 DIABETES MELLITUS, WITH NECROSIS OF MUSCLE (HCC): ICD-10-CM

## 2024-06-26 DIAGNOSIS — L97.413 DIABETIC ULCER OF RIGHT MIDFOOT ASSOCIATED WITH TYPE 2 DIABETES MELLITUS, WITH NECROSIS OF MUSCLE (HCC): ICD-10-CM

## 2024-06-26 DIAGNOSIS — M14.679 CHARCOT'S ARTHROPATHY OF FOREFOOT: Primary | ICD-10-CM

## 2024-06-26 DIAGNOSIS — M86.671 CHRONIC REFRACTORY OSTEOMYELITIS OF FOOT, RIGHT (HCC): ICD-10-CM

## 2024-06-26 PROCEDURE — 99213 OFFICE O/P EST LOW 20 MIN: CPT

## 2024-06-26 RX ORDER — LIDOCAINE 50 MG/G
OINTMENT TOPICAL ONCE
OUTPATIENT
Start: 2024-06-26 | End: 2024-06-26

## 2024-06-26 RX ORDER — BACITRACIN ZINC AND POLYMYXIN B SULFATE 500; 1000 [USP'U]/G; [USP'U]/G
OINTMENT TOPICAL ONCE
OUTPATIENT
Start: 2024-06-26 | End: 2024-06-26

## 2024-06-26 RX ORDER — IBUPROFEN 200 MG
TABLET ORAL ONCE
OUTPATIENT
Start: 2024-06-26 | End: 2024-06-26

## 2024-06-26 RX ORDER — LIDOCAINE HYDROCHLORIDE 20 MG/ML
JELLY TOPICAL ONCE
Status: COMPLETED | OUTPATIENT
Start: 2024-06-26 | End: 2024-06-26

## 2024-06-26 RX ORDER — LIDOCAINE HYDROCHLORIDE 20 MG/ML
JELLY TOPICAL ONCE
OUTPATIENT
Start: 2024-06-26 | End: 2024-06-26

## 2024-06-26 RX ORDER — GINSENG 100 MG
CAPSULE ORAL ONCE
OUTPATIENT
Start: 2024-06-26 | End: 2024-06-26

## 2024-06-26 RX ORDER — BETAMETHASONE DIPROPIONATE 0.5 MG/G
CREAM TOPICAL ONCE
OUTPATIENT
Start: 2024-06-26 | End: 2024-06-26

## 2024-06-26 RX ORDER — CLOBETASOL PROPIONATE 0.5 MG/G
OINTMENT TOPICAL ONCE
OUTPATIENT
Start: 2024-06-26 | End: 2024-06-26

## 2024-06-26 RX ORDER — TRIAMCINOLONE ACETONIDE 1 MG/G
OINTMENT TOPICAL ONCE
OUTPATIENT
Start: 2024-06-26 | End: 2024-06-26

## 2024-06-26 RX ORDER — GENTAMICIN SULFATE 1 MG/G
OINTMENT TOPICAL ONCE
OUTPATIENT
Start: 2024-06-26 | End: 2024-06-26

## 2024-06-26 RX ORDER — SODIUM CHLOR/HYPOCHLOROUS ACID 0.033 %
SOLUTION, IRRIGATION IRRIGATION ONCE
OUTPATIENT
Start: 2024-06-26 | End: 2024-06-26

## 2024-06-26 RX ORDER — LIDOCAINE HYDROCHLORIDE 40 MG/ML
SOLUTION TOPICAL ONCE
OUTPATIENT
Start: 2024-06-26 | End: 2024-06-26

## 2024-06-26 RX ORDER — LIDOCAINE 40 MG/G
CREAM TOPICAL ONCE
OUTPATIENT
Start: 2024-06-26 | End: 2024-06-26

## 2024-06-26 RX ADMIN — LIDOCAINE HYDROCHLORIDE: 20 JELLY TOPICAL at 11:42

## 2024-06-26 ASSESSMENT — PAIN SCALES - GENERAL: PAINLEVEL_OUTOF10: 5

## 2024-06-26 ASSESSMENT — PAIN DESCRIPTION - DESCRIPTORS: DESCRIPTORS: BURNING

## 2024-06-26 ASSESSMENT — PAIN DESCRIPTION - ORIENTATION: ORIENTATION: RIGHT

## 2024-06-26 ASSESSMENT — PAIN DESCRIPTION - LOCATION: LOCATION: FOOT

## 2024-06-26 NOTE — WOUND CARE
Discharge Instructions for  [facility]  [address] [place] [address]  [address] [place] [address]  Phone [phone]   Fax [phone]      NAME:  Deepa Cruz  YOB: 1985  MEDICAL RECORD NUMBER:  [id number]  DATE:  6/26/2024    Return Appointment:  2 weeks with Cristian Da Silva DO    Instructions:    Right lateral foot;  Clean with normal saline or Vashe.  Hydrofera blue classic.  Secure with opti-lock, roll gauze.  Change every day.   Offload with your defender boot or a knee scooter.    Continue Antibiotics : clindamycin and ciproflaxin.     Please increase dietary protein to at least 100 grams per day to support cell rejuvenation.   May use supplements such as Ensure Max, Austin, & Glucerna (samples & coupons provided at first visit).   Be sure to spread intake throughout the day for improved absorption.      MRI results discussed today.    Follow up with Dr Nicole concerning plans for foot, possible surgery.    Should you experience increased redness, swelling, pain, foul odor, size of wound(s), or have a temperature over 101 degrees please contact the W[facility] Healing Center at [phone] or if after hours contact your primary care physician or go to the hospital emergency department.    PLEASE NOTE: IF YOU ARE UNABLE TO OBTAIN WOUND SUPPLIES, CONTINUE TO USE THE SUPPLIES YOU HAVE AVAILABLE UNTIL YOU ARE ABLE TO REACH US. IT IS MOST IMPORTANT TO KEEP THE WOUND COVERED AT ALL TIMES.    Electronically signed Ruthie Perez RN, [facility] on 6/26/2024 at 11:29 AM

## 2024-06-26 NOTE — FLOWSHEET NOTE
06/26/24 1112   Wound 01/30/24 Foot Right;Lateral #2 Right lateral foot   Date First Assessed/Time First Assessed: 01/30/24 1340   Present on Original Admission: Yes  Wound Approximate Age at First Assessment (Weeks): 2 weeks  Primary Wound Type: Diabetic Ulcer  Location: Foot  Wound Location Orientation: Right;Lateral  Wou...   Wound Image    Wound Etiology Diabetic Elizabeth 3   Dressing Status Old drainage noted   Wound Cleansed Cleansed with saline   Dressing/Treatment Hydrofera blue;Roll gauze;Tape/Soft cloth adhesive tape   Offloading for Diabetic Foot Ulcers Offloading boot   Wound Length (cm) 3.5 cm   Wound Width (cm) 2.8 cm   Wound Depth (cm) 0.8 cm   Wound Surface Area (cm^2) 9.8 cm^2   Change in Wound Size % (l*w) -68.97   Wound Volume (cm^3) 7.84 cm^3   Wound Healing % -1252   Wound Assessment Exposed structure bone   Drainage Amount Large (50-75% saturated)   Drainage Description Serosanguinous   Odor None   Emily-wound Assessment Intact   Margins Defined edges   Wound Thickness Description not for Pressure Injury Full thickness   Pain Assessment   Pain Assessment 0-10   Pain Level 5   Pain Location Foot   Pain Orientation Right   Pain Descriptors Burning

## 2024-06-28 RX ORDER — CLINDAMYCIN HYDROCHLORIDE 150 MG/1
450 CAPSULE ORAL 4 TIMES DAILY
Qty: 360 CAPSULE | Refills: 1 | Status: SHIPPED | OUTPATIENT
Start: 2024-06-28 | End: 2024-08-27

## 2024-06-28 RX ORDER — CIPROFLOXACIN 500 MG/1
500 TABLET, FILM COATED ORAL 2 TIMES DAILY
Qty: 60 TABLET | Refills: 1 | Status: SHIPPED | OUTPATIENT
Start: 2024-06-28 | End: 2024-08-27

## 2024-07-10 ENCOUNTER — HOSPITAL ENCOUNTER (OUTPATIENT)
Dept: WOUND CARE | Age: 39
Discharge: HOME | End: 2024-07-10
Payer: COMMERCIAL

## 2024-07-10 VITALS
SYSTOLIC BLOOD PRESSURE: 148 MMHG | DIASTOLIC BLOOD PRESSURE: 75 MMHG | TEMPERATURE: 97.3 F | WEIGHT: 230.2 LBS | BODY MASS INDEX: 34.89 KG/M2 | HEIGHT: 68 IN | RESPIRATION RATE: 18 BRPM | HEART RATE: 97 BPM

## 2024-07-10 DIAGNOSIS — L97.413 DIABETIC ULCER OF RIGHT MIDFOOT ASSOCIATED WITH TYPE 2 DIABETES MELLITUS, WITH NECROSIS OF MUSCLE (HCC): ICD-10-CM

## 2024-07-10 DIAGNOSIS — M86.671 CHRONIC REFRACTORY OSTEOMYELITIS OF FOOT, RIGHT (HCC): ICD-10-CM

## 2024-07-10 DIAGNOSIS — E11.621 DIABETIC ULCER OF RIGHT MIDFOOT ASSOCIATED WITH TYPE 2 DIABETES MELLITUS, WITH NECROSIS OF MUSCLE (HCC): ICD-10-CM

## 2024-07-10 DIAGNOSIS — M14.679 CHARCOT'S ARTHROPATHY OF FOREFOOT: Primary | ICD-10-CM

## 2024-07-10 PROCEDURE — 11044 DBRDMT BONE 1ST 20 SQ CM/<: CPT

## 2024-07-10 RX ORDER — LIDOCAINE HYDROCHLORIDE 20 MG/ML
JELLY TOPICAL ONCE
Status: COMPLETED | OUTPATIENT
Start: 2024-07-10 | End: 2024-07-10

## 2024-07-10 RX ORDER — SODIUM CHLOR/HYPOCHLOROUS ACID 0.033 %
SOLUTION, IRRIGATION IRRIGATION ONCE
OUTPATIENT
Start: 2024-07-10 | End: 2024-07-10

## 2024-07-10 RX ORDER — TRIAMCINOLONE ACETONIDE 1 MG/G
OINTMENT TOPICAL ONCE
OUTPATIENT
Start: 2024-07-10 | End: 2024-07-10

## 2024-07-10 RX ORDER — IBUPROFEN 200 MG
TABLET ORAL ONCE
OUTPATIENT
Start: 2024-07-10 | End: 2024-07-10

## 2024-07-10 RX ORDER — LIDOCAINE HYDROCHLORIDE 20 MG/ML
JELLY TOPICAL ONCE
OUTPATIENT
Start: 2024-07-10 | End: 2024-07-10

## 2024-07-10 RX ORDER — GENTAMICIN SULFATE 1 MG/G
OINTMENT TOPICAL ONCE
OUTPATIENT
Start: 2024-07-10 | End: 2024-07-10

## 2024-07-10 RX ORDER — CLOBETASOL PROPIONATE 0.5 MG/G
OINTMENT TOPICAL ONCE
OUTPATIENT
Start: 2024-07-10 | End: 2024-07-10

## 2024-07-10 RX ORDER — BACITRACIN ZINC AND POLYMYXIN B SULFATE 500; 1000 [USP'U]/G; [USP'U]/G
OINTMENT TOPICAL ONCE
OUTPATIENT
Start: 2024-07-10 | End: 2024-07-10

## 2024-07-10 RX ORDER — LIDOCAINE 40 MG/G
CREAM TOPICAL ONCE
OUTPATIENT
Start: 2024-07-10 | End: 2024-07-10

## 2024-07-10 RX ORDER — LIDOCAINE 50 MG/G
OINTMENT TOPICAL ONCE
OUTPATIENT
Start: 2024-07-10 | End: 2024-07-10

## 2024-07-10 RX ORDER — BETAMETHASONE DIPROPIONATE 0.5 MG/G
CREAM TOPICAL ONCE
OUTPATIENT
Start: 2024-07-10 | End: 2024-07-10

## 2024-07-10 RX ORDER — GINSENG 100 MG
CAPSULE ORAL ONCE
OUTPATIENT
Start: 2024-07-10 | End: 2024-07-10

## 2024-07-10 RX ORDER — LIDOCAINE HYDROCHLORIDE 40 MG/ML
SOLUTION TOPICAL ONCE
OUTPATIENT
Start: 2024-07-10 | End: 2024-07-10

## 2024-07-10 RX ADMIN — LIDOCAINE HYDROCHLORIDE: 20 JELLY TOPICAL at 14:51

## 2024-07-10 ASSESSMENT — PAIN DESCRIPTION - ORIENTATION: ORIENTATION: RIGHT

## 2024-07-10 ASSESSMENT — PAIN DESCRIPTION - LOCATION: LOCATION: FOOT

## 2024-07-10 ASSESSMENT — PAIN DESCRIPTION - DESCRIPTORS: DESCRIPTORS: ACHING;BURNING

## 2024-07-10 ASSESSMENT — PAIN SCALES - GENERAL: PAINLEVEL_OUTOF10: 5

## 2024-07-10 NOTE — FLOWSHEET NOTE
07/10/24 1412   Right Leg Edema Point of Measurement   Leg circumference 40 cm   Ankle circumference 23 cm   Foot circumference 25 cm   Compression Therapy Tubular elastic support bandage   Wound 01/30/24 Foot Right;Lateral #2 Right lateral foot   Date First Assessed/Time First Assessed: 01/30/24 1340   Present on Original Admission: Yes  Wound Approximate Age at First Assessment (Weeks): 2 weeks  Primary Wound Type: Diabetic Ulcer  Location: Foot  Wound Location Orientation: Right;Lateral  Wou...   Wound Image     Wound Etiology Diabetic Elizabeth 3   Dressing Status Old drainage noted   Wound Cleansed Cleansed with saline   Dressing/Treatment Hydrofera blue;Roll gauze;Tape/Soft cloth adhesive tape   Offloading for Diabetic Foot Ulcers Offloading boot   Wound Length (cm) 4 cm   Wound Width (cm) 3 cm   Wound Depth (cm) 0.3 cm   Wound Surface Area (cm^2) 12 cm^2   Change in Wound Size % (l*w) -106.9   Wound Volume (cm^3) 3.6 cm^3   Wound Healing % -521   Post-Procedure Length (cm) 4 cm   Post-Procedure Width (cm) 3 cm   Post-Procedure Depth (cm) 0.3 cm   Post-Procedure Surface Area (cm^2) 12 cm^2   Post-Procedure Volume (cm^3) 3.6 cm^3   Wound Assessment Exposed structure bone   Drainage Amount Large (50-75% saturated)   Drainage Description Serosanguinous   Odor None   Emily-wound Assessment Intact   Margins Defined edges   Wound Thickness Description not for Pressure Injury Full thickness   Pain Assessment   Pain Assessment 0-10   Pain Level 5   Pain Location Foot   Pain Orientation Right   Pain Descriptors Aching;Burning

## 2024-07-10 NOTE — WOUND CARE
Discharge Instructions for  [facility]  [address] [place] [address]  [address] [place] [address]  Phone [phone]   Fax [phone]      NAME:  Deepa Cruz  YOB: 1985  MEDICAL RECORD NUMBER:  [id number]  DATE:  7/10/2024    Return Appointment:  2 week with Cristian Da Silva DO    Instructions:    Right lateral foot;  Clean with normal saline or Vashe.  Apply alginate with silver. Cut product to wound size and apply to wound bed.  Secure with opti-lock, roll gauze.  Change 3 times a week and as needed  Offload with your defender boot or a knee scooter.    Continue Antibiotics : clindamycin and ciproflaxin.     Please increase dietary protein to at least 100 grams per day to support cell rejuvenation.   May use supplements such as Ensure Max, Austin, & Glucerna (samples & coupons provided at first visit).   Be sure to spread intake throughout the day for improved absorption.      Follow up with Dr Nicole concerning plans for foot, possible surgery.    Should you experience increased redness, swelling, pain, foul odor, size of wound(s), or have a temperature over 101 degrees please contact the [facility]aling Center at [phone] or if after hours contact your primary care physician or go to the hospital emergency department.    PLEASE NOTE: IF YOU ARE UNABLE TO OBTAIN WOUND SUPPLIES, CONTINUE TO USE THE SUPPLIES YOU HAVE AVAILABLE UNTIL YOU ARE ABLE TO REACH US. IT IS MOST IMPORTANT TO KEEP THE WOUND COVERED AT ALL TIMES.    Electronically signed Ruthie Perez RN, [facility] on 7/10/2024 at 2:39 PM

## 2024-07-15 ENCOUNTER — TELEPHONE (OUTPATIENT)
Dept: WOUND CARE | Age: 39
End: 2024-07-15

## 2024-07-15 RX ORDER — DOXYCYCLINE HYCLATE 100 MG
100 TABLET ORAL 2 TIMES DAILY
Qty: 60 TABLET | Refills: 1 | Status: SHIPPED | OUTPATIENT
Start: 2024-07-15 | End: 2024-09-13

## 2024-07-15 NOTE — TELEPHONE ENCOUNTER
Patient called and stated that she was experiencing dizziness and blurred vision soon after taking her clindamycin. Notified Dr. Da Silva who advised her to stop taking clindamycin and begin taking doxycycline which he e-scribed to her pharmacy on file. She is also to continue taking the Cipro previously prescribed. Patient voiced understanding.

## 2024-07-29 ENCOUNTER — HOSPITAL ENCOUNTER (OUTPATIENT)
Dept: WOUND CARE | Age: 39
Discharge: HOME | End: 2024-07-29
Payer: COMMERCIAL

## 2024-07-29 VITALS
HEART RATE: 100 BPM | BODY MASS INDEX: 35.01 KG/M2 | TEMPERATURE: 98.1 F | HEIGHT: 68 IN | DIASTOLIC BLOOD PRESSURE: 78 MMHG | WEIGHT: 231 LBS | RESPIRATION RATE: 18 BRPM | SYSTOLIC BLOOD PRESSURE: 119 MMHG

## 2024-07-29 DIAGNOSIS — M14.679 CHARCOT'S ARTHROPATHY OF FOREFOOT: Primary | ICD-10-CM

## 2024-07-29 DIAGNOSIS — M86.671 CHRONIC REFRACTORY OSTEOMYELITIS OF FOOT, RIGHT (HCC): ICD-10-CM

## 2024-07-29 DIAGNOSIS — L97.413 DIABETIC ULCER OF RIGHT MIDFOOT ASSOCIATED WITH TYPE 2 DIABETES MELLITUS, WITH NECROSIS OF MUSCLE (HCC): ICD-10-CM

## 2024-07-29 DIAGNOSIS — E11.621 DIABETIC ULCER OF RIGHT MIDFOOT ASSOCIATED WITH TYPE 2 DIABETES MELLITUS, WITH NECROSIS OF MUSCLE (HCC): ICD-10-CM

## 2024-07-29 PROCEDURE — 11042 DBRDMT SUBQ TIS 1ST 20SQCM/<: CPT

## 2024-07-29 RX ORDER — TRIAMCINOLONE ACETONIDE 1 MG/G
OINTMENT TOPICAL ONCE
OUTPATIENT
Start: 2024-07-29 | End: 2024-07-29

## 2024-07-29 RX ORDER — LIDOCAINE HYDROCHLORIDE 20 MG/ML
JELLY TOPICAL ONCE
OUTPATIENT
Start: 2024-07-29 | End: 2024-07-29

## 2024-07-29 RX ORDER — LIDOCAINE 40 MG/G
CREAM TOPICAL ONCE
OUTPATIENT
Start: 2024-07-29 | End: 2024-07-29

## 2024-07-29 RX ORDER — BETAMETHASONE DIPROPIONATE 0.5 MG/G
CREAM TOPICAL ONCE
OUTPATIENT
Start: 2024-07-29 | End: 2024-07-29

## 2024-07-29 RX ORDER — CLOBETASOL PROPIONATE 0.5 MG/G
OINTMENT TOPICAL ONCE
OUTPATIENT
Start: 2024-07-29 | End: 2024-07-29

## 2024-07-29 RX ORDER — GENTAMICIN SULFATE 1 MG/G
OINTMENT TOPICAL ONCE
OUTPATIENT
Start: 2024-07-29 | End: 2024-07-29

## 2024-07-29 RX ORDER — BACITRACIN ZINC AND POLYMYXIN B SULFATE 500; 1000 [USP'U]/G; [USP'U]/G
OINTMENT TOPICAL ONCE
OUTPATIENT
Start: 2024-07-29 | End: 2024-07-29

## 2024-07-29 RX ORDER — GINSENG 100 MG
CAPSULE ORAL ONCE
OUTPATIENT
Start: 2024-07-29 | End: 2024-07-29

## 2024-07-29 RX ORDER — IBUPROFEN 200 MG
TABLET ORAL ONCE
OUTPATIENT
Start: 2024-07-29 | End: 2024-07-29

## 2024-07-29 RX ORDER — LIDOCAINE 50 MG/G
OINTMENT TOPICAL ONCE
OUTPATIENT
Start: 2024-07-29 | End: 2024-07-29

## 2024-07-29 RX ORDER — SODIUM CHLOR/HYPOCHLOROUS ACID 0.033 %
SOLUTION, IRRIGATION IRRIGATION ONCE
OUTPATIENT
Start: 2024-07-29 | End: 2024-07-29

## 2024-07-29 RX ORDER — LIDOCAINE HYDROCHLORIDE 40 MG/ML
SOLUTION TOPICAL ONCE
OUTPATIENT
Start: 2024-07-29 | End: 2024-07-29

## 2024-07-29 RX ORDER — LIDOCAINE HYDROCHLORIDE 20 MG/ML
JELLY TOPICAL ONCE
Status: COMPLETED | OUTPATIENT
Start: 2024-07-29 | End: 2024-07-29

## 2024-07-29 RX ADMIN — LIDOCAINE HYDROCHLORIDE: 20 JELLY TOPICAL at 15:24

## 2024-07-29 NOTE — WOUND CARE
Discharge Instructions for  [facility]  [address] [place] [address]  [address] [place] [address]  Phone [phone]   Fax [phone]      NAME:  Deepa Cruz  YOB: 1985  MEDICAL RECORD NUMBER:  [id number]  DATE:  7/29/2024    Return Appointment:  2 week with Cristian Da Silva DO    Instructions:  Right lateral foot;  Clean with normal saline or Vashe.  Apply alginate with silver. Cut product to wound size and apply to wound bed.  Secure with opti-lock, roll gauze.  Change 3 times a week and as needed  Offload with your defender boot or a knee scooter.    Continue Antibiotics     Please increase dietary protein to at least 100 grams per day to support cell rejuvenation.   May use supplements such as Ensure Max, Austin, & Glucerna (samples & coupons provided at first visit).   Be sure to spread intake throughout the day for improved absorption.      Follow up with Dr Nicole concerning plans for foot, possible surgery.    Should you experience increased redness, swelling, pain, foul odor, size of wound(s), or have a temperature over 101 degrees please contact the [facility]ound Healing Center at [phone] or if after hours contact your primary care physician or go to the hospital emergency department.    PLEASE NOTE: IF YOU ARE UNABLE TO OBTAIN WOUND SUPPLIES, CONTINUE TO USE THE SUPPLIES YOU HAVE AVAILABLE UNTIL YOU ARE ABLE TO REACH US. IT IS MOST IMPORTANT TO KEEP THE WOUND COVERED AT ALL TIMES.    Electronically signed Ruthie Perez RN, [facility] on 7/29/2024 at 3:13 PM

## 2024-07-30 NOTE — FLOWSHEET NOTE
07/29/24 1435   Right Leg Edema Point of Measurement   Leg circumference 41 cm   Ankle circumference 23 cm   Foot circumference 24 cm   Compression Therapy Tubular elastic support bandage   Wound 01/30/24 Foot Right;Lateral #2 Right lateral foot   Date First Assessed/Time First Assessed: 01/30/24 1340   Present on Original Admission: Yes  Wound Approximate Age at First Assessment (Weeks): 2 weeks  Primary Wound Type: Diabetic Ulcer  Location: Foot  Wound Location Orientation: Right;Lateral  Wou...   Wound Image     Wound Etiology Diabetic Elizabeth 3   Dressing Status Old drainage noted   Wound Cleansed Cleansed with saline   Dressing/Treatment Alginate with Ag   Offloading for Diabetic Foot Ulcers Offloading boot   Wound Length (cm) 2.4 cm   Wound Width (cm) 2.5 cm   Wound Depth (cm) 0.1 cm   Wound Surface Area (cm^2) 6 cm^2   Change in Wound Size % (l*w) -3.45   Wound Volume (cm^3) 0.6 cm^3   Wound Healing % -3   Post-Procedure Length (cm) 2.4 cm   Post-Procedure Width (cm) 2.5 cm   Post-Procedure Depth (cm) 0.3 cm   Post-Procedure Surface Area (cm^2) 6 cm^2   Post-Procedure Volume (cm^3) 1.8 cm^3   Wound Assessment Pink/red   Drainage Amount Large (50-75% saturated)   Drainage Description Serosanguinous   Odor None   Emily-wound Assessment Intact   Margins Defined edges   Wound Thickness Description not for Pressure Injury Full thickness   Pain Assessment   Pain Assessment None - Denies Pain

## 2024-08-12 ENCOUNTER — HOSPITAL ENCOUNTER (OUTPATIENT)
Dept: WOUND CARE | Age: 39
Discharge: HOME | End: 2024-08-12
Payer: COMMERCIAL

## 2024-08-12 VITALS
RESPIRATION RATE: 18 BRPM | HEIGHT: 68 IN | HEART RATE: 99 BPM | DIASTOLIC BLOOD PRESSURE: 69 MMHG | SYSTOLIC BLOOD PRESSURE: 109 MMHG | BODY MASS INDEX: 34.59 KG/M2 | OXYGEN SATURATION: 97 % | WEIGHT: 228.2 LBS | TEMPERATURE: 98.2 F

## 2024-08-12 DIAGNOSIS — M86.671 CHRONIC REFRACTORY OSTEOMYELITIS OF FOOT, RIGHT (HCC): ICD-10-CM

## 2024-08-12 DIAGNOSIS — L97.413 DIABETIC ULCER OF RIGHT MIDFOOT ASSOCIATED WITH TYPE 2 DIABETES MELLITUS, WITH NECROSIS OF MUSCLE (HCC): ICD-10-CM

## 2024-08-12 DIAGNOSIS — M14.679 CHARCOT'S ARTHROPATHY OF FOREFOOT: Primary | ICD-10-CM

## 2024-08-12 DIAGNOSIS — E11.621 DIABETIC ULCER OF RIGHT MIDFOOT ASSOCIATED WITH TYPE 2 DIABETES MELLITUS, WITH NECROSIS OF MUSCLE (HCC): ICD-10-CM

## 2024-08-12 PROCEDURE — 11042 DBRDMT SUBQ TIS 1ST 20SQCM/<: CPT

## 2024-08-12 RX ORDER — GINSENG 100 MG
CAPSULE ORAL ONCE
OUTPATIENT
Start: 2024-08-12 | End: 2024-08-12

## 2024-08-12 RX ORDER — TRIAMCINOLONE ACETONIDE 1 MG/G
OINTMENT TOPICAL ONCE
OUTPATIENT
Start: 2024-08-12 | End: 2024-08-12

## 2024-08-12 RX ORDER — BETAMETHASONE DIPROPIONATE 0.5 MG/G
CREAM TOPICAL ONCE
OUTPATIENT
Start: 2024-08-12 | End: 2024-08-12

## 2024-08-12 RX ORDER — BACITRACIN ZINC AND POLYMYXIN B SULFATE 500; 1000 [USP'U]/G; [USP'U]/G
OINTMENT TOPICAL ONCE
OUTPATIENT
Start: 2024-08-12 | End: 2024-08-12

## 2024-08-12 RX ORDER — SODIUM CHLOR/HYPOCHLOROUS ACID 0.033 %
SOLUTION, IRRIGATION IRRIGATION ONCE
OUTPATIENT
Start: 2024-08-12 | End: 2024-08-12

## 2024-08-12 RX ORDER — LIDOCAINE HYDROCHLORIDE 20 MG/ML
JELLY TOPICAL ONCE
Status: COMPLETED | OUTPATIENT
Start: 2024-08-12 | End: 2024-08-12

## 2024-08-12 RX ORDER — CLOBETASOL PROPIONATE 0.5 MG/G
OINTMENT TOPICAL ONCE
OUTPATIENT
Start: 2024-08-12 | End: 2024-08-12

## 2024-08-12 RX ORDER — LIDOCAINE 40 MG/G
CREAM TOPICAL ONCE
OUTPATIENT
Start: 2024-08-12 | End: 2024-08-12

## 2024-08-12 RX ORDER — LIDOCAINE HYDROCHLORIDE 40 MG/ML
SOLUTION TOPICAL ONCE
OUTPATIENT
Start: 2024-08-12 | End: 2024-08-12

## 2024-08-12 RX ORDER — LIDOCAINE HYDROCHLORIDE 20 MG/ML
JELLY TOPICAL ONCE
OUTPATIENT
Start: 2024-08-12 | End: 2024-08-12

## 2024-08-12 RX ORDER — LIDOCAINE 50 MG/G
OINTMENT TOPICAL ONCE
OUTPATIENT
Start: 2024-08-12 | End: 2024-08-12

## 2024-08-12 RX ORDER — IBUPROFEN 200 MG
TABLET ORAL ONCE
OUTPATIENT
Start: 2024-08-12 | End: 2024-08-12

## 2024-08-12 RX ORDER — GENTAMICIN SULFATE 1 MG/G
OINTMENT TOPICAL ONCE
OUTPATIENT
Start: 2024-08-12 | End: 2024-08-12

## 2024-08-12 RX ADMIN — LIDOCAINE HYDROCHLORIDE: 20 JELLY TOPICAL at 14:34

## 2024-08-12 ASSESSMENT — PAIN SCALES - GENERAL: PAINLEVEL_OUTOF10: 4

## 2024-08-12 NOTE — FLOWSHEET NOTE
08/12/24 1421   Right Leg Edema Point of Measurement   Leg circumference 41 cm   Ankle circumference 24 cm   Foot circumference 25 cm   Compression Therapy Compression ordered   Wound 01/30/24 Foot Right;Lateral #2 Right lateral foot   Date First Assessed/Time First Assessed: 01/30/24 1340   Present on Original Admission: Yes  Wound Approximate Age at First Assessment (Weeks): 2 weeks  Primary Wound Type: Diabetic Ulcer  Location: Foot  Wound Location Orientation: Right;Lateral  Wou...   Wound Image     Wound Etiology Diabetic Elizabeth 3   Dressing Status Old drainage noted   Wound Cleansed Cleansed with saline   Dressing/Treatment Alginate with Ag   Offloading for Diabetic Foot Ulcers Post op shoe   Wound Length (cm) 2.5 cm   Wound Width (cm) 2.7 cm   Wound Depth (cm) 0.1 cm   Wound Surface Area (cm^2) 6.75 cm^2   Change in Wound Size % (l*w) -16.38   Wound Volume (cm^3) 0.675 cm^3   Wound Healing % -16   Post-Procedure Length (cm) 2.5 cm   Post-Procedure Width (cm) 2.5 cm   Post-Procedure Depth (cm) 0.1 cm   Post-Procedure Surface Area (cm^2) 6.25 cm^2   Post-Procedure Volume (cm^3) 0.625 cm^3   Wound Assessment Ayden/red;Slough   Drainage Amount Moderate (25-50%)   Drainage Description Serosanguinous   Odor None   Emily-wound Assessment Intact   Margins Defined edges   Wound Thickness Description not for Pressure Injury Full thickness   Pain Assessment   Pain Assessment 0-10   Pain Level 4

## 2024-08-12 NOTE — WOUND CARE
Discharge Instructions for  [facility]  [address] [place] [address]  [address] [place] [address]  Phone [phone]   Fax [phone]      NAME:  Deepa Cruz  YOB: 1985  MEDICAL RECORD NUMBER:  [id number]  DATE:  8/12/2024    Return Appointment:  2 week with Cristian Da Silva DO    Instructions:  Right lateral foot;  Clean with normal saline or Vashe.  Apply alginate with silver. Cut product to wound size and apply to wound bed.  Secure with opti-lock, roll gauze.  Change 3 times a week and as needed  Offload with your defender boot or a knee scooter.    Continue Antibiotics   Silver nitrate used at today's visit    Please increase dietary protein to at least 100 grams per day to support cell rejuvenation.   May use supplements such as Ensure Max, Austin, & Glucerna (samples & coupons provided at first visit).   Be sure to spread intake throughout the day for improved absorption.      Follow up with Dr Nicole concerning plans for foot, possible surgery.    Should you experience increased redness, swelling, pain, foul odor, size of wound(s), or have a temperature over 101 degrees please contact the [facility] at [phone] or if after hours contact your primary care physician or go to the hospital emergency department.    PLEASE NOTE: IF YOU ARE UNABLE TO OBTAIN WOUND SUPPLIES, CONTINUE TO USE THE SUPPLIES YOU HAVE AVAILABLE UNTIL YOU ARE ABLE TO REACH US. IT IS MOST IMPORTANT TO KEEP THE WOUND COVERED AT ALL TIMES.    Electronically signed Camron Cross RN, [employer] on 8/12/2024 at 3:00 PM

## 2024-08-12 NOTE — DISCHARGE INSTRUCTIONS
Instructions:  Right lateral foot;  Clean with normal saline or Vashe.  Apply alginate with silver. Cut product to wound size and apply to wound bed.  Secure with opti-lock, roll gauze.  Change 3 times a week and as needed  Offload with your defender boot or a knee scooter.     Continue Antibiotics   Silver nitrate used at today's visit     Please increase dietary protein to at least 100 grams per day to support cell rejuvenation.   May use supplements such as Ensure Max, Austin, & Glucerna (samples & coupons provided at first visit).   Be sure to spread intake throughout the day for improved absorption.       Follow up with Dr Nicole concerning plans for foot, possible surgery.

## 2024-08-27 ENCOUNTER — HOSPITAL ENCOUNTER (OUTPATIENT)
Dept: WOUND CARE | Age: 39
Discharge: HOME | End: 2024-08-27
Payer: COMMERCIAL

## 2024-08-27 VITALS
HEART RATE: 91 BPM | BODY MASS INDEX: 34.56 KG/M2 | WEIGHT: 228 LBS | DIASTOLIC BLOOD PRESSURE: 90 MMHG | SYSTOLIC BLOOD PRESSURE: 151 MMHG | RESPIRATION RATE: 18 BRPM | HEIGHT: 68 IN | TEMPERATURE: 98.4 F

## 2024-08-27 DIAGNOSIS — L97.413 DIABETIC ULCER OF RIGHT MIDFOOT ASSOCIATED WITH TYPE 2 DIABETES MELLITUS, WITH NECROSIS OF MUSCLE (HCC): ICD-10-CM

## 2024-08-27 DIAGNOSIS — M14.679 CHARCOT'S ARTHROPATHY OF FOREFOOT: Primary | ICD-10-CM

## 2024-08-27 DIAGNOSIS — M86.671 CHRONIC REFRACTORY OSTEOMYELITIS OF FOOT, RIGHT (HCC): ICD-10-CM

## 2024-08-27 DIAGNOSIS — E11.621 DIABETIC ULCER OF RIGHT MIDFOOT ASSOCIATED WITH TYPE 2 DIABETES MELLITUS, WITH NECROSIS OF MUSCLE (HCC): ICD-10-CM

## 2024-08-27 PROCEDURE — 6370000000 HC RX 637 (ALT 250 FOR IP): Performed by: FAMILY MEDICINE

## 2024-08-27 PROCEDURE — 11042 DBRDMT SUBQ TIS 1ST 20SQCM/<: CPT

## 2024-08-27 RX ORDER — CLOBETASOL PROPIONATE 0.5 MG/G
OINTMENT TOPICAL ONCE
OUTPATIENT
Start: 2024-08-27 | End: 2024-08-27

## 2024-08-27 RX ORDER — LIDOCAINE 40 MG/G
CREAM TOPICAL ONCE
OUTPATIENT
Start: 2024-08-27 | End: 2024-08-27

## 2024-08-27 RX ORDER — BETAMETHASONE DIPROPIONATE 0.5 MG/G
CREAM TOPICAL ONCE
OUTPATIENT
Start: 2024-08-27 | End: 2024-08-27

## 2024-08-27 RX ORDER — BACITRACIN ZINC AND POLYMYXIN B SULFATE 500; 1000 [USP'U]/G; [USP'U]/G
OINTMENT TOPICAL ONCE
OUTPATIENT
Start: 2024-08-27 | End: 2024-08-27

## 2024-08-27 RX ORDER — LIDOCAINE HYDROCHLORIDE 40 MG/ML
SOLUTION TOPICAL ONCE
OUTPATIENT
Start: 2024-08-27 | End: 2024-08-27

## 2024-08-27 RX ORDER — GINSENG 100 MG
CAPSULE ORAL ONCE
OUTPATIENT
Start: 2024-08-27 | End: 2024-08-27

## 2024-08-27 RX ORDER — NEOMYCIN/BACITRACIN/POLYMYXINB 3.5-400-5K
OINTMENT (GRAM) TOPICAL ONCE
OUTPATIENT
Start: 2024-08-27 | End: 2024-08-27

## 2024-08-27 RX ORDER — LIDOCAINE 50 MG/G
OINTMENT TOPICAL ONCE
Status: COMPLETED | OUTPATIENT
Start: 2024-08-27 | End: 2024-08-27

## 2024-08-27 RX ORDER — TRIAMCINOLONE ACETONIDE 1 MG/G
OINTMENT TOPICAL ONCE
OUTPATIENT
Start: 2024-08-27 | End: 2024-08-27

## 2024-08-27 RX ORDER — SODIUM CHLOR/HYPOCHLOROUS ACID 0.033 %
SOLUTION, IRRIGATION IRRIGATION ONCE
OUTPATIENT
Start: 2024-08-27 | End: 2024-08-27

## 2024-08-27 RX ORDER — LIDOCAINE HYDROCHLORIDE 20 MG/ML
JELLY TOPICAL ONCE
OUTPATIENT
Start: 2024-08-27 | End: 2024-08-27

## 2024-08-27 RX ORDER — CIPROFLOXACIN 500 MG/1
500 TABLET, FILM COATED ORAL 2 TIMES DAILY
Qty: 60 TABLET | Refills: 1 | Status: SHIPPED | OUTPATIENT
Start: 2024-08-27 | End: 2024-10-26

## 2024-08-27 RX ORDER — DOXYCYCLINE HYCLATE 100 MG
100 TABLET ORAL 2 TIMES DAILY
Qty: 60 TABLET | Refills: 1 | Status: SHIPPED | OUTPATIENT
Start: 2024-08-27 | End: 2024-10-26

## 2024-08-27 RX ORDER — MUPIROCIN 20 MG/G
OINTMENT TOPICAL ONCE
OUTPATIENT
Start: 2024-08-27 | End: 2024-08-27

## 2024-08-27 RX ORDER — GENTAMICIN SULFATE 1 MG/G
OINTMENT TOPICAL ONCE
OUTPATIENT
Start: 2024-08-27 | End: 2024-08-27

## 2024-08-27 RX ORDER — LIDOCAINE 50 MG/G
OINTMENT TOPICAL ONCE
OUTPATIENT
Start: 2024-08-27 | End: 2024-08-27

## 2024-08-27 RX ADMIN — LIDOCAINE: 50 OINTMENT TOPICAL at 15:02

## 2024-08-27 ASSESSMENT — PAIN SCALES - GENERAL: PAINLEVEL_OUTOF10: 4

## 2024-08-27 ASSESSMENT — PAIN DESCRIPTION - DESCRIPTORS: DESCRIPTORS: ACHING

## 2024-08-27 ASSESSMENT — PAIN DESCRIPTION - LOCATION: LOCATION: FOOT

## 2024-08-27 ASSESSMENT — PAIN DESCRIPTION - ORIENTATION: ORIENTATION: RIGHT

## 2024-08-27 NOTE — WOUND CARE
Discharge Instructions for  [facility]  [address] [place] [address]  [address] [place] [address]  Phone [phone]   Fax [phone]      NAME:  Deepa Cruz  YOB: 1985  MEDICAL RECORD NUMBER:  [id number]  DATE:  8/27/2024    Return Appointment:  2 week with Cristian Da Silva DO    Instructions:  Right lateral foot;  Clean with normal saline or Vashe.  Apply alginate with silver. Cut product to wound size and apply to wound bed.  Secure with opti-lock, roll gauze.  Change daily and as needed  Offload with your defender boot or a knee scooter.    Continue Antibiotics. New prescription sent, Cipro and Doxycycline  Silver nitrate used at today's visit    Please increase dietary protein to at least 100 grams per day to support cell rejuvenation.   May use supplements such as Ensure Max, Austin, & Glucerna (samples & coupons provided at first visit).   Be sure to spread intake throughout the day for improved absorption.      Follow up with Dr Nicole concerning plans for foot, possible surgery.  Appointment in October.    Should you experience increased redness, swelling, pain, foul odor, size of wound(s), or have a temperature over 101 degrees please contact the [facility]aling Center at [phone] or if after hours contact your primary care physician or go to the hospital emergency department.    PLEASE NOTE: IF YOU ARE UNABLE TO OBTAIN WOUND SUPPLIES, CONTINUE TO USE THE SUPPLIES YOU HAVE AVAILABLE UNTIL YOU ARE ABLE TO REACH US. IT IS MOST IMPORTANT TO KEEP THE WOUND COVERED AT ALL TIMES.    Electronically signed Ruthie Perez RN, [facility] on 8/27/2024 at 2:43 PM

## 2024-08-27 NOTE — FLOWSHEET NOTE
08/27/24 1428   Right Leg Edema Point of Measurement   Leg circumference 41 cm   Ankle circumference 23 cm   Foot circumference 25 cm   Compression Therapy Compression not ordered   Wound 01/30/24 Foot Right;Lateral #2 Right lateral foot   Date First Assessed/Time First Assessed: 01/30/24 1340   Present on Original Admission: Yes  Wound Approximate Age at First Assessment (Weeks): 2 weeks  Primary Wound Type: Diabetic Ulcer  Location: Foot  Wound Location Orientation: Right;Lateral  Wou...   Wound Image    Wound Etiology Diabetic Elizabeth 3   Dressing Status Old drainage noted   Wound Cleansed Cleansed with saline   Dressing/Treatment Alginate with Ag   Offloading for Diabetic Foot Ulcers Post op shoe   Wound Length (cm) 2.4 cm   Wound Width (cm) 2.8 cm   Wound Depth (cm) 0.1 cm   Wound Surface Area (cm^2) 6.72 cm^2   Change in Wound Size % (l*w) -15.86   Wound Volume (cm^3) 0.672 cm^3   Wound Healing % -16   Post-Procedure Length (cm) 2.5 cm   Post-Procedure Width (cm) 2.9 cm   Post-Procedure Depth (cm) 0.2 cm   Post-Procedure Surface Area (cm^2) 7.25 cm^2   Post-Procedure Volume (cm^3) 1.45 cm^3   Wound Assessment [place]/red;Slough   Drainage Amount Large (50-75% saturated)   Drainage Description Serosanguinous   Odor None   Emily-wound Assessment Intact   Margins Defined edges   Wound Thickness Description not for Pressure Injury Full thickness   Pain Assessment   Pain Assessment 0-10   Pain Level 4   Pain Location Foot   Pain Orientation Right   Pain Descriptors Aching

## 2024-09-10 ENCOUNTER — HOSPITAL ENCOUNTER (OUTPATIENT)
Dept: WOUND CARE | Age: 39
Discharge: HOME | End: 2024-09-10
Payer: COMMERCIAL

## 2024-09-10 VITALS
OXYGEN SATURATION: 100 % | WEIGHT: 227 LBS | HEIGHT: 68 IN | HEART RATE: 95 BPM | SYSTOLIC BLOOD PRESSURE: 145 MMHG | DIASTOLIC BLOOD PRESSURE: 94 MMHG | RESPIRATION RATE: 18 BRPM | TEMPERATURE: 98.6 F | BODY MASS INDEX: 34.4 KG/M2

## 2024-09-10 DIAGNOSIS — M14.679 CHARCOT'S ARTHROPATHY OF FOREFOOT: Primary | ICD-10-CM

## 2024-09-10 DIAGNOSIS — L97.413 DIABETIC ULCER OF RIGHT MIDFOOT ASSOCIATED WITH TYPE 2 DIABETES MELLITUS, WITH NECROSIS OF MUSCLE (HCC): ICD-10-CM

## 2024-09-10 DIAGNOSIS — M86.671 CHRONIC REFRACTORY OSTEOMYELITIS OF FOOT, RIGHT (HCC): ICD-10-CM

## 2024-09-10 DIAGNOSIS — E11.621 DIABETIC ULCER OF RIGHT MIDFOOT ASSOCIATED WITH TYPE 2 DIABETES MELLITUS, WITH NECROSIS OF MUSCLE (HCC): ICD-10-CM

## 2024-09-10 PROCEDURE — 11042 DBRDMT SUBQ TIS 1ST 20SQCM/<: CPT

## 2024-09-10 RX ORDER — GENTAMICIN SULFATE 1 MG/G
OINTMENT TOPICAL ONCE
OUTPATIENT
Start: 2024-09-10 | End: 2024-09-10

## 2024-09-10 RX ORDER — BETAMETHASONE DIPROPIONATE 0.5 MG/G
CREAM TOPICAL ONCE
OUTPATIENT
Start: 2024-09-10 | End: 2024-09-10

## 2024-09-10 RX ORDER — LIDOCAINE HYDROCHLORIDE 20 MG/ML
JELLY TOPICAL ONCE
Status: COMPLETED | OUTPATIENT
Start: 2024-09-10 | End: 2024-09-10

## 2024-09-10 RX ORDER — LIDOCAINE HYDROCHLORIDE 20 MG/ML
JELLY TOPICAL ONCE
OUTPATIENT
Start: 2024-09-10 | End: 2024-09-10

## 2024-09-10 RX ORDER — GINSENG 100 MG
CAPSULE ORAL ONCE
OUTPATIENT
Start: 2024-09-10 | End: 2024-09-10

## 2024-09-10 RX ORDER — MUPIROCIN 20 MG/G
OINTMENT TOPICAL ONCE
OUTPATIENT
Start: 2024-09-10 | End: 2024-09-10

## 2024-09-10 RX ORDER — NEOMYCIN/BACITRACIN/POLYMYXINB 3.5-400-5K
OINTMENT (GRAM) TOPICAL ONCE
OUTPATIENT
Start: 2024-09-10 | End: 2024-09-10

## 2024-09-10 RX ORDER — BACITRACIN ZINC AND POLYMYXIN B SULFATE 500; 1000 [USP'U]/G; [USP'U]/G
OINTMENT TOPICAL ONCE
OUTPATIENT
Start: 2024-09-10 | End: 2024-09-10

## 2024-09-10 RX ORDER — TRIAMCINOLONE ACETONIDE 1 MG/G
OINTMENT TOPICAL ONCE
OUTPATIENT
Start: 2024-09-10 | End: 2024-09-10

## 2024-09-10 RX ORDER — LIDOCAINE HYDROCHLORIDE 40 MG/ML
SOLUTION TOPICAL ONCE
OUTPATIENT
Start: 2024-09-10 | End: 2024-09-10

## 2024-09-10 RX ORDER — LIDOCAINE 40 MG/G
CREAM TOPICAL ONCE
OUTPATIENT
Start: 2024-09-10 | End: 2024-09-10

## 2024-09-10 RX ORDER — CLOBETASOL PROPIONATE 0.5 MG/G
OINTMENT TOPICAL ONCE
OUTPATIENT
Start: 2024-09-10 | End: 2024-09-10

## 2024-09-10 RX ORDER — LIDOCAINE 50 MG/G
OINTMENT TOPICAL ONCE
OUTPATIENT
Start: 2024-09-10 | End: 2024-09-10

## 2024-09-10 RX ORDER — SODIUM CHLOR/HYPOCHLOROUS ACID 0.033 %
SOLUTION, IRRIGATION IRRIGATION ONCE
OUTPATIENT
Start: 2024-09-10 | End: 2024-09-10

## 2024-09-10 RX ADMIN — LIDOCAINE HYDROCHLORIDE: 20 JELLY TOPICAL at 14:46

## 2024-09-19 ENCOUNTER — OFFICE VISIT (OUTPATIENT)
Dept: ORTHOPEDIC SURGERY | Age: 39
End: 2024-09-19

## 2024-09-19 DIAGNOSIS — M25.512 LEFT SHOULDER PAIN, UNSPECIFIED CHRONICITY: Primary | ICD-10-CM

## 2024-09-19 DIAGNOSIS — S46.002A ROTATOR CUFF INJURY, LEFT, INITIAL ENCOUNTER: ICD-10-CM

## 2024-09-19 RX ORDER — MELOXICAM 7.5 MG/1
7.5 TABLET ORAL 2 TIMES DAILY PRN
Qty: 30 TABLET | Refills: 1 | Status: SHIPPED | OUTPATIENT
Start: 2024-09-19 | End: 2024-10-19

## 2024-09-23 ENCOUNTER — TELEPHONE (OUTPATIENT)
Dept: ORTHOPEDIC SURGERY | Age: 39
End: 2024-09-23

## 2024-09-24 ENCOUNTER — HOSPITAL ENCOUNTER (OUTPATIENT)
Dept: WOUND CARE | Age: 39
Discharge: HOME | End: 2024-09-24
Attending: FAMILY MEDICINE
Payer: COMMERCIAL

## 2024-09-24 VITALS
WEIGHT: 221.4 LBS | SYSTOLIC BLOOD PRESSURE: 135 MMHG | HEART RATE: 72 BPM | DIASTOLIC BLOOD PRESSURE: 107 MMHG | RESPIRATION RATE: 18 BRPM | HEIGHT: 68 IN | OXYGEN SATURATION: 100 % | BODY MASS INDEX: 33.56 KG/M2 | TEMPERATURE: 98.4 F

## 2024-09-24 DIAGNOSIS — M14.679 CHARCOT'S ARTHROPATHY OF FOREFOOT: Primary | ICD-10-CM

## 2024-09-24 DIAGNOSIS — E11.621 DIABETIC ULCER OF RIGHT MIDFOOT ASSOCIATED WITH TYPE 2 DIABETES MELLITUS, WITH NECROSIS OF MUSCLE (HCC): ICD-10-CM

## 2024-09-24 DIAGNOSIS — M86.671 CHRONIC REFRACTORY OSTEOMYELITIS OF FOOT, RIGHT: ICD-10-CM

## 2024-09-24 DIAGNOSIS — L97.413 DIABETIC ULCER OF RIGHT MIDFOOT ASSOCIATED WITH TYPE 2 DIABETES MELLITUS, WITH NECROSIS OF MUSCLE (HCC): ICD-10-CM

## 2024-09-24 PROCEDURE — 11042 DBRDMT SUBQ TIS 1ST 20SQCM/<: CPT

## 2024-09-24 RX ORDER — LIDOCAINE HYDROCHLORIDE 20 MG/ML
JELLY TOPICAL ONCE
Status: COMPLETED | OUTPATIENT
Start: 2024-09-24 | End: 2024-09-24

## 2024-09-24 RX ORDER — MUPIROCIN 20 MG/G
OINTMENT TOPICAL ONCE
OUTPATIENT
Start: 2024-09-24 | End: 2024-09-24

## 2024-09-24 RX ORDER — LIDOCAINE 50 MG/G
OINTMENT TOPICAL ONCE
OUTPATIENT
Start: 2024-09-24 | End: 2024-09-24

## 2024-09-24 RX ORDER — LIDOCAINE HYDROCHLORIDE 40 MG/ML
SOLUTION TOPICAL ONCE
OUTPATIENT
Start: 2024-09-24 | End: 2024-09-24

## 2024-09-24 RX ORDER — GINSENG 100 MG
CAPSULE ORAL ONCE
OUTPATIENT
Start: 2024-09-24 | End: 2024-09-24

## 2024-09-24 RX ORDER — BACITRACIN ZINC AND POLYMYXIN B SULFATE 500; 1000 [USP'U]/G; [USP'U]/G
OINTMENT TOPICAL ONCE
OUTPATIENT
Start: 2024-09-24 | End: 2024-09-24

## 2024-09-24 RX ORDER — LIDOCAINE HYDROCHLORIDE 20 MG/ML
JELLY TOPICAL ONCE
OUTPATIENT
Start: 2024-09-24 | End: 2024-09-24

## 2024-09-24 RX ORDER — SODIUM CHLOR/HYPOCHLOROUS ACID 0.033 %
SOLUTION, IRRIGATION IRRIGATION ONCE
OUTPATIENT
Start: 2024-09-24 | End: 2024-09-24

## 2024-09-24 RX ORDER — NEOMYCIN/BACITRACIN/POLYMYXINB 3.5-400-5K
OINTMENT (GRAM) TOPICAL ONCE
OUTPATIENT
Start: 2024-09-24 | End: 2024-09-24

## 2024-09-24 RX ORDER — BETAMETHASONE DIPROPIONATE 0.5 MG/G
CREAM TOPICAL ONCE
OUTPATIENT
Start: 2024-09-24 | End: 2024-09-24

## 2024-09-24 RX ORDER — LIDOCAINE 40 MG/G
CREAM TOPICAL ONCE
OUTPATIENT
Start: 2024-09-24 | End: 2024-09-24

## 2024-09-24 RX ORDER — GENTAMICIN SULFATE 1 MG/G
OINTMENT TOPICAL ONCE
OUTPATIENT
Start: 2024-09-24 | End: 2024-09-24

## 2024-09-24 RX ORDER — TRIAMCINOLONE ACETONIDE 1 MG/G
OINTMENT TOPICAL ONCE
OUTPATIENT
Start: 2024-09-24 | End: 2024-09-24

## 2024-09-24 RX ORDER — CLOBETASOL PROPIONATE 0.5 MG/G
OINTMENT TOPICAL ONCE
OUTPATIENT
Start: 2024-09-24 | End: 2024-09-24

## 2024-09-24 RX ADMIN — LIDOCAINE HYDROCHLORIDE: 20 JELLY TOPICAL at 14:26

## 2024-09-24 ASSESSMENT — PAIN SCALES - GENERAL: PAINLEVEL_OUTOF10: 3

## 2024-09-25 ENCOUNTER — TELEPHONE (OUTPATIENT)
Age: 39
End: 2024-09-25

## 2024-10-08 ENCOUNTER — OFFICE VISIT (OUTPATIENT)
Dept: ORTHOPEDIC SURGERY | Age: 39
End: 2024-10-08
Payer: COMMERCIAL

## 2024-10-08 DIAGNOSIS — L97.413 DIABETIC ULCER OF RIGHT MIDFOOT ASSOCIATED WITH TYPE 2 DIABETES MELLITUS, WITH NECROSIS OF MUSCLE (HCC): Primary | ICD-10-CM

## 2024-10-08 DIAGNOSIS — E11.621 DIABETIC ULCER OF RIGHT MIDFOOT ASSOCIATED WITH TYPE 2 DIABETES MELLITUS, WITH NECROSIS OF MUSCLE (HCC): Primary | ICD-10-CM

## 2024-10-08 PROCEDURE — 3052F HG A1C>EQUAL 8.0%<EQUAL 9.0%: CPT | Performed by: ORTHOPAEDIC SURGERY

## 2024-10-08 PROCEDURE — 99213 OFFICE O/P EST LOW 20 MIN: CPT | Performed by: ORTHOPAEDIC SURGERY

## 2024-10-08 NOTE — PROGRESS NOTES
Name: Deepa Cruz  YOB: 1985  Gender: female  MRN: [id number]    Plan:    We discussed the need for a transmetatarsal today.  She has not ready for that.  She wants to consider a right fifth ray amputation.  She will come back at a later date to discuss surgery.    Right fifth ray amputation-wound debridement-circumferential ankle         Procedure: Right Foot I & D, Debridement And Metatarsal  Bone Excision - Right    Surgery Date: 1/10/2024      Subjective: Denies fevers chills or infection.  Denies any signs of spreading erythema.  She states the wound is getting more shallow.    3/5/2024-patient states her plantar wound is almost completely healed.  She continues to have an open lateral foot wound.  She has her last antibiotic infusion later this week.  She continues to see  for wound care.  Patient is still very adamant that she does not want any amputation and would like to continue continue with wound care.    10/8/24-she presents today with the lateral fifth metatarsal head wound that probes down to bone.  She is been treated with wound care.  She knows she needs a transmetatarsal amputation.  She presents today discuss surgery.    ROS: Patient Denies fever/chills, headache, visual changes, chest pain, shortness of breath, and nausea/vomiting/diarrhea     Exam:     Wound underneath the fifth metatarsal head.  Approximate 2 cm x 2 cm red beefy tissue and lateral fifth metatarsal head ulceration.  No signs of acute fractures.  No signs of instability of the foot.  2+ dorsal pedis pulse.  No sensation to the foot.  Ulceration is intact with no signs of significant spreading of redness erythema.  It is isolated to the lateral foot    Assessment and plan:  I explained to her that she needs a transmetatarsal potation.  At this point I do not think is much else we can do.  She wants to know what her fifth ray amputation.  She will come back at a later date when she is ready to

## 2024-10-11 ENCOUNTER — HOSPITAL ENCOUNTER (OUTPATIENT)
Dept: WOUND CARE | Age: 39
Discharge: HOME | End: 2024-10-11
Attending: FAMILY MEDICINE
Payer: COMMERCIAL

## 2024-10-11 VITALS
WEIGHT: 228 LBS | RESPIRATION RATE: 18 BRPM | BODY MASS INDEX: 34.56 KG/M2 | HEIGHT: 68 IN | DIASTOLIC BLOOD PRESSURE: 88 MMHG | SYSTOLIC BLOOD PRESSURE: 134 MMHG | OXYGEN SATURATION: 98 % | TEMPERATURE: 98.1 F | HEART RATE: 89 BPM

## 2024-10-11 DIAGNOSIS — E11.621 DIABETIC ULCER OF RIGHT MIDFOOT ASSOCIATED WITH TYPE 2 DIABETES MELLITUS, WITH NECROSIS OF MUSCLE (HCC): ICD-10-CM

## 2024-10-11 DIAGNOSIS — M14.679 CHARCOT'S ARTHROPATHY OF FOREFOOT: Primary | ICD-10-CM

## 2024-10-11 DIAGNOSIS — M86.671 CHRONIC REFRACTORY OSTEOMYELITIS OF FOOT, RIGHT: ICD-10-CM

## 2024-10-11 DIAGNOSIS — L97.413 DIABETIC ULCER OF RIGHT MIDFOOT ASSOCIATED WITH TYPE 2 DIABETES MELLITUS, WITH NECROSIS OF MUSCLE (HCC): ICD-10-CM

## 2024-10-11 PROCEDURE — 11042 DBRDMT SUBQ TIS 1ST 20SQCM/<: CPT

## 2024-10-11 RX ORDER — MUPIROCIN 20 MG/G
OINTMENT TOPICAL ONCE
OUTPATIENT
Start: 2024-10-11 | End: 2024-10-11

## 2024-10-11 RX ORDER — LIDOCAINE 50 MG/G
OINTMENT TOPICAL ONCE
OUTPATIENT
Start: 2024-10-11 | End: 2024-10-11

## 2024-10-11 RX ORDER — BACITRACIN ZINC AND POLYMYXIN B SULFATE 500; 1000 [USP'U]/G; [USP'U]/G
OINTMENT TOPICAL ONCE
OUTPATIENT
Start: 2024-10-11 | End: 2024-10-11

## 2024-10-11 RX ORDER — NEOMYCIN/BACITRACIN/POLYMYXINB 3.5-400-5K
OINTMENT (GRAM) TOPICAL ONCE
OUTPATIENT
Start: 2024-10-11 | End: 2024-10-11

## 2024-10-11 RX ORDER — BETAMETHASONE DIPROPIONATE 0.5 MG/G
CREAM TOPICAL ONCE
OUTPATIENT
Start: 2024-10-11 | End: 2024-10-11

## 2024-10-11 RX ORDER — LIDOCAINE 40 MG/G
CREAM TOPICAL ONCE
OUTPATIENT
Start: 2024-10-11 | End: 2024-10-11

## 2024-10-11 RX ORDER — SODIUM CHLOR/HYPOCHLOROUS ACID 0.033 %
SOLUTION, IRRIGATION IRRIGATION ONCE
OUTPATIENT
Start: 2024-10-11 | End: 2024-10-11

## 2024-10-11 RX ORDER — LIDOCAINE HYDROCHLORIDE 20 MG/ML
JELLY TOPICAL ONCE
OUTPATIENT
Start: 2024-10-11 | End: 2024-10-11

## 2024-10-11 RX ORDER — CLOBETASOL PROPIONATE 0.5 MG/G
OINTMENT TOPICAL ONCE
OUTPATIENT
Start: 2024-10-11 | End: 2024-10-11

## 2024-10-11 RX ORDER — TRIAMCINOLONE ACETONIDE 1 MG/G
OINTMENT TOPICAL ONCE
OUTPATIENT
Start: 2024-10-11 | End: 2024-10-11

## 2024-10-11 RX ORDER — GINSENG 100 MG
CAPSULE ORAL ONCE
OUTPATIENT
Start: 2024-10-11 | End: 2024-10-11

## 2024-10-11 RX ORDER — LIDOCAINE HYDROCHLORIDE 40 MG/ML
SOLUTION TOPICAL ONCE
OUTPATIENT
Start: 2024-10-11 | End: 2024-10-11

## 2024-10-11 RX ORDER — GENTAMICIN SULFATE 1 MG/G
OINTMENT TOPICAL ONCE
OUTPATIENT
Start: 2024-10-11 | End: 2024-10-11

## 2024-10-11 RX ORDER — LIDOCAINE HYDROCHLORIDE 20 MG/ML
JELLY TOPICAL ONCE
Status: COMPLETED | OUTPATIENT
Start: 2024-10-11 | End: 2024-10-11

## 2024-10-11 RX ADMIN — LIDOCAINE HYDROCHLORIDE: 20 JELLY TOPICAL at 16:07

## 2024-10-11 NOTE — FLOWSHEET NOTE
10/11/24 1130   Wound 01/30/24 Foot Right;Lateral #2 Right lateral foot   Date First Assessed/Time First Assessed: 01/30/24 1340   Present on Original Admission: Yes  Wound Approximate Age at First Assessment (Weeks): 2 weeks  Primary Wound Type: Diabetic Ulcer  Location: Foot  Wound Location Orientation: Right;Lateral  Wou...   Wound Image     Wound Etiology Diabetic Elizabeth 3   Dressing Status Old drainage noted   Wound Cleansed Cleansed with saline   Dressing/Treatment Alginate with Ag   Offloading for Diabetic Foot Ulcers Post op shoe   Wound Length (cm) 2.7 cm   Wound Width (cm) 2.3 cm   Wound Depth (cm) 0.8 cm   Wound Surface Area (cm^2) 6.21 cm^2   Change in Wound Size % (l*w) -7.07   Wound Volume (cm^3) 4.968 cm^3   Wound Healing % -757   Post-Procedure Length (cm) 2.7 cm   Post-Procedure Width (cm) 2.3 cm   Post-Procedure Depth (cm) 0.8 cm   Post-Procedure Surface Area (cm^2) 6.21 cm^2   Post-Procedure Volume (cm^3) 4.968 cm^3   Wound Assessment Pink/red   Drainage Amount Large (50-75% saturated)   Drainage Description Serosanguinous   Odor Mild   Emily-wound Assessment Intact   Wound Thickness Description not for Pressure Injury Full thickness   Pain Assessment   Pain Assessment 0-10     Pre- and post-debridement

## 2024-10-11 NOTE — DISCHARGE INSTRUCTIONS
Return Appointment:  2 week with Cristian Da Silva DO     Instructions:  Right lateral foot;  Clean with normal saline or Vashe.  Apply alginate with silver. Cut product to wound size and apply to wound bed.  Secure with opti-lock, roll gauze.  Change daily and as needed     Patient states she cannot use knee scooter or wear Defender boot. Currently wearing post-op shoe.  Continue Antibiotics as prescribed (Cipro and Doxycycline)     Please increase dietary protein to at least 100 grams per day to support cell rejuvenation.   May use supplements such as Ensure Max, Austin, & Glucerna (samples & coupons provided at first visit).   Be sure to spread intake throughout the day for improved absorption.       Dr. Nicole recommended forefoot amputation; patient plans to proceed with 5th metatarsal amputation at beginning of the 2025 per patient report.

## 2024-10-11 NOTE — WOUND CARE
Discharge Instructions for  [facility]  [address] [place] [address]  [address] [place] [address]  Phone [phone]   Fax [phone]      NAME:  Deepa Cruz  YOB: 1985  MEDICAL RECORD NUMBER:  [id number]  DATE:  10/11/2024    Return Appointment:  2 week with Cristian Da Silva DO    Instructions:  Right lateral foot;  Clean with normal saline or Vashe.  Apply alginate with silver. Cut product to wound size and apply to wound bed.  Secure with opti-lock, roll gauze.  Change daily and as needed    Patient states she cannot use knee scooter or wear Defender boot. Currently wearing post-op shoe.  Continue Antibiotics as prescribed (Cipro and Doxycycline)    Please increase dietary protein to at least 100 grams per day to support cell rejuvenation.   May use supplements such as Ensure Max, Austin, & Glucerna (samples & coupons provided at first visit).   Be sure to spread intake throughout the day for improved absorption.      Dr. Nicole recommended forefoot amputation; patient plans to proceed with 5th metatarsal amputation at beginning of the 2025 per patient report.    Should you experience increased redness, swelling, pain, foul odor, size of wound(s), or have a temperature over 101 degrees please contact the [facility]aling Center at [phone] or if after hours contact your primary care physician or go to the hospital emergency department.    PLEASE NOTE: IF YOU ARE UNABLE TO OBTAIN WOUND SUPPLIES, CONTINUE TO USE THE SUPPLIES YOU HAVE AVAILABLE UNTIL YOU ARE ABLE TO REACH US. IT IS MOST IMPORTANT TO KEEP THE WOUND COVERED AT ALL TIMES.    Electronically signed Pretty Darling PT, [facility] on 10/11/2024 at 12:12 PM

## 2024-10-14 ENCOUNTER — CLINICAL DOCUMENTATION (OUTPATIENT)
Dept: ORTHOPEDIC SURGERY | Age: 39
End: 2024-10-14

## 2024-10-22 ENCOUNTER — HOSPITAL ENCOUNTER (OUTPATIENT)
Dept: WOUND CARE | Age: 39
Discharge: HOME | End: 2024-10-22
Payer: COMMERCIAL

## 2024-10-22 VITALS
HEIGHT: 68 IN | DIASTOLIC BLOOD PRESSURE: 80 MMHG | TEMPERATURE: 98.4 F | RESPIRATION RATE: 18 BRPM | SYSTOLIC BLOOD PRESSURE: 118 MMHG | WEIGHT: 226 LBS | BODY MASS INDEX: 34.25 KG/M2 | OXYGEN SATURATION: 99 % | HEART RATE: 100 BPM

## 2024-10-22 DIAGNOSIS — M14.679 CHARCOT'S ARTHROPATHY OF FOREFOOT: Primary | ICD-10-CM

## 2024-10-22 DIAGNOSIS — M86.671 CHRONIC REFRACTORY OSTEOMYELITIS OF FOOT, RIGHT: ICD-10-CM

## 2024-10-22 DIAGNOSIS — L97.413 DIABETIC ULCER OF RIGHT MIDFOOT ASSOCIATED WITH TYPE 2 DIABETES MELLITUS, WITH NECROSIS OF MUSCLE (HCC): ICD-10-CM

## 2024-10-22 DIAGNOSIS — E11.621 DIABETIC ULCER OF RIGHT MIDFOOT ASSOCIATED WITH TYPE 2 DIABETES MELLITUS, WITH NECROSIS OF MUSCLE (HCC): ICD-10-CM

## 2024-10-22 PROCEDURE — 99213 OFFICE O/P EST LOW 20 MIN: CPT

## 2024-10-22 RX ORDER — LIDOCAINE HYDROCHLORIDE 20 MG/ML
JELLY TOPICAL ONCE
Status: DISCONTINUED | OUTPATIENT
Start: 2024-10-22 | End: 2024-10-23 | Stop reason: HOSPADM

## 2024-10-22 RX ORDER — LIDOCAINE HYDROCHLORIDE 40 MG/ML
SOLUTION TOPICAL ONCE
OUTPATIENT
Start: 2024-10-22 | End: 2024-10-22

## 2024-10-22 RX ORDER — CIPROFLOXACIN 500 MG/1
500 TABLET, FILM COATED ORAL 2 TIMES DAILY
Qty: 60 TABLET | Refills: 1 | Status: SHIPPED | OUTPATIENT
Start: 2024-10-22 | End: 2024-12-21

## 2024-10-22 RX ORDER — BACITRACIN ZINC AND POLYMYXIN B SULFATE 500; 1000 [USP'U]/G; [USP'U]/G
OINTMENT TOPICAL ONCE
OUTPATIENT
Start: 2024-10-22 | End: 2024-10-22

## 2024-10-22 RX ORDER — CLOBETASOL PROPIONATE 0.5 MG/G
OINTMENT TOPICAL ONCE
OUTPATIENT
Start: 2024-10-22 | End: 2024-10-22

## 2024-10-22 RX ORDER — MUPIROCIN 20 MG/G
OINTMENT TOPICAL ONCE
OUTPATIENT
Start: 2024-10-22 | End: 2024-10-22

## 2024-10-22 RX ORDER — SODIUM CHLOR/HYPOCHLOROUS ACID 0.033 %
SOLUTION, IRRIGATION IRRIGATION ONCE
OUTPATIENT
Start: 2024-10-22 | End: 2024-10-22

## 2024-10-22 RX ORDER — TRIAMCINOLONE ACETONIDE 1 MG/G
OINTMENT TOPICAL ONCE
OUTPATIENT
Start: 2024-10-22 | End: 2024-10-22

## 2024-10-22 RX ORDER — GENTAMICIN SULFATE 1 MG/G
OINTMENT TOPICAL ONCE
OUTPATIENT
Start: 2024-10-22 | End: 2024-10-22

## 2024-10-22 RX ORDER — LIDOCAINE HYDROCHLORIDE 20 MG/ML
JELLY TOPICAL ONCE
OUTPATIENT
Start: 2024-10-22 | End: 2024-10-22

## 2024-10-22 RX ORDER — DOXYCYCLINE HYCLATE 100 MG
100 TABLET ORAL 2 TIMES DAILY
Qty: 60 TABLET | Refills: 1 | Status: SHIPPED | OUTPATIENT
Start: 2024-10-22 | End: 2024-12-21

## 2024-10-22 RX ORDER — NEOMYCIN/BACITRACIN/POLYMYXINB 3.5-400-5K
OINTMENT (GRAM) TOPICAL ONCE
OUTPATIENT
Start: 2024-10-22 | End: 2024-10-22

## 2024-10-22 RX ORDER — GINSENG 100 MG
CAPSULE ORAL ONCE
OUTPATIENT
Start: 2024-10-22 | End: 2024-10-22

## 2024-10-22 RX ORDER — LIDOCAINE 50 MG/G
OINTMENT TOPICAL ONCE
OUTPATIENT
Start: 2024-10-22 | End: 2024-10-22

## 2024-10-22 RX ORDER — LIDOCAINE 40 MG/G
CREAM TOPICAL ONCE
OUTPATIENT
Start: 2024-10-22 | End: 2024-10-22

## 2024-10-22 RX ORDER — BETAMETHASONE DIPROPIONATE 0.5 MG/G
CREAM TOPICAL ONCE
OUTPATIENT
Start: 2024-10-22 | End: 2024-10-22

## 2024-10-22 NOTE — FLOWSHEET NOTE
10/22/24 1451   Right Leg Edema Point of Measurement   Leg circumference 40 cm   Ankle circumference 22 cm   Foot circumference 23.5 cm   Compression Therapy Compression not ordered   Wound 01/30/24 Foot Right;Lateral #2 Right lateral foot   Date First Assessed/Time First Assessed: 01/30/24 1340   Present on Original Admission: Yes  Wound Approximate Age at First Assessment (Weeks): 2 weeks  Primary Wound Type: Diabetic Ulcer  Location: Foot  Wound Location Orientation: Right;Lateral  Wou...   Wound Image    Wound Etiology Diabetic Elizabeth 3   Dressing Status Old drainage noted   Wound Cleansed Cleansed with saline   Dressing/Treatment Alginate with Ag   Offloading for Diabetic Foot Ulcers Post op shoe   Wound Length (cm) 2.7 cm   Wound Width (cm) 2.5 cm   Wound Depth (cm) 0.6 cm   Wound Surface Area (cm^2) 6.75 cm^2   Change in Wound Size % (l*w) -16.38   Wound Volume (cm^3) 4.05 cm^3   Wound Healing % -598   Wound Assessment Hyper granulation tissue   Drainage Amount Moderate (25-50%)   Drainage Description Serosanguinous   Odor None   Emily-wound Assessment Maceration   Wound Thickness Description not for Pressure Injury Full thickness   Pain Assessment   Pain Assessment None - Denies Pain

## 2024-10-22 NOTE — WOUND CARE
Discharge Instructions for  [facility]  [address] [place] [address]  [address] [place] [address]  Phone [phone]   Fax [phone]      NAME:  Deepa Cruz  YOB: 1985  MEDICAL RECORD NUMBER:  [id number]  DATE:  10/22/2024    Return Appointment:  2 week with Cristian Da Silva DO    Instructions:  Right lateral foot;  Clean with normal saline or Vashe.  Apply alginate with silver. Cut product to wound size and apply to wound bed.  Secure with opti-lock, roll gauze.  Change daily and as needed    Patient states she cannot use knee scooter or wear Defender boot. Currently wearing post-op shoe.  Continue Antibiotics as prescribed (Cipro and Doxycycline); refills sent to pharmacy today    Please increase dietary protein to at least 100 grams per day to support cell rejuvenation.   May use supplements such as Ensure Max, Austin, & Glucerna (samples & coupons provided at first visit).   Be sure to spread intake throughout the day for improved absorption.      Dr. Nicole recommended forefoot amputation; patient plans to proceed with 5th metatarsal amputation at beginning of the 2025 per patient report.    Should you experience increased redness, swelling, pain, foul odor, size of wound(s), or have a temperature over 101 degrees please contact the [facility]aling Center at [phone] or if after hours contact your primary care physician or go to the hospital emergency department.    PLEASE NOTE: IF YOU ARE UNABLE TO OBTAIN WOUND SUPPLIES, CONTINUE TO USE THE SUPPLIES YOU HAVE AVAILABLE UNTIL YOU ARE ABLE TO REACH US. IT IS MOST IMPORTANT TO KEEP THE WOUND COVERED AT ALL TIMES.    Electronically signed Pretty Darling PT, [facility] on 10/22/2024 at 3:02 PM

## 2024-10-22 NOTE — DISCHARGE INSTRUCTIONS
Return Appointment:  3 weeks with Cristian Da Silva DO     Instructions:  Right lateral foot;  Clean with normal saline or Vashe.  Apply alginate with silver. Cut product to wound size and apply to wound bed.  Secure with opti-lock, roll gauze.  Change daily and as needed     Patient states she cannot use knee scooter or wear Defender boot. Currently wearing post-op shoe.  Continue Antibiotics as prescribed (Cipro and Doxycycline); refills sent to pharmacy today     Please increase dietary protein to at least 100 grams per day to support cell rejuvenation.   May use supplements such as Ensure Max, Austin, & Glucerna (samples & coupons provided at first visit).   Be sure to spread intake throughout the day for improved absorption.       Dr. Nicole recommended forefoot amputation; patient plans to proceed with 5th metatarsal amputation at beginning of the 2025 per patient report.

## 2024-10-30 ENCOUNTER — TELEPHONE (OUTPATIENT)
Dept: ORTHOPEDIC SURGERY | Age: 39
End: 2024-10-30

## 2024-11-12 NOTE — PROGRESS NOTES
Name: Deepa Cruz  YOB: 1985  Gender: female  MRN: [id number]  Date of Encounter:  11/15/2024       CHIEF COMPLAINT:     Chief Complaint   Patient presents with    Results     Left Humerus MRI        SUBJECTIVE/OBJECTIVE:      HPI:    Patient is a 38 y.o. pleasant female who presents today for a return evaluation of her left shoulder.    Working diagnosis:   1. Adhesive capsulitis of left shoulder    2. Effusion of shoulder joint, left       LOV: 9/19/2024     Recall hx:     Ms Cruz has a PMH DM2, osteomyelitis of right foot, Charcot foot, HTN, asthma. She presents for left shoulder pain that started 8/6/24 while at work. 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 There was no twist event, pop or tear, but sudden sharp pain. She has had anterior and lateral pain since that time, made worse with abducting the shoulder. She is currently on light duty. This is her nondominant arm. She denies numbness or tingling. She has a MRI scheduled next Tuesday at [facility]. She has been taking a muscle relaxer and NSAIDs. She has tried no other treatment.     11/15/24: Her shoulder is not in as much pain as her first visit. She denies redness, swelling, warmth, fevers, chills. She has a chronic wound on her right foot but lab studies have been normal. She has found her PT experience good so far and thinks it's helping.     PAST HISTORY:   Past medical, surgical, family, social history and allergies reviewed by me. Unchanged from prior visit.     REVIEW OF SYSTEMS:   As noted in HPI.     PHYSICAL EXAMINATION:     Gen: Well-developed, no acute distress   HEENT: NC/AT, EOMI   Neck: Trachea midline, normal ROM   CV: Regular rhythm by palpation of distal pulse, normal capillary refill   Pulm: No respiratory distress, no stridor   Psychiatric: Well oriented, normal mood and affect.   Skin: No rashes, lesions or ulcers, normal temperature, turgor, and texture on uninvolved extremity.

## 2024-11-13 ENCOUNTER — HOSPITAL ENCOUNTER (OUTPATIENT)
Dept: WOUND CARE | Age: 39
Discharge: HOME | End: 2024-11-13
Payer: COMMERCIAL

## 2024-11-13 VITALS
RESPIRATION RATE: 18 BRPM | SYSTOLIC BLOOD PRESSURE: 138 MMHG | WEIGHT: 224.2 LBS | TEMPERATURE: 97.3 F | HEART RATE: 97 BPM | BODY MASS INDEX: 33.98 KG/M2 | DIASTOLIC BLOOD PRESSURE: 85 MMHG | HEIGHT: 68 IN

## 2024-11-13 DIAGNOSIS — L97.413 DIABETIC ULCER OF RIGHT MIDFOOT ASSOCIATED WITH TYPE 2 DIABETES MELLITUS, WITH NECROSIS OF MUSCLE (HCC): Primary | Chronic | ICD-10-CM

## 2024-11-13 DIAGNOSIS — E11.621 DIABETIC ULCER OF RIGHT MIDFOOT ASSOCIATED WITH TYPE 2 DIABETES MELLITUS, WITH NECROSIS OF MUSCLE (HCC): Primary | Chronic | ICD-10-CM

## 2024-11-13 DIAGNOSIS — M14.679 CHARCOT'S ARTHROPATHY OF FOREFOOT: ICD-10-CM

## 2024-11-13 DIAGNOSIS — M86.671 CHRONIC REFRACTORY OSTEOMYELITIS OF FOOT, RIGHT: Chronic | ICD-10-CM

## 2024-11-13 PROCEDURE — 11042 DBRDMT SUBQ TIS 1ST 20SQCM/<: CPT

## 2024-11-13 NOTE — FLOWSHEET NOTE
11/13/24 1332   Wound 01/30/24 Foot Right;Lateral #2 Right lateral foot   Date First Assessed/Time First Assessed: 01/30/24 1340   Present on Original Admission: Yes  Wound Approximate Age at First Assessment (Weeks): 2 weeks  Primary Wound Type: Diabetic Ulcer  Location: Foot  Wound Location Orientation: Right;Lateral  Wou...   Wound Image     Wound Etiology Diabetic Elizabeth 3   Dressing Status Old drainage noted   Wound Cleansed Cleansed with saline   Dressing/Treatment Alginate with Ag   Offloading for Diabetic Foot Ulcers Post op shoe   Wound Length (cm) 2.3 cm   Wound Width (cm) 2.1 cm   Wound Depth (cm) 0.4 cm   Wound Surface Area (cm^2) 4.83 cm^2   Change in Wound Size % (l*w) 16.72   Wound Volume (cm^3) 1.932 cm^3   Wound Healing % -233   Post-Procedure Length (cm) 2.3 cm   Post-Procedure Width (cm) 2.2 cm   Post-Procedure Depth (cm) 0.4 cm   Post-Procedure Surface Area (cm^2) 5.06 cm^2   Post-Procedure Volume (cm^3) 2.024 cm^3   Wound Assessment Hyper granulation tissue;Exposed structure bone   Drainage Amount Moderate (25-50%)   Drainage Description Serosanguinous   Odor None   Emily-wound Assessment Hyperkeratosis (callous)   Wound Thickness Description not for Pressure Injury Full thickness   Pain Assessment   Pain Assessment None - Denies Pain

## 2024-11-13 NOTE — WOUND CARE
Discharge Instructions for  [facility]  [address] [place] [address]  [address] [place] [address]  Phone [phone]   Fax [phone]      NAME:  Deepa Cruz  YOB: 1985  MEDICAL RECORD NUMBER:  [id number]  DATE:  11/13/2024    Return Appointment:  3 weeks with Cristian Da Silva DO    Instructions:  Right lateral foot;  Clean with normal saline or Vashe.  Apply alginate with silver. Cut product to wound size and apply to wound bed.  Secure with opti-lock, roll gauze.  Change daily and as needed    Patient states she cannot use knee scooter or wear Defender boot. Currently wearing post-op shoe.  Continue Antibiotics as prescribed (Cipro and Doxycycline); refills sent to pharmacy today    Please increase dietary protein to at least 100 grams per day to support cell rejuvenation.   May use supplements such as Ensure Max, Austin, & Glucerna (samples & coupons provided at first visit).   Be sure to spread intake throughout the day for improved absorption.      Dr. Nicole recommended forefoot amputation; patient plans to proceed with 5th metatarsal amputation at beginning of the 2025 per patient report.    Should you experience increased redness, swelling, pain, foul odor, size of wound(s), or have a temperature over 101 degrees please contact the [facility]aling Center at [phone] or if after hours contact your primary care physician or go to the hospital emergency department.    PLEASE NOTE: IF YOU ARE UNABLE TO OBTAIN WOUND SUPPLIES, CONTINUE TO USE THE SUPPLIES YOU HAVE AVAILABLE UNTIL YOU ARE ABLE TO REACH US. IT IS MOST IMPORTANT TO KEEP THE WOUND COVERED AT ALL TIMES.    Electronically signed GELA JONES RN, [facility] on 11/13/2024 at 2:11 PM

## 2024-11-14 ENCOUNTER — OFFICE VISIT (OUTPATIENT)
Dept: ORTHOPEDIC SURGERY | Age: 39
End: 2024-11-14
Payer: COMMERCIAL

## 2024-11-14 DIAGNOSIS — M25.412: ICD-10-CM

## 2024-11-14 DIAGNOSIS — M75.02 ADHESIVE CAPSULITIS OF LEFT SHOULDER: Primary | ICD-10-CM

## 2024-11-14 PROCEDURE — 99213 OFFICE O/P EST LOW 20 MIN: CPT | Performed by: STUDENT IN AN ORGANIZED HEALTH CARE EDUCATION/TRAINING PROGRAM

## 2024-11-19 ENCOUNTER — TELEPHONE (OUTPATIENT)
Dept: ORTHOPEDIC SURGERY | Age: 39
End: 2024-11-19

## 2024-11-20 ENCOUNTER — TELEPHONE (OUTPATIENT)
Dept: ORTHOPEDIC SURGERY | Age: 39
End: 2024-11-20

## 2024-11-20 DIAGNOSIS — L08.9 RIGHT FOOT INFECTION: ICD-10-CM

## 2024-12-02 DIAGNOSIS — G89.18 POST-OP PAIN: Primary | ICD-10-CM

## 2024-12-03 ENCOUNTER — ANESTHESIA EVENT (OUTPATIENT)
Dept: SURGERY | Age: 39
End: 2024-12-03
Payer: COMMERCIAL

## 2024-12-03 RX ORDER — ONDANSETRON 4 MG/1
4 TABLET, FILM COATED ORAL DAILY PRN
Qty: 30 TABLET | Refills: 0 | Status: SHIPPED | OUTPATIENT
Start: 2024-12-03

## 2024-12-03 RX ORDER — DOCUSATE SODIUM 100 MG/1
100 CAPSULE, LIQUID FILLED ORAL DAILY PRN
Qty: 30 CAPSULE | Refills: 0 | Status: SHIPPED | OUTPATIENT
Start: 2024-12-03

## 2024-12-03 RX ORDER — OXYCODONE HYDROCHLORIDE 5 MG/1
5 TABLET ORAL EVERY 6 HOURS PRN
Qty: 20 TABLET | Refills: 0 | Status: SHIPPED | OUTPATIENT
Start: 2024-12-03 | End: 2024-12-08

## 2024-12-03 NOTE — PERIOP NOTE
Preop department called to notify patient of arrival time for scheduled procedure. Instructions given to   - Arrive at [facility] Entrance [address].  - No solid food after midnight & Please drink 32 ounces of water 2 hours prior to your arrival to avoid dehydration unless otherwise indicated. No gum, mints, or ice chips.   - Have a responsible adult to drive patient to the hospital, stay during surgery, and patient will need supervision 24 hours after anesthesia.   - Use antibacterial soap in shower the night before surgery and on the morning of surgery.       Was patient contacted: N  Voicemail left: Y  Numbers contacted: [phone]   Arrival time: 1030  Time to complete 32 ounces of water: 0830

## 2024-12-03 NOTE — PERIOP NOTE
Preop department called to notify patient of arrival time for scheduled procedure. Instructions given to   - Arrive at [facility] Entrance [address].  - No solid food after midnight & Please drink 32 ounces of water 2 hours prior to your arrival to avoid dehydration unless otherwise indicated. No gum, mints, or ice chips.   - Have a responsible adult to drive patient to the hospital, stay during surgery, and patient will need supervision 24 hours after anesthesia.   - Use antibacterial soap in shower the night before surgery and on the morning of surgery.       Was patient contacted: no  Voicemail left: yes-pts phone  Numbers contacted: [phone]   Arrival time: 0900  Time to complete 32 ounces of water:0700

## 2024-12-04 ENCOUNTER — HOSPITAL ENCOUNTER (OUTPATIENT)
Age: 39
Setting detail: OUTPATIENT SURGERY
Discharge: HOME | End: 2024-12-04
Attending: ORTHOPAEDIC SURGERY | Admitting: ORTHOPAEDIC SURGERY
Payer: COMMERCIAL

## 2024-12-04 ENCOUNTER — APPOINTMENT (OUTPATIENT)
Dept: GENERAL RADIOLOGY | Age: 39
End: 2024-12-04
Attending: ORTHOPAEDIC SURGERY
Payer: COMMERCIAL

## 2024-12-04 ENCOUNTER — ANESTHESIA (OUTPATIENT)
Dept: SURGERY | Age: 39
End: 2024-12-04
Payer: COMMERCIAL

## 2024-12-04 VITALS
SYSTOLIC BLOOD PRESSURE: 94 MMHG | OXYGEN SATURATION: 97 % | DIASTOLIC BLOOD PRESSURE: 58 MMHG | HEART RATE: 79 BPM | RESPIRATION RATE: 12 BRPM | TEMPERATURE: 98.5 F

## 2024-12-04 LAB
GLUCOSE BLD STRIP.AUTO-MCNC: 107 MG/DL (ref 65–100)
GLUCOSE BLD STRIP.AUTO-MCNC: 99 MG/DL (ref 65–100)
HCG UR QL: NEGATIVE
POTASSIUM BLD-SCNC: 4.5 MMOL/L (ref 3.5–5.1)
SERVICE CMNT-IMP: ABNORMAL
SERVICE CMNT-IMP: NORMAL

## 2024-12-04 PROCEDURE — 2709999900 HC NON-CHARGEABLE SUPPLY: Performed by: ORTHOPAEDIC SURGERY

## 2024-12-04 PROCEDURE — 6360000002 HC RX W HCPCS: Performed by: ANESTHESIOLOGY

## 2024-12-04 PROCEDURE — 87205 SMEAR GRAM STAIN: CPT

## 2024-12-04 PROCEDURE — 6360000002 HC RX W HCPCS: Performed by: PHYSICIAN ASSISTANT

## 2024-12-04 PROCEDURE — 6370000000 HC RX 637 (ALT 250 FOR IP): Performed by: ANESTHESIOLOGY

## 2024-12-04 PROCEDURE — 3700000000 HC ANESTHESIA ATTENDED CARE: Performed by: ORTHOPAEDIC SURGERY

## 2024-12-04 PROCEDURE — 3700000001 HC ADD 15 MINUTES (ANESTHESIA): Performed by: ORTHOPAEDIC SURGERY

## 2024-12-04 PROCEDURE — 7100000010 HC PHASE II RECOVERY - FIRST 15 MIN: Performed by: ORTHOPAEDIC SURGERY

## 2024-12-04 PROCEDURE — 3600000012 HC SURGERY LEVEL 2 ADDTL 15MIN: Performed by: ORTHOPAEDIC SURGERY

## 2024-12-04 PROCEDURE — 7100000000 HC PACU RECOVERY - FIRST 15 MIN: Performed by: ORTHOPAEDIC SURGERY

## 2024-12-04 PROCEDURE — 6360000002 HC RX W HCPCS

## 2024-12-04 PROCEDURE — 7100000001 HC PACU RECOVERY - ADDTL 15 MIN: Performed by: ORTHOPAEDIC SURGERY

## 2024-12-04 PROCEDURE — 81025 URINE PREGNANCY TEST: CPT

## 2024-12-04 PROCEDURE — 84132 ASSAY OF SERUM POTASSIUM: CPT

## 2024-12-04 PROCEDURE — 82962 GLUCOSE BLOOD TEST: CPT

## 2024-12-04 PROCEDURE — 87077 CULTURE AEROBIC IDENTIFY: CPT

## 2024-12-04 PROCEDURE — 2580000003 HC RX 258

## 2024-12-04 PROCEDURE — 76942 ECHO GUIDE FOR BIOPSY: CPT | Performed by: ANESTHESIOLOGY

## 2024-12-04 PROCEDURE — 2580000003 HC RX 258: Performed by: PHYSICIAN ASSISTANT

## 2024-12-04 PROCEDURE — 3600000002 HC SURGERY LEVEL 2 BASE: Performed by: ORTHOPAEDIC SURGERY

## 2024-12-04 PROCEDURE — 87070 CULTURE OTHR SPECIMN AEROBIC: CPT

## 2024-12-04 PROCEDURE — 2500000003 HC RX 250 WO HCPCS

## 2024-12-04 PROCEDURE — 87186 SC STD MICRODIL/AGAR DIL: CPT

## 2024-12-04 PROCEDURE — 7100000011 HC PHASE II RECOVERY - ADDTL 15 MIN: Performed by: ORTHOPAEDIC SURGERY

## 2024-12-04 PROCEDURE — 87075 CULTR BACTERIA EXCEPT BLOOD: CPT

## 2024-12-04 RX ORDER — SODIUM CHLORIDE, SODIUM LACTATE, POTASSIUM CHLORIDE, CALCIUM CHLORIDE 600; 310; 30; 20 MG/100ML; MG/100ML; MG/100ML; MG/100ML
INJECTION, SOLUTION INTRAVENOUS CONTINUOUS
Status: DISCONTINUED | OUTPATIENT
Start: 2024-12-04 | End: 2024-12-04 | Stop reason: HOSPADM

## 2024-12-04 RX ORDER — PROCHLORPERAZINE EDISYLATE 5 MG/ML
5 INJECTION INTRAMUSCULAR; INTRAVENOUS
Status: DISCONTINUED | OUTPATIENT
Start: 2024-12-04 | End: 2024-12-04 | Stop reason: HOSPADM

## 2024-12-04 RX ORDER — PROPOFOL 10 MG/ML
INJECTION, EMULSION INTRAVENOUS
Status: DISCONTINUED | OUTPATIENT
Start: 2024-12-04 | End: 2024-12-04 | Stop reason: SDUPTHER

## 2024-12-04 RX ORDER — SODIUM CHLORIDE 0.9 % (FLUSH) 0.9 %
5-40 SYRINGE (ML) INJECTION PRN
Status: DISCONTINUED | OUTPATIENT
Start: 2024-12-04 | End: 2024-12-04 | Stop reason: HOSPADM

## 2024-12-04 RX ORDER — LIDOCAINE HYDROCHLORIDE 10 MG/ML
1 INJECTION, SOLUTION INFILTRATION; PERINEURAL
Status: DISCONTINUED | OUTPATIENT
Start: 2024-12-04 | End: 2024-12-04 | Stop reason: HOSPADM

## 2024-12-04 RX ORDER — ACETAMINOPHEN 500 MG
1000 TABLET ORAL ONCE
Status: COMPLETED | OUTPATIENT
Start: 2024-12-04 | End: 2024-12-04

## 2024-12-04 RX ORDER — OXYCODONE HYDROCHLORIDE 5 MG/1
10 TABLET ORAL PRN
Status: DISCONTINUED | OUTPATIENT
Start: 2024-12-04 | End: 2024-12-04 | Stop reason: HOSPADM

## 2024-12-04 RX ORDER — ROPIVACAINE HYDROCHLORIDE 5 MG/ML
INJECTION, SOLUTION EPIDURAL; INFILTRATION; PERINEURAL
Status: DISCONTINUED | OUTPATIENT
Start: 2024-12-04 | End: 2024-12-04 | Stop reason: SDUPTHER

## 2024-12-04 RX ORDER — CIPROFLOXACIN 500 MG/1
500 TABLET, FILM COATED ORAL 2 TIMES DAILY
Qty: 60 TABLET | Refills: 0 | OUTPATIENT
Start: 2024-12-04

## 2024-12-04 RX ORDER — DIPHENHYDRAMINE HYDROCHLORIDE 50 MG/ML
12.5 INJECTION INTRAMUSCULAR; INTRAVENOUS
Status: DISCONTINUED | OUTPATIENT
Start: 2024-12-04 | End: 2024-12-04 | Stop reason: HOSPADM

## 2024-12-04 RX ORDER — DEXMEDETOMIDINE HYDROCHLORIDE 100 UG/ML
INJECTION, SOLUTION INTRAVENOUS
Status: DISCONTINUED | OUTPATIENT
Start: 2024-12-04 | End: 2024-12-04 | Stop reason: SDUPTHER

## 2024-12-04 RX ORDER — ONDANSETRON 2 MG/ML
4 INJECTION INTRAMUSCULAR; INTRAVENOUS
Status: DISCONTINUED | OUTPATIENT
Start: 2024-12-04 | End: 2024-12-04 | Stop reason: HOSPADM

## 2024-12-04 RX ORDER — MIDAZOLAM HYDROCHLORIDE 2 MG/2ML
2 INJECTION, SOLUTION INTRAMUSCULAR; INTRAVENOUS
Status: COMPLETED | OUTPATIENT
Start: 2024-12-04 | End: 2024-12-04

## 2024-12-04 RX ORDER — HYDROMORPHONE HYDROCHLORIDE 2 MG/ML
0.5 INJECTION, SOLUTION INTRAMUSCULAR; INTRAVENOUS; SUBCUTANEOUS EVERY 5 MIN PRN
Status: DISCONTINUED | OUTPATIENT
Start: 2024-12-04 | End: 2024-12-04 | Stop reason: HOSPADM

## 2024-12-04 RX ORDER — SODIUM CHLORIDE, SODIUM LACTATE, POTASSIUM CHLORIDE, CALCIUM CHLORIDE 600; 310; 30; 20 MG/100ML; MG/100ML; MG/100ML; MG/100ML
INJECTION, SOLUTION INTRAVENOUS
Status: DISCONTINUED | OUTPATIENT
Start: 2024-12-04 | End: 2024-12-04 | Stop reason: SDUPTHER

## 2024-12-04 RX ORDER — OXYCODONE HYDROCHLORIDE 5 MG/1
5 TABLET ORAL PRN
Status: DISCONTINUED | OUTPATIENT
Start: 2024-12-04 | End: 2024-12-04 | Stop reason: HOSPADM

## 2024-12-04 RX ORDER — SODIUM CHLORIDE 0.9 % (FLUSH) 0.9 %
5-40 SYRINGE (ML) INJECTION EVERY 12 HOURS SCHEDULED
Status: DISCONTINUED | OUTPATIENT
Start: 2024-12-04 | End: 2024-12-04 | Stop reason: HOSPADM

## 2024-12-04 RX ORDER — SODIUM CHLORIDE 9 MG/ML
INJECTION, SOLUTION INTRAVENOUS PRN
Status: DISCONTINUED | OUTPATIENT
Start: 2024-12-04 | End: 2024-12-04 | Stop reason: HOSPADM

## 2024-12-04 RX ORDER — NALOXONE HYDROCHLORIDE 0.4 MG/ML
INJECTION, SOLUTION INTRAMUSCULAR; INTRAVENOUS; SUBCUTANEOUS PRN
Status: DISCONTINUED | OUTPATIENT
Start: 2024-12-04 | End: 2024-12-04 | Stop reason: HOSPADM

## 2024-12-04 RX ORDER — LIDOCAINE HYDROCHLORIDE 20 MG/ML
INJECTION, SOLUTION EPIDURAL; INFILTRATION; INTRACAUDAL; PERINEURAL
Status: DISCONTINUED | OUTPATIENT
Start: 2024-12-04 | End: 2024-12-04 | Stop reason: SDUPTHER

## 2024-12-04 RX ADMIN — PROPOFOL 50 MCG/KG/MIN: 10 INJECTION, EMULSION INTRAVENOUS at 14:02

## 2024-12-04 RX ADMIN — LIDOCAINE HYDROCHLORIDE 40 MG: 20 INJECTION, SOLUTION EPIDURAL; INFILTRATION; INTRACAUDAL; PERINEURAL at 14:01

## 2024-12-04 RX ADMIN — SODIUM CHLORIDE, SODIUM LACTATE, POTASSIUM CHLORIDE, AND CALCIUM CHLORIDE: 600; 310; 30; 20 INJECTION, SOLUTION INTRAVENOUS at 13:56

## 2024-12-04 RX ADMIN — CEFAZOLIN 2000 MG: 2 INJECTION, POWDER, FOR SOLUTION INTRAMUSCULAR; INTRAVENOUS at 14:21

## 2024-12-04 RX ADMIN — PROPOFOL 30 MG: 10 INJECTION, EMULSION INTRAVENOUS at 14:01

## 2024-12-04 RX ADMIN — MIDAZOLAM 2 MG: 1 INJECTION INTRAMUSCULAR; INTRAVENOUS at 12:36

## 2024-12-04 RX ADMIN — ACETAMINOPHEN 1000 MG: 500 TABLET, FILM COATED ORAL at 11:38

## 2024-12-04 RX ADMIN — DEXMEDETOMIDINE 12 MCG: 100 INJECTION, SOLUTION, CONCENTRATE INTRAVENOUS at 14:23

## 2024-12-04 RX ADMIN — ROPIVACAINE HYDROCHLORIDE 20 ML: 5 INJECTION, SOLUTION EPIDURAL; INFILTRATION; PERINEURAL at 12:36

## 2024-12-04 RX ADMIN — DEXMEDETOMIDINE 10 MCG: 100 INJECTION, SOLUTION, CONCENTRATE INTRAVENOUS at 14:20

## 2024-12-04 RX ADMIN — DEXMEDETOMIDINE 20 MCG: 100 INJECTION, SOLUTION, CONCENTRATE INTRAVENOUS at 14:01

## 2024-12-04 ASSESSMENT — PAIN SCALES - GENERAL: PAINLEVEL_OUTOF10: 0

## 2024-12-04 NOTE — OP NOTE
Operative Note    Patient:Deepa Cruz  MRN: [id number]    Date Of Surgery: 12/4/2024    Surgeon: James Nicole MD    Assistant Surgeon: None    Procedure Performed:   right  Fifth metatarsectomy    Pre Op Diagnosis:  Right fifth ray osteomyelitis    Post Op Diagnosis:   same    Implants:   * No implants in log *    Anesthesia:  Monitor Anesthesia Care    Blood Loss:  10 cc    Tourniquet:  Estimated ankle 0 minutes    Pre Operative Abx:   Ancef 2g    Specimens/Cultures:  2 cultures taken of wound    Significant Findings:  Infection Present At Time Of Surgery (PATOS) (choose all levels that have infection present):  - Deep Infection (muscle/fascia) present as evidenced by fluid consistent with infection with exposed bone      Pre Operative Course:  Deepa Cruz is a 39 y.o. female rather than transmetatarsal amputation  who has a history of diabetic foot wounds and infection.  The decision was made to proceed with a right amputation    Operation In Detail:  Patient was evaluated in the preoperative area.  The right lower extremity was marked by me.  We had a long discussion about the procedure and postoperative protocols.  The patient was then brought back to the operating room suite and placed in the operating room table.  A timeout was taken to identify the patient, procedure being performed, and laterality.  After this the patient was prepped and draped in the normal sterile fashion using a Betadine solution and/or a ChloraPrep solution.  A timeout was then taken to identify the patient his name, date of birth, laterality, and procedure being performed.  We also identified allergies and any concerns about the operation.  Attention was then placed to the operative extremity.    Large ulcer underneath fifth metatarsal head noted.  2 deep cultures were taken.  Antibiotics were given.  The wound was noted to be in this ulceration.  Is a 1 cm x 1 cm by 1 cm ulcer that tracked down to the fifth metatarsal

## 2024-12-04 NOTE — ANESTHESIA POSTPROCEDURE EVALUATION
Department of Anesthesiology  Postprocedure Note    Patient: Deepa Cruz  MRN: [id number]  YOB: 1985  Date of evaluation: 12/4/2024    Procedure Summary       Date: 12/04/24 Room / Location: [facility] OP OR 02 / [facility]    Anesthesia Start: 1356 Anesthesia Stop: 1445    Procedure: Right fifth ray amputation-wound debridement (Right) Diagnosis:       Right foot infection      (Right foot infection [L08.9])    Surgeons: James Nicole MD Responsible Provider: Karson Em MD    Anesthesia Type: TIVA ASA Status: 3            Anesthesia Type: No value filed.    Marla Phase I: Marla Score: 8    Marla Phase II: Marla Score: 10    Anesthesia Post Evaluation    Patient location during evaluation: PACU  Patient participation: complete - patient participated  Level of consciousness: awake and alert  Airway patency: patent  Nausea & Vomiting: no nausea and no vomiting  Cardiovascular status: hemodynamically stable  Respiratory status: acceptable, nonlabored ventilation and spontaneous ventilation  Hydration status: euvolemic  Comments: BP (!) 94/58   Pulse 79   Temp 98.5 °F (36.9 °C) (Infrared)   Resp 12   SpO2 97%     Multimodal analgesia pain management approach  Pain management: adequate and satisfactory to patient        No notable events documented.

## 2024-12-04 NOTE — H&P
Update History & Physical    The Patient's History and Physical was reviewed   I discussed the surgery and patients medical condition with the patient.  The chart was reviewed with the patient and I examined the patient.    There was no change.  The surgical site was confirmed by the patient and me.  I had a long discussion with her again in preoperative area.  She is still not ready to proceed with a transmetatarsal amputation.  Due to her infection she will will consider a right fifth ray amputation understand that it may or may not work.  Went through a long significant detail discussion with her about how she needs a transmetatarsal amputation, however I do have significant concerns that if we do not get rid of the infection and debride the wound that she has the infection will spread and result in the BKA.  Therefore an attempt to still have a limb salvage possibility we will perform 1/5 amputation understanding that I still think a transmetatarsal amputation is the best long-term outcome    CV: RRR  RESP: CTAB    Plan:  The risk, benefits, expected outcome, and alternative to the recommended procedure have been discussed with the patient.  Patient understands and wants to proceed with the procedure.    Electronically signed by James Nicole MD on 12/04/24 11:03 AM      Name: Deepa Cruz  YOB: 1985  Gender: female  MRN: [id number]    Plan:    We discussed the need for a transmetatarsal today.  She has not ready for that.  She wants to consider a right fifth ray amputation.  She will come back at a later date to discuss surgery.    Right fifth ray amputation-wound debridement-circumferential ankle         Procedure: Right Foot I & D, Debridement And Metatarsal  Bone Excision - Right    Surgery Date: 1/10/2024      Subjective: Denies fevers chills or infection.  Denies any signs of spreading erythema.  She states the wound is getting more shallow.    3/5/2024-patient states her plantar

## 2024-12-04 NOTE — ANESTHESIA PRE PROCEDURE
Department of Anesthesiology  Preprocedure Note       Name:  Deepa Cruz   Age:  39 y.o.  :  1985                                          MRN:  [id number]         Date:  2024      Surgeon: Surgeon(s):  James Nicole MD    Procedure: Procedure(s):  Right fifth ray amputation-wound debridement    Medications prior to admission:   Prior to Admission medications    Medication Sig Start Date End Date Taking? Authorizing Provider   ciprofloxacin (CIPRO) 500 MG tablet Take 1 tablet by mouth 2 times daily 10/22/24 12/21/24 Yes Cristian Da Silva DO   doxycycline hyclate (VIBRA-TABS) 100 MG tablet Take 1 tablet by mouth 2 times daily 10/22/24 12/21/24 Yes Cristian Da Silva DO   FLUoxetine (PROZAC) 20 MG capsule  24  Yes ProviderJace MD TRESIBA FLEXTOUCH 200 UNIT/ML SOPN INJECT 40 UNITS UNDER THE SKIN TWICE DAILY 24  Yes ProviderJace MD   zolpidem (AMBIEN) 10 MG tablet Take 1 tablet by mouth nightly. 24  Yes ProviderJace MD   VRAYLAR 1.5 MG capsule Take 1 capsule by mouth daily   Yes ProviderJace MD   zolpidem (AMBIEN) 5 MG tablet TAKE 1 TABLET BY MOUTH AT BEDTIME AS NEEDED FOR INSOMNIA 3/26/24  Yes ProviderJace MD   butalbital-acetaminophen-caffeine (FIORICET, ESGIC) -40 MG per tablet 1 tablet as needed for severe headache up to twice a week 24  Yes ProviderJace MD   traZODone (DESYREL) 100 MG tablet Take 1 tablet by mouth nightly as needed 23  Yes Provider, MD Jace   acyclovir (ZOVIRAX) 200 MG capsule Take 1 capsule by mouth 2 times daily 23  Yes Jace Del Rio MD   insulin lispro (HUMALOG KWIKPEN) 200 UNIT/ML SOPN pen Inject 15 Units into the skin 3 times daily (with meals) 23  Yes Jace Del Rio MD   TRESIBA FLEXTOUCH 100 UNIT/ML SOPN Inject 80 Units into the skin at bedtime 23  Yes Jace Del Rio MD   FLUoxetine (PROZAC) 40 MG capsule Take 1 capsule by mouth daily

## 2024-12-04 NOTE — ANESTHESIA PROCEDURE NOTES
Peripheral Block    Patient location during procedure: holding area  Reason for block: post-op pain management  Start time: 12/4/2024 12:36 PM  End time: 12/4/2024 12:40 PM  Staffing  Anesthesiologist: Karson Em MD  Performed by: Karson Em MD  Authorized by: Karson Em MD    Preanesthetic Checklist  Completed: patient identified, IV checked, site marked, risks and benefits discussed, surgical/procedural consents, equipment checked, pre-op evaluation, timeout performed, anesthesia consent given, oxygen available, monitors applied/VS acknowledged, fire risk safety assessment completed and verbalized and blood product R/B/A discussed and consented  Peripheral Block   Patient position: supine (Leg on padded elevated pillow)  Prep: ChloraPrep  Provider prep: mask and sterile gloves  Patient monitoring: cardiac monitor, continuous pulse ox, frequent blood pressure checks, IV access, responsive to questions and oxygen  Block type: Ankle  Laterality: right  Injection technique: single-shot  Guidance: ultrasound guided  Local infiltration: lidocaine  Infiltration strength: 1 %  Local infiltration: lidocaine  Dose: 1 mL    Needle   Needle type: short-bevel   Needle gauge: 26 G  Needle localization: anatomical landmarks  Needle length: 8 cm  Assessment   Injection assessment: negative aspiration for heme, no paresthesia on injection, local visualized surrounding nerve on ultrasound and no intravascular symptoms  Paresthesia pain: none  Slow fractionated injection: yes  Hemodynamics: stable    Additional Notes  Time out at 1236  Medications Administered  ropivacaine (NAROPIN) injection 0.5% - Perineural   20 mL - 12/4/2024 12:36:00 PM

## 2024-12-04 NOTE — DISCHARGE INSTRUCTIONS
INSTRUCTIONS FOLLOWING FOOT SURGERY    ACTIVITY  Protected partial weight bearing on the heel only as tolerated in post op shoe after full sensation returns. Information regarding scooters, crutches and other assistive devices will have been discussed at your presurgical office visit these devices are to be used until told otherwise by the doctor or nurse practitioner.  Drain will be removed in two days, per Dr. Nicole.  Rest when you feel tired. Minimizing activity levels is highly recommended for the first 48 to 72 hours after surgery.  Follow up with MD in 2-3 weeks.  If you have any questions or concerns regarding your surgery or recovery please call our office.    Blood clot prevention:  If the surgery you had requires you to be nonweightbearing, in addition to the narcotic and antibiotic you will also take an 81 mg Aspirin. This will be taken until you are told to discontinue it. An over-the-counter 81 mg aspirin will work.  Get up and out of bed frequently. While in bed move the legs as much as possible.    DRESSING CARE   PLEASE DO NOT GET THE SURGICAL DRESSING WET.  It is recommended using a snug compressive device such as a cast bag to prevent the dressing from getting wet when bathing if you need assistance in any way with this please call our office.  Please make every effort to leave your postoperative dressing that was placed sterilely in the operating room in place until your first postoperative visit.  If bleeding through your bandage occurs you may reinforce the dressing with additional gauze and an Ace wrap.  Keep dressing clean and dry, and after it is removed continue to keep site clean and dry as able.  Don't put anything into the splint to relieve itching etc.     CALL YOUR DOCTOR IF YOU HAVE  Excessive bleeding that does not stop  Temperature of 101.5 degrees or above.  Redness, excessive swelling or bruising, and/or green or yellow, smelly discharge from incision.  Loss of sensation - cold,

## 2024-12-06 ENCOUNTER — OFFICE VISIT (OUTPATIENT)
Dept: ORTHOPEDIC SURGERY | Age: 39
End: 2024-12-06

## 2024-12-06 DIAGNOSIS — L08.9 RIGHT FOOT INFECTION: Primary | ICD-10-CM

## 2024-12-06 LAB
BACTERIA SPEC CULT: ABNORMAL
BACTERIA SPEC CULT: NORMAL
BACTERIA SPEC CULT: NORMAL
GRAM STN SPEC: ABNORMAL
GRAM STN SPEC: ABNORMAL
SERVICE CMNT-IMP: ABNORMAL
SERVICE CMNT-IMP: NORMAL
SERVICE CMNT-IMP: NORMAL

## 2024-12-06 PROCEDURE — 99024 POSTOP FOLLOW-UP VISIT: CPT | Performed by: ORTHOPAEDIC SURGERY

## 2024-12-06 RX ORDER — SULFAMETHOXAZOLE AND TRIMETHOPRIM 800; 160 MG/1; MG/1
1 TABLET ORAL 2 TIMES DAILY
Qty: 56 TABLET | Refills: 0 | OUTPATIENT
Start: 2024-12-06 | End: 2025-01-03

## 2024-12-06 RX ORDER — DOXYCYCLINE HYCLATE 100 MG
100 TABLET ORAL 2 TIMES DAILY
Qty: 28 TABLET | Refills: 0 | Status: SHIPPED | OUTPATIENT
Start: 2024-12-06 | End: 2024-12-20

## 2024-12-06 RX ORDER — CIPROFLOXACIN 500 MG/1
500 TABLET, FILM COATED ORAL 2 TIMES DAILY
Qty: 14 TABLET | Refills: 0 | Status: SHIPPED | OUTPATIENT
Start: 2024-12-06 | End: 2024-12-13

## 2024-12-06 RX ORDER — DIPHENOXYLATE HYDROCHLORIDE AND ATROPINE SULFATE 2.5; .025 MG/1; MG/1
1 TABLET ORAL 4 TIMES DAILY PRN
Qty: 20 TABLET | Refills: 1 | Status: SHIPPED | OUTPATIENT
Start: 2024-12-06 | End: 2024-12-16

## 2024-12-06 RX ORDER — DIPHENOXYLATE HYDROCHLORIDE AND ATROPINE SULFATE 2.5; .025 MG/1; MG/1
1 TABLET ORAL 3 TIMES DAILY
Qty: 30 TABLET | Refills: 0 | OUTPATIENT
Start: 2024-12-06 | End: 2024-12-16

## 2024-12-06 RX ORDER — SULFAMETHOXAZOLE AND TRIMETHOPRIM 800; 160 MG/1; MG/1
1 TABLET ORAL 2 TIMES DAILY
Qty: 28 TABLET | Refills: 0 | Status: SHIPPED | OUTPATIENT
Start: 2024-12-06 | End: 2024-12-20

## 2024-12-06 RX ORDER — ONDANSETRON 4 MG/1
4 TABLET, FILM COATED ORAL DAILY PRN
Qty: 30 TABLET | Refills: 0 | OUTPATIENT
Start: 2024-12-06

## 2024-12-06 RX ORDER — ONDANSETRON 4 MG/1
4 TABLET, ORALLY DISINTEGRATING ORAL 3 TIMES DAILY PRN
Qty: 21 TABLET | Refills: 1 | Status: SHIPPED | OUTPATIENT
Start: 2024-12-06

## 2024-12-06 NOTE — PROGRESS NOTES
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  Will change her to Bactrim p.o. DS twice daily x 14 days  Lomotil and zofran called into attempt to decrease her stomach irritation from Bactrim  If she unable to control her stomach irritation we will have to get in touch of the infectious disease specialist to find out what the dosing for linezolid is and to figure out if that should be combined with rifampin.  Follow up in 2 week(s) without XR          Procedure: Right fifth ray amputation-wound debridement - Right    Surgery Date: 12/4/2024      Subjective: Denies fevers chills or infection.  Denies any signs of spreading erythema.  She presents today for drain pull    ROS: Patient Denies fever/chills, headache, visual changes, chest pain, shortness of breath, and nausea/vomiting/diarrhea     Exam:     Wounds: appears to be healing well with good reapproximation, healing well , sutures in place and left in.  Drain pulled.  No signs of spreading erythema.    Vascular: BLE: 2+ DP pulse, toes wwp w/ BCR<2s    Imaging:   No imaging reviewed    Results       Procedure Component Value Units Date/Time    Culture, Wound [[id number]]  (Abnormal) Collected: 12/04/24 1419    Order Status: Completed Specimen: Foot, Right Updated: 12/06/24 0859     Special Requests NO SPECIAL REQUESTS        Gram Stain NO WBC'S SEEN         NO DEFINITE ORGANISM SEEN        Culture       SCANT Staphylococcus aureus SENSITIVITY TO FOLLOW                  LIGHT NORMAL SKIN PIPER ISOLATED          Culture, Anaerobic [[id number]] Collected: 12/04/24 1419    Order Status: Completed Specimen: Foot, Right Updated: 12/06/24 1127     Special Requests NO SPECIAL REQUESTS        Culture       SUBCULTURE IS NECESSARY TO DETERMINE PRESENCE OR ABSENCE OF ANAEROBIC BACTERIA IN THIS CULTURE.  FURTHER REPORT TO FOLLOW AFTER INCUBATION OF SUBCULTURE.

## 2024-12-07 LAB
BACTERIA SPEC CULT: ABNORMAL
BACTERIA SPEC CULT: ABNORMAL
GRAM STN SPEC: ABNORMAL
GRAM STN SPEC: ABNORMAL
SERVICE CMNT-IMP: ABNORMAL

## 2024-12-09 DIAGNOSIS — G89.18 POST-OP PAIN: Primary | ICD-10-CM

## 2024-12-09 RX ORDER — OXYCODONE HYDROCHLORIDE 5 MG/1
5 TABLET ORAL EVERY 6 HOURS PRN
Qty: 20 TABLET | Refills: 0 | Status: SHIPPED | OUTPATIENT
Start: 2024-12-09 | End: 2024-12-14

## 2024-12-09 NOTE — TELEPHONE ENCOUNTER
She is requesting a refill on Oxycodone to [facility] on [facility].    Immunohistochemistry ([id number] and [id number]) billing is not performed here. Please use the Immunohistochemistry Stain Billing plan to accomplish this. Immunohistochemistry ([id number] and [id number]) billing is not performed here. Please use the Immunohistochemistry Stain Billing plan to accomplish this.

## 2024-12-10 DIAGNOSIS — M25.412: ICD-10-CM

## 2024-12-10 LAB
BASOPHILS # BLD: 0 K/UL (ref 0–0.2)
BASOPHILS NFR BLD: 0 % (ref 0–2)
CRP SERPL-MCNC: 1.9 MG/DL (ref 0–0.4)
DIFFERENTIAL METHOD BLD: ABNORMAL
EOSINOPHIL # BLD: 0.1 K/UL (ref 0–0.8)
EOSINOPHIL NFR BLD: 1 % (ref 0.5–7.8)
ERYTHROCYTE [DISTWIDTH] IN BLOOD BY AUTOMATED COUNT: 13.8 % (ref 11.9–14.6)
ERYTHROCYTE [SEDIMENTATION RATE] IN BLOOD: 62 MM/HR (ref 0–20)
HCT VFR BLD AUTO: 42.4 % (ref 35.8–46.3)
HGB BLD-MCNC: 12.8 G/DL (ref 11.7–15.4)
IMM GRANULOCYTES # BLD AUTO: 0 K/UL (ref 0–0.5)
IMM GRANULOCYTES NFR BLD AUTO: 0 % (ref 0–5)
LYMPHOCYTES # BLD: 1.7 K/UL (ref 0.5–4.6)
LYMPHOCYTES NFR BLD: 16 % (ref 13–44)
MCH RBC QN AUTO: 25 PG (ref 26.1–32.9)
MCHC RBC AUTO-ENTMCNC: 30.2 G/DL (ref 31.4–35)
MCV RBC AUTO: 83 FL (ref 82–102)
MONOCYTES # BLD: 0.4 K/UL (ref 0.1–1.3)
MONOCYTES NFR BLD: 4 % (ref 4–12)
NEUTS SEG # BLD: 8.1 K/UL (ref 1.7–8.2)
NEUTS SEG NFR BLD: 78 % (ref 43–78)
NRBC # BLD: 0 K/UL (ref 0–0.2)
PLATELET # BLD AUTO: 414 K/UL (ref 150–450)
PMV BLD AUTO: 9.3 FL (ref 9.4–12.3)
RBC # BLD AUTO: 5.11 M/UL (ref 4.05–5.2)
WBC # BLD AUTO: 10.3 K/UL (ref 4.3–11.1)

## 2024-12-17 NOTE — PROGRESS NOTES
significant pain. She still hasn't regained full motion. She has been doing more at work and she wants to be released to full duty.     PAST HISTORY:   Past medical, surgical, family, social history and allergies reviewed by me. Unchanged from prior visit.     REVIEW OF SYSTEMS:   As noted in HPI.     PHYSICAL EXAMINATION:     Gen: Well-developed, no acute distress   HEENT: NC/AT, EOMI   Neck: Trachea midline, normal ROM   CV: Regular rhythm by palpation of distal pulse, normal capillary refill   Pulm: No respiratory distress, no stridor   Psychiatric: Well oriented, normal mood and affect.   Skin: No rashes, lesions or ulcers, normal temperature, turgor, and texture on uninvolved extremity.      ORTHO EXAM:    Left Shoulder:      Inspection: no biceps deformity; no notable atrophy or erythema. No warmth of the joint. No palpable effusion  ROM active  passive: . Abduction 90. External rotation  at 90 degrees of abduction, external rotation improved to 40. Internal rotation 30.   Tenderness: No significant tenderness  Strength: Abduction 4+/5, External rotation 5/5, Internal rotation 5/5. There is some tremulousness with resisted abduction   Provocative tests: Negative Belly press, bearhug. Mccarty positive. No pain with empty can reported.   Normal capillary refill / 2+ radial pulse   Sensation intact to light touch         DIAGNOSTIC IMAGING:     3 view x-ray of the left shoulder Grashey, outlet, axillary taken today    Findings: There is no dislocation or fracture.  Bone appears consistent with previous x-rays, no evidence of AVN or necrosis.  Impression: Stable left shoulder x-ray    I have independently interpreted x-ray.    ASSESSMENT/PLAN:   1. Adhesive capsulitis of left shoulder    2. Effusion of shoulder joint, left         Her shoulder pain has improved significantly.  She still has limitation in her range of motion.  All these findings consistent with frozen shoulder.  We discussed this condition

## 2024-12-19 ENCOUNTER — OFFICE VISIT (OUTPATIENT)
Dept: ORTHOPEDIC SURGERY | Age: 39
End: 2024-12-19

## 2024-12-19 DIAGNOSIS — E11.621 DIABETIC ULCER OF RIGHT MIDFOOT ASSOCIATED WITH TYPE 2 DIABETES MELLITUS, WITH NECROSIS OF MUSCLE (HCC): Primary | ICD-10-CM

## 2024-12-19 DIAGNOSIS — M75.02 ADHESIVE CAPSULITIS OF LEFT SHOULDER: Primary | ICD-10-CM

## 2024-12-19 DIAGNOSIS — M25.412: ICD-10-CM

## 2024-12-19 DIAGNOSIS — L97.413 DIABETIC ULCER OF RIGHT MIDFOOT ASSOCIATED WITH TYPE 2 DIABETES MELLITUS, WITH NECROSIS OF MUSCLE (HCC): Primary | ICD-10-CM

## 2024-12-19 PROCEDURE — 99024 POSTOP FOLLOW-UP VISIT: CPT | Performed by: ORTHOPAEDIC SURGERY

## 2024-12-19 RX ORDER — SULFAMETHOXAZOLE AND TRIMETHOPRIM 800; 160 MG/1; MG/1
1 TABLET ORAL 2 TIMES DAILY
Qty: 20 TABLET | Refills: 0 | Status: SHIPPED | OUTPATIENT
Start: 2024-12-19 | End: 2024-12-29

## 2024-12-19 NOTE — PROGRESS NOTES
complications and that she has been noncompliant with my recommendations so far.    She agrees to, she understands, we do have a good relationship.  Hopefully she can heal this wound despite going Esmya wishes.

## 2025-02-27 ENCOUNTER — OFFICE VISIT (OUTPATIENT)
Dept: ORTHOPEDIC SURGERY | Age: 40
End: 2025-02-27

## 2025-02-27 DIAGNOSIS — E11.621 DIABETIC ULCER OF RIGHT MIDFOOT ASSOCIATED WITH TYPE 2 DIABETES MELLITUS, WITH NECROSIS OF MUSCLE (HCC): Primary | ICD-10-CM

## 2025-02-27 DIAGNOSIS — L97.413 DIABETIC ULCER OF RIGHT MIDFOOT ASSOCIATED WITH TYPE 2 DIABETES MELLITUS, WITH NECROSIS OF MUSCLE (HCC): Primary | ICD-10-CM

## 2025-02-27 PROCEDURE — 99024 POSTOP FOLLOW-UP VISIT: CPT | Performed by: ORTHOPAEDIC SURGERY

## 2025-02-27 NOTE — PROGRESS NOTES
Name: Deepa Cruz  YOB: 1985  Gender: female  MRN: [id number]    Plan:    Cultures grew out MRSA resistant to doxycycline.    Wound has healed.  Follow-up with me on as-needed basis.  Customized inserts and extra-depth shoes ordered         Procedure: Right fifth ray amputation-wound debridement - Right    Surgery Date: 12/4/2024      Subjective: Denies fevers chills or infection.  Denies any signs of spreading erythema.  She states the wound is healed.  Overall it is looking much better.    ROS: Patient Denies fever/chills, headache, visual changes, chest pain, shortness of breath, and nausea/vomiting/diarrhea     Exam:     Wounds: appears to be healing well with good reapproximation, healing well ,  There is obvious deformity the forefoot but the wound is completely healed.  No signs of infection today.  Complete epithelialization has been noted.    Vascular: BLE: 2+ DP pulse, toes wwp w/ BCR<2s    Imaging:   No imaging reviewed    Results       ** No results found for the last 336 hours. **          Assessment and plan  Prescription for custom inserts and extra-depth shoes ordered.  The wound has healed.    At this point she can weight-bear as tolerated and customized shoes

## 2025-07-10 NOTE — WOUND CARE
Discharge Instructions for  [facility]  [address] [place] [address]  [address] [place] [address]  Phone [phone]   Fax [phone]      NAME:  Deepa Cruz  YOB: 1985  MEDICAL RECORD NUMBER:  [id number]  DATE:  5/30/2024    Return Appointment:   1 week with Cristian Da Silva DO    Instructions:    Right lateral foot;  Clean with normal saline or Vashe.  Hydrofera blue classic.  Secure with opti-lock, roll gauze.  Change every day.   Offload with your defender boot or a knee scooter.    Antibiotics prescribed clindamycin and ciproflaxin. Please  at your pharmacy and start taking.    Please increase dietary protein to at least 100 grams per day to support cell rejuvenation.   May use supplements such as Ensure Max, [place], & Glucerna (samples & coupons provided at first visit).   Be sure to spread intake throughout the day for improved absorption.      MRI of right foot ordered. Please call [phone] to speak directly with a  to schedule your appointment if you have not received an automated call within 24 hours.       Should you experience increased redness, swelling, pain, foul odor, size of wound(s), or have a temperature over 101 degrees please contact the [facility] at [phone] or if after hours contact your primary care physician or go to the hospital emergency department.    PLEASE NOTE: IF YOU ARE UNABLE TO OBTAIN WOUND SUPPLIES, CONTINUE TO USE THE SUPPLIES YOU HAVE AVAILABLE UNTIL YOU ARE ABLE TO REACH US. IT IS MOST IMPORTANT TO KEEP THE WOUND COVERED AT ALL TIMES.    Electronically signed Camron Cross RN, [facility] on 5/30/2024 at 4:21 PM    
No

## (undated) DEVICE — STERILE PVP: Brand: MEDLINE INDUSTRIES, INC.

## (undated) DEVICE — CUTTING BURR: Brand: MICA

## (undated) DEVICE — ZIMMER® STERILE DISPOSABLE TOURNIQUET CUFF WITH PLC, DUAL PORT, SINGLE BLADDER, 18 IN. (46 CM)

## (undated) DEVICE — CLOTH PRE OP W9XL10.5IN 2% CHG FRAGRANCE RNS FREE READYPREP

## (undated) DEVICE — BASIC SINGLE BASIN-LF: Brand: MEDLINE INDUSTRIES, INC.

## (undated) DEVICE — GOWN,SIRUS,NONRNF,SETINSLV,XL,20/CS: Brand: MEDLINE

## (undated) DEVICE — BANDAGE COMPR L5YDXW2IN FOAM CO FLX

## (undated) DEVICE — FOOT & ANKLE SOFT DR WOMACK: Brand: MEDLINE INDUSTRIES, INC.

## (undated) DEVICE — SUTURE PROL SZ 3-0 L18IN NONABSORBABLE BLU L19MM PS-2 3/8 8687H

## (undated) DEVICE — PRECISION THIN (9.0 X 0.38 X 31.0MM)

## (undated) DEVICE — DRESSING PETRO W3XL8IN OIL EMUL N ADH GZ KNIT IMPREG CELOS

## (undated) DEVICE — GLOVE SURG SZ 8 L12IN FNGR THK79MIL GRN LTX FREE

## (undated) DEVICE — BANDAGE,ELASTIC,ESMARK,STERILE,4"X9',LF: Brand: MEDLINE

## (undated) DEVICE — PAD,ABDOMINAL,5"X9",ST,LF,25/BX: Brand: MEDLINE INDUSTRIES, INC.

## (undated) DEVICE — GLOVE ORANGE PI 7 1/2   MSG9075

## (undated) DEVICE — GLOVE SURG SZ 85 L12IN FNGR ORTHO 126MIL CRM LTX FREE

## (undated) DEVICE — STRIP WND PK W0.25INXL5YD IODO GZ TIGHTLY WVN CURAD

## (undated) DEVICE — DRAIN SURG PENROSE 0.25X12 IN CLOSED WND DRAINAGE PREM SIL